# Patient Record
Sex: FEMALE | Race: WHITE | NOT HISPANIC OR LATINO | Employment: OTHER | URBAN - METROPOLITAN AREA
[De-identification: names, ages, dates, MRNs, and addresses within clinical notes are randomized per-mention and may not be internally consistent; named-entity substitution may affect disease eponyms.]

---

## 2017-01-13 ENCOUNTER — OFFICE VISIT (OUTPATIENT)
Dept: GASTROENTEROLOGY | Facility: CLINIC | Age: 77
End: 2017-01-13

## 2017-01-13 VITALS
WEIGHT: 207 LBS | DIASTOLIC BLOOD PRESSURE: 76 MMHG | SYSTOLIC BLOOD PRESSURE: 132 MMHG | HEIGHT: 64 IN | BODY MASS INDEX: 35.34 KG/M2

## 2017-01-13 DIAGNOSIS — D12.6: Primary | ICD-10-CM

## 2017-01-13 PROCEDURE — 99203 OFFICE O/P NEW LOW 30 MIN: CPT | Performed by: INTERNAL MEDICINE

## 2017-01-13 RX ORDER — SODIUM CHLORIDE 0.9 % (FLUSH) 0.9 %
1-10 SYRINGE (ML) INJECTION AS NEEDED
Status: CANCELLED | OUTPATIENT
Start: 2017-01-13

## 2017-01-13 RX ORDER — UBIDECARENONE 100 MG
100 CAPSULE ORAL DAILY
COMMUNITY
End: 2018-08-09

## 2017-01-13 RX ORDER — PANTOPRAZOLE SODIUM 40 MG/1
40 TABLET, DELAYED RELEASE ORAL DAILY
COMMUNITY
End: 2017-08-03 | Stop reason: SDUPTHER

## 2017-01-13 NOTE — PROGRESS NOTES
Chief Complaint   Patient presents with   • Consult for a rectal EUS     Subjective      HPI     Viktoria Villasenor is a 76 y.o. female who presents for evaluation for possible rectal EUS.  Pt underwent recent colonoscopy with Dr. Garcia.  She was was reported as a 2mm rectosigmoid polyp resected, with pathology suggestive of a Schwannoma.  Immunostaining was positive for S100 and parakeratin.  No Ki67 index was performed.  I have reviewed all of these records from Dr. Garcia's office.  She has no lower GI complaints.  She has no known hx of neurofibromatosis or unusual skin lesions.  She has no significant family hx from Gi perspective.      Past Medical History   Diagnosis Date   • ACE-inhibitor cough    • Asthma    • Asymptomatic postmenopausal status    • Conjunctivitis      right   • GERD (gastroesophageal reflux disease)    • HLD (hyperlipidemia)    • Hypertension    • Hypopigmentation    • Left shoulder tendonitis    • Menopausal syndrome    • Motion sickness    • Plantar fasciitis of left foot      nodule   • Pruritus      possibly due to Benicar   • SOB (shortness of breath)    • Thyroid cyst    • UTI (urinary tract infection)    • Viral syndrome        Current Outpatient Prescriptions:   •  coenzyme Q10 100 MG capsule, Take 100 mg by mouth Daily., Disp: , Rfl:   •  irbesartan (AVAPRO) 300 MG tablet, Take 1 tablet by mouth Every Night for 180 days., Disp: 90 tablet, Rfl: 1  •  montelukast (SINGULAIR) 10 MG tablet, Take 1 tablet by mouth Every Night for 180 days., Disp: 90 tablet, Rfl: 1  •  pantoprazole (PROTONIX) 40 MG EC tablet, Take 40 mg by mouth Daily., Disp: , Rfl:   •  simvastatin (ZOCOR) 20 MG tablet, Take 1 tablet by mouth Every Night for 180 days., Disp: 90 tablet, Rfl: 1     No Known Allergies  Social History     Social History   • Marital status:      Spouse name: N/A   • Number of children: N/A   • Years of education: N/A     Occupational History   • Not on file.     Social History Main  Topics   • Smoking status: Former Smoker     Packs/day: 1.00     Quit date: 1994   • Smokeless tobacco: Never Used      Comment: Smoked 1 ppd for 20-25 years.  Quit in 1995.    • Alcohol use No   • Drug use: No   • Sexual activity: Defer     Other Topics Concern   • Not on file     Social History Narrative     Family History   Problem Relation Age of Onset   • Anxiety disorder Mother    • Uterine cancer Mother    • Cancer Father      Prostate Cancer    • Other Father      AAA     Review of Systems   Constitutional: Negative.    HENT: Negative.    Respiratory: Negative.    Cardiovascular: Negative.    Gastrointestinal: Negative.    Neurological: Negative.    Psychiatric/Behavioral: Negative.         Objective   Vitals:    01/13/17 0922   BP: 132/76     Physical Exam   Constitutional: She is oriented to person, place, and time. She appears well-developed and well-nourished.   HENT:   Head: Normocephalic and atraumatic.   Eyes: EOM are normal. Pupils are equal, round, and reactive to light.   Cardiovascular: Normal rate, regular rhythm and normal heart sounds.    Pulmonary/Chest: Effort normal.   Abdominal: Soft. Bowel sounds are normal. She exhibits no distension and no mass. There is no tenderness. No hernia.   Musculoskeletal: Normal range of motion.   Neurological: She is alert and oriented to person, place, and time.   Skin: Skin is warm and dry.   Psychiatric: She has a normal mood and affect. Her behavior is normal. Judgment and thought content normal.        Assessment/Plan      Viktoria was seen today for consult for a rectal eus.    Diagnoses and all orders for this visit:    Schwannoma of colon  -     Case Request; Standing  -     sodium chloride 0.9 % flush 1-10 mL; Infuse 1-10 mL into a venous catheter As Needed for line care.  -     Case Request    Other orders  -     Implement Anesthesia orders day of procedure.; Standing  -     Obtain informed consent; Standing  -     Insert Peripheral IV; Standing  -      Saline Lock & Maintain IV Access; Standing    This lady is a somewhat unusual presentation of what pathology is describing as a rectal schwannoma.  This was discovered as a 2 mm polypoid lesion in the rectosigmoid on recent colonoscopy.  Lesion was not obviously submucosal in origin would be typical of a schwannoma.  We'll plan on endorectal ultrasound for further evaluation although the extremely small small size of this lesion is reassuring, as is the historically benign nature of schwannomas.

## 2017-01-13 NOTE — LETTER
January 16, 2017     Magen Olson MD  [id number] [facility] [address]  [place] [id number]    Patient: Viktoria Villasenor   YOB: 1940   Date of Visit: 1/13/2017       Dear Dr. Wesley MD:    Thank you for referring Viktoria Villasenor to me for evaluation. Below is a copy of my consult note.    If you have questions, please do not hesitate to call me. I look forward to following Viktoria along with you.         Sincerely,        Casper Rodríguez MD        CC: Alexy Garcia MD    Chief Complaint   Patient presents with   • Consult for a rectal EUS     Subjective      HPI     Viktoria Villasenor is a 76 y.o. female who presents for evaluation for possible rectal EUS.  Pt underwent recent colonoscopy with Dr. Garcia.  She was was reported as a 2mm rectosigmoid polyp resected, with pathology suggestive of a Schwannoma.  Immunostaining was positive for S100 and parakeratin.  No Ki67 index was performed.  I have reviewed all of these records from Dr. Garcia's office.  She has no lower GI complaints.  She has no known hx of neurofibromatosis or unusual skin lesions.  She has no significant family hx from Gi perspective.      Past Medical History   Diagnosis Date   • ACE-inhibitor cough    • Asthma    • Asymptomatic postmenopausal status    • Conjunctivitis      right   • GERD (gastroesophageal reflux disease)    • HLD (hyperlipidemia)    • Hypertension    • Hypopigmentation    • Left shoulder tendonitis    • Menopausal syndrome    • Motion sickness    • Plantar fasciitis of left foot      nodule   • Pruritus      possibly due to Benicar   • SOB (shortness of breath)    • Thyroid cyst    • UTI (urinary tract infection)    • Viral syndrome        Current Outpatient Prescriptions:   •  coenzyme Q10 100 MG capsule, Take 100 mg by mouth Daily., Disp: , Rfl:   •  irbesartan (AVAPRO) 300 MG tablet, Take 1 tablet by mouth Every Night for 180 days., Disp: 90 tablet, Rfl: 1  •  montelukast (SINGULAIR) 10 MG  tablet, Take 1 tablet by mouth Every Night for 180 days., Disp: 90 tablet, Rfl: 1  •  pantoprazole (PROTONIX) 40 MG EC tablet, Take 40 mg by mouth Daily., Disp: , Rfl:   •  simvastatin (ZOCOR) 20 MG tablet, Take 1 tablet by mouth Every Night for 180 days., Disp: 90 tablet, Rfl: 1     No Known Allergies  Social History     Social History   • Marital status:      Spouse name: N/A   • Number of children: N/A   • Years of education: N/A     Occupational History   • Not on file.     Social History Main Topics   • Smoking status: Former Smoker     Packs/day: 1.00     Quit date: 1994   • Smokeless tobacco: Never Used      Comment: Smoked 1 ppd for 20-25 years.  Quit in 1995.    • Alcohol use No   • Drug use: No   • Sexual activity: Defer     Other Topics Concern   • Not on file     Social History Narrative     Family History   Problem Relation Age of Onset   • Anxiety disorder Mother    • Uterine cancer Mother    • Cancer Father      Prostate Cancer    • Other Father      AAA     Review of Systems   Constitutional: Negative.    HENT: Negative.    Respiratory: Negative.    Cardiovascular: Negative.    Gastrointestinal: Negative.    Neurological: Negative.    Psychiatric/Behavioral: Negative.         Objective   Vitals:    01/13/17 0922   BP: 132/76     Physical Exam   Constitutional: She is oriented to person, place, and time. She appears well-developed and well-nourished.   HENT:   Head: Normocephalic and atraumatic.   Eyes: EOM are normal. Pupils are equal, round, and reactive to light.   Cardiovascular: Normal rate, regular rhythm and normal heart sounds.    Pulmonary/Chest: Effort normal.   Abdominal: Soft. Bowel sounds are normal. She exhibits no distension and no mass. There is no tenderness. No hernia.   Musculoskeletal: Normal range of motion.   Neurological: She is alert and oriented to person, place, and time.   Skin: Skin is warm and dry.   Psychiatric: She has a normal mood and affect. Her behavior is  normal. Judgment and thought content normal.        Assessment/Plan      Viktoria was seen today for consult for a rectal eus.    Diagnoses and all orders for this visit:    Schwannoma of colon  -     Case Request; Standing  -     sodium chloride 0.9 % flush 1-10 mL; Infuse 1-10 mL into a venous catheter As Needed for line care.  -     Case Request    Other orders  -     Implement Anesthesia orders day of procedure.; Standing  -     Obtain informed consent; Standing  -     Insert Peripheral IV; Standing  -     Saline Lock & Maintain IV Access; Standing    This lady is a somewhat unusual presentation of what pathology is describing as a rectal schwannoma.  This was discovered as a 2 mm polypoid lesion in the rectosigmoid on recent colonoscopy.  Lesion was not obviously submucosal in origin would be typical of a schwannoma.  We'll plan on endorectal ultrasound for further evaluation although the extremely small small size of this lesion is reassuring, as is the historically benign nature of schwannomas.

## 2017-01-13 NOTE — MR AVS SNAPSHOT
Viktoria Villasenor   1/13/2017 9:45 AM   Office Visit    Dept Phone:  [phone]   Encounter #:  [id number]    Provider:  Casper Rodríguez MD   Department:  [facility]                Your Full Care Plan              Today's Medication Changes          These changes are accurate as of: 1/13/17  9:56 AM.  If you have any questions, ask your nurse or doctor.               Stop taking medication(s)listed here:     omeprazole 40 MG capsule   Commonly known as:  priLOSEC   Stopped by:  Casper Rodríguez MD                      Your Updated Medication List          This list is accurate as of: 1/13/17  9:56 AM.  Always use your most recent med list.                coenzyme Q10 100 MG capsule       irbesartan 300 MG tablet   Commonly known as:  AVAPRO   Take 1 tablet by mouth Every Night for 180 days.       montelukast 10 MG tablet   Commonly known as:  SINGULAIR   Take 1 tablet by mouth Every Night for 180 days.       pantoprazole 40 MG EC tablet   Commonly known as:  PROTONIX       simvastatin 20 MG tablet   Commonly known as:  ZOCOR   Take 1 tablet by mouth Every Night for 180 days.               We Performed the Following     Case Request       You Were Diagnosed With        Codes Comments    Schwannoma of colon    -  Primary ICD-10-CM: D12.6  ICD-9-CM: 211.3       Instructions     None    Patient Instructions History      Upcoming Appointments     Visit Type Date Time Department    NEW PATIENT 1/13/2017  9:45 AM [facility]      cVidya Signup     [facility] [employer]Vidya allows you to send messages to your doctor, view your test results, renew your prescriptions, schedule appointments, and more. To sign up, go to [employer] and click on the Sign Up Now link in the New User? box. Enter your [employer]Vidya Activation Code exactly as it appears below along with the last four digits of your Social Security Number and your Date of Birth  "() to complete the sign-up process. If you do not sign up before the expiration date, you must request a new code.    [email or web address] Activation Code: [email or web address]  Expires: 2017  9:56 AM    If you have questions, you can email [email or web address] or call [phone] to talk to our [email or web address] staff. Remember, [email or web address] is NOT to be used for urgent needs. For medical emergencies, dial 911.               Other Info from Your Visit           Allergies     No Known Allergies      Reason for Visit     Consult for a rectal EUS           Vital Signs     Blood Pressure Height Weight Body Mass Index Smoking Status       132/76 64\" (162.6 cm) 207 lb (93.9 kg) 35.53 kg/m2 Former Smoker       Problems and Diagnoses Noted     Schwannoma of colon    -  Primary        "

## 2017-02-07 ENCOUNTER — ANESTHESIA (OUTPATIENT)
Dept: GASTROENTEROLOGY | Facility: HOSPITAL | Age: 77
End: 2017-02-07

## 2017-02-07 ENCOUNTER — HOSPITAL ENCOUNTER (OUTPATIENT)
Facility: HOSPITAL | Age: 77
Setting detail: HOSPITAL OUTPATIENT SURGERY
Discharge: HOME | End: 2017-02-07
Attending: INTERNAL MEDICINE | Admitting: INTERNAL MEDICINE

## 2017-02-07 ENCOUNTER — ANESTHESIA EVENT (OUTPATIENT)
Dept: GASTROENTEROLOGY | Facility: HOSPITAL | Age: 77
End: 2017-02-07

## 2017-02-07 VITALS
HEART RATE: 72 BPM | OXYGEN SATURATION: 97 % | BODY MASS INDEX: 34.67 KG/M2 | TEMPERATURE: 98.4 F | SYSTOLIC BLOOD PRESSURE: 114 MMHG | WEIGHT: 202 LBS | RESPIRATION RATE: 16 BRPM | DIASTOLIC BLOOD PRESSURE: 59 MMHG

## 2017-02-07 DIAGNOSIS — D12.6: ICD-10-CM

## 2017-02-07 PROCEDURE — 45341 SIGMOIDOSCOPY W/ULTRASOUND: CPT | Performed by: INTERNAL MEDICINE

## 2017-02-07 PROCEDURE — 25010000002 PROPOFOL 10 MG/ML EMULSION: Performed by: ANESTHESIOLOGY

## 2017-02-07 RX ORDER — SODIUM CHLORIDE, SODIUM LACTATE, POTASSIUM CHLORIDE, CALCIUM CHLORIDE 600; 310; 30; 20 MG/100ML; MG/100ML; MG/100ML; MG/100ML
1000 INJECTION, SOLUTION INTRAVENOUS CONTINUOUS PRN
Status: DISCONTINUED | OUTPATIENT
Start: 2017-02-07 | End: 2017-02-07 | Stop reason: HOSPADM

## 2017-02-07 RX ORDER — LIDOCAINE HYDROCHLORIDE 20 MG/ML
INJECTION, SOLUTION INFILTRATION; PERINEURAL AS NEEDED
Status: DISCONTINUED | OUTPATIENT
Start: 2017-02-07 | End: 2017-02-07 | Stop reason: SURG

## 2017-02-07 RX ORDER — SODIUM CHLORIDE 0.9 % (FLUSH) 0.9 %
1-10 SYRINGE (ML) INJECTION AS NEEDED
Status: DISCONTINUED | OUTPATIENT
Start: 2017-02-07 | End: 2017-02-07 | Stop reason: HOSPADM

## 2017-02-07 RX ORDER — PROPOFOL 10 MG/ML
VIAL (ML) INTRAVENOUS AS NEEDED
Status: DISCONTINUED | OUTPATIENT
Start: 2017-02-07 | End: 2017-02-07 | Stop reason: SURG

## 2017-02-07 RX ADMIN — PROPOFOL 200 MG: 10 INJECTION, EMULSION INTRAVENOUS at 11:00

## 2017-02-07 RX ADMIN — SODIUM CHLORIDE, POTASSIUM CHLORIDE, SODIUM LACTATE AND CALCIUM CHLORIDE: 600; 310; 30; 20 INJECTION, SOLUTION INTRAVENOUS at 10:20

## 2017-02-07 RX ADMIN — LIDOCAINE HYDROCHLORIDE 40 MG: 20 INJECTION, SOLUTION INFILTRATION; PERINEURAL at 10:20

## 2017-02-07 RX ADMIN — SODIUM CHLORIDE, POTASSIUM CHLORIDE, SODIUM LACTATE AND CALCIUM CHLORIDE 1000 ML: 600; 310; 30; 20 INJECTION, SOLUTION INTRAVENOUS at 10:15

## 2017-02-07 RX ADMIN — PROPOFOL 200 MG: 10 INJECTION, EMULSION INTRAVENOUS at 10:32

## 2017-02-07 NOTE — PLAN OF CARE
Problem: Patient Care Overview (Adult)  Goal: Plan of Care Review  Outcome: Ongoing (interventions implemented as appropriate)    02/07/17 0953   Coping/Psychosocial Response Interventions   Plan Of Care Reviewed With patient       Goal: Adult Individualization and Mutuality  Outcome: Ongoing (interventions implemented as appropriate)    02/07/17 0953   Individualization   Patient Specific Preferences none       Goal: Discharge Needs Assessment  Outcome: Ongoing (interventions implemented as appropriate)    02/07/17 0953   Discharge Needs Assessment   Concerns To Be Addressed no discharge needs identified   Living Environment   Transportation Available family or friend will provide         Problem: GI Endoscopy (Adult)  Goal: Signs and Symptoms of Listed Potential Problems Will be Absent or Manageable (GI Endoscopy)  Outcome: Ongoing (interventions implemented as appropriate)    02/07/17 0953   GI Endoscopy   Problems Assessed (GI Endoscopy) pain   Problems Present (GI Endoscopy) none

## 2017-02-07 NOTE — ANESTHESIA POSTPROCEDURE EVALUATION
Patient: Viktoria Villasenor    Procedure Summary     Date Anesthesia Start Anesthesia Stop Room / Location    02/07/17 1032 1109  [facility] 10 /  [facility]       Procedure Diagnosis Surgeon Provider    ENDOSCOPIC ULTRASOUND (LOWER) (N/A ) Schwannoma of colon  (Schwannoma of colon [D12.6]) MD Devon Brown MD          Anesthesia Type: MAC  Last vitals  BP 91/57 (02/07/17 1109)    Temp      Pulse 80 (02/07/17 1109)   Resp 16 (02/07/17 1109)    SpO2 96 % (02/07/17 1109)      Post Anesthesia Care and Evaluation    Patient location during evaluation: PACU  Patient participation: complete - patient participated  Level of consciousness: awake and alert  Pain management: adequate  Airway patency: patent  Anesthetic complications: No anesthetic complications    Cardiovascular status: acceptable  Respiratory status: acceptable  Hydration status: acceptable

## 2017-02-07 NOTE — DISCHARGE INSTRUCTIONS
For the next 24 hours patient needs to be with a responsible adult.    For 24 hours DO NOT drive, operate machinery, appliances, drink alcohol, make important decisions or sign legal documents.    Start with a light or bland diet and advance to regular diet as tolerated.    Follow recommendations on procedure report provided by your doctor.    Call Dr. MANZANO for problems     Problems may include but not limited to: large amounts of bleeding, trouble breathing, repeated vomiting, severe unrelieved pain, fever or chills.

## 2017-02-07 NOTE — ANESTHESIA PREPROCEDURE EVALUATION
Anesthesia Evaluation     Patient summary reviewed and Nursing notes reviewed      Airway   Mallampati: III  TM distance: <3 FB  Neck ROM: full  possible difficult intubation  Dental - normal exam     Pulmonary - normal exam   (+) a smoker,   Cardiovascular - negative cardio ROS and normal exam    ECG reviewed  Rhythm: regular  Rate: normal        Neuro/Psych- negative ROS  GI/Hepatic/Renal/Endo - negative ROS     Musculoskeletal (-) negative ROS    Abdominal  - normal exam    Bowel sounds: normal.   Substance History - negative use     OB/GYN negative ob/gyn ROS         Other                              Anesthesia Plan    ASA 2     MAC   (Small mouth  overbite  Short TM distance)  intravenous induction   Anesthetic plan and risks discussed with patient.

## 2017-02-15 ENCOUNTER — TELEPHONE (OUTPATIENT)
Dept: CARDIOLOGY | Facility: CLINIC | Age: 77
End: 2017-02-15

## 2017-02-15 NOTE — TELEPHONE ENCOUNTER
Called spoke with them noted RBBB on monitor no symptoms. Hey are letting her go to F/U with us as an op.MERLE

## 2017-02-17 NOTE — TELEPHONE ENCOUNTER
See EKG in inbox. Please advise how soon you would like to see the pt.     Pt can be reached at [phone] or [phone]

## 2017-02-18 NOTE — TELEPHONE ENCOUNTER
Sheree,    She has had a recent heart cath which was normal.  She can follow-up in 4-6 weeks.  Can you please arrange?  Thanks    AUSTIN

## 2017-04-14 ENCOUNTER — RESULTS ENCOUNTER (OUTPATIENT)
Dept: FAMILY MEDICINE CLINIC | Facility: CLINIC | Age: 77
End: 2017-04-14

## 2017-04-14 DIAGNOSIS — I10 ESSENTIAL HYPERTENSION: ICD-10-CM

## 2017-04-14 DIAGNOSIS — J45.909 UNCOMPLICATED ASTHMA, UNSPECIFIED ASTHMA SEVERITY: ICD-10-CM

## 2017-04-14 DIAGNOSIS — E78.49 OTHER HYPERLIPIDEMIA: ICD-10-CM

## 2017-05-02 ENCOUNTER — OFFICE VISIT (OUTPATIENT)
Dept: FAMILY MEDICINE CLINIC | Facility: CLINIC | Age: 77
End: 2017-05-02

## 2017-05-02 VITALS
HEIGHT: 64 IN | SYSTOLIC BLOOD PRESSURE: 128 MMHG | TEMPERATURE: 97.9 F | DIASTOLIC BLOOD PRESSURE: 70 MMHG | RESPIRATION RATE: 16 BRPM | BODY MASS INDEX: 35 KG/M2 | HEART RATE: 77 BPM | OXYGEN SATURATION: 97 % | WEIGHT: 205 LBS

## 2017-05-02 DIAGNOSIS — N39.0 ACUTE UTI: Primary | ICD-10-CM

## 2017-05-02 LAB
BILIRUB BLD-MCNC: NEGATIVE MG/DL
CLARITY, POC: CLEAR
COLOR UR: YELLOW
GLUCOSE UR STRIP-MCNC: NEGATIVE MG/DL
KETONES UR QL: NEGATIVE
LEUKOCYTE EST, POC: ABNORMAL
NITRITE UR-MCNC: NEGATIVE MG/ML
PH UR: 6.5 [PH] (ref 5–8)
PROT UR STRIP-MCNC: NEGATIVE MG/DL
RBC # UR STRIP: NEGATIVE /UL
SP GR UR: 1.02 (ref 1–1.03)
UROBILINOGEN UR QL: NORMAL

## 2017-05-02 PROCEDURE — 81003 URINALYSIS AUTO W/O SCOPE: CPT | Performed by: NURSE PRACTITIONER

## 2017-05-02 PROCEDURE — 99213 OFFICE O/P EST LOW 20 MIN: CPT | Performed by: NURSE PRACTITIONER

## 2017-05-02 RX ORDER — NITROFURANTOIN 25; 75 MG/1; MG/1
100 CAPSULE ORAL 2 TIMES DAILY
Qty: 14 CAPSULE | Refills: 0 | Status: SHIPPED | OUTPATIENT
Start: 2017-05-02 | End: 2017-08-03

## 2017-05-04 LAB
BACTERIA UR CULT: NORMAL
BACTERIA UR CULT: NORMAL

## 2017-05-16 LAB
ALBUMIN SERPL-MCNC: 4.2 G/DL (ref 3.5–5.2)
ALBUMIN/GLOB SERPL: 1.5 G/DL
ALP SERPL-CCNC: 92 U/L (ref 39–117)
ALT SERPL-CCNC: 39 U/L (ref 1–33)
AST SERPL-CCNC: 33 U/L (ref 1–32)
BASOPHILS # BLD AUTO: 0.04 10*3/MM3 (ref 0–0.2)
BASOPHILS NFR BLD AUTO: 0.4 % (ref 0–1.5)
BILIRUB SERPL-MCNC: 0.4 MG/DL (ref 0.1–1.2)
BUN SERPL-MCNC: 10 MG/DL (ref 8–23)
BUN/CREAT SERPL: 10.8 (ref 7–25)
CALCIUM SERPL-MCNC: 9.1 MG/DL (ref 8.6–10.5)
CHLORIDE SERPL-SCNC: 104 MMOL/L (ref 98–107)
CHOLEST SERPL-MCNC: 150 MG/DL (ref 0–200)
CO2 SERPL-SCNC: 28.1 MMOL/L (ref 22–29)
CREAT SERPL-MCNC: 0.93 MG/DL (ref 0.57–1)
EOSINOPHIL # BLD AUTO: 0.56 10*3/MM3 (ref 0–0.7)
EOSINOPHIL NFR BLD AUTO: 6.2 % (ref 0.3–6.2)
ERYTHROCYTE [DISTWIDTH] IN BLOOD BY AUTOMATED COUNT: 13.4 % (ref 11.7–13)
GLOBULIN SER CALC-MCNC: 2.8 GM/DL
GLUCOSE SERPL-MCNC: 110 MG/DL (ref 65–99)
HCT VFR BLD AUTO: 42.9 % (ref 35.6–45.5)
HDLC SERPL-MCNC: 62 MG/DL (ref 40–60)
HGB BLD-MCNC: 13.8 G/DL (ref 11.9–15.5)
IMM GRANULOCYTES # BLD: 0.02 10*3/MM3 (ref 0–0.03)
IMM GRANULOCYTES NFR BLD: 0.2 % (ref 0–0.5)
LDLC SERPL CALC-MCNC: 66 MG/DL (ref 0–100)
LDLC/HDLC SERPL: 1.07 {RATIO}
LYMPHOCYTES # BLD AUTO: 2.28 10*3/MM3 (ref 0.9–4.8)
LYMPHOCYTES NFR BLD AUTO: 25.1 % (ref 19.6–45.3)
MCH RBC QN AUTO: 28.2 PG (ref 26.9–32)
MCHC RBC AUTO-ENTMCNC: 32.2 G/DL (ref 32.4–36.3)
MCV RBC AUTO: 87.7 FL (ref 80.5–98.2)
MONOCYTES # BLD AUTO: 0.79 10*3/MM3 (ref 0.2–1.2)
MONOCYTES NFR BLD AUTO: 8.7 % (ref 5–12)
NEUTROPHILS # BLD AUTO: 5.41 10*3/MM3 (ref 1.9–8.1)
NEUTROPHILS NFR BLD AUTO: 59.4 % (ref 42.7–76)
PLATELET # BLD AUTO: 216 10*3/MM3 (ref 140–500)
POTASSIUM SERPL-SCNC: 4.4 MMOL/L (ref 3.5–5.2)
PROT SERPL-MCNC: 7 G/DL (ref 6–8.5)
RBC # BLD AUTO: 4.89 10*6/MM3 (ref 3.9–5.2)
SODIUM SERPL-SCNC: 146 MMOL/L (ref 136–145)
TRIGL SERPL-MCNC: 108 MG/DL (ref 0–150)
TSH SERPL DL<=0.005 MIU/L-ACNC: 1.89 MIU/ML (ref 0.27–4.2)
VLDLC SERPL CALC-MCNC: 21.6 MG/DL (ref 5–40)
WBC # BLD AUTO: 9.1 10*3/MM3 (ref 4.5–10.7)

## 2017-05-18 ENCOUNTER — TELEPHONE (OUTPATIENT)
Dept: FAMILY MEDICINE CLINIC | Facility: CLINIC | Age: 77
End: 2017-05-18

## 2017-05-18 DIAGNOSIS — E78.49 OTHER HYPERLIPIDEMIA: ICD-10-CM

## 2017-05-18 DIAGNOSIS — J45.909 UNCOMPLICATED ASTHMA, UNSPECIFIED ASTHMA SEVERITY: ICD-10-CM

## 2017-05-18 DIAGNOSIS — I10 ESSENTIAL HYPERTENSION: ICD-10-CM

## 2017-05-18 RX ORDER — IRBESARTAN 300 MG/1
300 TABLET ORAL NIGHTLY
Qty: 30 TABLET | Refills: 0 | Status: SHIPPED | OUTPATIENT
Start: 2017-05-18 | End: 2017-08-03 | Stop reason: SDUPTHER

## 2017-05-18 RX ORDER — SIMVASTATIN 20 MG
20 TABLET ORAL NIGHTLY
Qty: 30 TABLET | Refills: 0 | Status: SHIPPED | OUTPATIENT
Start: 2017-05-18 | End: 2017-08-03 | Stop reason: SDUPTHER

## 2017-05-18 RX ORDER — MONTELUKAST SODIUM 10 MG/1
10 TABLET ORAL NIGHTLY
Qty: 30 TABLET | Refills: 0 | Status: SHIPPED | OUTPATIENT
Start: 2017-05-18 | End: 2017-08-03 | Stop reason: SDUPTHER

## 2017-08-03 ENCOUNTER — OFFICE VISIT (OUTPATIENT)
Dept: FAMILY MEDICINE CLINIC | Facility: CLINIC | Age: 77
End: 2017-08-03

## 2017-08-03 VITALS
HEIGHT: 64 IN | HEART RATE: 72 BPM | RESPIRATION RATE: 16 BRPM | BODY MASS INDEX: 35.51 KG/M2 | DIASTOLIC BLOOD PRESSURE: 57 MMHG | SYSTOLIC BLOOD PRESSURE: 133 MMHG | WEIGHT: 208 LBS | TEMPERATURE: 97.8 F

## 2017-08-03 DIAGNOSIS — R79.89 ELEVATED LFTS: ICD-10-CM

## 2017-08-03 DIAGNOSIS — J45.909 UNCOMPLICATED ASTHMA, UNSPECIFIED ASTHMA SEVERITY: ICD-10-CM

## 2017-08-03 DIAGNOSIS — E78.49 OTHER HYPERLIPIDEMIA: ICD-10-CM

## 2017-08-03 DIAGNOSIS — K20.90 ESOPHAGITIS: ICD-10-CM

## 2017-08-03 DIAGNOSIS — R73.03 PREDIABETES: ICD-10-CM

## 2017-08-03 DIAGNOSIS — I10 ESSENTIAL HYPERTENSION: Primary | ICD-10-CM

## 2017-08-03 DIAGNOSIS — E66.09 NON MORBID OBESITY DUE TO EXCESS CALORIES: ICD-10-CM

## 2017-08-03 PROCEDURE — 99214 OFFICE O/P EST MOD 30 MIN: CPT | Performed by: FAMILY MEDICINE

## 2017-08-03 RX ORDER — PANTOPRAZOLE SODIUM 40 MG/1
40 TABLET, DELAYED RELEASE ORAL NIGHTLY PRN
Qty: 90 TABLET | Refills: 1 | Status: SHIPPED | OUTPATIENT
Start: 2017-08-03 | End: 2018-08-09

## 2017-08-03 RX ORDER — MONTELUKAST SODIUM 10 MG/1
10 TABLET ORAL NIGHTLY
Qty: 90 TABLET | Refills: 1 | Status: SHIPPED | OUTPATIENT
Start: 2017-08-03 | End: 2018-02-14 | Stop reason: SDUPTHER

## 2017-08-03 RX ORDER — IRBESARTAN 300 MG/1
300 TABLET ORAL NIGHTLY
Qty: 90 TABLET | Refills: 1 | Status: SHIPPED | OUTPATIENT
Start: 2017-08-03 | End: 2018-02-14 | Stop reason: ALTCHOICE

## 2017-08-03 RX ORDER — SIMVASTATIN 20 MG
20 TABLET ORAL NIGHTLY
Qty: 90 TABLET | Refills: 1 | Status: SHIPPED | OUTPATIENT
Start: 2017-08-03 | End: 2018-02-14 | Stop reason: SDUPTHER

## 2017-08-03 NOTE — PROGRESS NOTES
"Chief Complaint   Patient presents with   • Hyperlipidemia   • Hypertension       Subjective   This patient presents the office to review labs and refill medicines.  Blood pressure is controlled on current therapy.  Lipids are improved and the goal on current therapy.  Her asthma is controlled with montelukast.  She was diagnosed with mild esophagitis.  Her last EGD was done in February 2017.  She is using pantoprazole on an as-needed basis.  All pathology was negative.  She did have some stomach polyps removed a Schatzki's ring stretched.  Currently she is nearly asymptomatic.  Since the last visit her liver function tests have improved.  They're only mildly elevated.  Her blood sugar is improved but is still mildly elevated.  I have reviewed and updated her medications, medical history and problem list during today's office visit.        Social History   Substance Use Topics   • Smoking status: Former Smoker     Packs/day: 1.00     Quit date: 1994   • Smokeless tobacco: Never Used      Comment: Smoked 1 ppd for 20-25 years.  Quit in 1995.    • Alcohol use No       Review of Systems   Constitutional: Negative for fatigue.   Cardiovascular: Negative for chest pain.       Objective   /57  Pulse 72  Temp 97.8 °F (36.6 °C) (Oral)   Resp 16  Ht 64\" (162.6 cm)  Wt 208 lb (94.3 kg)  BMI 35.7 kg/m2  Body mass index is 35.7 kg/(m^2).  Physical Exam   Constitutional: She is cooperative. No distress.   Eyes: Conjunctivae and lids are normal.   Neck: Carotid bruit is not present. No tracheal deviation present.   Cardiovascular: Normal rate, regular rhythm and normal heart sounds.    No murmur heard.  Pulmonary/Chest: Effort normal and breath sounds normal.   Neurological: She is alert. She is not disoriented.   Skin: Skin is warm and dry.   Psychiatric: She has a normal mood and affect. Her speech is normal and behavior is normal.   Vitals reviewed.      Data Reviewed:    CMP:  Lab Results   Component Value Date    "  (H) 05/15/2017    BUN 10 05/15/2017    CREATININE 0.93 05/15/2017    EGFRIFNONA 59 (L) 05/15/2017    EGFRIFAFRI 71 05/15/2017     (H) 05/15/2017    K 4.4 05/15/2017     05/15/2017    CALCIUM 9.1 05/15/2017    PROTENTOTREF 7.0 05/15/2017    ALBUMIN 4.20 05/15/2017    LABGLOBREF 2.8 05/15/2017    BILITOT 0.4 05/15/2017    ALKPHOS 92 05/15/2017    AST 33 (H) 05/15/2017    ALT 39 (H) 05/15/2017     CBC w/ diff:   Lab Results   Component Value Date    WBC 9.10 05/15/2017    RBC 4.89 05/15/2017    HGB 13.8 05/15/2017    HCT 42.9 05/15/2017    MCV 87.7 05/15/2017    MCH 28.2 05/15/2017    MCHC 32.2 (L) 05/15/2017    RDW 13.4 (H) 05/15/2017     05/15/2017    NEUTRORELPCT 59.4 05/15/2017    AUTOIGPER 0.3 04/29/2016    LYMPHORELPCT 25.1 05/15/2017    MONORELPCT 8.7 05/15/2017    EOSRELPCT 6.2 05/15/2017    BASORELPCT 0.4 05/15/2017     LIPID PANEL:  Lab Results   Component Value Date    CHLPL 150 05/15/2017    TRIG 108 05/15/2017    HDL 62 (H) 05/15/2017    VLDL 21.6 05/15/2017    LDL 66 05/15/2017    LDLHDL 1.07 05/15/2017     TSH:  Lab Results   Component Value Date    TSH 1.890 05/15/2017       Assessment/Plan     Problem List Items Addressed This Visit        Cardiovascular and Mediastinum    HLD (hyperlipidemia)    Relevant Medications    simvastatin (ZOCOR) 20 MG tablet    Other Relevant Orders    Lipid Panel With LDL/HDL Ratio    Hypertension - Primary    Relevant Medications    irbesartan (AVAPRO) 300 MG tablet    Other Relevant Orders    Comprehensive metabolic panel    CBC and Differential    TSH       Respiratory    Asthma    Relevant Medications    montelukast (SINGULAIR) 10 MG tablet       Digestive    Esophagitis    Relevant Medications    pantoprazole (PROTONIX) 40 MG EC tablet    Non morbid obesity due to excess calories       Other    Elevated LFTs    Relevant Orders    Comprehensive metabolic panel    Prediabetes          Outpatient Encounter Prescriptions as of 8/3/2017    Medication Sig Dispense Refill   • coenzyme Q10 100 MG capsule Take 100 mg by mouth Daily.     • irbesartan (AVAPRO) 300 MG tablet Take 1 tablet by mouth Every Night for 180 days. 90 tablet 1   • montelukast (SINGULAIR) 10 MG tablet Take 1 tablet by mouth Every Night for 180 days. 90 tablet 1   • pantoprazole (PROTONIX) 40 MG EC tablet Take 1 tablet by mouth At Night As Needed (heartburn) for up to 180 days. 90 tablet 1   • simvastatin (ZOCOR) 20 MG tablet Take 1 tablet by mouth Every Night for 180 days. 90 tablet 1   • [DISCONTINUED] irbesartan (AVAPRO) 300 MG tablet Take 1 tablet by mouth Every Night for 180 days. 30 tablet 0   • [DISCONTINUED] montelukast (SINGULAIR) 10 MG tablet Take 1 tablet by mouth Every Night for 180 days. 30 tablet 0   • [DISCONTINUED] nitrofurantoin, macrocrystal-monohydrate, (MACROBID) 100 MG capsule Take 1 capsule by mouth 2 (Two) Times a Day. 14 capsule 0   • [DISCONTINUED] pantoprazole (PROTONIX) 40 MG EC tablet Take 40 mg by mouth Daily.     • [DISCONTINUED] simvastatin (ZOCOR) 20 MG tablet Take 1 tablet by mouth Every Night for 180 days. 30 tablet 0     No facility-administered encounter medications on file as of 8/3/2017.        Orders Placed This Encounter   Procedures   • Comprehensive metabolic panel     Standing Status:   Future     Standing Expiration Date:   4/30/2018   • Lipid Panel With LDL/HDL Ratio     Standing Status:   Future     Standing Expiration Date:   4/30/2018   • TSH     Standing Status:   Future     Standing Expiration Date:   4/30/2018   • CBC and Differential     Standing Status:   Future     Standing Expiration Date:   4/30/2018     Order Specific Question:   Manual Differential     Answer:   No       Continue with current treatment plan.  Diabetic Diet given to patient.         F/U in 6 months

## 2017-08-03 NOTE — PATIENT INSTRUCTIONS
"  Diabetes   Diabetes and Nutrition      Why does it matter what I eat?    What you eat is closely connected to the amount of sugar in your blood. The right food choices will help you control your blood sugar level.    Do I have to follow a special diet?    There isn't one specific \"diabetes diet.\" Your doctor will probably suggest that you work with a registered dietitian to design a meal plan. A meal plan is a guide that tells you what kinds of food to eat at meals and for snacks. The plan also tells you how much food to have. For most people who have diabetes (and those without, too), a healthy diet consists of 40% to 60% of calories from carbohydrates, 20% from protein and 30% or less from fat. It should be low in cholesterol, low in salt and low in added sugar.    Can I eat any sugar?    Yes. In recent years, doctors have learned that eating some sugar doesn't usually cause problems for most people who have diabetes--as long as it is part of a balanced diet. Just be careful about how much sugar you eat and try not to add sugar to foods.    What kinds of foods can I eat?    In general, at each meal you may have 2 to 5 choices (or up to 60 grams) of carbohydrates, 1 choice of protein and a certain amount of fat. Talk to your doctor or dietitian for specific advice.    Carbohydrates. Carbohydrates are found in fruits, vegetables, beans, dairy foods and starchy foods such as breads. Try to have fresh fruits rather than canned fruits, fruit juices or dried fruit. You may eat fresh vegetables and frozen or canned vegetables. Condiments such as nonfat mayonnaise, ketchup and mustard are also carbohydrates.    Protein. Protein is found in meat, poultry, fish, dairy products, beans and some vegetables. Try to eat poultry and fish more often than red meat. Don't eat poultry skin, and trim extra fat from all meat. Choose nonfat or reduced-fat options when you eat dairy, such as cheeses and yogurts.    Fat. Butter, " margarine, lard and oils add fat to food. Fat is also in many dairy and meat products. Try to avoid fried foods, mayonnaise-based dishes (unless they are made with fat-free vasquez), egg yolks, amaral and high-fat dairy products. Your doctor or dietitian will tell you how many grams of fat you may eat each day. When eating fat-free versions of foods (such as mayonnaise and butter), check the label to see how many grams of carbohydrates they contain. Keep in mind that these products often have added sugar.       This handout was developed by the American [employer] in cooperation with the American Diabetes Association.             Diabetes type 2 - meal planning      When you have type 2 diabetes, taking time to plan your meals goes a long way toward controlling your blood sugar and weight.    Your main focus is on keeping your blood sugar (glucose) level in your target range. To help manage your blood sugar, follow a meal plan that has:  •Food from all the food groups  •Fewer calories  •About the same amount of carbohydrates at each meal and snack  •Healthy fats    Along with healthy eating, you can keep your blood sugar in target range by maintaining a healthy weight. Persons with type 2 diabetes are often overweight. Losing just 10 pounds can help you manage your diabetes better. Eating healthy foods and staying active (for example, 30 minutes of walking per day) can help you meet and maintain your weight loss goal.     HOW CARBOHYDRATES AFFECT BLOOD SUGAR    Carbohydrates in food give your body energy. You need to eat carbohydrates to maintain your energy. But carbohydrates also raise your blood sugar higher and faster than other kinds of food.    The main kinds of carbohydrates are starches, sugars, and fiber. Learn which foods have carbohydrates. This will help with meal planning so that you can keep your blood sugar in your target range.    MEAL PLANNING FOR CHILDREN WITH TYPE 2 DIABETES    Meal  plans should consider the amount of calories children need to grow. In general, three small meals and three snacks a day can help meet calorie needs. Many children with type 2 diabetes are overweight. The goal should be a healthy weight by eating healthy foods and getting more activity (60 minutes each day).    Work with a registered dietitian to design a meal plan for your child. A registered dietitian is an expert in food and nutrition.    The following tips can help your child stay on track:  •No food is off-limits. Knowing how different foods affect your child’s blood sugar helps you and your child keep it in target range.  •Help your child learn how much food is a healthy amount. This is called portion control.  •Have your family gradually switch from drinking soda and other sugary drinks, such as sports drinks and juices, to plain water or low-fat milk.            PLANNING MEALS    Everyone has individual needs. Work with your doctor, registered dietitian, or diabetes educator to develop a meal plan that works for you.    When shopping, read food labels to make better food choices.    A good way to make sure you get all the nutrients you need during meals is to use the plate method. This is a visual food guide that helps you choose the best types and right amounts of food to eat. It encourages larger portions of non-starchy vegetables (half the plate) and moderate portions of protein (one quarter of the plate) and starch (one quarter of the plate). You can find more information about the plate method at the American Diabetes Association website: http://www.diabetes.org/food-and-fitness/food/planning-meals/create-your-plate.    EAT A VARIETY OF FOODS    Eating a wide variety of foods helps you stay healthy. Try to include foods from all the food groups at each meal.     VEGETABLES (2½ to 3 cups a day)    Choose fresh or frozen vegetables without added sauces, fats, or salt. Non-starchy vegetables include dark  green and deep yellow vegetables, such as spinach, broccoli, truong lettuce, cabbage, chard, and bell peppers. Starchy vegetables include corn, green peas, lima beans, potatoes, and taro.    FRUITS (1½ to 2 cups a day)    Choose fresh, frozen, canned (without added sugar), or dried fruits. Try apples, bananas, berries, cherries, fruit cocktail, grapes, melon, oranges, peaches, pears, papaya, pineapple, raisins. Drink juices that are 100% fruit with no added sweeteners or syrups.    GRAINS (3 to 4 ounces a day)    There are two types of grains:  •Whole grains are unprocessed and have the entire grain kernel. Examples are whole-wheat flour, oatmeal, whole cornmeal, amaranth, barley, brown and wild rice, buckwheat, and quinoa.  •Refined grains have been processed (milled) to remove the bran and germ. Examples are white flour, de-germed cornmeal, white bread, and white rice.    Grains have starch, a type of carbohydrate. Carbohydrates raise your blood sugar level. So, for healthy eating, make sure half of the grains you eat each day are whole grains. Whole grains have lots of fiber. Fiber in the diet keeps your blood sugar level from rising too fast.           PROTEIN FOODS (5 to 6½ ounces a day)    Protein foods include meat, poultry, seafood, eggs, beans and peas, nuts, seeds, and processed soy foods. Eat fish and poultry more often. Remove the skin from chicken and turkey. Select lean cuts of beef, veal, pork, or wild game. Trim all visible fat from meat. Bake, roast, broil, grill, or boil instead of frying.    DAIRY (3 cups a day)    Choose low-fat or nonfat dairy products. Be aware that milk, yogurt, and other dairy foods have natural sugar even when they do not contain added sugar. Take this into account when planning meals to stay in your blood sugar target range.        OILS/FATS (no more than 7 teaspoons a day)    Oils are not considered a food group. But they have nutrients that help your body stay healthy.  Oils are different from fats in that oils remain liquid at room temperature. Fats remain solid at room temperature.    Limit your intake of fatty foods, especially those high in saturated fat, such as hamburgers, deep-fried foods, amaral, and butter.     Instead, choose foods that are high in polyunsaturated or monounsaturated fats. These include fish, nuts, and vegetable oils.    Oils can raise your blood sugar, but not as fast as starch. Oils are also high in calories. Try to use no more than the recommended daily limit of 7 teaspoons.    WHAT ABOUT ALCOHOL AND SWEETS?    If you choose to drink alcohol, limit the amount and have it with a meal. Check with your health care provider about how alcohol will affect your blood sugar and to determine a safe amount for you.    Sweets are high in fat and sugar. Keep portion sizes small.     Here are tips to help avoid eating too many sweets:  •Ask for extra spoons and forks and split your dessert with others.  •Eat sweets that are sugar-free.  •Always ask for the smallest serving size or children’s size.      A registered dietitian can help you decide how to balance the carbohydrates, protein, and fat in your diet. Here are some general guidelines:    The amount of each type of food you eat depends on:  •Your diet  •Your weight  •How often you exercise  •Your other health risks    Everyone has individual needs. Work with your doctor, and possibly a dietitian, to develop a meal plan that works for you.    The Diabetes Food Pyramid, which resembles the old USDA food guide pyramid, splits foods into six groups in a range of serving sizes. In the Diabetes Food Pyramid, food groups are based on carbohydrate and protein content instead of their food type. A person with diabetes should eat more of the foods in the bottom of the pyramid (grains, beans, vegetables) than those on the top (fats and sweets). This diet will help keep your heart and body systems healthy.    Another  "method, similar to the new \"plate\" USDA food guide, encourages larger portions of vegetables (half the plate) and moderate portions of protein (one-quarter of the plate) and starch (one-quarter of the plate).    GRAINS, BEANS, AND STARCHY VEGETABLES    (6 or more servings a day)    Foods like bread, grains, beans, rice, pasta, and starchy vegetables are at the bottom of the pyramid because they should serve as the foundation of your diet. As a group, these foods are loaded with vitamins, minerals, fiber, and healthy carbohydrates.    It is important, however, to eat foods with plenty of fiber. Choose whole-grain foods such as whole-grain bread or crackers, tortillas, bran cereal, brown rice, or beans. Use whole-wheat or other whole-grain flours in cooking and baking. Choose low-fat breads, such as bagels, tortillas, English muffins, and elena bread.    VEGETABLES    (3 - 5 servings a day)    Choose fresh or frozen vegetables without added sauces, fats, or salt. Opt for more dark green and deep yellow vegetables, such as spinach, broccoli, truong lettuce, carrots, and peppers.    FRUITS    (2 - 4 servings a day)    Choose whole fruits more often than juices. Whole fruits have more fiber. Citrus fruits, such as oranges, grapefruits, and tangerines, are best. Drink fruit juices that do NOT have added sweeteners or syrups.    MILK    (2 - 3 servings a day)    Choose low-fat or nonfat milk or yogurt. Yogurt has natural sugar in it, but it can also contain added sugar or artificial sweeteners. Yogurt with artificial sweeteners has fewer calories than yogurt with added sugar.    MEAT AND FISH    (2 - 3 servings a day)    Eat fish and poultry more often. Remove the skin from chicken and turkey. Select lean cuts of beef, veal, pork, or wild game. Trim all visible fat from meat. Bake, roast, broil, grill, or boil instead of frying.    FATS, ALCOHOL, AND SWEETS    In general, you should limit your intake of fatty foods, " especially those high in saturated fat, such as hamburgers, cheese, amaral, and butter.    If you choose to drink alcohol, limit the amount and have it with a meal. Check with your health care provider about how alcohol will affect your blood sugar, and to determine a safe amount for you.    Sweets are high in fat and sugar, so keep portion sizes small. Here are some tips to help avoid eating too many sweets:  •Ask for extra spoons and forks and split your dessert with others.  •Eat sweets that are sugar-free.  •Always ask for the small serving size.    Learn how to read food labels, and consult them when making food decisions.      References      American Diabetes Association. Standards of medical care in diabetes -- 2013.American Diabetes Association. Standards of medical care in diabetes -- 2013. Diabetes Care. 2013;36 Suppl 1:S11-S66            Obesity, Adult  Obesity is the condition of having too much total body fat. Being overweight or obese means that your weight is greater than what is considered healthy for your body size. Obesity is determined by a measurement called BMI. BMI is an estimate of body fat and is calculated from height and weight. For adults, a BMI of 30 or higher is considered obese.  Obesity can eventually lead to other health concerns and major illnesses, including:  · Stroke.  · Coronary artery disease (CAD).  · Type 2 diabetes.  · Some types of cancer, including cancers of the colon, breast, uterus, and gallbladder.  · Osteoarthritis.  · High blood pressure (hypertension).  · High cholesterol.  · Sleep apnea.  · Gallbladder stones.  · Infertility problems.   CAUSES  The main cause of obesity is taking in (consuming) more calories than your body uses for energy. Other factors that contribute to this condition may include:  · Being born with genes that make you more likely to become obese.  · Having a medical condition that causes obesity. These conditions include:    Hypothyroidism.     Polycystic ovarian syndrome (PCOS).    Binge-eating disorder.    Cushing syndrome.  · Taking certain medicines, such as steroids, antidepressants, and seizure medicines.  · Not being physically active (sedentary lifestyle).  · Living where there are limited places to exercise safely or buy healthy foods.  · Not getting enough sleep.  RISK FACTORS  The following factors may increase your risk of this condition:  · Having a family history of obesity.  · Being a woman of -American descent.  · Being a man of  descent.  SYMPTOMS  Having excessive body fat is the main symptom of this condition.  DIAGNOSIS  This condition may be diagnosed based on:  · Your symptoms.  · Your medical history.  · A physical exam. Your health care provider may measure:    Your BMI. If you are an adult with a BMI between 25 and less than 30, you are considered overweight. If you are an adult with a BMI of 30 or higher, you are considered obese.    The distances around your hips and your waist (circumferences). These may be compared to each other to help diagnose your condition.    Your skinfold thickness. Your health care provider may gently pinch a fold of your skin and measure it.  TREATMENT  Treatment for this condition often includes changing your lifestyle. Treatment may include some or all of the following:  · Dietary changes. Work with your health care provider and a dietitian to set a weight-loss goal that is healthy and reasonable for you. Dietary changes may include eating:    Smaller portions. A portion size is the amount of a particular food that is healthy for you to eat at one time. This varies from person to person.    Low-calorie or low-fat options.    More whole grains, fruits, and vegetables.  · Regular physical activity. This may include aerobic activity (cardio) and strength training.  · Medicine to help you lose weight. Your health care provider may prescribe medicine if you are unable to lose 1 pound a week  after 6 weeks of eating more healthily and doing more physical activity.  · Surgery. Surgical options may include gastric banding and gastric bypass. Surgery may be done if:    Other treatments have not helped to improve your condition.    You have a BMI of 40 or higher.    You have life-threatening health problems related to obesity.  HOME CARE INSTRUCTIONS  Eating and Drinking  · Follow recommendations from your health care provider about what you eat and drink. Your health care provider may advise you to:    Limit fast foods, sweets, and processed snack foods.    Choose low-fat options, such as low-fat milk instead of whole milk.    Eat 5 or more servings of fruits or vegetables every day.    Eat at home more often. This gives you more control over what you eat.    Choose healthy foods when you eat out.    Learn what a healthy portion size is.    Keep low-fat snacks on hand.    Avoid sugary drinks, such as soda, fruit juice, iced tea sweetened with sugar, and flavored milk.    Eat a healthy breakfast.  · Drink enough water to keep your urine clear or pale yellow.  · Do not go without eating for long periods of time (do not fast) or follow a fad diet. Fasting and fad diets can be unhealthy and even dangerous.  Physical Activity  · Exercise regularly, as told by your health care provider. Ask your health care provider what types of exercise are safe for you and how often you should exercise.  · Warm up and stretch before being active.  · Cool down and stretch after being active.  · Rest between periods of activity.  Lifestyle  · Limit the time that you spend in front of your TV, computer, or video game system.  · Find ways to reward yourself that do not involve food.  · Limit alcohol intake to no more than 1 drink a day for nonpregnant women and 2 drinks a day for men. One drink equals 12 oz of beer, 5 oz of wine, or 1½ oz of hard liquor.  General Instructions  · Keep a weight loss journal to keep track of the food  you eat and how much you exercise you get.  · Take over-the-counter and prescription medicines only as told by your health care provider.  · Take vitamins and supplements only as told by your health care provider.  · Consider joining a support group. Your health care provider may be able to recommend a support group.  · Keep all follow-up visits as told by your health care provider. This is important.  SEEK MEDICAL CARE IF:  · You are unable to meet your weight loss goal after 6 weeks of dietary and lifestyle changes.     This information is not intended to replace advice given to you by your health care provider. Make sure you discuss any questions you have with your health care provider.     Document Released: 01/25/2006 Document Revised: 04/10/2017 Document Reviewed: 10/05/2016  [employer] Patient Education ©2017 [employer]      Exercising to Lose Weight  Exercising can help you to lose weight. In order to lose weight through exercise, you need to do vigorous-intensity exercise. You can tell that you are exercising with vigorous intensity if you are breathing very hard and fast and cannot hold a conversation while exercising.  Moderate-intensity exercise helps to maintain your current weight. You can tell that you are exercising at a moderate level if you have a higher heart rate and faster breathing, but you are still able to hold a conversation.  HOW OFTEN SHOULD I EXERCISE?  Choose an activity that you enjoy and set realistic goals. Your health care provider can help you to make an activity plan that works for you. Exercise regularly as directed by your health care provider. This may include:  · Doing resistance training twice each week, such as:    Push-ups.    Sit-ups.    Lifting weights.    Using resistance bands.  · Doing a given intensity of exercise for a given amount of time. Choose from these options:    150 minutes of moderate-intensity exercise every week.    75 minutes of  vigorous-intensity exercise every week.    A mix of moderate-intensity and vigorous-intensity exercise every week.  Children, pregnant women, people who are out of shape, people who are overweight, and older adults may need to consult a health care provider for individual recommendations. If you have any sort of medical condition, be sure to consult your health care provider before starting a new exercise program.  WHAT ARE SOME ACTIVITIES THAT CAN HELP ME TO LOSE WEIGHT?   · Walking at a rate of at least 4.5 miles an hour.  · Jogging or running at a rate of 5 miles per hour.  · Biking at a rate of at least 10 miles per hour.  · Lap swimming.  · Roller-skating or in-line skating.  · Cross-country skiing.  · Vigorous competitive sports, such as football, basketball, and soccer.  · Jumping rope.  · Aerobic dancing.  HOW CAN I BE MORE ACTIVE IN MY DAY-TO-DAY ACTIVITIES?  · Use the stairs instead of the elevator.  · Take a walk during your lunch break.  · If you drive, park your car farther away from work or school.  · If you take public transportation, get off one stop early and walk the rest of the way.  · Make all of your phone calls while standing up and walking around.  · Get up, stretch, and walk around every 30 minutes throughout the day.  WHAT GUIDELINES SHOULD I FOLLOW WHILE EXERCISING?  · Do not exercise so much that you hurt yourself, feel dizzy, or get very short of breath.  · Consult your health care provider prior to starting a new exercise program.  · Wear comfortable clothes and shoes with good support.  · Drink plenty of water while you exercise to prevent dehydration or heat stroke. Body water is lost during exercise and must be replaced.  · Work out until you breathe faster and your heart beats faster.     This information is not intended to replace advice given to you by your health care provider. Make sure you discuss any questions you have with your health care provider.     Document Released:  01/20/2012 Document Revised: 01/08/2016 Document Reviewed: 05/21/2015  [employer] Interactive Patient Education ©2017 [employer]      MyPlate from [employer]  The general, healthful diet is based on the 2010 Dietary Guidelines for Americans. The amount of food you need to eat from each food group depends on your age, sex, and level of physical activity and can be individualized by a dietitian. Go to ChooseMyPlate.gov for more information.  WHAT DO I NEED TO KNOW ABOUT THE MYPLATE PLAN?  · Enjoy your food, but eat less.    · Avoid oversized portions.      ½ of your plate should include fruits and vegetables.    ¼ of your plate should be grains.    ¼ of your plate should be protein.  Grains  · Make at least half of your grains whole grains.  · For a 2,000 calorie daily food plan, eat 6 oz every day.  · 1 oz is about 1 slice bread, 1 cup cereal, or ½ cup cooked rice, cereal, or pasta.  Vegetables  · Make half your plate fruits and vegetables.  · For a 2,000 calorie daily food plan, eat 2½ cups every day.  · 1 cup is about 1 cup raw or cooked vegetables or vegetable juice or 2 cups raw leafy greens.  Fruits  · Make half your plate fruits and vegetables.  · For a 2,000 calorie daily food plan, eat 2 cups every day.  · 1 cup is about 1 cup fruit or 100% fruit juice or ½ cup dried fruit.  Protein  · For a 2,000 calorie daily food plan, eat 5½ oz every day.  · 1 oz is about 1 oz meat, poultry, or fish, ¼ cup cooked beans, 1 egg, 1 Tbsp peanut butter, or ½ oz nuts or seeds.  Dairy  · Switch to fat-free or low-fat (1%) milk.  · For a 2,000 calorie daily food plan, eat 3 cups every day.  · 1 cup is about 1 cup milk or yogurt or soy milk (soy beverage), 1½ oz natural cheese, or 2 oz processed cheese.  Fats, Oils, and Empty Calories  · Only small amounts of oils are recommended.  · Empty calories are calories from solid fats or added sugars.  · Compare sodium in foods like soup, bread, and frozen meals. Choose the foods with lower  numbers.  · Drink water instead of sugary drinks.  WHAT FOODS CAN I EAT?  Grains  Whole grains such as whole wheat, quinoa, millet, and bulgur. Bread, rolls, and pasta made from whole grains. Brown or wild rice. Hot or cold cereals made from whole grains and without added sugar.  Vegetables  All fresh vegetables, especially fresh red, dark green, or orange vegetables. Peas and beans. Low-sodium frozen or canned vegetables prepared without added salt. Low-sodium vegetable juices.  Fruits  All fresh, frozen, and dried fruits. Canned fruit packed in water or fruit juice without added sugar. Fruit juices without added sugar.  Meats and Other Protein Sources  Boiled, baked, or grilled lean meat trimmed of fat. Skinless poultry. Fresh seafood and shellfish. Canned seafood packed in water. Unsalted nuts and unsalted nut butters. Tofu. Dried beans and pea. Eggs.  Dairy  Low-fat or fat-free milk, yogurt, and cheeses.   Sweets and Desserts  Frozen desserts made from low-fat milk.  Fats and Oils  Olive, peanut, and canola oils and margarine. Salad dressing and mayonnaise made from these oils.  Other  Soups and casseroles made from allowed ingredients and without added fat or salt.  The items listed above may not be a complete list of recommended foods or beverages. Contact your dietitian for more options.   WHAT FOODS ARE NOT RECOMMENDED?  Grains  Sweetened, low-fiber cereals. Packaged baked goods. Snack crackers and chips. Cheese crackers, butter crackers, and biscuits. Frozen waffles, sweet breads, doughnuts, pastries, packaged baking mixes, pancakes, cakes, and cookies.  Vegetables  Regular canned or frozen vegetables or vegetables prepared with salt. Canned tomatoes. Canned tomato sauce. Fried vegetables. Vegetables in cream sauce or cheese sauce.  Fruits  Fruits packed in syrup or made with added sugar.   Meats and Other Protein Sources  Marbled or fatty meats such as ribs. Poultry with skin. Fried meats, poultry, eggs,  or fish. Sausages, hot dogs, and deli meats such as pastrami, bologna, or salami.  Dairy  Whole milk, cream, cheeses made from whole milk, sour cream. Ice cream or yogurt made from whole milk or with added sugar.  Beverages  For adults, no more than one alcoholic drink per day. Regular soft drinks or other sugary beverages. Juice drinks.  Sweets and Desserts  Sugary or fatty desserts, candy, and other sweets.  Fats and Oils  Solid shortening or partially hydrogenated oils. Solid margarine. Margarine that contains trans fats. Butter.  The items listed above may not be a complete list of foods and beverages to avoid. Contact your dietitian for more information.     This information is not intended to replace advice given to you by your health care provider. Make sure you discuss any questions you have with your health care provider.     Document Released: 01/06/2009 Document Revised: 01/08/2016 Document Reviewed: 11/26/2014  [employer] Interactive Patient Education ©2017 [employer]

## 2017-09-18 ENCOUNTER — OFFICE VISIT (OUTPATIENT)
Dept: RETAIL CLINIC | Facility: CLINIC | Age: 77
End: 2017-09-18

## 2017-09-18 VITALS
SYSTOLIC BLOOD PRESSURE: 156 MMHG | DIASTOLIC BLOOD PRESSURE: 80 MMHG | RESPIRATION RATE: 16 BRPM | TEMPERATURE: 97.9 F | HEART RATE: 91 BPM | OXYGEN SATURATION: 96 %
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
## 2017-09-18 PROCEDURE — 99213 OFFICE O/P EST LOW 20 MIN: CPT | Performed by: NURSE PRACTITIONER

## 2017-09-18 RX ORDER — PREDNISONE 20 MG/1
20 TABLET ORAL 2 TIMES DAILY
Qty: 10 TABLET | Refills: 0 | Status: SHIPPED | OUTPATIENT
Start: 2017-09-18 | End: 2018-08-09

## 2017-09-18 RX ORDER — TRIAMCINOLONE ACETONIDE 5 MG/G
CREAM TOPICAL 3 TIMES DAILY
Qty: 30 G | Refills: 0 | Status: SHIPPED | OUTPATIENT
Start: 2017-09-18 | End: 2018-08-09

## 2017-09-18 NOTE — PATIENT INSTRUCTIONS
Insect Bite  Mosquitoes, flies, fleas, bedbugs, and many other insects can bite. Insect bites are different from insect stings. A sting is when poison (venom) is injected into the skin. Insect bites can cause pain or itching for a few days, but they are usually not serious. Some insects can spread diseases to people through a bite.  SYMPTOMS   Symptoms of an insect bite include:  · Itching or pain in the bite area.  · Redness and swelling in the bite area.  · An open wound (skin ulcer).  In many cases, symptoms last for 2-4 days.   DIAGNOSIS   This condition is usually diagnosed based on symptoms and a physical exam.  TREATMENT   Treatment is usually not needed for an insect bite. Symptoms often go away on their own. Your health care provider may recommend creams or lotions to help reduce itching. Antibiotic medicines may be prescribed if the bite becomes infected. A tetanus shot may be given in some cases. If you develop an allergic reaction to an insect bite, your health care provider will prescribe medicines to treat the reaction (antihistamines). This is rare.  HOME CARE INSTRUCTIONS  · Do not scratch the bite area.  · Keep the bite area clean and dry. Wash the bite area daily with soap and water as told by your health care provider.  · If directed, apply ice to the bite area.    Put ice in a plastic bag.    Place a towel between your skin and the bag.    Leave the ice on for 20 minutes, 2-3 times per day.  · To help reduce itching and swelling, try applying a baking soda paste, cortisone cream, or calamine lotion to the bite area as told by your health care provider.  · Apply or take over-the-counter and prescription medicines only as told by your health care provider.  · If you were prescribed an antibiotic medicine, use it as told by your health care provider. Do not stop using the antibiotic even if your condition improves.  · Keep all follow-up visits as told by your health care provider. This is  important.  PREVENTION   · Use insect repellent. The best insect repellents contain:    DEET, picaridin, oil of lemon eucalyptus (OLE), or [id number].    Higher amounts of an active ingredient.  · When you are outdoors, wear clothing that covers your arms and legs.  · Avoid opening windows that do not have window screens.  SEEK MEDICAL CARE IF:  · You have increased redness, swelling, or pain in the bite area.  · You have a fever.  SEEK IMMEDIATE MEDICAL CARE IF:   · You have joint pain.    · You have fluid, blood, or pus coming from the bite area.  · You have a headache or neck pain.  · You have unusual weakness.  · You have a rash.  · You have chest pain or shortness of breath.  · You have abdominal pain, nausea, or vomiting.  · You feel unusually tired or sleepy.     This information is not intended to replace advice given to you by your health care provider. Make sure you discuss any questions you have with your health care provider.     Document Released: 01/25/2006 Document Revised: 04/10/2017 Document Reviewed: 05/04/2016  [employer] Interactive Patient Education ©2017 [employer]

## 2017-09-18 NOTE — PROGRESS NOTES
Subjective:     Viktoria Villasenor is a 76 y.o.     Insect Bite   This is a new (left breast, felt it when she bit but did not see it , was about the size of a quarter) problem. The current episode started yesterday. The problem has been gradually worsening. Associated symptoms include nausea. Pertinent negatives include no arthralgias, chills, congestion, coughing, fever, headaches, myalgias, sore throat or vomiting. Associated symptoms comments: Started out quarter sized, itches, tender red, it has spread to her nipple. Treatments tried: antihistamine and hydrocortisone cream. The treatment provided no relief.         The following portions of the patient's history were reviewed and updated as appropriate: allergies, current medications, past family history, past medical history, past social history, past surgical history and problem list.      Review of Systems   Constitutional: Negative for chills and fever.   HENT: Negative for congestion and sore throat.    Respiratory: Negative for cough, shortness of breath and wheezing.    Cardiovascular:        Hx: hypertension   Gastrointestinal: Positive for nausea. Negative for vomiting.   Musculoskeletal: Negative for arthralgias and myalgias.   Skin: Negative for color change and pallor.        Quarter sized and now covers at least 50 percent of her breast, tender especially at nipple. Itchy, red and warm to touch   Neurological: Negative for dizziness, light-headedness and headaches.         Objective:      Physical Exam   Constitutional: She is oriented to person, place, and time. She appears well-developed and well-nourished. She is cooperative.   HENT:   Head: Normocephalic and atraumatic.   Nose: Nose normal.   Eyes: EOM are normal. Pupils are equal, round, and reactive to light.   Neck: Normal range of motion. Neck supple.   Cardiovascular: Normal rate, regular rhythm, S1 normal, S2 normal and normal heart sounds.    Pulmonary/Chest: Effort normal and breath  sounds normal.   Abdominal: Soft. Bowel sounds are normal. There is no tenderness.   Musculoskeletal: Normal range of motion.   Neurological: She is alert and oriented to person, place, and time.   Skin: Skin is warm, dry and intact.   Left breast swollen and erythematous covering castro 50% of breast and entire nipple area   Psychiatric: She has a normal mood and affect. Her speech is normal and behavior is normal. Cognition and memory are normal.   Vitals reviewed.          Diagnoses and all orders for this visit:    Insect bite, initial encounter    Other orders  -     predniSONE (DELTASONE) 20 MG tablet; Take 1 tablet by mouth 2 (Two) Times a Day.  -     triamcinolone (KENALOG) 0.5 % cream; Apply  topically 3 (Three) Times a Day. To affected area for contact dermatitis

## 2017-12-29 DIAGNOSIS — E78.49 OTHER HYPERLIPIDEMIA: ICD-10-CM

## 2017-12-29 DIAGNOSIS — J45.909 UNCOMPLICATED ASTHMA: ICD-10-CM

## 2017-12-31 ENCOUNTER — RESULTS ENCOUNTER (OUTPATIENT)
Dept: FAMILY MEDICINE CLINIC | Facility: CLINIC | Age: 77
End: 2017-12-31

## 2017-12-31 DIAGNOSIS — R79.89 ELEVATED LFTS: ICD-10-CM

## 2017-12-31 DIAGNOSIS — I10 ESSENTIAL HYPERTENSION: ICD-10-CM

## 2017-12-31 DIAGNOSIS — E78.49 OTHER HYPERLIPIDEMIA: ICD-10-CM

## 2018-01-02 DIAGNOSIS — I10 ESSENTIAL HYPERTENSION: ICD-10-CM

## 2018-01-02 RX ORDER — IRBESARTAN 300 MG/1
TABLET ORAL
Qty: 90 TABLET | Refills: 1 | OUTPATIENT
Start: 2018-01-02

## 2018-01-02 RX ORDER — SIMVASTATIN 20 MG
TABLET ORAL
Qty: 90 TABLET | Refills: 1 | OUTPATIENT
Start: 2018-01-02

## 2018-01-02 RX ORDER — PANTOPRAZOLE SODIUM 40 MG/1
TABLET, DELAYED RELEASE ORAL
Qty: 90 TABLET | Refills: 1 | OUTPATIENT
Start: 2018-01-02

## 2018-01-02 RX ORDER — MONTELUKAST SODIUM 10 MG/1
TABLET ORAL
Qty: 90 TABLET | Refills: 1 | OUTPATIENT
Start: 2018-01-02

## 2018-02-09 LAB
ALBUMIN SERPL-MCNC: 4.4 G/DL (ref 3.5–5.2)
ALBUMIN/GLOB SERPL: 1.8 G/DL
ALP SERPL-CCNC: 95 U/L (ref 39–117)
ALT SERPL-CCNC: 60 U/L (ref 1–33)
AST SERPL-CCNC: 53 U/L (ref 1–32)
BASOPHILS # BLD AUTO: 0.03 10*3/MM3 (ref 0–0.2)
BASOPHILS NFR BLD AUTO: 0.4 % (ref 0–1.5)
BILIRUB SERPL-MCNC: 0.6 MG/DL (ref 0.1–1.2)
BUN SERPL-MCNC: 12 MG/DL (ref 8–23)
BUN/CREAT SERPL: 13.8 (ref 7–25)
CALCIUM SERPL-MCNC: 9.4 MG/DL (ref 8.6–10.5)
CHLORIDE SERPL-SCNC: 101 MMOL/L (ref 98–107)
CHOLEST SERPL-MCNC: 159 MG/DL (ref 0–200)
CO2 SERPL-SCNC: 27.7 MMOL/L (ref 22–29)
CREAT SERPL-MCNC: 0.87 MG/DL (ref 0.57–1)
EOSINOPHIL # BLD AUTO: 0.69 10*3/MM3 (ref 0–0.7)
EOSINOPHIL NFR BLD AUTO: 8.2 % (ref 0.3–6.2)
ERYTHROCYTE [DISTWIDTH] IN BLOOD BY AUTOMATED COUNT: 13.1 % (ref 11.7–13)
GFR SERPLBLD CREATININE-BSD FMLA CKD-EPI: 63 ML/MIN/1.73
GFR SERPLBLD CREATININE-BSD FMLA CKD-EPI: 77 ML/MIN/1.73
GLOBULIN SER CALC-MCNC: 2.5 GM/DL
GLUCOSE SERPL-MCNC: 105 MG/DL (ref 65–99)
HCT VFR BLD AUTO: 45.4 % (ref 35.6–45.5)
HDLC SERPL-MCNC: 61 MG/DL (ref 40–60)
HGB BLD-MCNC: 14.5 G/DL (ref 11.9–15.5)
IMM GRANULOCYTES # BLD: 0.02 10*3/MM3 (ref 0–0.03)
IMM GRANULOCYTES NFR BLD: 0.2 % (ref 0–0.5)
LDLC SERPL CALC-MCNC: 77 MG/DL (ref 0–100)
LDLC/HDLC SERPL: 1.26 {RATIO}
LYMPHOCYTES # BLD AUTO: 2.52 10*3/MM3 (ref 0.9–4.8)
LYMPHOCYTES NFR BLD AUTO: 30 % (ref 19.6–45.3)
MCH RBC QN AUTO: 28.8 PG (ref 26.9–32)
MCHC RBC AUTO-ENTMCNC: 31.9 G/DL (ref 32.4–36.3)
MCV RBC AUTO: 90.1 FL (ref 80.5–98.2)
MONOCYTES # BLD AUTO: 0.76 10*3/MM3 (ref 0.2–1.2)
MONOCYTES NFR BLD AUTO: 9.1 % (ref 5–12)
NEUTROPHILS # BLD AUTO: 4.37 10*3/MM3 (ref 1.9–8.1)
NEUTROPHILS NFR BLD AUTO: 52.1 % (ref 42.7–76)
PLATELET # BLD AUTO: 207 10*3/MM3 (ref 140–500)
POTASSIUM SERPL-SCNC: 4.5 MMOL/L (ref 3.5–5.2)
PROT SERPL-MCNC: 6.9 G/DL (ref 6–8.5)
RBC # BLD AUTO: 5.04 10*6/MM3 (ref 3.9–5.2)
SODIUM SERPL-SCNC: 143 MMOL/L (ref 136–145)
TRIGL SERPL-MCNC: 105 MG/DL (ref 0–150)
TSH SERPL DL<=0.005 MIU/L-ACNC: 1.73 MIU/ML (ref 0.27–4.2)
VLDLC SERPL CALC-MCNC: 21 MG/DL (ref 5–40)
WBC # BLD AUTO: 8.39 10*3/MM3 (ref 4.5–10.7)

## 2018-02-14 ENCOUNTER — OFFICE VISIT (OUTPATIENT)
Dept: FAMILY MEDICINE CLINIC | Facility: CLINIC | Age: 78
End: 2018-02-14

## 2018-02-14 VITALS
BODY MASS INDEX: 35.85 KG/M2 | WEIGHT: 210 LBS | HEIGHT: 64 IN | SYSTOLIC BLOOD PRESSURE: 128 MMHG | DIASTOLIC BLOOD PRESSURE: 62 MMHG | TEMPERATURE: 97.1 F | RESPIRATION RATE: 16 BRPM

## 2018-02-14 DIAGNOSIS — R73.03 PREDIABETES: ICD-10-CM

## 2018-02-14 DIAGNOSIS — R79.89 ELEVATED LFTS: ICD-10-CM

## 2018-02-14 DIAGNOSIS — K20.90 ESOPHAGITIS: ICD-10-CM

## 2018-02-14 DIAGNOSIS — E66.09 NON MORBID OBESITY DUE TO EXCESS CALORIES: ICD-10-CM

## 2018-02-14 DIAGNOSIS — I10 ESSENTIAL HYPERTENSION: Primary | ICD-10-CM

## 2018-02-14 DIAGNOSIS — J45.20 MILD INTERMITTENT ASTHMA WITHOUT COMPLICATION: ICD-10-CM

## 2018-02-14 DIAGNOSIS — E78.49 OTHER HYPERLIPIDEMIA: ICD-10-CM

## 2018-02-14 PROCEDURE — 99214 OFFICE O/P EST MOD 30 MIN: CPT | Performed by: FAMILY MEDICINE

## 2018-02-14 RX ORDER — VALSARTAN 320 MG/1
320 TABLET ORAL DAILY
Qty: 90 TABLET | Refills: 1 | Status: SHIPPED | OUTPATIENT
Start: 2018-02-14 | End: 2018-07-25 | Stop reason: ALTCHOICE

## 2018-02-14 RX ORDER — MONTELUKAST SODIUM 10 MG/1
10 TABLET ORAL NIGHTLY
Qty: 90 TABLET | Refills: 1 | Status: SHIPPED | OUTPATIENT
Start: 2018-02-14 | End: 2018-08-09

## 2018-02-14 RX ORDER — SIMVASTATIN 20 MG
20 TABLET ORAL NIGHTLY
Qty: 90 TABLET | Refills: 1 | Status: SHIPPED | OUTPATIENT
Start: 2018-02-14 | End: 2018-08-09

## 2018-02-14 NOTE — PROGRESS NOTES
"Chief Complaint   Patient presents with   • Hyperlipidemia   • Hypertension   • Heartburn       Subjective   Viktoria Villasenor presents to the office today to refill her medications and review recent labs. No medication side effects are reported.  Her arterial blood pressure is controlled.  Lipids are controlled.  Asthma is controlled with current medication.  There has been interval improvement in her fasting blood sugar although it is still mildly elevated.  Mild progression of elevated liver function tests.  These elevations are in the nondangerous range.  She continues to take pantoprazole as needed for intermittent esophagitis symptoms.  Overall she feels well.    I have reviewed and updated her medications, medical history and problem list during today's office visit.     Assessment/Plan   Problem List Items Addressed This Visit        Cardiovascular and Mediastinum    HLD (hyperlipidemia)    Relevant Medications    simvastatin (ZOCOR) 20 MG tablet    Other Relevant Orders    Lipid Panel With LDL/HDL Ratio    Essential hypertension - Primary    Relevant Medications    valsartan (DIOVAN) 320 MG tablet    Other Relevant Orders    Comprehensive metabolic panel    CBC and Differential    TSH       Respiratory    Mild intermittent asthma without complication    Relevant Medications    montelukast (SINGULAIR) 10 MG tablet       Digestive    Esophagitis    Non morbid obesity due to excess calories       Other    Elevated LFTs    Prediabetes        F/U in 6 months for Medicare Wellness Exam and regular visit       Review of Systems   Constitutional: Negative for fatigue.   Cardiovascular: Negative for chest pain.     Objective   /62  Temp 97.1 °F (36.2 °C) (Oral)   Resp 16  Ht 162.6 cm (64\")  Wt 95.3 kg (210 lb)  BMI 36.05 kg/m2  Body mass index is 36.05 kg/(m^2).  Physical Exam   Constitutional: She is cooperative. No distress.   Eyes: Conjunctivae and lids are normal.   Neck: Carotid bruit is not " present. No tracheal deviation present.   Cardiovascular: Normal rate, regular rhythm and normal heart sounds.    No murmur heard.  Pulmonary/Chest: Effort normal and breath sounds normal.   Neurological: She is alert. She is not disoriented.   Skin: Skin is warm and dry.   Psychiatric: She has a normal mood and affect. Her speech is normal and behavior is normal.   Vitals reviewed.       Social History   Substance Use Topics   • Smoking status: Former Smoker     Packs/day: 1.00     Quit date: 1994   • Smokeless tobacco: Never Used      Comment: Smoked 1 ppd for 20-25 years.  Quit in 1995.    • Alcohol use No       Data Reviewed:    CMP:  Lab Results   Component Value Date     (H) 02/09/2018    BUN 12 02/09/2018    CREATININE 0.87 02/09/2018    EGFRIFNONA 63 02/09/2018    EGFRIFAFRI 77 02/09/2018     02/09/2018    K 4.5 02/09/2018     02/09/2018    CALCIUM 9.4 02/09/2018    PROTENTOTREF 6.9 02/09/2018    ALBUMIN 4.40 02/09/2018    LABGLOBREF 2.5 02/09/2018    BILITOT 0.6 02/09/2018    ALKPHOS 95 02/09/2018    AST 53 (H) 02/09/2018    ALT 60 (H) 02/09/2018     CBC w/ diff:   Lab Results   Component Value Date    WBC 8.39 02/09/2018    RBC 5.04 02/09/2018    HGB 14.5 02/09/2018    HCT 45.4 02/09/2018    MCV 90.1 02/09/2018    MCH 28.8 02/09/2018    MCHC 31.9 (L) 02/09/2018    RDW 13.1 (H) 02/09/2018     02/09/2018    NEUTRORELPCT 52.1 02/09/2018    AUTOIGPER 0.3 04/29/2016    LYMPHORELPCT 30.0 02/09/2018    MONORELPCT 9.1 02/09/2018    EOSRELPCT 8.2 (H) 02/09/2018    BASORELPCT 0.4 02/09/2018     LIPID PANEL:  Lab Results   Component Value Date    CHLPL 159 02/09/2018    TRIG 105 02/09/2018    HDL 61 (H) 02/09/2018    VLDL 21 02/09/2018    LDL 77 02/09/2018    LDLHDL 1.26 02/09/2018     TSH:  Lab Results   Component Value Date    TSH 1.730 02/09/2018       Orders Placed This Encounter   Procedures   • Comprehensive metabolic panel     Standing Status:   Future     Standing Expiration Date:    11/11/2018   • Lipid Panel With LDL/HDL Ratio     Standing Status:   Future     Standing Expiration Date:   11/11/2018   • TSH     Standing Status:   Future     Standing Expiration Date:   11/11/2018   • CBC and Differential     Standing Status:   Future     Standing Expiration Date:   11/11/2018     Order Specific Question:   Manual Differential     Answer:   No       Outpatient Encounter Prescriptions as of 2/14/2018   Medication Sig Dispense Refill   • coenzyme Q10 100 MG capsule Take 100 mg by mouth Daily.     • montelukast (SINGULAIR) 10 MG tablet Take 1 tablet by mouth Every Night for 180 days. 90 tablet 1   • pantoprazole (PROTONIX) 40 MG EC tablet Take 1 tablet by mouth At Night As Needed (heartburn) for up to 180 days. 90 tablet 1   • predniSONE (DELTASONE) 20 MG tablet Take 1 tablet by mouth 2 (Two) Times a Day. 10 tablet 0   • simvastatin (ZOCOR) 20 MG tablet Take 1 tablet by mouth Every Night for 180 days. 90 tablet 1   • triamcinolone (KENALOG) 0.5 % cream Apply  topically 3 (Three) Times a Day. To affected area for contact dermatitis 30 g 0   • valsartan (DIOVAN) 320 MG tablet Take 1 tablet by mouth Daily for 180 days. 90 tablet 1   • [DISCONTINUED] irbesartan (AVAPRO) 300 MG tablet Take 1 tablet by mouth Every Night for 180 days. 90 tablet 1   • [DISCONTINUED] montelukast (SINGULAIR) 10 MG tablet Take 1 tablet by mouth Every Night for 180 days. 90 tablet 1   • [DISCONTINUED] simvastatin (ZOCOR) 20 MG tablet Take 1 tablet by mouth Every Night for 180 days. 90 tablet 1     No facility-administered encounter medications on file as of 2/14/2018.

## 2018-06-22 ENCOUNTER — OFFICE VISIT (OUTPATIENT)
Dept: RETAIL CLINIC | Facility: CLINIC | Age: 78
End: 2018-06-22

## 2018-06-22 VITALS
RESPIRATION RATE: 18 BRPM | HEART RATE: 68 BPM | DIASTOLIC BLOOD PRESSURE: 62 MMHG | SYSTOLIC BLOOD PRESSURE: 130 MMHG | TEMPERATURE: 97.7 F | OXYGEN SATURATION: 98 %

## 2018-06-22 DIAGNOSIS — N30.00 ACUTE CYSTITIS WITHOUT HEMATURIA: Primary | ICD-10-CM

## 2018-06-22 LAB
BILIRUB BLD-MCNC: ABNORMAL MG/DL
CLARITY, POC: ABNORMAL
COLOR UR: YELLOW
GLUCOSE UR STRIP-MCNC: NEGATIVE MG/DL
KETONES UR QL: NEGATIVE
LEUKOCYTE EST, POC: ABNORMAL
NITRITE UR-MCNC: POSITIVE MG/ML
PH UR: 5.5 [PH] (ref 5–8)
PROT UR STRIP-MCNC: NEGATIVE MG/DL
RBC # UR STRIP: NEGATIVE /UL
SP GR UR: 1.02 (ref 1–1.03)
UROBILINOGEN UR QL: NORMAL

## 2018-06-22 PROCEDURE — 99213 OFFICE O/P EST LOW 20 MIN: CPT | Performed by: NURSE PRACTITIONER

## 2018-06-22 PROCEDURE — 81003 URINALYSIS AUTO W/O SCOPE: CPT | Performed by: NURSE PRACTITIONER

## 2018-06-22 RX ORDER — FLUCONAZOLE 150 MG/1
TABLET ORAL
Qty: 2 TABLET | Refills: 0 | Status: SHIPPED | OUTPATIENT
Start: 2018-06-22 | End: 2018-08-09

## 2018-06-22 RX ORDER — NITROFURANTOIN 25; 75 MG/1; MG/1
100 CAPSULE ORAL 2 TIMES DAILY
Qty: 14 CAPSULE | Refills: 0 | Status: SHIPPED | OUTPATIENT
Start: 2018-06-22 | End: 2018-06-29

## 2018-06-22 NOTE — PROGRESS NOTES
"Donavan Villasenor is a 77 y.o. female.     Urinary Tract Infection    This is a new problem. The current episode started in the past 7 days. The problem has been gradually worsening. The quality of the pain is described as burning (pressure). The pain is at a severity of 3/10. The pain is mild. There has been no fever. She is not sexually active. There is no history of pyelonephritis. Associated symptoms include flank pain (mild at times pain in the back). Pertinent negatives include no chills, hematuria or urgency. She has tried nothing for the symptoms. The treatment provided no relief.        The following portions of the patient's history were reviewed and updated as appropriate: allergies, past family history, past medical history, past social history, past surgical history and problem list.    Review of Systems   Constitutional: Negative for chills.   HENT: Negative.    Respiratory: Negative.    Cardiovascular: Negative.    Gastrointestinal: Negative.    Genitourinary: Positive for flank pain (mild at times pain in the back). Negative for hematuria and urgency.   Neurological: Negative.        Objective   Physical Exam   Constitutional: She is cooperative. No distress.   HENT:   Head: Normocephalic.   Right Ear: Hearing, tympanic membrane, external ear and ear canal normal.   Left Ear: Hearing, tympanic membrane, external ear and ear canal normal.   Nose: Nose normal.   Mouth/Throat: Oropharynx is clear and moist.   Eyes: Conjunctivae, EOM and lids are normal. Pupils are equal, round, and reactive to light.   Neck: Trachea normal and full passive range of motion without pain.   Cardiovascular: Normal rate, regular rhythm and normal pulses.    Pulmonary/Chest: Effort normal and breath sounds normal.   Abdominal: There is tenderness in the suprapubic area. There is no CVA tenderness (\"soreness\" when tapped on the back).   Neurological: She is alert.   Skin: Skin is warm. Capillary refill takes " less than 2 seconds.   Psychiatric: She has a normal mood and affect. Her speech is normal and behavior is normal.   Vitals reviewed.        Assessment/Plan   Viktoria was seen today for urinary tract infection.    Diagnoses and all orders for this visit:    Acute cystitis without hematuria  -     POC Urinalysis Dipstick, Automated  -     Urine Culture, Comprehen ([facility]) - Urine, Clean Catch    Other orders  -     nitrofurantoin, macrocrystal-monohydrate, (MACROBID) 100 MG capsule; Take 1 capsule by mouth 2 (Two) Times a Day for 7 days.  -     fluconazole (DIFLUCAN) 150 MG tablet; Take one tablet today then the second tablet on day 7 of antibiotic treatment

## 2018-06-22 NOTE — PATIENT INSTRUCTIONS
Urinary Tract Infection, Adult  A urinary tract infection (UTI) is an infection of any part of the urinary tract. The urinary tract includes the:  · Kidneys.  · Ureters.  · Bladder.  · Urethra.    These organs make, store, and get rid of pee (urine) in the body.  Follow these instructions at home:  · Take over-the-counter and prescription medicines only as told by your doctor.  · If you were prescribed an antibiotic medicine, take it as told by your doctor. Do not stop taking the antibiotic even if you start to feel better.  · Avoid the following drinks:  ? Alcohol.  ? Caffeine.  ? Tea.  ? Carbonated drinks.  · Drink enough fluid to keep your pee clear or pale yellow.  · Keep all follow-up visits as told by your doctor. This is important.  · Make sure to:  ? Empty your bladder often and completely. Do not to hold pee for long periods of time.  ? Empty your bladder before and after sex.  ? Wipe from front to back after a bowel movement if you are female. Use each tissue one time when you wipe.  Contact a doctor if:  · You have back pain.  · You have a fever.  · You feel sick to your stomach (nauseous).  · You throw up (vomit).  · Your symptoms do not get better after 3 days.  · Your symptoms go away and then come back.  Get help right away if:  · You have very bad back pain.  · You have very bad lower belly (abdominal) pain.  · You are throwing up and cannot keep down any medicines or water.  This information is not intended to replace advice given to you by your health care provider. Make sure you discuss any questions you have with your health care provider.  Document Released: 06/05/2009 Document Revised: 05/25/2017 Document Reviewed: 11/07/2016  [employer] Interactive Patient Education © 2018 [employer]

## 2018-06-25 LAB
BACTERIA UR CULT: NORMAL
BACTERIA UR CULT: NORMAL
Lab: NORMAL

## 2018-06-27 ENCOUNTER — TELEPHONE (OUTPATIENT)
Dept: RETAIL CLINIC | Facility: CLINIC | Age: 78
End: 2018-06-27

## 2018-06-27 NOTE — TELEPHONE ENCOUNTER
Attempted without success to contact pt.  Will need a follow up appt to discuss DCCV.  Left vm to return call.    Number not valid

## 2018-06-29 ENCOUNTER — TELEPHONE (OUTPATIENT)
Dept: RETAIL CLINIC | Facility: CLINIC | Age: 78
End: 2018-06-29

## 2018-06-29 NOTE — TELEPHONE ENCOUNTER
"Urine culture back and negative. Patient reached by phone. Reports still having burning with urination and \"kidney pain\". Advised to follow up with higher level of care. Verbalizes understanding.  "

## 2018-07-14 ENCOUNTER — RESULTS ENCOUNTER (OUTPATIENT)
Dept: FAMILY MEDICINE CLINIC | Facility: CLINIC | Age: 78
End: 2018-07-14

## 2018-07-14 DIAGNOSIS — I10 ESSENTIAL HYPERTENSION: ICD-10-CM

## 2018-07-14 DIAGNOSIS — E78.49 OTHER HYPERLIPIDEMIA: ICD-10-CM

## 2018-07-25 ENCOUNTER — TELEPHONE (OUTPATIENT)
Dept: FAMILY MEDICINE CLINIC | Facility: CLINIC | Age: 78
End: 2018-07-25

## 2018-07-25 RX ORDER — IRBESARTAN 300 MG/1
300 TABLET ORAL NIGHTLY
Qty: 90 TABLET | Refills: 0 | Status: SHIPPED | OUTPATIENT
Start: 2018-07-25 | End: 2018-08-09 | Stop reason: ALTCHOICE

## 2018-07-31 LAB
ALBUMIN SERPL-MCNC: 4.4 G/DL (ref 3.5–5.2)
ALBUMIN/GLOB SERPL: 1.8 G/DL
ALP SERPL-CCNC: 98 U/L (ref 39–117)
ALT SERPL-CCNC: 108 U/L (ref 1–33)
AST SERPL-CCNC: 127 U/L (ref 1–32)
BASOPHILS # BLD AUTO: 0.03 10*3/MM3 (ref 0–0.2)
BASOPHILS NFR BLD AUTO: 0.3 % (ref 0–1.5)
BILIRUB SERPL-MCNC: 0.7 MG/DL (ref 0.1–1.2)
BUN SERPL-MCNC: 11 MG/DL (ref 8–23)
BUN/CREAT SERPL: 11.2 (ref 7–25)
CALCIUM SERPL-MCNC: 9.8 MG/DL (ref 8.6–10.5)
CHLORIDE SERPL-SCNC: 103 MMOL/L (ref 98–107)
CHOLEST SERPL-MCNC: 158 MG/DL (ref 0–200)
CO2 SERPL-SCNC: 27.5 MMOL/L (ref 22–29)
CREAT SERPL-MCNC: 0.98 MG/DL (ref 0.57–1)
EOSINOPHIL # BLD AUTO: 0.38 10*3/MM3 (ref 0–0.7)
EOSINOPHIL NFR BLD AUTO: 3.9 % (ref 0.3–6.2)
ERYTHROCYTE [DISTWIDTH] IN BLOOD BY AUTOMATED COUNT: 13.2 % (ref 11.7–13)
GLOBULIN SER CALC-MCNC: 2.5 GM/DL
GLUCOSE SERPL-MCNC: 109 MG/DL (ref 65–99)
HCT VFR BLD AUTO: 47.1 % (ref 35.6–45.5)
HDLC SERPL-MCNC: 62 MG/DL (ref 40–60)
HGB BLD-MCNC: 15.4 G/DL (ref 11.9–15.5)
IMM GRANULOCYTES # BLD: 0.01 10*3/MM3 (ref 0–0.03)
IMM GRANULOCYTES NFR BLD: 0.1 % (ref 0–0.5)
LDLC SERPL CALC-MCNC: 78 MG/DL (ref 0–100)
LDLC/HDLC SERPL: 1.25 {RATIO}
LYMPHOCYTES # BLD AUTO: 2.85 10*3/MM3 (ref 0.9–4.8)
LYMPHOCYTES NFR BLD AUTO: 28.9 % (ref 19.6–45.3)
MCH RBC QN AUTO: 29.4 PG (ref 26.9–32)
MCHC RBC AUTO-ENTMCNC: 32.7 G/DL (ref 32.4–36.3)
MCV RBC AUTO: 89.9 FL (ref 80.5–98.2)
MONOCYTES # BLD AUTO: 0.73 10*3/MM3 (ref 0.2–1.2)
MONOCYTES NFR BLD AUTO: 7.4 % (ref 5–12)
NEUTROPHILS # BLD AUTO: 5.86 10*3/MM3 (ref 1.9–8.1)
NEUTROPHILS NFR BLD AUTO: 59.5 % (ref 42.7–76)
PLATELET # BLD AUTO: 197 10*3/MM3 (ref 140–500)
POTASSIUM SERPL-SCNC: 4.9 MMOL/L (ref 3.5–5.2)
PROT SERPL-MCNC: 6.9 G/DL (ref 6–8.5)
RBC # BLD AUTO: 5.24 10*6/MM3 (ref 3.9–5.2)
SODIUM SERPL-SCNC: 144 MMOL/L (ref 136–145)
TRIGL SERPL-MCNC: 92 MG/DL (ref 0–150)
TSH SERPL DL<=0.005 MIU/L-ACNC: 1.49 MIU/ML (ref 0.27–4.2)
VLDLC SERPL CALC-MCNC: 18.4 MG/DL (ref 5–40)
WBC # BLD AUTO: 9.85 10*3/MM3 (ref 4.5–10.7)

## 2018-08-09 ENCOUNTER — OFFICE VISIT (OUTPATIENT)
Dept: FAMILY MEDICINE CLINIC | Facility: CLINIC | Age: 78
End: 2018-08-09

## 2018-08-09 VITALS
HEIGHT: 64 IN | WEIGHT: 210 LBS | SYSTOLIC BLOOD PRESSURE: 150 MMHG | HEART RATE: 78 BPM | OXYGEN SATURATION: 98 % | RESPIRATION RATE: 16 BRPM | BODY MASS INDEX: 35.85 KG/M2 | DIASTOLIC BLOOD PRESSURE: 59 MMHG | TEMPERATURE: 98.4 F

## 2018-08-09 DIAGNOSIS — K76.0 STEATOSIS OF LIVER: ICD-10-CM

## 2018-08-09 DIAGNOSIS — K20.90 ESOPHAGITIS: ICD-10-CM

## 2018-08-09 DIAGNOSIS — Z00.00 MEDICARE ANNUAL WELLNESS VISIT, INITIAL: Primary | ICD-10-CM

## 2018-08-09 DIAGNOSIS — E78.49 OTHER HYPERLIPIDEMIA: ICD-10-CM

## 2018-08-09 DIAGNOSIS — R79.89 ELEVATED LFTS: ICD-10-CM

## 2018-08-09 DIAGNOSIS — R73.03 PREDIABETES: ICD-10-CM

## 2018-08-09 DIAGNOSIS — E04.1 THYROID CYST: ICD-10-CM

## 2018-08-09 DIAGNOSIS — Z11.59 ENCOUNTER FOR SCREENING FOR OTHER VIRAL DISEASES (CODE): ICD-10-CM

## 2018-08-09 DIAGNOSIS — I10 ESSENTIAL HYPERTENSION: ICD-10-CM

## 2018-08-09 PROCEDURE — 99214 OFFICE O/P EST MOD 30 MIN: CPT | Performed by: FAMILY MEDICINE

## 2018-08-09 PROCEDURE — G0438 PPPS, INITIAL VISIT: HCPCS | Performed by: FAMILY MEDICINE

## 2018-08-09 RX ORDER — OLMESARTAN MEDOXOMIL 40 MG/1
40 TABLET ORAL DAILY
Qty: 90 TABLET | Refills: 0 | Status: SHIPPED | OUTPATIENT
Start: 2018-08-09 | End: 2018-09-20 | Stop reason: SDUPTHER

## 2018-08-09 NOTE — ASSESSMENT & PLAN NOTE
Stop all medications except new BP medication. Check hepatitis panel, check for iron overload, referral to GI

## 2018-08-09 NOTE — PATIENT INSTRUCTIONS
Check on insurance coverage and cost for Shingrix (newest shingles vaccine) and get the immunization at your local pharmacy. It is more effective than the old Zostavax vaccine and is recommended even if you have had the Zostavax vaccine in the past. For more information, please look at the website below:    https://www.cdc.gov/vaccines/vpd/shingles/public/shingrix/index.html      Medicare Wellness  Personal Prevention Plan of Service     Date of Office Visit:  2018  Encounter Provider:  Magen Olson MD  Place of Service:  [facility]  Patient Name: Viktoria Villasenor  :  1940    As part of the Medicare Wellness portion of your visit today, we are providing you with this personalized preventive plan of services (PPPS). This plan is based upon recommendations of the [place] Preventive Services Task Force (USPSTF) and the Advisory Committee on Immunization Practices (ACIP).    This lists the preventive care services that should be considered, and provides dates of when you are due. Items listed as completed are up-to-date and do not require any further intervention.    Health Maintenance   Topic Date Due   • ZOSTER VACCINE (2 of 3) 2013   • MEDICARE ANNUAL WELLNESS  2016   • INFLUENZA VACCINE  10/15/2018 (Originally 2018)   • MAMMOGRAM  2019   • TDAP/TD VACCINES (2 - Td) 2019   • LIPID PANEL  2019   • COLONOSCOPY  2026   • PNEUMOCOCCAL VACCINES (65+ LOW/MEDIUM RISK)  Completed       Orders Placed This Encounter   Procedures   • US Thyroid     Standing Status:   Future     Standing Expiration Date:   2019     Order Specific Question:   Reason for Exam:     Answer:   followup thyroid cysts   • Hepatic Function Panel   • Hepatitis A Antibody, Total   • Hepatitis B Surface Antibody   • Hepatitis B Surface Antigen   • Hepatitis C Antibody   • Iron Profile   • Comprehensive metabolic panel     Standing Status:   Future      Standing Expiration Date:   5/6/2019   • Lipid Panel With LDL/HDL Ratio     Standing Status:   Future     Standing Expiration Date:   5/6/2019   • TSH     Standing Status:   Future     Standing Expiration Date:   5/6/2019   • Ambulatory Referral to Gastroenterology     Referral Priority:   Routine     Referral Type:   Consultation     Referral Reason:   Specialty Services Required     Referred to Provider:   Casper Hodge MD     Requested Specialty:   Gastroenterology     Number of Visits Requested:   1   • CBC and Differential     Standing Status:   Future     Standing Expiration Date:   5/6/2019     Order Specific Question:   Manual Differential     Answer:   No       Return in about 1 month (around 9/9/2018) for BP Check.        Fall Prevention in the Home  Falls can cause injuries and can affect people from all age groups. There are many simple things that you can do to make your home safe and to help prevent falls.  What can I do on the outside of my home?  · Regularly repair the edges of walkways and driveways and fix any cracks.  · Remove high doorway thresholds.  · Trim any shrubbery on the main path into your home.  · Use bright outdoor lighting.  · Clear walkways of debris and clutter, including tools and rocks.  · Regularly check that handrails are securely fastened and in good repair. Both sides of any steps should have handrails.  · Install guardrails along the edges of any raised decks or porches.  · Have leaves, snow, and ice cleared regularly.  · Use sand or salt on walkways during winter months.  · In the garage, clean up any spills right away, including grease or oil spills.  What can I do in the bathroom?  · Use night lights.  · Install grab bars by the toilet and in the tub and shower. Do not use towel bars as grab bars.  · Use non-skid mats or decals on the floor of the tub or shower.  · If you need to sit down while you are in the shower, use a plastic, non-slip stool.  · Keep the floor dry.  Immediately clean up any water that spills on the floor.  · Remove soap buildup in the tub or shower on a regular basis.  · Attach bath mats securely with double-sided non-slip rug tape.  · Remove throw rugs and other tripping hazards from the floor.  What can I do in the bedroom?  · Use night lights.  · Make sure that a bedside light is easy to reach.  · Do not use oversized bedding that drapes onto the floor.  · Have a firm chair that has side arms to use for getting dressed.  · Remove throw rugs and other tripping hazards from the floor.  What can I do in the kitchen?  · Clean up any spills right away.  · Avoid walking on wet floors.  · Place frequently used items in easy-to-reach places.  · If you need to reach for something above you, use a sturdy step stool that has a grab bar.  · Keep electrical cables out of the way.  · Do not use floor polish or wax that makes floors slippery. If you have to use wax, make sure that it is non-skid floor wax.  · Remove throw rugs and other tripping hazards from the floor.  What can I do in the stairways?  · Do not leave any items on the stairs.  · Make sure that there are handrails on both sides of the stairs. Fix handrails that are broken or loose. Make sure that handrails are as long as the stairways.  · Check any carpeting to make sure that it is firmly attached to the stairs. Fix any carpet that is loose or worn.  · Avoid having throw rugs at the top or bottom of stairways, or secure the rugs with carpet tape to prevent them from moving.  · Make sure that you have a light switch at the top of the stairs and the bottom of the stairs. If you do not have them, have them installed.  What are some other fall prevention tips?  · Wear closed-toe shoes that fit well and support your feet. Wear shoes that have rubber soles or low heels.  · When you use a stepladder, make sure that it is completely opened and that the sides are firmly locked. Have someone hold the ladder while you  are using it. Do not climb a closed stepladder.  · Add color or contrast paint or tape to grab bars and handrails in your home. Place contrasting color strips on the first and last steps.  · Use mobility aids as needed, such as canes, walkers, scooters, and crutches.  · Turn on lights if it is dark. Replace any light bulbs that burn out.  · Set up furniture so that there are clear paths. Keep the furniture in the same spot.  · Fix any uneven floor surfaces.  · Choose a carpet design that does not hide the edge of steps of a stairway.  · Be aware of any and all pets.  · Review your medicines with your healthcare provider. Some medicines can cause dizziness or changes in blood pressure, which increase your risk of falling.  Talk with your health care provider about other ways that you can decrease your risk of falls. This may include working with a physical therapist or  to improve your strength, balance, and endurance.  This information is not intended to replace advice given to you by your health care provider. Make sure you discuss any questions you have with your health care provider.  Document Released: 12/08/2003 Document Revised: 05/16/2017 Document Reviewed: 01/22/2016  Elsevier Interactive Patient Education © 2018 [employer]

## 2018-08-09 NOTE — ASSESSMENT & PLAN NOTE
Lipid abnormalities are improving with treatment.  lifestyle intervention only due to elevated LFT  Lipids will be reassessed at next visit.

## 2018-08-09 NOTE — ASSESSMENT & PLAN NOTE
Hypertension is worsening.  Medication changes per orders.  Blood pressure will be reassessed in 4 weeks.

## 2018-08-09 NOTE — PROGRESS NOTES
QUICK REFERENCE INFORMATION:  The ABCs of the Annual Wellness Visit    Initial Medicare Wellness Visit    HEALTH RISK ASSESSMENT    1940    Recent Hospitalizations:  No hospitalization(s) within the last year..        Current Medical Providers:  Patient Care Team:  Magen Olson MD as PCP - General  Magen Olson MD as PCP - Family Medicine  Magen Olson MD as PCP - Claims Attributed  Ralph Turner MD as Consulting Physician (Otolaryngology)  Casper Hodge MD as Consulting Physician (Gastroenterology)        Smoking Status:  History   Smoking Status   • Former Smoker   • Packs/day: 1.00   • Years: 30.00   • Quit date: 1994   Smokeless Tobacco   • Never Used       Alcohol Consumption:  History   Alcohol Use No       Depression Screen:   PHQ-2/PHQ-9 Depression Screening 8/9/2018   Little interest or pleasure in doing things 0   Feeling down, depressed, or hopeless 0   Total Score 0       Health Habits and Functional and Cognitive Screening:  Functional & Cognitive Status 8/9/2018   Do you have difficulty preparing food and eating? No   Do you have difficulty bathing yourself, getting dressed or grooming yourself? No   Do you have difficulty using the toilet? No   Do you have difficulty moving around from place to place? No   Do you have trouble with steps or getting out of a bed or a chair? No   In the past year have you fallen or experienced a near fall? No   Current Diet Unhealthy Diet   Dental Exam Unknown   Eye Exam Up to date   Exercise (times per week) 0 times per week   Current Exercise Activities Include Walking   Do you need help using the phone?  No   Are you deaf or do you have serious difficulty hearing?  No   Do you need help with transportation? No   Do you need help shopping? No   Do you need help preparing meals?  No   Do you need help with housework?  No   Do you need help with laundry? No   Do you need help taking your medications? No   Do you need help managing money? No   Do you  ever drive or ride in a car without wearing a seat belt? No   Have you felt unusual stress, anger or loneliness in the last month? No   Who do you live with? Spouse   If you need help, do you have trouble finding someone available to you? No   Have you been bothered in the last four weeks by sexual problems? No   Do you have difficulty concentrating, remembering or making decisions? No           Does the patient have evidence of cognitive impairment? No    Asiprin use counseling: Does not need ASA (and currently is not on it)      Recent Lab Results:    Visual Acuity:  No exam data present    Age-appropriate Screening Schedule:  Refer to the list below for future screening recommendations based on patient's age, sex and/or medical conditions. Orders for these recommended tests are listed in the plan section. The patient has been provided with a written plan.    Health Maintenance   Topic Date Due   • ZOSTER VACCINE (2 of 3) 05/18/2013   • INFLUENZA VACCINE  10/15/2018 (Originally 8/1/2018)   • MAMMOGRAM  03/07/2019   • TDAP/TD VACCINES (2 - Td) 03/24/2019   • LIPID PANEL  07/31/2019   • COLONOSCOPY  12/01/2026   • PNEUMOCOCCAL VACCINES (65+ LOW/MEDIUM RISK)  Completed        Subjective   History of Present Illness    Viktoria Villasenor is a 77 y.o. female who presents for an Annual Wellness Visit.she is also here to review labs and refill medications.  Blood pressures above goal.  She was changed from irbesartan to valsartan due to elevated liver function tests.  Recently valsartan has been recalled by the FDA and we will switch her to olmesartan with short-term follow-up to recheck blood pressure.  Lipids are stable.  Prediabetes is slightly improved.  Her liver function tests are markedly worse.  Repeat labs in referral to gastroenterology as indicated.  Esophagitis will also be evaluated by her gastroenterologist.  She has history of thyroid cysts and it is time for surveillance of that condition.  Immunization  needs have been discussed.    The following portions of the patient's history were reviewed and updated as appropriate: allergies, current medications, past family history, past medical history, past social history, past surgical history and problem list.    Outpatient Medications Prior to Visit   Medication Sig Dispense Refill   • montelukast (SINGULAIR) 10 MG tablet Take 1 tablet by mouth Every Night for 180 days. 90 tablet 1   • simvastatin (ZOCOR) 20 MG tablet Take 1 tablet by mouth Every Night for 180 days. 90 tablet 1   • coenzyme Q10 100 MG capsule Take 100 mg by mouth Daily.     • fluconazole (DIFLUCAN) 150 MG tablet Take one tablet today then the second tablet on day 7 of antibiotic treatment 2 tablet 0   • irbesartan (AVAPRO) 300 MG tablet Take 1 tablet by mouth Every Night. (Patient not taking: Reported on 8/9/2018) 90 tablet 0   • pantoprazole (PROTONIX) 40 MG EC tablet Take 1 tablet by mouth At Night As Needed (heartburn) for up to 180 days. 90 tablet 1   • predniSONE (DELTASONE) 20 MG tablet Take 1 tablet by mouth 2 (Two) Times a Day. 10 tablet 0   • triamcinolone (KENALOG) 0.5 % cream Apply  topically 3 (Three) Times a Day. To affected area for contact dermatitis 30 g 0     No facility-administered medications prior to visit.        Patient Active Problem List   Diagnosis   • Other hyperlipidemia   • Essential hypertension   • Thyroid cysts   • Mild intermittent asthma without complication   • Esophagitis   • Elevated LFTs   • Prediabetes   • Non morbid obesity due to excess calories   • Steatosis of liver       Advance Care Planning:  has an advance directive - a copy has been provided and is in file    Identification of Risk Factors:  Risk factors include: weight , unhealthy diet, inactivity and increased fall risk.    Review of Systems   Constitutional: Positive for fatigue. Negative for unexpected weight change.   Cardiovascular: Negative for chest pain.   Gastrointestinal: Negative for nausea and  "vomiting.   Skin: Negative for color change.       Compared to one year ago, the patient feels her physical health is worse. fatigue  Compared to one year ago, the patient feels her mental health is the same.    Objective     Physical Exam   Constitutional: She is cooperative. No distress.   Eyes: Conjunctivae and lids are normal.   Neck: Carotid bruit is not present. No tracheal deviation present.   Cardiovascular: Normal rate, regular rhythm and normal heart sounds.    No murmur heard.  Pulmonary/Chest: Effort normal and breath sounds normal.   Abdominal: Soft. Normal appearance and bowel sounds are normal. There is no hepatosplenomegaly. There is no tenderness.   Neurological: She is alert. She is not disoriented.   Skin: Skin is warm and dry.   Psychiatric: She has a normal mood and affect. Her speech is normal and behavior is normal.   Vitals reviewed.      Vitals:    08/09/18 0944   BP: 150/59   Pulse: 78   Resp: 16   Temp: 98.4 °F (36.9 °C)   SpO2: 98%   Weight: 95.3 kg (210 lb)   Height: 162.6 cm (64.02\")   PainSc: 0-No pain       Patient's Body mass index is 36.03 kg/m². BMI is above normal parameters. Recommendations include: educational material, exercise counseling and nutrition counseling.      Assessment/Plan   Patient Self-Management and Personalized Health Advice  The patient has been provided with information about: diet, exercise, weight management and fall prevention and preventive services including:   · Exercise counseling provided, Fall Risk assessment done, Nutrition counseling provided, shingrix.    Problem List Items Addressed This Visit     Other hyperlipidemia     Lipid abnormalities are improving with treatment.  lifestyle intervention only due to elevated LFT  Lipids will be reassessed at next visit.         Relevant Orders    Lipid Panel With LDL/HDL Ratio    Essential hypertension     Hypertension is worsening.  Medication changes per orders.  Blood pressure will be reassessed in 4 " weeks.         Relevant Medications    olmesartan (BENICAR) 40 MG tablet    Other Relevant Orders    Comprehensive metabolic panel    CBC and Differential    TSH    Thyroid cysts     Recheck US         Relevant Orders    US Thyroid    Prediabetes     The current medical regimen is effective;  continue present plan .           Esophagitis     Referral to GI         Relevant Orders    Ambulatory Referral to Gastroenterology    Elevated LFTs     Stop all medications except new BP medication. Check hepatitis panel, check for iron overload, referral to GI         Relevant Orders    Ambulatory Referral to Gastroenterology    Hepatic Function Panel    Hepatitis A Antibody, Total    Hepatitis B Surface Antibody    Hepatitis B Surface Antigen    Hepatitis C Antibody    Iron Profile    Steatosis of liver     Weight loss encouraged, referral to GI         Relevant Orders    Hepatic Function Panel    Hepatitis A Antibody, Total    Hepatitis B Surface Antibody    Hepatitis B Surface Antigen    Hepatitis C Antibody    Iron Profile      Other Visit Diagnoses     Medicare annual wellness visit, initial    -  Primary    fall prevention, diet, exercise, immunizations    Encounter for screening for other viral diseases (CODE)         Relevant Orders    Hepatitis B Surface Antibody    Hepatitis B Surface Antigen            Orders Placed This Encounter   Procedures   • US Thyroid     Standing Status:   Future     Standing Expiration Date:   2/5/2019     Order Specific Question:   Reason for Exam:     Answer:   followup thyroid cysts   • Hepatic Function Panel   • Hepatitis A Antibody, Total   • Hepatitis B Surface Antibody   • Hepatitis B Surface Antigen   • Hepatitis C Antibody   • Iron Profile   • Comprehensive metabolic panel     Standing Status:   Future     Standing Expiration Date:   5/6/2019   • Lipid Panel With LDL/HDL Ratio     Standing Status:   Future     Standing Expiration Date:   5/6/2019   • TSH     Standing Status:    Future     Standing Expiration Date:   5/6/2019   • Ambulatory Referral to Gastroenterology     Referral Priority:   Routine     Referral Type:   Consultation     Referral Reason:   Specialty Services Required     Referred to Provider:   Casper Hodge MD     Requested Specialty:   Gastroenterology     Number of Visits Requested:   1   • CBC and Differential     Standing Status:   Future     Standing Expiration Date:   5/6/2019     Order Specific Question:   Manual Differential     Answer:   No       Outpatient Encounter Prescriptions as of 8/9/2018   Medication Sig Dispense Refill   • [DISCONTINUED] montelukast (SINGULAIR) 10 MG tablet Take 1 tablet by mouth Every Night for 180 days. 90 tablet 1   • [DISCONTINUED] simvastatin (ZOCOR) 20 MG tablet Take 1 tablet by mouth Every Night for 180 days. 90 tablet 1   • olmesartan (BENICAR) 40 MG tablet Take 1 tablet by mouth Daily for 90 days. 90 tablet 0   • [DISCONTINUED] coenzyme Q10 100 MG capsule Take 100 mg by mouth Daily.     • [DISCONTINUED] fluconazole (DIFLUCAN) 150 MG tablet Take one tablet today then the second tablet on day 7 of antibiotic treatment 2 tablet 0   • [DISCONTINUED] irbesartan (AVAPRO) 300 MG tablet Take 1 tablet by mouth Every Night. (Patient not taking: Reported on 8/9/2018) 90 tablet 0   • [DISCONTINUED] pantoprazole (PROTONIX) 40 MG EC tablet Take 1 tablet by mouth At Night As Needed (heartburn) for up to 180 days. 90 tablet 1   • [DISCONTINUED] predniSONE (DELTASONE) 20 MG tablet Take 1 tablet by mouth 2 (Two) Times a Day. 10 tablet 0   • [DISCONTINUED] triamcinolone (KENALOG) 0.5 % cream Apply  topically 3 (Three) Times a Day. To affected area for contact dermatitis 30 g 0     No facility-administered encounter medications on file as of 8/9/2018.        Reviewed use of high risk medication in the elderly: no  Reviewed for potential of harmful drug interactions in the elderly: no    Follow Up:  Return in about 1 month (around 9/9/2018) for  BP Check.     An After Visit Summary and PPPS with all of these plans were given to the patient.

## 2018-08-10 LAB
ALBUMIN SERPL-MCNC: 4.3 G/DL (ref 3.5–5.2)
ALP SERPL-CCNC: 100 U/L (ref 39–117)
ALT SERPL-CCNC: 118 U/L (ref 1–33)
AST SERPL-CCNC: 108 U/L (ref 1–32)
BILIRUB DIRECT SERPL-MCNC: <0.2 MG/DL (ref 0–0.3)
BILIRUB SERPL-MCNC: 0.5 MG/DL (ref 0.1–1.2)
HAV AB SER QL IA: NEGATIVE
HBV SURFACE AB SER QL: NON REACTIVE
HBV SURFACE AG SERPL QL IA: NEGATIVE
HCV AB S/CO SERPL IA: 0.1 S/CO RATIO (ref 0–0.9)
IRON SATN MFR SERPL: 24 % (ref 20–50)
IRON SERPL-MCNC: 102 MCG/DL (ref 37–145)
PROT SERPL-MCNC: 7.2 G/DL (ref 6–8.5)
TIBC SERPL-MCNC: 420 MCG/DL
UIBC SERPL-MCNC: 318 MCG/DL

## 2018-08-13 NOTE — PROGRESS NOTES
Please call Viktoria regarding her abnormal result. Tell her to keep appointment with GI to assess her liver abnormality.

## 2018-08-17 ENCOUNTER — HOSPITAL ENCOUNTER (OUTPATIENT)
Dept: ULTRASOUND IMAGING | Facility: HOSPITAL | Age: 78
Discharge: HOME | End: 2018-08-17
Attending: FAMILY MEDICINE | Admitting: FAMILY MEDICINE

## 2018-08-17 DIAGNOSIS — E04.1 THYROID CYST: ICD-10-CM

## 2018-08-17 PROCEDURE — 76536 US EXAM OF HEAD AND NECK: CPT

## 2018-08-20 NOTE — PROGRESS NOTES
I have reviewed your recent test results and have found them to be within normal limits.  Please continue with current recommendations as discussed at our last visit. Please keep follow up schedule with me as set up.  Please call if you have any questions                                                             Dr. Olson

## 2018-09-09 ENCOUNTER — APPOINTMENT (OUTPATIENT)
Dept: GENERAL RADIOLOGY | Facility: HOSPITAL | Age: 78
End: 2018-09-09

## 2018-09-09 ENCOUNTER — APPOINTMENT (OUTPATIENT)
Dept: CARDIOLOGY | Facility: HOSPITAL | Age: 78
End: 2018-09-09
Attending: EMERGENCY MEDICINE

## 2018-09-09 ENCOUNTER — HOSPITAL ENCOUNTER (EMERGENCY)
Facility: HOSPITAL | Age: 78
Discharge: HOME | End: 2018-09-09
Attending: EMERGENCY MEDICINE | Admitting: EMERGENCY MEDICINE

## 2018-09-09 VITALS
SYSTOLIC BLOOD PRESSURE: 115 MMHG | OXYGEN SATURATION: 95 % | HEIGHT: 64 IN | HEART RATE: 88 BPM | DIASTOLIC BLOOD PRESSURE: 88 MMHG | BODY MASS INDEX: 35.51 KG/M2 | TEMPERATURE: 97.7 F | RESPIRATION RATE: 16 BRPM | WEIGHT: 208 LBS

## 2018-09-09 DIAGNOSIS — S80.11XA LEG HEMATOMA, RIGHT, INITIAL ENCOUNTER: Primary | ICD-10-CM

## 2018-09-09 PROCEDURE — 93971 EXTREMITY STUDY: CPT

## 2018-09-09 PROCEDURE — 99282 EMERGENCY DEPT VISIT SF MDM: CPT

## 2018-09-09 PROCEDURE — 73590 X-RAY EXAM OF LOWER LEG: CPT

## 2018-09-09 NOTE — PROGRESS NOTES
Right leg venous Doppler study preliminary report: negative for dvt.  Called report to Dr. Marrero.

## 2018-09-09 NOTE — ED PROVIDER NOTES
" EMERGENCY DEPARTMENT ENCOUNTER    Room Number:  33/33  Date seen:  9/9/2018  Time seen: 4:13 PM  PCP: Magen Olson MD  Historian: Patient, Family      HPI  Chief Complaint: Fall  Context: Viktoria Villasenor is a 77 y.o. female who presents to the ED c/o intermittent episodes of right lower leg pain s/p mechanical fall sustained about 6 days ago. Pt reports that pt slipped on a slippery surface while pt was at a pool in [place]. Pt reports that she returned via vehicle from [place] to [place] yesterday. Pt states that her leg pain has progressively worsened since initial onset. Pt states that her right lower leg pain worsens with movement of the RLE and improves with resting the RLE. Pt denies head injury, LOC, new/worsening abdominal pain, chest pain, dyspnea, nausea, vomiting, neck pain, and focal weakness/numbness of the RLE. Pt is not currently taking any antiplatelets or anticoagulants. There are no other complaints at this time.       Pain Location: Right lower leg  Radiation: None  Character: \"aching\"  Duration: Fall occurred about 6 days ago  Severity: Moderate  Progression: Worsening  Aggravating Factors: Movement of the RLE  Alleviating Factors: Resting the RLE            PAST MEDICAL HISTORY  Active Ambulatory Problems     Diagnosis Date Noted   • Other hyperlipidemia    • Essential hypertension    • Thyroid cysts    • Mild intermittent asthma without complication    • Esophagitis 04/06/2016   • Elevated LFTs 06/28/2016   • Prediabetes 08/03/2017   • Non morbid obesity due to excess calories 08/03/2017   • Steatosis of liver 08/09/2018     Resolved Ambulatory Problems     Diagnosis Date Noted   • Plantar fasciitis of left foot    • Left shoulder tendonitis    • ACE-inhibitor cough    • Conjunctivitis    • Hypopigmentation    • Menopausal syndrome    • Asymptomatic postmenopausal status    • Pruritus    • SOB (shortness of breath)    • UTI (urinary tract infection)    • Viral syndrome    • Motion " sickness    • 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   • Chest tightness 04/06/2016     Past Medical History:   Diagnosis Date   • ACE-inhibitor cough    • Asthma    • Asymptomatic postmenopausal status    • Conjunctivitis    • GERD (gastroesophageal reflux disease)    • History of colon polyps    • HLD (hyperlipidemia)    • Hypertension    • Hypopigmentation    • Left shoulder tendonitis    • Menopausal syndrome    • Motion sickness    • Plantar fasciitis of left foot    • Pruritus    • SOB (shortness of breath)    • Thyroid cyst    • UTI (urinary tract infection)    • Viral syndrome          PAST SURGICAL HISTORY  Past Surgical History:   Procedure Laterality Date   • CARDIAC CATHETERIZATION      in the 80's   • CARDIAC CATHETERIZATION N/A 5/4/2016    Procedure: Left Heart Cath;  Surgeon: Dale Vasquez MD;  Location: [facility] CATH INVASIVE LOCATION;  Service:    • CARDIAC CATHETERIZATION N/A 5/4/2016    Procedure: Coronary angiography;  Surgeon: Dale Vasquez MD;  Location: [facility] CATH INVASIVE LOCATION;  Service:    • CARDIAC CATHETERIZATION N/A 5/4/2016    Procedure: Left ventriculography;  Surgeon: Dale Vasquez MD;  Location: [facility] CATH INVASIVE LOCATION;  Service:    • CHOLECYSTECTOMY  1980   • COLONOSCOPY      >5 years   • COLONOSCOPY  12/01/2016    tics, IH, rectal biopsy showed changes suggestive of Schwannoma    • ENDOSCOPY  12/01/2016    LA grade B esophagitis, mild Schatzki ring, HH, gastric polyp, erythematous mucosa in stomach   • LASIK Bilateral    • RECTAL ULTRASOUND N/A 2/7/2017    Procedure: ENDOSCOPIC ULTRASOUND (LOWER);  Surgeon: Casper Rodríguez MD;  Location: [facility] ENDOSCOPY;  Service:    • SHOULDER SURGERY Left 06/17/2010    Dr. Green; repair L shoulder adhesive capsulitis   • SKIN BIOPSY     • TOTAL ABDOMINAL HYSTERECTOMY  1989    fibroid tumors         FAMILY HISTORY  Family History   Problem Relation Age of Onset   • Anxiety disorder Mother    • Uterine cancer Mother    • Dementia Mother    •  Cancer Father         Prostate Cancer    • Other Father         AAA, aneurysm rupture   • Leukemia Brother          SOCIAL HISTORY  Social History     Social History   • Marital status:      Spouse name: N/A   • Number of children: 2   • Years of education: N/A     Occupational History   • Not on file.     Social History Main Topics   • Smoking status: Former Smoker     Packs/day: 1.00     Years: 30.00     Quit date: 1994   • Smokeless tobacco: Never Used   • Alcohol use No   • Drug use: No   • Sexual activity: Defer     Other Topics Concern   • Not on file     Social History Narrative   • No narrative on file         ALLERGIES  Patient has no known allergies.        REVIEW OF SYSTEMS  Review of Systems   Constitutional: Negative.    Eyes: Negative for visual disturbance.   Respiratory: Negative for shortness of breath.    Cardiovascular: Negative for chest pain.   Gastrointestinal: Negative for abdominal pain, nausea and vomiting.   Musculoskeletal: Negative for neck pain.        Right lower leg pain   Skin: Negative for rash.   Neurological: Negative for syncope, weakness, numbness and headaches.   Psychiatric/Behavioral: Negative.    All other systems reviewed and are negative.           PHYSICAL EXAM  ED Triage Vitals   Temp Heart Rate Resp BP SpO2   09/09/18 1606 09/09/18 1606 09/09/18 1606 09/09/18 1617 09/09/18 1606   97.7 °F (36.5 °C) 88 16 115/88 95 %      Temp src Heart Rate Source Patient Position BP Location FiO2 (%)   09/09/18 1606 09/09/18 1606 09/09/18 1617 09/09/18 1617 --   Tympanic Monitor Lying Right arm        Physical Exam   Constitutional: She is oriented to person, place, and time. No distress.   Eyes: Pupils are equal, round, and reactive to light. EOM are normal.   Neck: Normal range of motion. Neck supple.   Cardiovascular: Normal rate and intact distal pulses.    Pulmonary/Chest: Effort normal. She exhibits no tenderness.   Abdominal: Soft. There is no tenderness.   Musculoskeletal:  She exhibits edema (of the right lower leg (from the knee down)) and tenderness (of the right lower leg).   Sensation is intact to light touch throughout the bilateral lower extremities. DP and PT pulses are 2+ bilaterally.      Neurological: She is alert and oriented to person, place, and time. She has normal sensation and normal strength.   Skin: Skin is warm. Bruising (of the right lower leg) noted.   Psychiatric: Mood and affect normal.   Nursing note and vitals reviewed.          LAB RESULTS  Recent Results (from the past 24 hour(s))   Duplex Venous Lower Extremity - Right    Collection Time: 09/09/18  5:30 PM   Result Value Ref Range    Right Common Femoral Spont Y     Right Common Femoral Phasic Y     Right Common Femoral Augment Y     Right Common Femoral Competent Y     Right Common Femoral Compress C     Right Saphenofemoral Junction Spont Y     Right Saphenofemoral Junction Phasic Y     Right Saphenofemoral Junction Augment Y     Right Saphenofemoral Junction Competent Y     Right Saphenofemoral Junction Compress C     Right Proximal Femoral Compress C     Right Mid Femoral Spont Y     Right Mid Femoral Phasic Y     Right Mid Femoral Augment Y     Right Mid Femoral Competent Y     Right Mid Femoral Compress C     Right Distal Femoral Compress C     Right Popliteal Spont Y     Right Popliteal Phasic Y     Right Popliteal Augment Y     Right Popliteal Competent Y     Right Popliteal Compress C     Right Posterior Tibial Compress C     Right Peroneal Compress C     Right GastronemiusSoleal Compress C     Right Greater Saph AK Compress C     Right Greater Saph BK Compress C     Left Common Femoral Spont Y     Left Common Femoral Phasic Y     Left Common Femoral Augment Y     Left Common Femoral Competent Y     Left Common Femoral Compress C        Ordered the above labs and reviewed the results.        RADIOLOGY  XR Tibia Fibula 2 View Right   Final Result    There appears to be mild soft tissue swelling but  no underlying fracture  is seen.         RLE Venous Doppler: Negative for acute DVT.    Ordered the above noted radiological studies. Reviewed by me in PACS.          PROCEDURES  Procedures        MEDICATIONS GIVEN IN ER  Medications - No data to display          PROGRESS AND CONSULTS  ED Course as of Sep 09 1857   Sun Sep 09, 2018   1731 5:31 PM  Patient with leg pain.  No fracture or DVT.  Has soft compartments.  I think she has hematoma and then drove back and has had some dependent edema.  Will discharge home.  Follow up with PMD.  Elevate.  [SL]      ED Course User Index  [SL] Imer Marrero MD         4:20 PM:  Right tib/fib X-Ray and RLE venous doppler ordered for further evaluation.    5:27 PM:  Rechecked pt. Informed pt and family that pt's right tib/fib X-Ray shows mild soft tissue swelling, but nothing acute otherwise. Pt's RLE venous doppler is negative for acute DVT. Pt was advised to ice/elevate area of pain. Pt was also advised to f/u with PMD. RTER warnings given. Pt and family agree with plan for discharge.          MEDICAL DECISION MAKING      MDM  Number of Diagnoses or Management Options     Amount and/or Complexity of Data Reviewed  Clinical lab tests: ordered and reviewed  Tests in the radiology section of CPT®: ordered and reviewed (Right tib/fib X-Ray:  There appears to be mild soft tissue swelling but no underlying fracture  is seen.)    Patient Progress  Patient progress: stable             DIAGNOSIS  Final diagnoses:   Leg hematoma, right, initial encounter             DISPOSITION  Pt discharged.      DISCHARGE    Patient discharged in stable condition.    Reviewed implications of results, diagnosis, meds, responsibility to follow up, warning signs and symptoms of possible worsening, potential complications and reasons to return to ER.    Patient/Family voiced understanding of above instructions.    Discussed plan for discharge, as there is no emergent indication for admission.  Pt/family is agreeable and understands need for follow up and repeat testing. Pt is aware that discharge does not mean that nothing is wrong but it indicates no emergency is present that requires admission and they must continue care with follow-up as given below or physician of their choice.     FOLLOW-UP  Magen Olson MD  [id number] [facility] [address]  [facility] [address]  [phone]    Schedule an appointment as soon as possible for a visit               Latest Documented Vital Signs:  As of 6:34 PM  BP- 115/88 HR- 88 Temp- 97.7 °F (36.5 °C) (Tympanic) O2 sat- 95%        --  Documentation assistance provided by bindu Stanton for Dr. Janes MD.  Information recorded by the scribe was done at my direction and has been verified and validated by me.               Deniz Stanton  09/09/18 2774       Imer Marrero MD  09/09/18 9272

## 2018-09-09 NOTE — ED TRIAGE NOTES
Pt states that she fell last Monday and landed on her right leg.  Denies hitting head or LOC.  Pt states the pain has gotten worse.   States she has some discoloration, but no numbness or tingling.

## 2018-09-10 LAB
BH CV LOWER VASCULAR LEFT COMMON FEMORAL AUGMENT: NORMAL
BH CV LOWER VASCULAR LEFT COMMON FEMORAL COMPETENT: NORMAL
BH CV LOWER VASCULAR LEFT COMMON FEMORAL COMPRESS: NORMAL
BH CV LOWER VASCULAR LEFT COMMON FEMORAL PHASIC: NORMAL
BH CV LOWER VASCULAR LEFT COMMON FEMORAL SPONT: NORMAL
BH CV LOWER VASCULAR RIGHT COMMON FEMORAL AUGMENT: NORMAL
BH CV LOWER VASCULAR RIGHT COMMON FEMORAL COMPETENT: NORMAL
BH CV LOWER VASCULAR RIGHT COMMON FEMORAL COMPRESS: NORMAL
BH CV LOWER VASCULAR RIGHT COMMON FEMORAL PHASIC: NORMAL
BH CV LOWER VASCULAR RIGHT COMMON FEMORAL SPONT: NORMAL
BH CV LOWER VASCULAR RIGHT DISTAL FEMORAL COMPRESS: NORMAL
BH CV LOWER VASCULAR RIGHT GASTRONEMIUS COMPRESS: NORMAL
BH CV LOWER VASCULAR RIGHT GREATER SAPH AK COMPRESS: NORMAL
BH CV LOWER VASCULAR RIGHT GREATER SAPH BK COMPRESS: NORMAL
BH CV LOWER VASCULAR RIGHT MID FEMORAL AUGMENT: NORMAL
BH CV LOWER VASCULAR RIGHT MID FEMORAL COMPETENT: NORMAL
BH CV LOWER VASCULAR RIGHT MID FEMORAL COMPRESS: NORMAL
BH CV LOWER VASCULAR RIGHT MID FEMORAL PHASIC: NORMAL
BH CV LOWER VASCULAR RIGHT MID FEMORAL SPONT: NORMAL
BH CV LOWER VASCULAR RIGHT PERONEAL COMPRESS: NORMAL
BH CV LOWER VASCULAR RIGHT POPLITEAL AUGMENT: NORMAL
BH CV LOWER VASCULAR RIGHT POPLITEAL COMPETENT: NORMAL
BH CV LOWER VASCULAR RIGHT POPLITEAL COMPRESS: NORMAL
BH CV LOWER VASCULAR RIGHT POPLITEAL PHASIC: NORMAL
BH CV LOWER VASCULAR RIGHT POPLITEAL SPONT: NORMAL
BH CV LOWER VASCULAR RIGHT POSTERIOR TIBIAL COMPRESS: NORMAL
BH CV LOWER VASCULAR RIGHT PROXIMAL FEMORAL COMPRESS: NORMAL
BH CV LOWER VASCULAR RIGHT SAPHENOFEMORAL JUNCTION AUGMENT: NORMAL
BH CV LOWER VASCULAR RIGHT SAPHENOFEMORAL JUNCTION COMPETENT: NORMAL
BH CV LOWER VASCULAR RIGHT SAPHENOFEMORAL JUNCTION COMPRESS: NORMAL
BH CV LOWER VASCULAR RIGHT SAPHENOFEMORAL JUNCTION PHASIC: NORMAL
BH CV LOWER VASCULAR RIGHT SAPHENOFEMORAL JUNCTION SPONT: NORMAL

## 2018-09-13 ENCOUNTER — EPISODE CHANGES (OUTPATIENT)
Dept: CASE MANAGEMENT | Facility: OTHER | Age: 78
End: 2018-09-13

## 2018-09-14 ENCOUNTER — EPISODE CHANGES (OUTPATIENT)
Dept: CASE MANAGEMENT | Facility: OTHER | Age: 78
End: 2018-09-14

## 2018-09-20 ENCOUNTER — OFFICE VISIT (OUTPATIENT)
Dept: FAMILY MEDICINE CLINIC | Facility: CLINIC | Age: 78
End: 2018-09-20

## 2018-09-20 VITALS
RESPIRATION RATE: 16 BRPM | HEIGHT: 64 IN | DIASTOLIC BLOOD PRESSURE: 70 MMHG | HEART RATE: 89 BPM | BODY MASS INDEX: 35.68 KG/M2 | SYSTOLIC BLOOD PRESSURE: 135 MMHG | WEIGHT: 209 LBS | TEMPERATURE: 97 F

## 2018-09-20 DIAGNOSIS — L03.115 CELLULITIS OF RIGHT LOWER EXTREMITY: ICD-10-CM

## 2018-09-20 DIAGNOSIS — I10 ESSENTIAL HYPERTENSION: Primary | ICD-10-CM

## 2018-09-20 DIAGNOSIS — R79.89 ELEVATED LFTS: ICD-10-CM

## 2018-09-20 DIAGNOSIS — E78.49 OTHER HYPERLIPIDEMIA: ICD-10-CM

## 2018-09-20 DIAGNOSIS — R73.03 PREDIABETES: ICD-10-CM

## 2018-09-20 PROBLEM — IMO0001 CLASS 2 OBESITY DUE TO EXCESS CALORIES WITH SERIOUS COMORBIDITY AND BODY MASS INDEX (BMI) OF 35.0 TO 35.9 IN ADULT: Status: ACTIVE | Noted: 2017-08-03

## 2018-09-20 PROCEDURE — 99214 OFFICE O/P EST MOD 30 MIN: CPT | Performed by: FAMILY MEDICINE

## 2018-09-20 RX ORDER — OLMESARTAN MEDOXOMIL 40 MG/1
40 TABLET ORAL DAILY
Qty: 90 TABLET | Refills: 0 | Status: SHIPPED | OUTPATIENT
Start: 2018-09-20 | End: 2018-09-20 | Stop reason: SDUPTHER

## 2018-09-20 RX ORDER — OLMESARTAN MEDOXOMIL 40 MG/1
40 TABLET ORAL DAILY
Qty: 90 TABLET | Refills: 0 | Status: SHIPPED | OUTPATIENT
Start: 2018-09-20 | End: 2018-12-17 | Stop reason: SDUPTHER

## 2018-09-20 RX ORDER — CEPHALEXIN 500 MG/1
500 TABLET ORAL 4 TIMES DAILY
Qty: 40 TABLET | Refills: 0 | Status: SHIPPED | OUTPATIENT
Start: 2018-09-20 | End: 2018-09-30

## 2018-09-20 NOTE — PROGRESS NOTES
"Chief Complaint   Patient presents with   • Hypertension       Subjective     Viktoria Villasenor presents to the office today to refill her medications. No medication side effects are reported.  Her blood pressure is controlled on current therapy.  Lipids are stable.  Prediabetes is stable.  She has appointment with gastroenterology in the near future.  3 weeks ago while on vacation the patient fell and injured her right leg.  She has been seen in the emergency room and fracture and blood clots have been ruled out.  Today her legs continue to have redness and warmth and tenderness.  She would like that rechecked.    I have reviewed and updated her medications, medical history and problem list during today's office visit.     Patient Care Team:  Magen Olson MD as PCP - General  Magen Olson MD as PCP - Family Medicine  Magen Olson MD as PCP - [facility]  Ralph Turner MD as Consulting Physician (Otolaryngology)  Casper Hodge MD as Consulting Physician (Gastroenterology)  Rebeca Sierra RN as Care Coordinator (Population Health)    Social History   Substance Use Topics   • Smoking status: Former Smoker     Packs/day: 1.00     Years: 30.00     Quit date: 1994   • Smokeless tobacco: Never Used   • Alcohol use No       Review of Systems   Constitutional: Negative for fatigue.   Cardiovascular: Negative for chest pain.   Skin:        See HPI       Objective     /70   Pulse 89   Temp 97 °F (36.1 °C) (Oral)   Resp 16   Ht 162.6 cm (64\")   Wt 94.8 kg (209 lb)   BMI 35.87 kg/m²     Body mass index is 35.87 kg/m².    Physical Exam   Constitutional: She is oriented to person, place, and time. She appears well-developed. No distress.   Eyes: Conjunctivae and lids are normal.   Neck: Carotid bruit is not present.   Cardiovascular: Normal rate, regular rhythm and normal heart sounds.    Pulmonary/Chest: Effort normal and breath sounds normal.   Neurological: She is alert and oriented to " person, place, and time.   Skin: Skin is warm and dry.   See photo below   Psychiatric: She has a normal mood and affect. Her behavior is normal.   Vitals reviewed.          Data Reviewed:             Assessment/Plan     Problem List Items Addressed This Visit     Other hyperlipidemia    Relevant Orders    Lipid Panel With LDL/HDL Ratio    Essential hypertension - Primary    Relevant Medications    olmesartan (BENICAR) 40 MG tablet    Other Relevant Orders    Comprehensive metabolic panel    CBC and Differential    Prediabetes    Elevated LFTs    Relevant Orders    Comprehensive metabolic panel    Cellulitis of right lower extremity    Relevant Medications    Cephalexin 500 MG tablet          Orders Placed This Encounter   Procedures   • Comprehensive metabolic panel     Standing Status:   Future     Standing Expiration Date:   3/19/2019   • Lipid Panel With LDL/HDL Ratio     Standing Status:   Future     Standing Expiration Date:   3/19/2019   • CBC and Differential     Standing Status:   Future     Standing Expiration Date:   3/19/2019     Order Specific Question:   Manual Differential     Answer:   No         Current Outpatient Prescriptions:   •  olmesartan (BENICAR) 40 MG tablet, Take 1 tablet by mouth Daily for 90 days., Disp: 90 tablet, Rfl: 0  •  Cephalexin 500 MG tablet, Take 500 mg by mouth 4 (Four) Times a Day for 10 days., Disp: 40 tablet, Rfl: 0    Return in about 2 weeks (around 10/4/2018) for Recheck cellulitis.

## 2018-09-28 ENCOUNTER — OFFICE VISIT (OUTPATIENT)
Dept: GASTROENTEROLOGY | Facility: CLINIC | Age: 78
End: 2018-09-28

## 2018-09-28 VITALS
BODY MASS INDEX: 35.71 KG/M2 | HEIGHT: 64 IN | WEIGHT: 209.2 LBS | DIASTOLIC BLOOD PRESSURE: 78 MMHG | SYSTOLIC BLOOD PRESSURE: 118 MMHG | TEMPERATURE: 98.1 F

## 2018-09-28 DIAGNOSIS — IMO0001 CLASS 2 OBESITY DUE TO EXCESS CALORIES WITH SERIOUS COMORBIDITY AND BODY MASS INDEX (BMI) OF 35.0 TO 35.9 IN ADULT: ICD-10-CM

## 2018-09-28 DIAGNOSIS — R79.89 ELEVATED LFTS: Primary | ICD-10-CM

## 2018-09-28 DIAGNOSIS — K76.0 STEATOSIS OF LIVER: ICD-10-CM

## 2018-09-28 PROCEDURE — 99214 OFFICE O/P EST MOD 30 MIN: CPT | Performed by: INTERNAL MEDICINE

## 2018-09-28 NOTE — PROGRESS NOTES
Chief Complaint   Patient presents with   • Elevated Hepatic Enzymes     Subjective     HPI     Viktoria Villasenor is a 77 y.o. female who presents today for f/u.  She has been followed by Dr. Wilder sargent for a long time but is trying to switch all her providers over to the [facility].  I saw Viktoria last year where she underwent a endorectal ultrasound for a schwannoma found incidentally on colonoscopy done by Dr. Garcia.  I saw no significant endosonographic abnormalities in the rectal area.    She has been referred by her PCP for evaluation of elevated liver enzymes.  /.  Normal platelets and TB.   Her body mass index today in the office is 36.  Weight relatively stable over last 4 years.  No EtOH intake.  Hx of thyroid nodules, no hypothyroidism.  RUQ u/s 2016 with hepatic steatosis.        Past Medical History:   Diagnosis Date   • ACE-inhibitor cough    • Asthma    • Asymptomatic postmenopausal status    • Conjunctivitis     right   • GERD (gastroesophageal reflux disease)    • History of colon polyps    • HLD (hyperlipidemia)    • Hypertension    • Hypopigmentation    • Left shoulder tendonitis    • Menopausal syndrome    • Motion sickness    • Plantar fasciitis of left foot     nodule   • Pruritus     possibly due to Benicar   • SOB (shortness of breath)    • Thyroid cyst    • UTI (urinary tract infection)    • Viral syndrome        Social History     Social History   • Marital status:    • Number of children: 2     Social History Main Topics   • Smoking status: Former Smoker     Packs/day: 1.00     Years: 30.00     Quit date: 1994   • Smokeless tobacco: Never Used   • Alcohol use No   • Drug use: No   • Sexual activity: Defer     Other Topics Concern   • Not on file       Current Outpatient Prescriptions:   •  Cephalexin 500 MG tablet, Take 500 mg by mouth 4 (Four) Times a Day for 10 days., Disp: 40 tablet, Rfl: 0  •  olmesartan (BENICAR) 40 MG tablet, Take 1 tablet by  mouth Daily for 90 days., Disp: 90 tablet, Rfl: 0    Review of Systems   Constitutional: Negative.    Cardiovascular: Negative.    Gastrointestinal: Negative.        Objective   Vitals:    09/28/18 0835   BP: 118/78   Temp: 98.1 °F (36.7 °C)       Physical Exam   Constitutional: She is oriented to person, place, and time. She appears well-developed and well-nourished.   HENT:   Head: Normocephalic and atraumatic.   Abdominal: Soft. Bowel sounds are normal. She exhibits no distension and no mass. There is no tenderness. No hernia.   Neurological: She is alert and oriented to person, place, and time.   Skin: Skin is warm and dry.   Psychiatric: She has a normal mood and affect. Her behavior is normal. Judgment and thought content normal.   Vitals reviewed.      Assessment/Plan   Assessment:     1. Elevated LFTs    2. Steatosis of liver    3. Class 2 obesity due to excess calories with serious comorbidity and body mass index (BMI) of 35.0 to 35.9 in adult      Plan:   Elevated LFTs most likely related to progressive NAFLD.  May have element of MARSHALL.  Will update RUQ u/s  We'll perform serologic workup today to rule out other secondary causes of her transaminitis.  Discussed in detail with the patient the importance of lifestyle modification to include dietary modification and exercise with the goal of weight reduction.  This is the mainstay of therapy for fatty liver disease.  Follow up 3 mos - will consider MARHSALL fibrosure at that time.  Request all records from Dr Garcia's office        Casper Rodríguez M.D.  [facility]  [address] [place] [address]  Office: [phone]

## 2018-09-29 LAB
ACTIN IGG SERPL-ACNC: 12 UNITS (ref 0–19)
IGG SERPL-MCNC: 1159 MG/DL (ref 700–1600)
MITOCHONDRIA M2 IGG SER-ACNC: <20 UNITS (ref 0–20)

## 2018-10-01 ENCOUNTER — TELEPHONE (OUTPATIENT)
Dept: GASTROENTEROLOGY | Facility: CLINIC | Age: 78
End: 2018-10-01

## 2018-10-01 LAB
ANA TITR SER IF: NEGATIVE {TITER}
ENDOMYSIUM IGA SER QL: NEGATIVE
GLIADIN PEPTIDE IGA SER-ACNC: 8 UNITS (ref 0–19)
GLIADIN PEPTIDE IGG SER-ACNC: 2 UNITS (ref 0–19)
IGA SERPL-MCNC: 376 MG/DL (ref 64–422)
TTG IGA SER-ACNC: <2 U/ML (ref 0–3)
TTG IGG SER-ACNC: <2 U/ML (ref 0–5)

## 2018-10-01 NOTE — TELEPHONE ENCOUNTER
----- Message from Casper Rodríguez MD sent at 9/28/2018 11:29 AM EDT -----  Please request office notes and last endoscopy reports from Dr Garcia's office

## 2018-10-04 ENCOUNTER — HOSPITAL ENCOUNTER (OUTPATIENT)
Dept: ULTRASOUND IMAGING | Facility: HOSPITAL | Age: 78
Discharge: HOME | End: 2018-10-04
Attending: INTERNAL MEDICINE | Admitting: INTERNAL MEDICINE

## 2018-10-04 DIAGNOSIS — R79.89 ELEVATED LFTS: ICD-10-CM

## 2018-10-04 PROCEDURE — 76705 ECHO EXAM OF ABDOMEN: CPT

## 2018-10-10 ENCOUNTER — OFFICE VISIT (OUTPATIENT)
Dept: FAMILY MEDICINE CLINIC | Facility: CLINIC | Age: 78
End: 2018-10-10

## 2018-10-10 VITALS
TEMPERATURE: 97.6 F | BODY MASS INDEX: 35.51 KG/M2 | RESPIRATION RATE: 16 BRPM | SYSTOLIC BLOOD PRESSURE: 134 MMHG | HEART RATE: 83 BPM | DIASTOLIC BLOOD PRESSURE: 76 MMHG | WEIGHT: 208 LBS | HEIGHT: 64 IN

## 2018-10-10 DIAGNOSIS — L03.115 CELLULITIS OF RIGHT LOWER EXTREMITY: Primary | ICD-10-CM

## 2018-10-10 PROCEDURE — 99213 OFFICE O/P EST LOW 20 MIN: CPT | Performed by: FAMILY MEDICINE

## 2018-10-10 RX ORDER — CEPHALEXIN 500 MG/1
500 TABLET ORAL 4 TIMES DAILY
Qty: 40 TABLET | Refills: 0 | Status: SHIPPED | OUTPATIENT
Start: 2018-10-10 | End: 2018-10-11

## 2018-10-10 NOTE — PROGRESS NOTES
"Chief Complaint   Patient presents with   • Cellulitis       Subjective   This patient returns the office to recheck cellulitis of right lower extremity.  She has finished her antibiotics.  The swelling is much improved.  There is still some residual redness and tenderness over the main spot.  Please see photo below.  Comparison with previous photo does document decreased swelling in the right lower extremity.  I have reviewed and updated her medications, medical history and problem list during today's office visit.     Patient Care Team:  Magen Olson MD as PCP - General  Magen Olson MD as PCP - Family Medicine  Magen Olson MD as PCP - [facility] Attributed  ForRalph calzada MD as Consulting Physician (Otolaryngology)  Casper Hodge MD as Consulting Physician (Gastroenterology)  Rebeca Sierra RN as Care Coordinator (Population Health)    Social History   Substance Use Topics   • Smoking status: Former Smoker     Packs/day: 1.00     Years: 30.00     Quit date: 1994   • Smokeless tobacco: Never Used   • Alcohol use No       Review of Systems   Cardiovascular: Negative for leg swelling.   Skin:        Decreased swelling       Objective     /76   Pulse 83   Temp 97.6 °F (36.4 °C) (Oral)   Resp 16   Ht 162.6 cm (64\")   Wt 94.3 kg (208 lb)   BMI 35.70 kg/m²     Body mass index is 35.7 kg/m².    Physical Exam   Constitutional: She appears well-developed. No distress.   Musculoskeletal:   See photo below            Data Reviewed:             Assessment/Plan     Problem List Items Addressed This Visit     Cellulitis of right lower extremity - Primary    Relevant Medications    Cephalexin 500 MG tablet          No orders of the defined types were placed in this encounter.        Current Outpatient Prescriptions:   •  olmesartan (BENICAR) 40 MG tablet, Take 1 tablet by mouth Daily for 90 days., Disp: 90 tablet, Rfl: 0  •  Cephalexin 500 MG tablet, Take 500 mg by mouth 4 (Four) Times a Day for 10 days., " Disp: 40 tablet, Rfl: 0    Return in about 2 weeks (around 10/24/2018) for Recheck.                  Rae Ale Rae

## 2018-10-11 ENCOUNTER — TELEPHONE (OUTPATIENT)
Dept: FAMILY MEDICINE CLINIC | Facility: CLINIC | Age: 78
End: 2018-10-11

## 2018-10-11 DIAGNOSIS — L03.115 CELLULITIS OF RIGHT LOWER EXTREMITY: Primary | ICD-10-CM

## 2018-10-11 RX ORDER — CEPHALEXIN 500 MG/1
500 CAPSULE ORAL 4 TIMES DAILY
Qty: 40 CAPSULE | Refills: 0 | Status: SHIPPED | OUTPATIENT
Start: 2018-10-11 | End: 2018-10-21

## 2018-10-23 ENCOUNTER — TELEPHONE (OUTPATIENT)
Dept: GASTROENTEROLOGY | Facility: CLINIC | Age: 78
End: 2018-10-23

## 2018-10-23 DIAGNOSIS — R79.9 ABNORMAL FINDING OF BLOOD CHEMISTRY: ICD-10-CM

## 2018-10-23 DIAGNOSIS — R79.89 ELEVATED LIVER FUNCTION TESTS: Primary | ICD-10-CM

## 2018-10-23 DIAGNOSIS — I10 ESSENTIAL (PRIMARY) HYPERTENSION: ICD-10-CM

## 2018-10-23 DIAGNOSIS — R74.8 ABNORMAL LEVELS OF OTHER SERUM ENZYMES: ICD-10-CM

## 2018-10-23 NOTE — TELEPHONE ENCOUNTER
Called pt and advised of the note from Dr Rodríguez. Pt verb understanding. Lab appt made for tomorrow at 0900. She will call to schedule 3 month follow a little closer to time.   Lab order placed.

## 2018-10-23 NOTE — TELEPHONE ENCOUNTER
----- Message from Casper Rodríguez MD sent at 10/8/2018 10:47 AM EDT -----  Autoimmune panel negative  U/S liver showed no concerning features  Please have pt come in for MARSHALL fibrosure at her convenience  O/V in 3 mos

## 2018-10-23 NOTE — TELEPHONE ENCOUNTER
----- Message from Zoë Maldonado sent at 10/23/2018  2:26 PM EDT -----  Regarding: LAB & U/S RESULTS  Contact: [phone]  PT HAD TEST ABOUT 2 WKS AGO. PLEASE CHECK WITH DR STRONG & CALL PT.

## 2018-10-26 LAB
A2 MACROGLOB SERPL-MCNC: 287 MG/DL (ref 110–276)
ALT SERPL W P-5'-P-CCNC: 110 IU/L (ref 0–40)
APO A-I SERPL-MCNC: 168 MG/DL (ref 116–209)
AST SERPL W P-5'-P-CCNC: 107 IU/L (ref 0–40)
BILIRUB SERPL-MCNC: 0.3 MG/DL (ref 0–1.2)
CHOLEST SERPL-MCNC: 197 MG/DL (ref 100–199)
FIBROSIS SCORING:: ABNORMAL
FIBROSIS STAGE SERPL QL: ABNORMAL
GGT SERPL-CCNC: 74 IU/L (ref 0–60)
GLUCOSE SERPL-MCNC: 71 MG/DL (ref 65–99)
HAPTOGLOB SERPL-MCNC: 151 MG/DL (ref 34–200)
INTERPRETATIONS: (REFERENCE): ABNORMAL
LABORATORY COMMENT REPORT: ABNORMAL
LIVER FIBR SCORE SERPL CALC.FIBROSURE: 0.39 (ref 0–0.21)
NASH SCORING (REFERENCE): ABNORMAL
NECROINFLAMMATORY ACT GRADE SERPL QL: ABNORMAL
NECROINFLAMMATORY ACT SCORE SERPL: 0.5
SERVICE CMNT-IMP: ABNORMAL
STEATOSIS GRADE (REFERENCE): ABNORMAL
STEATOSIS GRADING (REFERENCE): ABNORMAL
STEATOSIS SCORE (REFERENCE): 0.71 (ref 0–0.3)
TRIGL SERPL-MCNC: 105 MG/DL (ref 0–149)

## 2018-11-01 NOTE — ASSESSMENT & PLAN NOTE
Recheck US   VRE uti on 10/6  completed course of abx  reports dysuria on 10/29  repeat UA + Ecoli, pending s/s;

## 2018-11-07 ENCOUNTER — TELEPHONE (OUTPATIENT)
Dept: GASTROENTEROLOGY | Facility: CLINIC | Age: 78
End: 2018-11-07

## 2018-11-07 NOTE — TELEPHONE ENCOUNTER
----- Message from Casper Rodríguez MD sent at 11/2/2018  1:57 PM EDT -----  F1-F2 fibrosis, relatively early  F/U as scheduled

## 2018-11-08 NOTE — TELEPHONE ENCOUNTER
Patient called back advised as per Dr. Rodríguez's note. She verb understanding and will call back later to schedule an appointment for January.

## 2018-11-15 ENCOUNTER — OFFICE VISIT (OUTPATIENT)
Dept: FAMILY MEDICINE CLINIC | Facility: CLINIC | Age: 78
End: 2018-11-15

## 2018-11-15 VITALS
HEIGHT: 64 IN | TEMPERATURE: 97 F | WEIGHT: 210 LBS | RESPIRATION RATE: 16 BRPM | SYSTOLIC BLOOD PRESSURE: 158 MMHG | BODY MASS INDEX: 35.85 KG/M2 | DIASTOLIC BLOOD PRESSURE: 64 MMHG | HEART RATE: 77 BPM

## 2018-11-15 DIAGNOSIS — L03.115 CELLULITIS OF RIGHT LOWER EXTREMITY: Primary | ICD-10-CM

## 2018-11-15 PROCEDURE — 99213 OFFICE O/P EST LOW 20 MIN: CPT | Performed by: FAMILY MEDICINE

## 2018-11-15 RX ORDER — AMOXICILLIN AND CLAVULANATE POTASSIUM 875; 125 MG/1; MG/1
1 TABLET, FILM COATED ORAL 2 TIMES DAILY
Qty: 20 TABLET | Refills: 0 | Status: SHIPPED | OUTPATIENT
Start: 2018-11-15 | End: 2018-11-25

## 2018-11-15 NOTE — PROGRESS NOTES
"Chief Complaint   Patient presents with   • Cellulitis       Subjective   He returns the office to reevaluate moderate cellulitis of right lower leg.  It has moved distally and is associated with significant itching.  The previous antibiotics did not fully control the cellulitis.  Overall there is less redness. Vasoline helps some. I have reviewed and updated her medications, medical history and problem list during today's office visit.     Patient Care Team:  Magen Olson MD as PCP - General  Magen Olson MD as PCP - Family Medicine  Magen Olson MD as PCP - Claims Attributed  Ralph Turner MD as Consulting Physician (Otolaryngology)  Casper Hodge MD as Consulting Physician (Gastroenterology)    Social History     Tobacco Use   • Smoking status: Former Smoker     Packs/day: 1.00     Years: 30.00     Pack years: 30.00     Last attempt to quit:      Years since quittin.8   • Smokeless tobacco: Never Used   Substance Use Topics   • Alcohol use: No       Review of Systems   Skin:        See Hpi   Allergic/Immunologic: Negative for environmental allergies.       Objective     /64   Pulse 77   Temp 97 °F (36.1 °C) (Oral)   Resp 16   Ht 162.6 cm (64\")   Wt 95.3 kg (210 lb)   BMI 36.05 kg/m²     Body mass index is 36.05 kg/m².    Physical Exam         Data Reviewed:             Assessment/Plan     Problem List Items Addressed This Visit     Cellulitis of right lower extremity - Primary     antibiotics         Relevant Medications    amoxicillin-clavulanate (AUGMENTIN) 875-125 MG per tablet          No orders of the defined types were placed in this encounter.        Current Outpatient Medications:   •  olmesartan (BENICAR) 40 MG tablet, Take 1 tablet by mouth Daily for 90 days., Disp: 90 tablet, Rfl: 0  •  amoxicillin-clavulanate (AUGMENTIN) 875-125 MG per tablet, Take 1 tablet by mouth 2 (Two) Times a Day for 10 days., Disp: 20 tablet, Rfl: 0    Return in about 3 weeks (around 2018) " for Recheck.

## 2018-11-19 ENCOUNTER — RESULTS ENCOUNTER (OUTPATIENT)
Dept: FAMILY MEDICINE CLINIC | Facility: CLINIC | Age: 78
End: 2018-11-19

## 2018-11-19 DIAGNOSIS — I10 ESSENTIAL HYPERTENSION: ICD-10-CM

## 2018-11-19 DIAGNOSIS — R79.89 ELEVATED LFTS: ICD-10-CM

## 2018-11-19 DIAGNOSIS — E78.49 OTHER HYPERLIPIDEMIA: ICD-10-CM

## 2018-12-17 DIAGNOSIS — I10 ESSENTIAL HYPERTENSION: ICD-10-CM

## 2018-12-18 RX ORDER — OLMESARTAN MEDOXOMIL 40 MG/1
TABLET ORAL
Qty: 90 TABLET | Refills: 0 | Status: SHIPPED | OUTPATIENT
Start: 2018-12-18 | End: 2019-04-24 | Stop reason: ALTCHOICE

## 2019-01-06 ENCOUNTER — RESULTS ENCOUNTER (OUTPATIENT)
Dept: FAMILY MEDICINE CLINIC | Facility: CLINIC | Age: 79
End: 2019-01-06

## 2019-01-06 DIAGNOSIS — E78.49 OTHER HYPERLIPIDEMIA: ICD-10-CM

## 2019-01-06 DIAGNOSIS — I10 ESSENTIAL HYPERTENSION: ICD-10-CM

## 2019-04-02 LAB
ALBUMIN SERPL-MCNC: 4.3 G/DL (ref 3.5–5.2)
ALBUMIN/GLOB SERPL: 1.5 G/DL
ALP SERPL-CCNC: 100 U/L (ref 39–117)
ALT SERPL-CCNC: 78 U/L (ref 1–33)
AST SERPL-CCNC: 100 U/L (ref 1–32)
BASOPHILS # BLD AUTO: 0.08 10*3/MM3 (ref 0–0.2)
BASOPHILS NFR BLD AUTO: 1 % (ref 0–1.5)
BILIRUB SERPL-MCNC: 0.8 MG/DL (ref 0.2–1.2)
BUN SERPL-MCNC: 13 MG/DL (ref 8–23)
BUN/CREAT SERPL: 14.6 (ref 7–25)
CALCIUM SERPL-MCNC: 9.7 MG/DL (ref 8.6–10.5)
CHLORIDE SERPL-SCNC: 102 MMOL/L (ref 98–107)
CHOLEST SERPL-MCNC: 213 MG/DL (ref 0–200)
CO2 SERPL-SCNC: 25.7 MMOL/L (ref 22–29)
CREAT SERPL-MCNC: 0.89 MG/DL (ref 0.57–1)
EOSINOPHIL # BLD AUTO: 0.39 10*3/MM3 (ref 0–0.4)
EOSINOPHIL NFR BLD AUTO: 5.1 % (ref 0.3–6.2)
ERYTHROCYTE [DISTWIDTH] IN BLOOD BY AUTOMATED COUNT: 13.3 % (ref 12.3–15.4)
GLOBULIN SER CALC-MCNC: 2.9 GM/DL
GLUCOSE SERPL-MCNC: 104 MG/DL (ref 65–99)
HCT VFR BLD AUTO: 46.2 % (ref 34–46.6)
HDLC SERPL-MCNC: 59 MG/DL (ref 40–60)
HGB BLD-MCNC: 14.7 G/DL (ref 12–15.9)
IMM GRANULOCYTES # BLD AUTO: 0.01 10*3/MM3 (ref 0–0.05)
IMM GRANULOCYTES NFR BLD AUTO: 0.1 % (ref 0–0.5)
LDLC SERPL CALC-MCNC: 135 MG/DL (ref 0–100)
LDLC/HDLC SERPL: 2.28 {RATIO}
LYMPHOCYTES # BLD AUTO: 2.71 10*3/MM3 (ref 0.7–3.1)
LYMPHOCYTES NFR BLD AUTO: 35.5 % (ref 19.6–45.3)
MCH RBC QN AUTO: 29.6 PG (ref 26.6–33)
MCHC RBC AUTO-ENTMCNC: 31.8 G/DL (ref 31.5–35.7)
MCV RBC AUTO: 93.1 FL (ref 79–97)
MONOCYTES # BLD AUTO: 0.74 10*3/MM3 (ref 0.1–0.9)
MONOCYTES NFR BLD AUTO: 9.7 % (ref 5–12)
NEUTROPHILS # BLD AUTO: 3.7 10*3/MM3 (ref 1.4–7)
NEUTROPHILS NFR BLD AUTO: 48.6 % (ref 42.7–76)
NRBC BLD AUTO-RTO: 0 /100 WBC (ref 0–0)
PLATELET # BLD AUTO: 161 10*3/MM3 (ref 140–450)
POTASSIUM SERPL-SCNC: 5 MMOL/L (ref 3.5–5.2)
PROT SERPL-MCNC: 7.2 G/DL (ref 6–8.5)
RBC # BLD AUTO: 4.96 10*6/MM3 (ref 3.77–5.28)
SODIUM SERPL-SCNC: 140 MMOL/L (ref 136–145)
TRIGL SERPL-MCNC: 97 MG/DL (ref 0–150)
TSH SERPL DL<=0.005 MIU/L-ACNC: 2.01 MIU/ML (ref 0.27–4.2)
VLDLC SERPL CALC-MCNC: 19.4 MG/DL
WBC # BLD AUTO: 7.63 10*3/MM3 (ref 3.4–10.8)

## 2019-04-24 ENCOUNTER — OFFICE VISIT (OUTPATIENT)
Dept: FAMILY MEDICINE CLINIC | Facility: CLINIC | Age: 79
End: 2019-04-24

## 2019-04-24 VITALS
DIASTOLIC BLOOD PRESSURE: 61 MMHG | RESPIRATION RATE: 18 BRPM | BODY MASS INDEX: 34.16 KG/M2 | TEMPERATURE: 96.8 F | HEIGHT: 65 IN | SYSTOLIC BLOOD PRESSURE: 140 MMHG | HEART RATE: 80 BPM | OXYGEN SATURATION: 95 % | WEIGHT: 205 LBS

## 2019-04-24 DIAGNOSIS — I10 ESSENTIAL HYPERTENSION: Primary | ICD-10-CM

## 2019-04-24 DIAGNOSIS — R73.03 PREDIABETES: ICD-10-CM

## 2019-04-24 DIAGNOSIS — J45.20 MILD INTERMITTENT ASTHMA WITHOUT COMPLICATION: ICD-10-CM

## 2019-04-24 DIAGNOSIS — E78.49 OTHER HYPERLIPIDEMIA: ICD-10-CM

## 2019-04-24 PROCEDURE — 99214 OFFICE O/P EST MOD 30 MIN: CPT | Performed by: FAMILY MEDICINE

## 2019-04-24 RX ORDER — LISINOPRIL 20 MG/1
20 TABLET ORAL DAILY
Qty: 90 TABLET | Refills: 1 | Status: SHIPPED | OUTPATIENT
Start: 2019-04-24 | End: 2019-10-02 | Stop reason: SINTOL

## 2019-04-24 RX ORDER — CHOLESTYRAMINE LIGHT 4 G/5.7G
4 POWDER, FOR SUSPENSION ORAL DAILY
Qty: 90 PACKET | Refills: 1 | Status: SHIPPED | OUTPATIENT
Start: 2019-04-24 | End: 2019-10-02

## 2019-04-24 NOTE — ASSESSMENT & PLAN NOTE
Asthma is worsening.  The patient is experiencing frequent daytime asthma symptoms. She is experiencing frequent nighttime asthma symptoms.  Medications: resume montelukast.

## 2019-04-24 NOTE — ASSESSMENT & PLAN NOTE
Hypertension is improving with treatment.  Medication changes per orders. due to backorder of olmesartan  Blood pressure will be reassessed at the next regular appointment.

## 2019-04-24 NOTE — PROGRESS NOTES
"Chief Complaint   Patient presents with   • Hypertension       Subjective     Viktoria Villasenor presents to the office today to refill her medications and review recent labs. No medication side effects are reported.  Blood pressure shows improvement.  Olmesartan has been on back order.  We will switch to lisinopril which is also metabolized through the kidney.  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  Prediabetic condition is present and has shown interval improvement.  She has had a dry cough since January.  Previously she was on montelukast and this will be restarted.  Labs have been reviewed and are seen below.    I have reviewed and updated her medications, medical history and problem list during today's office visit.      Patient Care Team:  Magen Olson MD as PCP - General  Magen Olson MD as PCP - Family Medicine  [facility] Olson MD as PCP - [facility] as Consulting Physician (Otolaryngology)  Casper Hodge MD as Consulting Physician (Gastroenterology)    Social History     Tobacco Use   • Smoking status: Former Smoker     Packs/day: 1.00     Years: 30.00     Pack years: 30.00     Last attempt to quit:      Years since quittin.3   • Smokeless tobacco: Never Used   Substance Use Topics   • Alcohol use: No       Review of Systems   Constitutional: Negative for fatigue.   Respiratory: Positive for cough (dry).        Objective     /61   Pulse 80   Temp 96.8 °F (36 °C) (Oral)   Resp 18   Ht 163.8 cm (64.5\")   Wt 93 kg (205 lb)   SpO2 95%   BMI 34.64 kg/m²     Body mass index is 34.64 kg/m².    Physical Exam   Constitutional: She is oriented to person, place, and time. She appears well-developed. No distress.   Eyes: Conjunctivae and lids are normal.   Neck: Carotid bruit is not present.   Cardiovascular: Normal rate, regular rhythm and normal heart sounds.   Pulmonary/Chest: Effort normal and breath sounds normal. "   Neurological: She is alert and oriented to person, place, and time.   Skin: Skin is warm and dry.   Psychiatric: She has a normal mood and affect. Her behavior is normal.   Vitals reviewed.      Data Reviewed:             Lab Results   Component Value Date     (H) 04/01/2019    BUN 13 04/01/2019    BUN 13 04/29/2016    CREATININE 0.89 04/01/2019    CREATININE 0.93 04/29/2016    EGFRIFNONA 62 04/01/2019    EGFRIFNONA 59 (L) 04/29/2016    EGFRIFAFRI 75 04/01/2019     04/01/2019     04/29/2016    K 5.0 04/01/2019    K 4.7 04/29/2016     04/01/2019     04/29/2016    CO2 25.7 04/01/2019    CO2 25.8 04/29/2016    CALCIUM 9.7 04/01/2019    CALCIUM 9.6 04/29/2016    ALBUMIN 4.30 04/01/2019    LABGLOBREF 2.9 04/01/2019    BILITOT 0.8 04/01/2019    ALKPHOS 100 04/01/2019     (H) 04/01/2019    ALT 78 (H) 04/01/2019    CHLPL 213 (H) 04/01/2019    TRIG 97 04/01/2019    HDL 59 04/01/2019    VLDL 19.4 04/01/2019     (H) 04/01/2019    LDLHDL 2.28 04/01/2019    WBC 7.63 04/01/2019    RBC 4.96 04/01/2019    HCT 46.2 04/01/2019    HCT 45.4 04/29/2016    MCV 93.1 04/01/2019    MCV 86.3 04/29/2016    MCH 29.6 04/01/2019    MCH 28.5 04/29/2016    MCHC 31.8 04/01/2019    MCHC 33.0 04/29/2016    RDW 13.3 04/01/2019    RDW 13.0 04/29/2016    TSH 2.010 04/01/2019          Assessment/Plan     Diagnoses and all orders for this visit:    1. Essential hypertension (Primary)  Assessment & Plan:  Hypertension is improving with treatment.  Medication changes per orders. due to backorder of olmesartan  Blood pressure will be reassessed at the next regular appointment.    Orders:  -     lisinopril (PRINIVIL,ZESTRIL) 20 MG tablet; Take 1 tablet by mouth Daily for 180 days.  Dispense: 90 tablet; Refill: 1  -     Comprehensive Metabolic Panel; Future  -     CBC & Differential; Future  -     TSH; Future    2. Other hyperlipidemia  Assessment & Plan:  Lipid abnormalities are worsening.  Pharmacotherapy as  ordered. add cholestyramine powder packet daily with water.   Lipids will be reassessed in 6 months.    Orders:  -     cholestyramine light 4 g packet; Take 1 packet by mouth Daily for 180 days.  Dispense: 90 packet; Refill: 1  -     Lipid Panel With / Chol / HDL Ratio; Future  -     CK; Future    3. Prediabetes  Assessment & Plan:  Improving, continue diet and exercise    Orders:  -     Comprehensive Metabolic Panel; Future    4. Mild intermittent asthma without complication  Assessment & Plan:  Asthma is worsening.  The patient is experiencing frequent daytime asthma symptoms. She is experiencing frequent nighttime asthma symptoms.  Medications: resume montelukast.            Return in about 6 months (around 10/24/2019) for Medicare Wellness and regular visit, 30 minutes.

## 2019-04-24 NOTE — ASSESSMENT & PLAN NOTE
Lipid abnormalities are worsening.  Pharmacotherapy as ordered. add cholestyramine powder packet daily with water.   Lipids will be reassessed in 6 months.

## 2019-09-11 DIAGNOSIS — I10 ESSENTIAL HYPERTENSION: ICD-10-CM

## 2019-09-12 RX ORDER — LISINOPRIL 20 MG/1
TABLET ORAL
Qty: 90 TABLET | Refills: 1 | OUTPATIENT
Start: 2019-09-12

## 2019-09-21 ENCOUNTER — RESULTS ENCOUNTER (OUTPATIENT)
Dept: FAMILY MEDICINE CLINIC | Facility: CLINIC | Age: 79
End: 2019-09-21

## 2019-09-21 DIAGNOSIS — R73.03 PREDIABETES: ICD-10-CM

## 2019-09-21 DIAGNOSIS — I10 ESSENTIAL HYPERTENSION: ICD-10-CM

## 2019-09-21 DIAGNOSIS — E78.49 OTHER HYPERLIPIDEMIA: ICD-10-CM

## 2019-09-26 LAB
ALBUMIN SERPL-MCNC: 4.3 G/DL (ref 3.5–5.2)
ALBUMIN/GLOB SERPL: 1.3 G/DL
ALP SERPL-CCNC: 91 U/L (ref 39–117)
ALT SERPL-CCNC: 56 U/L (ref 1–33)
AST SERPL-CCNC: 73 U/L (ref 1–32)
BASOPHILS # BLD AUTO: 0.11 10*3/MM3 (ref 0–0.2)
BASOPHILS NFR BLD AUTO: 1.5 % (ref 0–1.5)
BILIRUB SERPL-MCNC: 0.9 MG/DL (ref 0.2–1.2)
BUN SERPL-MCNC: 12 MG/DL (ref 8–23)
BUN/CREAT SERPL: 13.8 (ref 7–25)
CALCIUM SERPL-MCNC: 9.5 MG/DL (ref 8.6–10.5)
CHLORIDE SERPL-SCNC: 103 MMOL/L (ref 98–107)
CHOLEST SERPL-MCNC: 214 MG/DL (ref 0–200)
CHOLEST/HDLC SERPL: 3.45 {RATIO}
CK SERPL-CCNC: 56 U/L (ref 20–180)
CO2 SERPL-SCNC: 27.9 MMOL/L (ref 22–29)
CREAT SERPL-MCNC: 0.87 MG/DL (ref 0.57–1)
EOSINOPHIL # BLD AUTO: 0.27 10*3/MM3 (ref 0–0.4)
EOSINOPHIL NFR BLD AUTO: 3.8 % (ref 0.3–6.2)
ERYTHROCYTE [DISTWIDTH] IN BLOOD BY AUTOMATED COUNT: 12.8 % (ref 12.3–15.4)
GLOBULIN SER CALC-MCNC: 3.2 GM/DL
GLUCOSE SERPL-MCNC: 99 MG/DL (ref 65–99)
HCT VFR BLD AUTO: 47.1 % (ref 34–46.6)
HDLC SERPL-MCNC: 62 MG/DL (ref 40–60)
HGB BLD-MCNC: 15.4 G/DL (ref 12–15.9)
IMM GRANULOCYTES # BLD AUTO: 0.01 10*3/MM3 (ref 0–0.05)
IMM GRANULOCYTES NFR BLD AUTO: 0.1 % (ref 0–0.5)
LDLC SERPL CALC-MCNC: 130 MG/DL (ref 0–100)
LYMPHOCYTES # BLD AUTO: 2.59 10*3/MM3 (ref 0.7–3.1)
LYMPHOCYTES NFR BLD AUTO: 36.1 % (ref 19.6–45.3)
MCH RBC QN AUTO: 28.5 PG (ref 26.6–33)
MCHC RBC AUTO-ENTMCNC: 32.7 G/DL (ref 31.5–35.7)
MCV RBC AUTO: 87.1 FL (ref 79–97)
MONOCYTES # BLD AUTO: 0.76 10*3/MM3 (ref 0.1–0.9)
MONOCYTES NFR BLD AUTO: 10.6 % (ref 5–12)
NEUTROPHILS # BLD AUTO: 3.43 10*3/MM3 (ref 1.7–7)
NEUTROPHILS NFR BLD AUTO: 47.9 % (ref 42.7–76)
NRBC BLD AUTO-RTO: 0 /100 WBC (ref 0–0.2)
PLATELET # BLD AUTO: 172 10*3/MM3 (ref 140–450)
POTASSIUM SERPL-SCNC: 4.8 MMOL/L (ref 3.5–5.2)
PROT SERPL-MCNC: 7.5 G/DL (ref 6–8.5)
RBC # BLD AUTO: 5.41 10*6/MM3 (ref 3.77–5.28)
SODIUM SERPL-SCNC: 142 MMOL/L (ref 136–145)
TRIGL SERPL-MCNC: 111 MG/DL (ref 0–150)
TSH SERPL DL<=0.005 MIU/L-ACNC: 1.1 UIU/ML (ref 0.27–4.2)
VLDLC SERPL CALC-MCNC: 22.2 MG/DL
WBC # BLD AUTO: 7.17 10*3/MM3 (ref 3.4–10.8)

## 2019-10-02 ENCOUNTER — OFFICE VISIT (OUTPATIENT)
Dept: FAMILY MEDICINE CLINIC | Facility: CLINIC | Age: 79
End: 2019-10-02

## 2019-10-02 VITALS
WEIGHT: 204 LBS | DIASTOLIC BLOOD PRESSURE: 68 MMHG | HEART RATE: 67 BPM | OXYGEN SATURATION: 97 % | BODY MASS INDEX: 34.83 KG/M2 | SYSTOLIC BLOOD PRESSURE: 138 MMHG | HEIGHT: 64 IN | RESPIRATION RATE: 16 BRPM

## 2019-10-02 DIAGNOSIS — Z23 NEED FOR IMMUNIZATION AGAINST INFLUENZA: ICD-10-CM

## 2019-10-02 DIAGNOSIS — K76.0 STEATOSIS OF LIVER: ICD-10-CM

## 2019-10-02 DIAGNOSIS — E78.49 OTHER HYPERLIPIDEMIA: ICD-10-CM

## 2019-10-02 DIAGNOSIS — E66.09 CLASS 1 OBESITY DUE TO EXCESS CALORIES WITH SERIOUS COMORBIDITY AND BODY MASS INDEX (BMI) OF 34.0 TO 34.9 IN ADULT: ICD-10-CM

## 2019-10-02 DIAGNOSIS — I10 ESSENTIAL HYPERTENSION: Primary | ICD-10-CM

## 2019-10-02 PROBLEM — E66.811 CLASS 1 OBESITY DUE TO EXCESS CALORIES WITH SERIOUS COMORBIDITY AND BODY MASS INDEX (BMI) OF 34.0 TO 34.9 IN ADULT: Status: ACTIVE | Noted: 2017-08-03

## 2019-10-02 PROCEDURE — G0008 ADMIN INFLUENZA VIRUS VAC: HCPCS | Performed by: FAMILY MEDICINE

## 2019-10-02 PROCEDURE — 99214 OFFICE O/P EST MOD 30 MIN: CPT | Performed by: FAMILY MEDICINE

## 2019-10-02 PROCEDURE — 90653 IIV ADJUVANT VACCINE IM: CPT | Performed by: FAMILY MEDICINE

## 2019-10-02 RX ORDER — ALBUTEROL SULFATE 90 UG/1
2 AEROSOL, METERED RESPIRATORY (INHALATION)
COMMUNITY
Start: 2019-06-27

## 2019-10-02 RX ORDER — VALSARTAN 160 MG/1
160 TABLET ORAL DAILY
Qty: 90 TABLET | Refills: 0 | Status: SHIPPED | OUTPATIENT
Start: 2019-10-02 | End: 2019-12-10 | Stop reason: SDUPTHER

## 2019-10-02 NOTE — ASSESSMENT & PLAN NOTE
Hypertension is unchanged.  Medication changes per orders.  Change from lisinopril to valsartan due to ACE cough  Blood pressure will be reassessed in 3 months.

## 2019-10-02 NOTE — ASSESSMENT & PLAN NOTE
Steatosis condition of liver is improving.  Patient will work with exercise and diet to try to lose 10 pounds and we will recheck.

## 2019-10-02 NOTE — PROGRESS NOTES
"Chief Complaint:   Subjective    Rooming Tab(CC,VS,Pt Hx,Fall Screen)   Chief Complaint   Patient presents with   • Hypertension     med refill    • Hyperlipidemia   • Earache     right - 2-3 weeks          History of Present Illness   Viktoria Villasenor is a 78 y.o. female who presents to the office today to review labs and refill medicines.  Blood pressure control.  Side effect of cough with lisinopril.  Lipids are unchanged.  She is on no medicine for that condition.  No recent issues with asthma.  Liver function testing is improving.  One liver enzyme still elevated at 56 with normal below 33.    I have reviewed and updated her medications, medical history and problem list during today's office visit.     Problem List Tab  Medications Tab  Synopsis Tab  Chart Review Tab  Care Everywhere Tab  Immunizations Tab  Patient History Tab  Social History     Tobacco Use   • Smoking status: Former Smoker     Packs/day: 1.00     Years: 30.00     Pack years: 30.00     Last attempt to quit:      Years since quittin.7   • Smokeless tobacco: Never Used   Substance Use Topics   • Alcohol use: No       Review of Systems   Constitutional: Negative for fatigue.   HENT:        Problem with right ear in last month, feels full   Cardiovascular: Negative for chest pain.         Physical Examination:   Objective   Rooming Tab(CC,VS,Pt Hx,Fall Screen)  /68   Pulse 67   Resp 16   Ht 163.8 cm (64.49\")   Wt 92.5 kg (204 lb)   SpO2 97%   BMI 34.49 kg/m²     Body mass index is 34.49 kg/m².    Physical Exam   Constitutional: She is oriented to person, place, and time. She appears well-developed. No distress.   HENT:   Left Ear: Tympanic membrane normal.   Serous otitis right ear   Eyes: Conjunctivae and lids are normal.   Neck: Carotid bruit is not present.   Cardiovascular: Normal rate, regular rhythm and normal heart sounds.   Pulmonary/Chest: Effort normal and breath sounds normal.   Neurological: She is alert and " oriented to person, place, and time.   Skin: Skin is warm and dry.   Psychiatric: She has a normal mood and affect. Her behavior is normal.   Vitals reviewed.       Data Reviewed:              Lab Results   Component Value Date    GLU 99 09/25/2019    BUN 12 09/25/2019    CREATININE 0.87 09/25/2019    EGFRIFNONA 63 09/25/2019    EGFRIFAFRI 77 09/25/2019     09/25/2019    K 4.8 09/25/2019     09/25/2019    CALCIUM 9.5 09/25/2019    ALBUMIN 4.30 09/25/2019    BILITOT 0.9 09/25/2019    ALKPHOS 91 09/25/2019    AST 73 (H) 09/25/2019    ALT 56 (H) 09/25/2019    CHLPL 214 (H) 09/25/2019    TRIG 111 09/25/2019    HDL 62 (H) 09/25/2019    VLDL 22.2 09/25/2019     (H) 09/25/2019    CKTOTAL 56 09/25/2019    WBC 7.17 09/25/2019    RBC 5.41 (H) 09/25/2019    HCT 47.1 (H) 09/25/2019    MCV 87.1 09/25/2019    MCH 28.5 09/25/2019    TSH 1.100 09/25/2019          Assessment/Plan:   Assessment/Plan   Order Review Tab  Health Maintenance Tab  Patient Plan/Order Tab  Diagnoses and all orders for this visit:    1. Essential hypertension (Primary)  Assessment & Plan:  Hypertension is unchanged.  Medication changes per orders.  Change from lisinopril to valsartan due to ACE cough  Blood pressure will be reassessed in 3 months.    Orders:  -     valsartan (DIOVAN) 160 MG tablet; Take 1 tablet by mouth Daily for 90 days.  Dispense: 90 tablet; Refill: 0    2. Need for immunization against influenza  -     Fluad Tri 65yr+    3. Other hyperlipidemia  Assessment & Plan:  Lipid abnormalities are unchanged.  Nutritional counseling was provided.  Lipids will be reassessed in 3 months.      4. Steatosis of liver  Assessment & Plan:  Steatosis condition of liver is improving.  Patient will work with exercise and diet to try to lose 10 pounds and we will recheck.      5. Class 1 obesity due to excess calories with serious comorbidity and body mass index (BMI) of 34.0 to 34.9 in adult  Assessment & Plan:  Obesity is improving with  lifestyle modifications.  continue same         Follow up:   Wrapup Tab  Return in about 3 months (around 1/2/2020) for Medicare Wellness and regular visit, 30 minutes.

## 2019-12-10 DIAGNOSIS — I10 ESSENTIAL HYPERTENSION: ICD-10-CM

## 2019-12-11 RX ORDER — VALSARTAN 160 MG/1
TABLET ORAL
Qty: 90 TABLET | Refills: 0 | Status: SHIPPED | OUTPATIENT
Start: 2019-12-11 | End: 2020-04-01 | Stop reason: SDUPTHER

## 2020-04-01 ENCOUNTER — OFFICE VISIT (OUTPATIENT)
Dept: FAMILY MEDICINE CLINIC | Facility: CLINIC | Age: 80
End: 2020-04-01

## 2020-04-01 DIAGNOSIS — E78.49 OTHER HYPERLIPIDEMIA: ICD-10-CM

## 2020-04-01 DIAGNOSIS — I10 ESSENTIAL HYPERTENSION: Primary | ICD-10-CM

## 2020-04-01 DIAGNOSIS — R73.03 PREDIABETES: ICD-10-CM

## 2020-04-01 PROCEDURE — 99442 PR PHYS/QHP TELEPHONE EVALUATION 11-20 MIN: CPT | Performed by: FAMILY MEDICINE

## 2020-04-01 RX ORDER — VALSARTAN 160 MG/1
160 TABLET ORAL DAILY
Qty: 90 TABLET | Refills: 1 | Status: SHIPPED | OUTPATIENT
Start: 2020-04-01 | End: 2020-08-10

## 2020-04-01 NOTE — PROGRESS NOTES
Mode of Visit: Telephone  You have chosen to receive care through a telephone visit today. Do you consent to use a telephone visit for your medical care today? Yes   Viktoria Villasenor confirmed that she was in [place] at the time of this telephonic visit.   The visit included telephone interaction. No technical issues occurred during this visit.   more than 10 minutes up to 20 minutes.   Subjective       Chief Complaint   Patient presents with   • Hypertension   • Hyperlipidemia   • Med Management         HPI:       Viktoria Villasenor is a 79 y.o. female who presents to  [address] today to refill medications.  Her blood pressure is controlled at home.  She has no side effects with valsartan.  She has been watching her diet and carbs because of her prediabetes.  Previously she was diagnosed with hyperlipidemia but that condition is unchanged.  Overall she feels well.    I have reviewed and updated her medications, medical history and problem list during today's office visit.     Social History     Tobacco Use   • Smoking status: Former Smoker     Packs/day: 1.00     Years: 30.00     Pack years: 30.00     Last attempt to quit:      Years since quittin.2   • Smokeless tobacco: Never Used   Substance Use Topics   • Alcohol use: No       Review of Systems   All other systems reviewed and are negative.        PE:   Objective   There were no vitals taken for this visit.    There is no height or weight on file to calculate BMI.    Physical Exam   Constitutional:   Voice quality good.  Cognition intact.  Appropriate interaction on the phone        Data Reviewed:                      A/P:     Assessment/Plan   Diagnoses and all orders for this visit:    1. Essential hypertension (Primary)  Assessment & Plan:  Hypertension is unchanged.  Continue current treatment regimen.  Blood pressure will be reassessed at the next regular  appointment.    Orders:  -     valsartan (DIOVAN) 160 MG tablet; Take 1 tablet by mouth Daily for 180 days.  Dispense: 90 tablet; Refill: 1  -     Comprehensive Metabolic Panel; Future  -     CBC & Differential; Future  -     TSH; Future    2. Other hyperlipidemia  Assessment & Plan:  Lipid abnormalities are unchanged.  Nutritional counseling was provided.  Lipids will be reassessed in 6 months.    Orders:  -     Lipid Panel With / Chol / HDL Ratio; Future  -     CK; Future    3. Prediabetes  Assessment & Plan:  Condition stable.  Low-carb diet shared.    Orders:  -     Comprehensive Metabolic Panel; Future        Follow up:    Return in about 6 months (around 10/1/2020) for Medicare Wellness and regular visit, 30 minutes.

## 2020-04-01 NOTE — PATIENT INSTRUCTIONS
"Carbohydrate Counting for Diabetes Mellitus, Adult    Carbohydrate counting is a method of keeping track of how many carbohydrates you eat. Eating carbohydrates naturally increases the amount of sugar (glucose) in the blood. Counting how many carbohydrates you eat helps keep your blood glucose within normal limits, which helps you manage your diabetes (diabetes mellitus).  It is important to know how many carbohydrates you can safely have in each meal. This is different for every person. A diet and nutrition specialist (registered dietitian) can help you make a meal plan and calculate how many carbohydrates you should have at each meal and snack.  Carbohydrates are found in the following foods:  · Grains, such as breads and cereals.  · Dried beans and soy products.  · Starchy vegetables, such as potatoes, peas, and corn.  · Fruit and fruit juices.  · Milk and yogurt.  · Sweets and snack foods, such as cake, cookies, candy, chips, and soft drinks.  How do I count carbohydrates?  There are two ways to count carbohydrates in food. You can use either of the methods or a combination of both.  Reading \"Nutrition Facts\" on packaged food  The \"Nutrition Facts\" list is included on the labels of almost all packaged foods and beverages in the [place] It includes:  · The serving size.  · Information about nutrients in each serving, including the grams (g) of carbohydrate per serving.  To use the “Nutrition Facts\":  · Decide how many servings you will have.  · Multiply the number of servings by the number of carbohydrates per serving.  · The resulting number is the total amount of carbohydrates that you will be having.  Learning standard serving sizes of other foods  When you eat carbohydrate foods that are not packaged or do not include \"Nutrition Facts\" on the label, you need to measure the servings in order to count the amount of carbohydrates:  · Measure the foods that you will eat with a food scale or measuring cup, if " needed.  · Decide how many standard-size servings you will eat.  · Multiply the number of servings by 15. Most carbohydrate-rich foods have about 15 g of carbohydrates per serving.  ? For example, if you eat 8 oz (170 g) of strawberries, you will have eaten 2 servings and 30 g of carbohydrates (2 servings x 15 g = 30 g).  · For foods that have more than one food mixed, such as soups and casseroles, you must count the carbohydrates in each food that is included.  The following list contains standard serving sizes of common carbohydrate-rich foods. Each of these servings has about 15 g of carbohydrates:  · ½ hamburger bun or ½ English muffin.  · ½ oz (15 mL) syrup.  · ½ oz (14 g) jelly.  · 1 slice of bread.  · 1 six-inch tortilla.  · 3 oz (85 g) cooked rice or pasta.  · 4 oz (113 g) cooked dried beans.  · 4 oz (113 g) starchy vegetable, such as peas, corn, or potatoes.  · 4 oz (113 g) hot cereal.  · 4 oz (113 g) mashed potatoes or ¼ of a large baked potato.  · 4 oz (113 g) canned or frozen fruit.  · 4 oz (120 mL) fruit juice.  · 4-6 crackers.  · 6 chicken nuggets.  · 6 oz (170 g) unsweetened dry cereal.  · 6 oz (170 g) plain fat-free yogurt or yogurt sweetened with artificial sweeteners.  · 8 oz (240 mL) milk.  · 8 oz (170 g) fresh fruit or one small piece of fruit.  · 24 oz (680 g) popped popcorn.  Example of carbohydrate counting  Sample meal  · 3 oz (85 g) chicken breast.  · 6 oz (170 g) brown rice.  · 4 oz (113 g) corn.  · 8 oz (240 mL) milk.  · 8 oz (170 g) strawberries with sugar-free whipped topping.  Carbohydrate calculation  1. Identify the foods that contain carbohydrates:  ? Rice.  ? Corn.  ? Milk.  ? Strawberries.  2. Calculate how many servings you have of each food:  ? 2 servings rice.  ? 1 serving corn.  ? 1 serving milk.  ? 1 serving strawberries.  3. Multiply each number of servings by 15 g:  ? 2 servings rice x 15 g = 30 g.  ? 1 serving corn x 15 g = 15 g.  ? 1 serving milk x 15 g = 15 g.  ? 1  serving strawberries x 15 g = 15 g.  4. Add together all of the amounts to find the total grams of carbohydrates eaten:  ? 30 g + 15 g + 15 g + 15 g = 75 g of carbohydrates total.  Summary  · Carbohydrate counting is a method of keeping track of how many carbohydrates you eat.  · Eating carbohydrates naturally increases the amount of sugar (glucose) in the blood.  · Counting how many carbohydrates you eat helps keep your blood glucose within normal limits, which helps you manage your diabetes.  · A diet and nutrition specialist (registered dietitian) can help you make a meal plan and calculate how many carbohydrates you should have at each meal and snack.  This information is not intended to replace advice given to you by your health care provider. Make sure you discuss any questions you have with your health care provider.  Document Released: 12/18/2006 Document Revised: 01/14/2020 Document Reviewed: 05/31/2017  [employer] Patient Education © 2020 [employer]    Fat and Cholesterol Restricted Diet  Getting too much fat and cholesterol in your diet may cause health problems. Following this diet helps keep your fat and cholesterol at normal levels. This can keep you from getting sick.  WHAT TYPES OF FAT SHOULD I CHOOSE?  · Choose monosaturated and polyunsaturated fats. These are found in foods such as olive oil, canola oil, flaxseeds, walnuts, almonds, and seeds.  · Eat more omega-3 fats. Good choices include salmon, mackerel, sardines, tuna, flaxseed oil, and ground flaxseeds.  · Limit saturated fats. These are in animal products such as meats, butter, and cream. They can also be in plant products such as palm oil, palm kernel oil, and coconut oil.  ·   ·   · Avoid foods with partially hydrogenated oils in them. These contain trans fats. Examples of foods that have trans fats are stick margarine, some tub margarines, cookies, crackers, and other baked goods.  WHAT GENERAL GUIDELINES DO I NEED TO FOLLOW?  "   · Check food labels. Look for the words \"trans fat\" and \"saturated fat.\"  · When preparing a meal:  ¨ Fill half of your plate with vegetables and green salads.  ¨ Fill one fourth of your plate with whole grains. Look for the word \"whole\" as the first word in the ingredient list.  ¨ Fill one fourth of your plate with lean protein foods.  · Limit fruit to two servings a day. Choose fruit instead of juice.  · Eat more foods with soluble fiber. Examples of foods with this type of fiber are apples, broccoli, carrots, beans, peas, and barley. Try to get 20-30 g (grams) of fiber per day.  · Eat more home-cooked foods. Eat less at restaurants and buffets.  · Limit or avoid alcohol.  · Limit foods high in starch and sugar.  · Limit fried foods.  · Cook foods without frying them. Baking, boiling, grilling, and broiling are all great options.  · Lose weight if you are overweight. Losing even a small amount of weight can help your overall health. It can also help prevent diseases such as diabetes and heart disease.  WHAT FOODS CAN I EAT?  Grains  Whole grains, such as whole wheat or whole grain breads, crackers, cereals, and pasta. Unsweetened oatmeal, bulgur, barley, quinoa, or brown rice. Corn or whole wheat flour tortillas.  Vegetables  Fresh or frozen vegetables (raw, steamed, roasted, or grilled). Green salads.  Fruits  All fresh, canned (in natural juice), or frozen fruits.  Meat and Other Protein Products  Ground beef (85% or leaner), grass-fed beef, or beef trimmed of fat. Skinless chicken or turkey. Ground chicken or turkey. Pork trimmed of fat. All fish and seafood. Eggs. Dried beans, peas, or lentils. Unsalted nuts or seeds. Unsalted canned or dry beans.  Dairy  Low-fat dairy products, such as skim or 1% milk, 2% or reduced-fat cheeses, low-fat ricotta or cottage cheese, or plain low-fat yogurt.  Fats and Oils  Tub margarines without trans fats. Light or reduced-fat mayonnaise and salad dressings. Avocado. Olive, " canola, sesame, or safflower oils. Natural peanut or almond butter (choose ones without added sugar and oil).  The items listed above may not be a complete list of recommended foods or beverages. Contact your dietitian for more options.  WHAT FOODS ARE NOT RECOMMENDED?  Grains  White bread. White pasta. White rice. Cornbread. Bagels, pastries, and croissants. Crackers that contain trans fat.  Vegetables  White potatoes. Corn. Creamed or fried vegetables. Vegetables in a cheese sauce.  Fruits  Dried fruits. Canned fruit in light or heavy syrup. Fruit juice.  Meat and Other Protein Products  Fatty cuts of meat. Ribs, chicken wings, amaral, sausage, bologna, salami, chitterlings, fatback, hot dogs, bratwurst, and packaged luncheon meats. Liver and organ meats.  Dairy  Whole or 2% milk, cream, half-and-half, and cream cheese. Whole milk cheeses. Whole-fat or sweetened yogurt. Full-fat cheeses. Nondairy creamers and whipped toppings. Processed cheese, cheese spreads, or cheese curds.  Sweets and Desserts  Corn syrup, sugars, honey, and molasses. Candy. Jam and jelly. Syrup. Sweetened cereals. Cookies, pies, cakes, donuts, muffins, and ice cream.  Fats and Oils  Butter, stick margarine, lard, shortening, ghee, or amaral fat. Coconut, palm kernel, or palm oils.  Beverages  Alcohol. Sweetened drinks (such as sodas, lemonade, and fruit drinks or punches).  The items listed above may not be a complete list of foods and beverages to avoid. Contact your dietitian for more information.  Document Released: 06/18/2013 Document Revised: 08/21/2015 Document Reviewed: 03/19/2015  [employer]® Patient Information ©2015 [employer], [facility]. This information is not intended to replace advice given to you by your health care provider. Make sure you discuss any questions you have with your health care provider.

## 2020-08-07 DIAGNOSIS — I10 ESSENTIAL HYPERTENSION: ICD-10-CM

## 2020-08-10 RX ORDER — VALSARTAN 160 MG/1
160 TABLET ORAL DAILY
Qty: 90 TABLET | Refills: 0 | Status: SHIPPED | OUTPATIENT
Start: 2020-08-10 | End: 2021-01-21 | Stop reason: SDUPTHER

## 2020-08-29 ENCOUNTER — RESULTS ENCOUNTER (OUTPATIENT)
Dept: FAMILY MEDICINE CLINIC | Facility: CLINIC | Age: 80
End: 2020-08-29

## 2020-08-29 DIAGNOSIS — R73.03 PREDIABETES: ICD-10-CM

## 2020-08-29 DIAGNOSIS — I10 ESSENTIAL HYPERTENSION: ICD-10-CM

## 2020-08-29 DIAGNOSIS — E78.49 OTHER HYPERLIPIDEMIA: ICD-10-CM

## 2020-10-22 DIAGNOSIS — I10 ESSENTIAL HYPERTENSION: ICD-10-CM

## 2020-10-23 RX ORDER — VALSARTAN 160 MG/1
TABLET ORAL
Qty: 90 TABLET | Refills: 0 | OUTPATIENT
Start: 2020-10-23

## 2021-01-13 DIAGNOSIS — I10 ESSENTIAL HYPERTENSION: ICD-10-CM

## 2021-01-14 RX ORDER — VALSARTAN 160 MG/1
160 TABLET ORAL DAILY
Qty: 90 TABLET | Refills: 0 | OUTPATIENT
Start: 2021-01-14 | End: 2021-04-14

## 2021-01-21 ENCOUNTER — TELEMEDICINE (OUTPATIENT)
Dept: FAMILY MEDICINE CLINIC | Facility: CLINIC | Age: 81
End: 2021-01-21

## 2021-01-21 DIAGNOSIS — E78.49 OTHER HYPERLIPIDEMIA: ICD-10-CM

## 2021-01-21 DIAGNOSIS — I10 ESSENTIAL HYPERTENSION: Primary | ICD-10-CM

## 2021-01-21 DIAGNOSIS — R73.03 PREDIABETES: ICD-10-CM

## 2021-01-21 PROCEDURE — 99442 PR PHYS/QHP TELEPHONE EVALUATION 11-20 MIN: CPT | Performed by: FAMILY MEDICINE

## 2021-01-21 RX ORDER — VALSARTAN 320 MG/1
320 TABLET ORAL NIGHTLY
Qty: 90 TABLET | Refills: 0 | Status: SHIPPED | OUTPATIENT
Start: 2021-01-21 | End: 2021-07-08 | Stop reason: SDUPTHER

## 2021-01-21 NOTE — PROGRESS NOTES
You have chosen to receive care through a telephone visit. Do you consent to use a telephone visit for your medical care today? Yes   Mode of Visit: Telephone  The visit included telephone interaction. No technical issues occurred during this visit.   more than 10 minutes up to 20 minutes.     Subjective       Chief Complaint   Patient presents with   • Hypertension     med refill    • Hyperlipidemia         History of Present Illness       Viktoria Villasenor is a 80 y.o. female who presents to  [address] today to refill medicines.  Home blood pressures are waxing and waning.  Some systolic blood pressures are in the 150s and some are in the 160s.  She is tolerating current valsartan with no reported side effects.  Labs are due to check on prediabetic condition and lipid condition.  She would like to postpone in office visit for least 3 months due to COVID-19 pandemic.          Objective                     Assessment/Plan     Assessment and Plan      Diagnoses and all orders for this visit:    1. Essential hypertension (Primary)  Assessment & Plan:  Hypertension is unchanged.  Continue current treatment regimen.  Blood pressure will be reassessed in 3 months.    Orders:  -     valsartan (DIOVAN) 320 MG tablet; Take 1 tablet by mouth Every Night for 90 days.  Dispense: 90 tablet; Refill: 0  -     Comprehensive Metabolic Panel  -     CBC & Differential  -     TSH    2. Other hyperlipidemia  Assessment & Plan:  Lipid abnormalities are unchanged.  Nutritional counseling was provided.  Lipids will be reassessed in 3 months.    Orders:  -     CK  -     Lipid Panel With / Chol / HDL Ratio    3. Prediabetes  Assessment & Plan:  Check labs    Orders:  -     Comprehensive Metabolic Panel        Follow Up     Return in about 3 months (around 4/21/2021) for Recheck/Medication.

## 2021-01-21 NOTE — PATIENT INSTRUCTIONS
"Fat and Cholesterol Restricted Diet  Getting too much fat and cholesterol in your diet may cause health problems. Following this diet helps keep your fat and cholesterol at normal levels. This can keep you from getting sick.  WHAT TYPES OF FAT SHOULD I CHOOSE?  · Choose monosaturated and polyunsaturated fats. These are found in foods such as olive oil, canola oil, flaxseeds, walnuts, almonds, and seeds.  · Eat more omega-3 fats. Good choices include salmon, mackerel, sardines, tuna, flaxseed oil, and ground flaxseeds.  · Limit saturated fats. These are in animal products such as meats, butter, and cream. They can also be in plant products such as palm oil, palm kernel oil, and coconut oil.  ·   ·   · Avoid foods with partially hydrogenated oils in them. These contain trans fats. Examples of foods that have trans fats are stick margarine, some tub margarines, cookies, crackers, and other baked goods.  WHAT GENERAL GUIDELINES DO I NEED TO FOLLOW?    · Check food labels. Look for the words \"trans fat\" and \"saturated fat.\"  · When preparing a meal:  ¨ Fill half of your plate with vegetables and green salads.  ¨ Fill one fourth of your plate with whole grains. Look for the word \"whole\" as the first word in the ingredient list.  ¨ Fill one fourth of your plate with lean protein foods.  · Limit fruit to two servings a day. Choose fruit instead of juice.  · Eat more foods with soluble fiber. Examples of foods with this type of fiber are apples, broccoli, carrots, beans, peas, and barley. Try to get 20-30 g (grams) of fiber per day.  · Eat more home-cooked foods. Eat less at restaurants and buffets.  · Limit or avoid alcohol.  · Limit foods high in starch and sugar.  · Limit fried foods.  · Cook foods without frying them. Baking, boiling, grilling, and broiling are all great options.  · Lose weight if you are overweight. Losing even a small amount of weight can help your overall health. It can also help prevent diseases such " as diabetes and heart disease.  WHAT FOODS CAN I EAT?  Grains  Whole grains, such as whole wheat or whole grain breads, crackers, cereals, and pasta. Unsweetened oatmeal, bulgur, barley, quinoa, or brown rice. Corn or whole wheat flour tortillas.  Vegetables  Fresh or frozen vegetables (raw, steamed, roasted, or grilled). Green salads.  Fruits  All fresh, canned (in natural juice), or frozen fruits.  Meat and Other Protein Products  Ground beef (85% or leaner), grass-fed beef, or beef trimmed of fat. Skinless chicken or turkey. Ground chicken or turkey. Pork trimmed of fat. All fish and seafood. Eggs. Dried beans, peas, or lentils. Unsalted nuts or seeds. Unsalted canned or dry beans.  Dairy  Low-fat dairy products, such as skim or 1% milk, 2% or reduced-fat cheeses, low-fat ricotta or cottage cheese, or plain low-fat yogurt.  Fats and Oils  Tub margarines without trans fats. Light or reduced-fat mayonnaise and salad dressings. Avocado. Olive, canola, sesame, or safflower oils. Natural peanut or almond butter (choose ones without added sugar and oil).  The items listed above may not be a complete list of recommended foods or beverages. Contact your dietitian for more options.  WHAT FOODS ARE NOT RECOMMENDED?  Grains  White bread. White pasta. White rice. Cornbread. Bagels, pastries, and croissants. Crackers that contain trans fat.  Vegetables  White potatoes. Corn. Creamed or fried vegetables. Vegetables in a cheese sauce.  Fruits  Dried fruits. Canned fruit in light or heavy syrup. Fruit juice.  Meat and Other Protein Products  Fatty cuts of meat. Ribs, chicken wings, amaral, sausage, bologna, salami, chitterlings, fatback, hot dogs, bratwurst, and packaged luncheon meats. Liver and organ meats.  Dairy  Whole or 2% milk, cream, half-and-half, and cream cheese. Whole milk cheeses. Whole-fat or sweetened yogurt. Full-fat cheeses. Nondairy creamers and whipped toppings. Processed cheese, cheese spreads, or cheese  "curds.  Sweets and Desserts  Corn syrup, sugars, honey, and molasses. Candy. Jam and jelly. Syrup. Sweetened cereals. Cookies, pies, cakes, donuts, muffins, and ice cream.  Fats and Oils  Butter, stick margarine, lard, shortening, ghee, or amaral fat. Coconut, palm kernel, or palm oils.  Beverages  Alcohol. Sweetened drinks (such as sodas, lemonade, and fruit drinks or punches).  The items listed above may not be a complete list of foods and beverages to avoid. Contact your dietitian for more information.  Document Released: 06/18/2013 Document Revised: 08/21/2015 Document Reviewed: 03/19/2015  [employer]® Patient Information ©2015 [employer]. This information is not intended to replace advice given to you by your health care provider. Make sure you discuss any questions you have with your health care provider.      DASH Eating Plan  DASH stands for \"Dietary Approaches to Stop Hypertension.\" The DASH eating plan is a healthy eating plan that has been shown to reduce high blood pressure (hypertension). It may also reduce your risk for type 2 diabetes, heart disease, and stroke. The DASH eating plan may also help with weight loss.  What are tips for following this plan?    General guidelines  · Avoid eating more than 2,300 mg (milligrams) of salt (sodium) a day. If you have hypertension, you may need to reduce your sodium intake to 1,500 mg a day.  · Limit alcohol intake to no more than 1 drink a day for nonpregnant women and 2 drinks a day for men. One drink equals 12 oz of beer, 5 oz of wine, or 1½ oz of hard liquor.  · Work with your health care provider to maintain a healthy body weight or to lose weight. Ask what an ideal weight is for you.  · Get at least 30 minutes of exercise that causes your heart to beat faster (aerobic exercise) most days of the week. Activities may include walking, swimming, or biking.  · Work with your health care provider or diet and nutrition specialist (dietitian) to adjust your eating " "plan to your individual calorie needs.  Reading food labels    · Check food labels for the amount of sodium per serving. Choose foods with less than 5 percent of the Daily Value of sodium. Generally, foods with less than 300 mg of sodium per serving fit into this eating plan.  · To find whole grains, look for the word \"whole\" as the first word in the ingredient list.  Shopping  · Buy products labeled as \"low-sodium\" or \"no salt added.\"  · Buy fresh foods. Avoid canned foods and premade or frozen meals.  Cooking  · Avoid adding salt when cooking. Use salt-free seasonings or herbs instead of table salt or sea salt. Check with your health care provider or pharmacist before using salt substitutes.  · Do not amaro foods. Cook foods using healthy methods such as baking, boiling, grilling, and broiling instead.  · Cook with heart-healthy oils, such as olive, canola, soybean, or sunflower oil.  Meal planning  · Eat a balanced diet that includes:  ? 5 or more servings of fruits and vegetables each day. At each meal, try to fill half of your plate with fruits and vegetables.  ? Up to 6-8 servings of whole grains each day.  ? Less than 6 oz of lean meat, poultry, or fish each day. A 3-oz serving of meat is about the same size as a deck of cards. One egg equals 1 oz.  ? 2 servings of low-fat dairy each day.  ? A serving of nuts, seeds, or beans 5 times each week.  ? Heart-healthy fats. Healthy fats called Omega-3 fatty acids are found in foods such as flaxseeds and coldwater fish, like sardines, salmon, and mackerel.  · Limit how much you eat of the following:  ? Canned or prepackaged foods.  ? Food that is high in trans fat, such as fried foods.  ? Food that is high in saturated fat, such as fatty meat.  ? Sweets, desserts, sugary drinks, and other foods with added sugar.  ? Full-fat dairy products.  · Do not salt foods before eating.  · Try to eat at least 2 vegetarian meals each week.  · Eat more home-cooked food and less " restaurant, buffet, and fast food.  · When eating at a restaurant, ask that your food be prepared with less salt or no salt, if possible.  What foods are recommended?  The items listed may not be a complete list. Talk with your dietitian about what dietary choices are best for you.  Grains  Whole-grain or whole-wheat bread. Whole-grain or whole-wheat pasta. Brown rice. Oatmeal. Quinoa. Bulgur. Whole-grain and low-sodium cereals. Melani bread. Low-fat, low-sodium crackers. Whole-wheat flour tortillas.  Vegetables  Fresh or frozen vegetables (raw, steamed, roasted, or grilled). Low-sodium or reduced-sodium tomato and vegetable juice. Low-sodium or reduced-sodium tomato sauce and tomato paste. Low-sodium or reduced-sodium canned vegetables.  Fruits  All fresh, dried, or frozen fruit. Canned fruit in natural juice (without added sugar).  Meat and other protein foods  Skinless chicken or turkey. Ground chicken or turkey. Pork with fat trimmed off. Fish and seafood. Egg whites. Dried beans, peas, or lentils. Unsalted nuts, nut butters, and seeds. Unsalted canned beans. Lean cuts of beef with fat trimmed off. Low-sodium, lean deli meat.  Dairy  Low-fat (1%) or fat-free (skim) milk. Fat-free, low-fat, or reduced-fat cheeses. Nonfat, low-sodium ricotta or cottage cheese. Low-fat or nonfat yogurt. Low-fat, low-sodium cheese.  Fats and oils  Soft margarine without trans fats. Vegetable oil. Low-fat, reduced-fat, or light mayonnaise and salad dressings (reduced-sodium). Canola, safflower, olive, soybean, and sunflower oils. Avocado.  Seasoning and other foods  Herbs. Spices. Seasoning mixes without salt. Unsalted popcorn and pretzels. Fat-free sweets.  What foods are not recommended?  The items listed may not be a complete list. Talk with your dietitian about what dietary choices are best for you.  Grains  Baked goods made with fat, such as croissants, muffins, or some breads. Dry pasta or rice meal packs.  Vegetables  Creamed or  fried vegetables. Vegetables in a cheese sauce. Regular canned vegetables (not low-sodium or reduced-sodium). Regular canned tomato sauce and paste (not low-sodium or reduced-sodium). Regular tomato and vegetable juice (not low-sodium or reduced-sodium). Pickles. Olives.  Fruits  Canned fruit in a light or heavy syrup. Fried fruit. Fruit in cream or butter sauce.  Meat and other protein foods  Fatty cuts of meat. Ribs. Fried meat. Anglin. Sausage. Bologna and other processed lunch meats. Salami. Fatback. Hotdogs. Bratwurst. Salted nuts and seeds. Canned beans with added salt. Canned or smoked fish. Whole eggs or egg yolks. Chicken or turkey with skin.  Dairy  Whole or 2% milk, cream, and half-and-half. Whole or full-fat cream cheese. Whole-fat or sweetened yogurt. Full-fat cheese. Nondairy creamers. Whipped toppings. Processed cheese and cheese spreads.  Fats and oils  Butter. Stick margarine. Lard. Shortening. Ghee. [place]in fat. Tropical oils, such as coconut, palm kernel, or palm oil.  Seasoning and other foods  Salted popcorn and pretzels. Onion salt, garlic salt, seasoned salt, table salt, and sea salt. Worcestershire sauce. Tartar sauce. Barbecue sauce. Teriyaki sauce. Soy sauce, including reduced-sodium. Steak sauce. Canned and packaged gravies. Fish sauce. Oyster sauce. Cocktail sauce. Horseradish that you find on the shelf. Ketchup. Mustard. Meat flavorings and tenderizers. Bouillon cubes. Hot sauce and Tabasco sauce. Premade or packaged marinades. Premade or packaged taco seasonings. Relishes. Regular salad dressings.  Where to find more information:  · National Heart, Lung, and Blood [place]: www.nhlbi.nih.gov  · American Heart Association: www.heart.org  Summary  · The DASH eating plan is a healthy eating plan that has been shown to reduce high blood pressure (hypertension). It may also reduce your risk for type 2 diabetes, heart disease, and stroke.  · With the DASH eating plan, you should limit salt  (sodium) intake to 2,300 mg a day. If you have hypertension, you may need to reduce your sodium intake to 1,500 mg a day.  · When on the DASH eating plan, aim to eat more fresh fruits and vegetables, whole grains, lean proteins, low-fat dairy, and heart-healthy fats.  · Work with your health care provider or diet and nutrition specialist (dietitian) to adjust your eating plan to your individual calorie needs.  This information is not intended to replace advice given to you by your health care provider. Make sure you discuss any questions you have with your health care provider.  Document Revised: 11/30/2018 Document Reviewed: 12/11/2017  [email or web address] Patient Education © 2020 [email or web address]      Exercising to Lose Weight  Exercise is structured, repetitive physical activity to improve fitness and health. Getting regular exercise is important for everyone. It is especially important if you are overweight. Being overweight increases your risk of heart disease, stroke, diabetes, high blood pressure, and several types of cancer. Reducing your calorie intake and exercising can help you lose weight.  Exercise is usually categorized as moderate or vigorous intensity. To lose weight, most people need to do a certain amount of moderate-intensity or vigorous-intensity exercise each week.  Moderate-intensity exercise    Moderate-intensity exercise is any activity that gets you moving enough to burn at least three times more energy (calories) than if you were sitting.  Examples of moderate exercise include:  · Walking a mile in 15 minutes.  · Doing light yard work.  · Biking at an easy pace.  Most people should get at least 150 minutes (2 hours and 30 minutes) a week of moderate-intensity exercise to maintain their body weight.  Vigorous-intensity exercise  Vigorous-intensity exercise is any activity that gets you moving enough to burn at least six times more calories than if you were sitting. When you exercise at this intensity, you  should be working hard enough that you are not able to carry on a conversation.  Examples of vigorous exercise include:  · Running.  · Playing a team sport, such as football, basketball, and soccer.  · Jumping rope.  Most people should get at least 75 minutes (1 hour and 15 minutes) a week of vigorous-intensity exercise to maintain their body weight.  How can exercise affect me?  When you exercise enough to burn more calories than you eat, you lose weight. Exercise also reduces body fat and builds muscle. The more muscle you have, the more calories you burn. Exercise also:  · Improves mood.  · Reduces stress and tension.  · Improves your overall fitness, flexibility, and endurance.  · Increases bone strength.  The amount of exercise you need to lose weight depends on:  · Your age.  · The type of exercise.  · Any health conditions you have.  · Your overall physical ability.  Talk to your health care provider about how much exercise you need and what types of activities are safe for you.  What actions can I take to lose weight?  Nutrition    · Make changes to your diet as told by your health care provider or diet and nutrition specialist (dietitian). This may include:  ? Eating fewer calories.  ? Eating more protein.  ? Eating less unhealthy fats.  ? Eating a diet that includes fresh fruits and vegetables, whole grains, low-fat dairy products, and lean protein.  ? Avoiding foods with added fat, salt, and sugar.  · Drink plenty of water while you exercise to prevent dehydration or heat stroke.  Activity  · Choose an activity that you enjoy and set realistic goals. Your health care provider can help you make an exercise plan that works for you.  · Exercise at a moderate or vigorous intensity most days of the week.  ? The intensity of exercise may vary from person to person. You can tell how intense a workout is for you by paying attention to your breathing and heartbeat. Most people will notice their breathing and  heartbeat get faster with more intense exercise.  · Do resistance training twice each week, such as:  ? Push-ups.  ? Sit-ups.  ? Lifting weights.  ? Using resistance bands.  · Getting short amounts of exercise can be just as helpful as long structured periods of exercise. If you have trouble finding time to exercise, try to include exercise in your daily routine.  ? Get up, stretch, and walk around every 30 minutes throughout the day.  ? Go for a walk during your lunch break.  ? Park your car farther away from your destination.  ? If you take public transportation, get off one stop early and walk the rest of the way.  ? Make phone calls while standing up and walking around.  ? Take the stairs instead of elevators or escalators.  · Wear comfortable clothes and shoes with good support.  · Do not exercise so much that you hurt yourself, feel dizzy, or get very short of breath.  Where to find more information  · [place] Department of Health and Human Services: www.hhs.gov  · Centers for Disease Control and Prevention (CDC): www.cdc.gov  Contact a health care provider:  · Before starting a new exercise program.  · If you have questions or concerns about your weight.  · If you have a medical problem that keeps you from exercising.  Get help right away if you have any of the following while exercising:  · Injury.  · Dizziness.  · Difficulty breathing or shortness of breath that does not go away when you stop exercising.  · Chest pain.  · Rapid heartbeat.  Summary  · Being overweight increases your risk of heart disease, stroke, diabetes, high blood pressure, and several types of cancer.  · Losing weight happens when you burn more calories than you eat.  · Reducing the amount of calories you eat in addition to getting regular moderate or vigorous exercise each week helps you lose weight.  This information is not intended to replace advice given to you by your health care provider. Make sure you discuss any questions you have  with your health care provider.  Document Revised: 12/31/2018 Document Reviewed: 12/31/2018  [employer] Patient Education © 2020 [employer]

## 2021-03-19 DIAGNOSIS — I10 ESSENTIAL HYPERTENSION: ICD-10-CM

## 2021-03-19 RX ORDER — VALSARTAN 320 MG/1
TABLET ORAL
Qty: 90 TABLET | Refills: 0 | OUTPATIENT
Start: 2021-03-19

## 2021-04-07 LAB
ALBUMIN SERPL-MCNC: 3.9 G/DL (ref 3.5–5.2)
ALBUMIN/GLOB SERPL: 1.1 G/DL
ALP SERPL-CCNC: 95 U/L (ref 39–117)
ALT SERPL-CCNC: 19 U/L (ref 1–33)
AST SERPL-CCNC: 28 U/L (ref 1–32)
BASOPHILS # BLD AUTO: 0.07 10*3/MM3 (ref 0–0.2)
BASOPHILS NFR BLD AUTO: 0.8 % (ref 0–1.5)
BILIRUB SERPL-MCNC: 0.8 MG/DL (ref 0–1.2)
BUN SERPL-MCNC: 8 MG/DL (ref 8–23)
BUN/CREAT SERPL: 9.6 (ref 7–25)
CALCIUM SERPL-MCNC: 9.2 MG/DL (ref 8.6–10.5)
CHLORIDE SERPL-SCNC: 102 MMOL/L (ref 98–107)
CHOLEST SERPL-MCNC: 159 MG/DL (ref 0–200)
CHOLEST/HDLC SERPL: 3.31 {RATIO}
CK SERPL-CCNC: 29 U/L (ref 20–180)
CO2 SERPL-SCNC: 25.8 MMOL/L (ref 22–29)
CREAT SERPL-MCNC: 0.83 MG/DL (ref 0.57–1)
EOSINOPHIL # BLD AUTO: 0.21 10*3/MM3 (ref 0–0.4)
EOSINOPHIL NFR BLD AUTO: 2.4 % (ref 0.3–6.2)
ERYTHROCYTE [DISTWIDTH] IN BLOOD BY AUTOMATED COUNT: 12.8 % (ref 12.3–15.4)
GLOBULIN SER CALC-MCNC: 3.5 GM/DL
GLUCOSE SERPL-MCNC: 102 MG/DL (ref 65–99)
HCT VFR BLD AUTO: 43.5 % (ref 34–46.6)
HDLC SERPL-MCNC: 48 MG/DL (ref 40–60)
HGB BLD-MCNC: 14.9 G/DL (ref 12–15.9)
IMM GRANULOCYTES # BLD AUTO: 0.02 10*3/MM3 (ref 0–0.05)
IMM GRANULOCYTES NFR BLD AUTO: 0.2 % (ref 0–0.5)
LDLC SERPL CALC-MCNC: 94 MG/DL (ref 0–100)
LYMPHOCYTES # BLD AUTO: 2.17 10*3/MM3 (ref 0.7–3.1)
LYMPHOCYTES NFR BLD AUTO: 24.5 % (ref 19.6–45.3)
MCH RBC QN AUTO: 29.6 PG (ref 26.6–33)
MCHC RBC AUTO-ENTMCNC: 34.3 G/DL (ref 31.5–35.7)
MCV RBC AUTO: 86.5 FL (ref 79–97)
MONOCYTES # BLD AUTO: 0.78 10*3/MM3 (ref 0.1–0.9)
MONOCYTES NFR BLD AUTO: 8.8 % (ref 5–12)
NEUTROPHILS # BLD AUTO: 5.61 10*3/MM3 (ref 1.7–7)
NEUTROPHILS NFR BLD AUTO: 63.3 % (ref 42.7–76)
NRBC BLD AUTO-RTO: 0 /100 WBC (ref 0–0.2)
PLATELET # BLD AUTO: 265 10*3/MM3 (ref 140–450)
POTASSIUM SERPL-SCNC: 4.8 MMOL/L (ref 3.5–5.2)
PROT SERPL-MCNC: 7.4 G/DL (ref 6–8.5)
RBC # BLD AUTO: 5.03 10*6/MM3 (ref 3.77–5.28)
SODIUM SERPL-SCNC: 140 MMOL/L (ref 136–145)
TRIGL SERPL-MCNC: 92 MG/DL (ref 0–150)
TSH SERPL DL<=0.005 MIU/L-ACNC: 1.87 UIU/ML (ref 0.27–4.2)
VLDLC SERPL CALC-MCNC: 17 MG/DL (ref 5–40)
WBC # BLD AUTO: 8.86 10*3/MM3 (ref 3.4–10.8)

## 2021-07-08 ENCOUNTER — OFFICE VISIT (OUTPATIENT)
Dept: FAMILY MEDICINE CLINIC | Facility: CLINIC | Age: 81
End: 2021-07-08

## 2021-07-08 VITALS
SYSTOLIC BLOOD PRESSURE: 122 MMHG | OXYGEN SATURATION: 97 % | TEMPERATURE: 98 F | DIASTOLIC BLOOD PRESSURE: 68 MMHG | HEART RATE: 82 BPM | WEIGHT: 194 LBS | RESPIRATION RATE: 16 BRPM | HEIGHT: 65 IN | BODY MASS INDEX: 32.32 KG/M2

## 2021-07-08 DIAGNOSIS — I10 ESSENTIAL HYPERTENSION: Primary | ICD-10-CM

## 2021-07-08 DIAGNOSIS — R01.1 CARDIAC MURMUR: ICD-10-CM

## 2021-07-08 DIAGNOSIS — R06.02 SOBOE (SHORTNESS OF BREATH ON EXERTION): ICD-10-CM

## 2021-07-08 PROCEDURE — 99214 OFFICE O/P EST MOD 30 MIN: CPT | Performed by: FAMILY MEDICINE

## 2021-07-08 RX ORDER — VALSARTAN 320 MG/1
320 TABLET ORAL NIGHTLY
Qty: 90 TABLET | Refills: 0 | Status: SHIPPED | OUTPATIENT
Start: 2021-07-08 | End: 2021-10-18 | Stop reason: SDUPTHER

## 2021-07-08 NOTE — PROGRESS NOTES
"Chief Complaint  Shortness of Breath    Subjective          Viktoria presents to [facility] to refill medicines. Blood pressure is very well controlled. No reported side effects to this medicine.    Patient has had weight loss of about 10 pounds over the past 3 months that is unintentional. She has been having some nausea and decreased appetite. She also has noted increased shortness of breath with minimal activity. She is unable to go up and down the stairs because of this issue as well as fear of falling due to knee issues.          Objective   Vital Signs:   Vitals:    07/08/21 1028   BP: 122/68   Pulse: 82   Resp: 16   Temp: 98 °F (36.7 °C)   TempSrc: Oral   SpO2: 97%   Weight: 88 kg (194 lb)   Height: 163.8 cm (64.5\")        Physical Exam  Vitals reviewed.   Constitutional:       General: She is not in acute distress.  Eyes:      General: Lids are normal.      Conjunctiva/sclera: Conjunctivae normal.   Neck:      Vascular: No carotid bruit.      Trachea: No tracheal deviation.   Cardiovascular:      Rate and Rhythm: Normal rate and regular rhythm.      Heart sounds: Murmur heard.     Pulmonary:      Effort: Pulmonary effort is normal.      Breath sounds: Normal breath sounds.   Skin:     General: Skin is warm and dry.   Neurological:      Mental Status: She is alert. She is not disoriented.   Psychiatric:         Speech: Speech normal.         Behavior: Behavior normal. Behavior is cooperative.          Result Review :                Assessment and Plan    Diagnoses and all orders for this visit:    1. Essential hypertension (Primary)  -     valsartan (DIOVAN) 320 MG tablet; Take 1 tablet by mouth Every Night for 90 days.  Dispense: 90 tablet; Refill: 0    2. Cardiac murmur  Assessment & Plan:  Cardiac murmur newly identified. Referral to cardiology.    Orders:  -     Ambulatory Referral to Cardiology    3. SOBOE (shortness of breath on exertion)  Assessment & Plan:  New problem. " Referral to cardiology.    Orders:  -     Ambulatory Referral to Cardiology  -     XR Chest PA & Lateral; Future      Follow Up   Return in about 3 months (around 10/8/2021) for Medicare Wellness & regular visit, [id number] min.  Patient was given instructions and counseling regarding her condition or for health maintenance advice. Please see specific information pulled into the AVS if appropriate.

## 2021-07-13 ENCOUNTER — HOSPITAL ENCOUNTER (OUTPATIENT)
Dept: GENERAL RADIOLOGY | Facility: HOSPITAL | Age: 81
Discharge: HOME | End: 2021-07-13
Admitting: FAMILY MEDICINE

## 2021-07-13 DIAGNOSIS — R06.02 SOBOE (SHORTNESS OF BREATH ON EXERTION): ICD-10-CM

## 2021-07-13 PROCEDURE — 71046 X-RAY EXAM CHEST 2 VIEWS: CPT

## 2021-08-10 ENCOUNTER — OFFICE VISIT (OUTPATIENT)
Dept: CARDIOLOGY | Facility: CLINIC | Age: 81
End: 2021-08-10

## 2021-08-10 ENCOUNTER — LAB (OUTPATIENT)
Dept: LAB | Facility: HOSPITAL | Age: 81
End: 2021-08-10

## 2021-08-10 VITALS
BODY MASS INDEX: 33.39 KG/M2 | HEIGHT: 64 IN | WEIGHT: 195.6 LBS | HEART RATE: 70 BPM | DIASTOLIC BLOOD PRESSURE: 72 MMHG | SYSTOLIC BLOOD PRESSURE: 132 MMHG

## 2021-08-10 DIAGNOSIS — R01.1 SYSTOLIC MURMUR: ICD-10-CM

## 2021-08-10 DIAGNOSIS — R60.0 BILATERAL LEG EDEMA: ICD-10-CM

## 2021-08-10 DIAGNOSIS — R06.02 SHORTNESS OF BREATH: ICD-10-CM

## 2021-08-10 DIAGNOSIS — R79.89 POSITIVE D DIMER: ICD-10-CM

## 2021-08-10 DIAGNOSIS — R07.2 PRECORDIAL PAIN: Primary | ICD-10-CM

## 2021-08-10 DIAGNOSIS — R07.2 PRECORDIAL PAIN: ICD-10-CM

## 2021-08-10 LAB — D DIMER PPP FEU-MCNC: 0.67 MCGFEU/ML (ref 0–0.49)

## 2021-08-10 PROCEDURE — 93000 ELECTROCARDIOGRAM COMPLETE: CPT | Performed by: INTERNAL MEDICINE

## 2021-08-10 PROCEDURE — 36415 COLL VENOUS BLD VENIPUNCTURE: CPT

## 2021-08-10 PROCEDURE — 99204 OFFICE O/P NEW MOD 45 MIN: CPT | Performed by: INTERNAL MEDICINE

## 2021-08-10 PROCEDURE — 85379 FIBRIN DEGRADATION QUANT: CPT

## 2021-08-10 NOTE — PROGRESS NOTES
I called and spoke with her.  I ordered a CT angiogram and lower extremity venous Doppler ultrasound.  AUSTIN

## 2021-08-23 ENCOUNTER — HOSPITAL ENCOUNTER (OUTPATIENT)
Dept: CARDIOLOGY | Facility: HOSPITAL | Age: 81
Discharge: HOME | End: 2021-08-23
Admitting: INTERNAL MEDICINE

## 2021-08-23 VITALS
WEIGHT: 195 LBS | HEIGHT: 64 IN | HEART RATE: 75 BPM | BODY MASS INDEX: 33.29 KG/M2 | DIASTOLIC BLOOD PRESSURE: 88 MMHG | SYSTOLIC BLOOD PRESSURE: 160 MMHG

## 2021-08-23 DIAGNOSIS — R07.2 PRECORDIAL PAIN: ICD-10-CM

## 2021-08-23 DIAGNOSIS — R60.0 BILATERAL LEG EDEMA: ICD-10-CM

## 2021-08-23 DIAGNOSIS — R01.1 SYSTOLIC MURMUR: ICD-10-CM

## 2021-08-23 DIAGNOSIS — R06.02 SHORTNESS OF BREATH: ICD-10-CM

## 2021-08-23 PROCEDURE — 93306 TTE W/DOPPLER COMPLETE: CPT

## 2021-08-23 PROCEDURE — 93306 TTE W/DOPPLER COMPLETE: CPT | Performed by: INTERNAL MEDICINE

## 2021-08-23 NOTE — PROGRESS NOTES
Called and spoke with the patient.  Echo looks good.  Await CTA and lower extremity Doppler.      AUSTIN

## 2021-08-24 LAB
AORTIC ARCH: 3 CM
ASCENDING AORTA: 2.9 CM
BH CV ECHO MEAS - ACS: 1.6 CM
BH CV ECHO MEAS - AO ARCH DIAM (PROXIMAL TRANS.): 3 CM
BH CV ECHO MEAS - AO MAX PG (FULL): 5.5 MMHG
BH CV ECHO MEAS - AO MAX PG: 11.2 MMHG
BH CV ECHO MEAS - AO MEAN PG (FULL): 2.4 MMHG
BH CV ECHO MEAS - AO MEAN PG: 6 MMHG
BH CV ECHO MEAS - AO ROOT AREA (BSA CORRECTED): 1.4
BH CV ECHO MEAS - AO ROOT AREA: 5.6 CM^2
BH CV ECHO MEAS - AO ROOT DIAM: 2.7 CM
BH CV ECHO MEAS - AO V2 MAX: 167.5 CM/SEC
BH CV ECHO MEAS - AO V2 MEAN: 116.2 CM/SEC
BH CV ECHO MEAS - AO V2 VTI: 42.7 CM
BH CV ECHO MEAS - ASC AORTA: 2.9 CM
BH CV ECHO MEAS - AVA(I,A): 1.7 CM^2
BH CV ECHO MEAS - AVA(I,D): 1.7 CM^2
BH CV ECHO MEAS - AVA(V,A): 1.8 CM^2
BH CV ECHO MEAS - AVA(V,D): 1.8 CM^2
BH CV ECHO MEAS - BSA(HAYCOCK): 2 M^2
BH CV ECHO MEAS - BSA: 1.9 M^2
BH CV ECHO MEAS - BZI_BMI: 33.5 KILOGRAMS/M^2
BH CV ECHO MEAS - BZI_METRIC_HEIGHT: 162.6 CM
BH CV ECHO MEAS - BZI_METRIC_WEIGHT: 88.5 KG
BH CV ECHO MEAS - EDV(MOD-SP2): 71 ML
BH CV ECHO MEAS - EDV(MOD-SP4): 58 ML
BH CV ECHO MEAS - EDV(TEICH): 109.8 ML
BH CV ECHO MEAS - EF(CUBED): 66.3 %
BH CV ECHO MEAS - EF(MOD-BP): 65 %
BH CV ECHO MEAS - EF(MOD-SP2): 64.8 %
BH CV ECHO MEAS - EF(MOD-SP4): 62.1 %
BH CV ECHO MEAS - EF(TEICH): 57.7 %
BH CV ECHO MEAS - ESV(MOD-SP2): 25 ML
BH CV ECHO MEAS - ESV(MOD-SP4): 22 ML
BH CV ECHO MEAS - ESV(TEICH): 46.4 ML
BH CV ECHO MEAS - FS: 30.4 %
BH CV ECHO MEAS - IVS/LVPW: 1.2
BH CV ECHO MEAS - IVSD: 1 CM
BH CV ECHO MEAS - LAT PEAK E' VEL: 9.1 CM/SEC
BH CV ECHO MEAS - LV DIASTOLIC VOL/BSA (35-75): 30 ML/M^2
BH CV ECHO MEAS - LV MASS(C)D: 153.1 GRAMS
BH CV ECHO MEAS - LV MASS(C)DI: 79.1 GRAMS/M^2
BH CV ECHO MEAS - LV MAX PG: 5.8 MMHG
BH CV ECHO MEAS - LV MEAN PG: 3.6 MMHG
BH CV ECHO MEAS - LV SYSTOLIC VOL/BSA (12-30): 11.4 ML/M^2
BH CV ECHO MEAS - LV V1 MAX: 120 CM/SEC
BH CV ECHO MEAS - LV V1 MEAN: 90.3 CM/SEC
BH CV ECHO MEAS - LV V1 VTI: 28.4 CM
BH CV ECHO MEAS - LVIDD: 4.8 CM
BH CV ECHO MEAS - LVIDS: 3.4 CM
BH CV ECHO MEAS - LVLD AP2: 6.1 CM
BH CV ECHO MEAS - LVLD AP4: 6.3 CM
BH CV ECHO MEAS - LVLS AP2: 5.4 CM
BH CV ECHO MEAS - LVLS AP4: 5.5 CM
BH CV ECHO MEAS - LVOT AREA (M): 2.5 CM^2
BH CV ECHO MEAS - LVOT AREA: 2.5 CM^2
BH CV ECHO MEAS - LVOT DIAM: 1.8 CM
BH CV ECHO MEAS - LVPWD: 0.83 CM
BH CV ECHO MEAS - MED PEAK E' VEL: 7.2 CM/SEC
BH CV ECHO MEAS - MR MAX PG: 122.9 MMHG
BH CV ECHO MEAS - MR MAX VEL: 554.4 CM/SEC
BH CV ECHO MEAS - MV A DUR: 0.09 SEC
BH CV ECHO MEAS - MV A MAX VEL: 74.6 CM/SEC
BH CV ECHO MEAS - MV DEC SLOPE: 465.1 CM/SEC^2
BH CV ECHO MEAS - MV DEC TIME: 0.15 SEC
BH CV ECHO MEAS - MV E MAX VEL: 90.8 CM/SEC
BH CV ECHO MEAS - MV E/A: 1.2
BH CV ECHO MEAS - MV MAX PG: 6.1 MMHG
BH CV ECHO MEAS - MV MEAN PG: 2 MMHG
BH CV ECHO MEAS - MV P1/2T MAX VEL: 122.6 CM/SEC
BH CV ECHO MEAS - MV P1/2T: 77.2 MSEC
BH CV ECHO MEAS - MV V2 MAX: 123.3 CM/SEC
BH CV ECHO MEAS - MV V2 MEAN: 65.4 CM/SEC
BH CV ECHO MEAS - MV V2 VTI: 30.2 CM
BH CV ECHO MEAS - MVA P1/2T LCG: 1.8 CM^2
BH CV ECHO MEAS - MVA(P1/2T): 2.9 CM^2
BH CV ECHO MEAS - MVA(VTI): 2.4 CM^2
BH CV ECHO MEAS - PA ACC TIME: 0.12 SEC
BH CV ECHO MEAS - PA MAX PG (FULL): 2.8 MMHG
BH CV ECHO MEAS - PA MAX PG: 4.7 MMHG
BH CV ECHO MEAS - PA PR(ACCEL): 23.1 MMHG
BH CV ECHO MEAS - PA V2 MAX: 108.6 CM/SEC
BH CV ECHO MEAS - PULM A REVS DUR: 0.1 SEC
BH CV ECHO MEAS - PULM A REVS VEL: 23.6 CM/SEC
BH CV ECHO MEAS - PULM DIAS VEL: 72.8 CM/SEC
BH CV ECHO MEAS - PULM S/D: 0.86
BH CV ECHO MEAS - PULM SYS VEL: 63 CM/SEC
BH CV ECHO MEAS - PVA(V,A): 1.5 CM^2
BH CV ECHO MEAS - PVA(V,D): 1.5 CM^2
BH CV ECHO MEAS - QP/QS: 0.49
BH CV ECHO MEAS - RAP SYSTOLE: 3 MMHG
BH CV ECHO MEAS - RV MAX PG: 1.9 MMHG
BH CV ECHO MEAS - RV MEAN PG: 1.3 MMHG
BH CV ECHO MEAS - RV V1 MAX: 68.6 CM/SEC
BH CV ECHO MEAS - RV V1 MEAN: 54.7 CM/SEC
BH CV ECHO MEAS - RV V1 VTI: 15.2 CM
BH CV ECHO MEAS - RVOT AREA: 2.3 CM^2
BH CV ECHO MEAS - RVOT DIAM: 1.7 CM
BH CV ECHO MEAS - RVSP: 38 MMHG
BH CV ECHO MEAS - SI(AO): 123.6 ML/M^2
BH CV ECHO MEAS - SI(CUBED): 39 ML/M^2
BH CV ECHO MEAS - SI(LVOT): 37.4 ML/M^2
BH CV ECHO MEAS - SI(MOD-SP2): 23.8 ML/M^2
BH CV ECHO MEAS - SI(MOD-SP4): 18.6 ML/M^2
BH CV ECHO MEAS - SI(TEICH): 32.8 ML/M^2
BH CV ECHO MEAS - SUP REN AO DIAM: 1.6 CM
BH CV ECHO MEAS - SV(AO): 239.2 ML
BH CV ECHO MEAS - SV(CUBED): 75.4 ML
BH CV ECHO MEAS - SV(LVOT): 72.3 ML
BH CV ECHO MEAS - SV(MOD-SP2): 46 ML
BH CV ECHO MEAS - SV(MOD-SP4): 36 ML
BH CV ECHO MEAS - SV(RVOT): 35.6 ML
BH CV ECHO MEAS - SV(TEICH): 63.4 ML
BH CV ECHO MEAS - TAPSE (>1.6): 2.1 CM
BH CV ECHO MEAS - TR MAX VEL: 295.3 CM/SEC
BH CV ECHO MEASUREMENTS AVERAGE E/E' RATIO: 11.14
BH CV XLRA - RV BASE: 3.4 CM
BH CV XLRA - RV LENGTH: 6.3 CM
BH CV XLRA - RV MID: 2.5 CM
BH CV XLRA - TDI S': 11.1 CM/SEC
LEFT ATRIUM VOLUME INDEX: 28 ML/M2
MAXIMAL PREDICTED HEART RATE: 142 BPM
SINUS: 3 CM
STJ: 2.3 CM
STRESS TARGET HR: 121 BPM

## 2021-08-29 NOTE — PROGRESS NOTES
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    History of Present Illness:    I again had the pleasure of seeing the patient in cardiology office on 8/10/2021.  She is a very pleasant 80 year-old female with a history of diabetes, hypertension, and hyperlipidemia who presents for follow-up and evaluation of new complaints.  I have not seen the patient since 2016, and she is considered a new patient today.  I initially saw her in the office on 2016 with chest discomfort.  She underwent left heart catheterization on 2016 which showed angiographically normal coronary arteries at that time.    The patient presents with several complaints.  She states that since 2021, she has felt more short of breath with exertion, and has also had pain in the center of her chest and into her neck at times.  She describes this as a pressure sensation.  She states that stairs have been very difficult for her, and activity such as cleaning the bathroom caused her to have to stop and rest frequently.  This is a change from her baseline.  She has also had some mild swelling in her legs.  She was also noted to have a murmur on exam recently, and she does have a systolic murmur at her left lower sternal border today.  Her EKG today does show a new right bundle branch block and left posterior fascicular block compared to her EKG from 2016.      Past Medical History:   Diagnosis Date   • ACE-inhibitor cough    • Asthma    • Asymptomatic postmenopausal status    • Cardiac murmur    • Conjunctivitis     right   • Elevated LFTs 2016   • GERD (gastroesophageal reflux disease)    • History of colon polyps    • HLD (hyperlipidemia)    •  Hypertension    • Hypopigmentation    • Left shoulder tendonitis    • Menopausal syndrome    • Motion sickness    • Plantar fasciitis of left foot     nodule   • Pruritus     possibly due to Benicar   • SOB (shortness of breath)    • SOBOE (shortness of breath on exertion)    • Thyroid cyst    • UTI (urinary tract infection)    • Viral syndrome        Past Surgical History:   Procedure Laterality Date   • CARDIAC CATHETERIZATION      in the 80's   • CARDIAC CATHETERIZATION N/A 5/4/2016    Procedure: Left Heart Cath;  Surgeon: Dale Vasquez MD;  Location: [facility] CATH INVASIVE LOCATION;  Service:    • CARDIAC CATHETERIZATION N/A 5/4/2016    Procedure: Coronary angiography;  Surgeon: Dale Vasquez MD;  Location: [facility] CATH INVASIVE LOCATION;  Service:    • CARDIAC CATHETERIZATION N/A 5/4/2016    Procedure: Left ventriculography;  Surgeon: Dale Vasquez MD;  Location: [facility] CATH INVASIVE LOCATION;  Service:    • CHOLECYSTECTOMY  1980   • COLONOSCOPY      >5 years   • COLONOSCOPY  12/01/2016    tics, IH, rectal biopsy showed changes suggestive of Schwannoma    • ENDOSCOPY  12/01/2016    LA grade B esophagitis, mild Schatzki ring, HH, gastric polyp, erythematous mucosa in stomach   • LASIK Bilateral    • RECTAL ULTRASOUND N/A 2/7/2017    Procedure: ENDOSCOPIC ULTRASOUND (LOWER);  Surgeon: Casper Rodríguez MD;  Location: [facility] ENDOSCOPY;  Service:    • SHOULDER SURGERY Left 06/17/2010    Dr. Green; repair L shoulder adhesive capsulitis   • SKIN BIOPSY     • TOTAL ABDOMINAL HYSTERECTOMY  1989    fibroid tumors       Current Outpatient Medications on File Prior to Visit   Medication Sig Dispense Refill   • albuterol sulfate  (90 Base) MCG/ACT inhaler Inhale 2 puffs.     • valsartan (DIOVAN) 320 MG tablet Take 1 tablet by mouth Every Night for 90 days. 90 tablet 0     No current facility-administered medications on file prior to visit.     Allergies as of 08/10/2021 - Reviewed 07/08/2021   Allergen  "Reaction Noted   • Lisinopril Cough 10/02/2019     Social History     Socioeconomic History   • Marital status:      Spouse name: Not on file   • Number of children: 2   • Years of education: Not on file   • Highest education level: Not on file   Tobacco Use   • Smoking status: Former Smoker     Packs/day: 1.00     Years: 30.00     Pack years: 30.00     Quit date:      Years since quittin.6   • Smokeless tobacco: Never Used   Substance and Sexual Activity   • Alcohol use: No   • Drug use: No   • Sexual activity: Defer     Family History   Problem Relation Age of Onset   • Anxiety disorder Mother    • Uterine cancer Mother    • Dementia Mother    • Cancer Father         Prostate Cancer    • Other Father         AAA, aneurysm rupture   • Leukemia Brother        Review of Systems   Constitutional: Positive for malaise/fatigue.   Cardiovascular: Positive for chest pain and dyspnea on exertion.   Respiratory: Positive for shortness of breath.    All other systems reviewed and are negative.     Objective:     Vitals:    08/10/21 1206   BP: 132/72   Pulse: 70   Weight: 88.7 kg (195 lb 9.6 oz)   Height: 163.8 cm (64.49\")     Body mass index is 33.07 kg/m².    Constitutional:       Appearance: Healthy appearance. Well-developed.   Eyes:      Conjunctiva/sclera: Conjunctivae normal.   HENT:      Head: Normocephalic and atraumatic.   Pulmonary:      Effort: Pulmonary effort is normal.      Breath sounds: Normal breath sounds.   Cardiovascular:      Normal rate. Regular rhythm.      Murmurs: There is a grade 2/6 systolic murmur at the LLSB.      No gallop.   Edema:     Peripheral edema absent.   Abdominal:      Palpations: Abdomen is soft.      Tenderness: There is no abdominal tenderness.   Musculoskeletal:      Cervical back: Neck supple. Skin:     General: Skin is warm.   Neurological:      Mental Status: Alert and oriented to person, place, and time.   Psychiatric:         Behavior: Behavior normal. "       Lab Review:     ECG 12 Lead    Date/Time: 8/10/2021 11:59 AM  Performed by: Irving Fajardo MD  Authorized by: Irving Fajardo MD   Comparison: compared with previous ECG from 4/6/2016  Comparison to previous ECG: Compared to the prior EKG, right bundle branch block and left posterior fascicular block are now present.  Rhythm: sinus rhythm  Rate: normal  BPM: 70  Conduction: right bundle branch block and left posterior fascicular block  Other findings: left atrial abnormality    Clinical impression: abnormal EKG            Lipid Panel    Lipid Panel 4/7/21   Total Cholesterol 159   Triglycerides 92   HDL Cholesterol 48   VLDL Cholesterol 17   LDL Cholesterol  94      Comments are available for some flowsheets but are not being displayed.              Cardiac Procedures:  1.  Left heart catheterization on 5/4/2016: Angiographically normal coronary arteries.    Assessment:       Diagnosis Plan   1. Precordial pain  D-dimer, Quantitative    Adult Transthoracic Echo Complete w/ Color, Spectral and Contrast if Necessary Per Protocol   2. Shortness of breath  D-dimer, Quantitative    Adult Transthoracic Echo Complete w/ Color, Spectral and Contrast if Necessary Per Protocol   3. Bilateral leg edema  D-dimer, Quantitative    Adult Transthoracic Echo Complete w/ Color, Spectral and Contrast if Necessary Per Protocol   4. Systolic murmur  Adult Transthoracic Echo Complete w/ Color, Spectral and Contrast if Necessary Per Protocol     Plan:       Again, her coronary arteries were normal in 2016, although she does have multiple risk factors, and this certainly could have changed.  She does have diabetes and hypertension.  She also has a new right posterior fascicular block and right bundle branch block on her EKG compared to 2016.  I am going to check an echocardiogram to mainly evaluate her left ventricular function, as well as her right ventricular size and function.  I am also going to check a D-dimer at this  point.  If positive, I will need to evaluate for pulmonary thromboembolic disease.  She is not having any unstable symptoms currently.  Depending on the results of the other test, she may need an ischemic evaluation in the future as well.  Further plans will be made pending the results of the above testing.

## 2021-09-05 DIAGNOSIS — I10 ESSENTIAL HYPERTENSION: ICD-10-CM

## 2021-09-07 RX ORDER — VALSARTAN 320 MG/1
TABLET ORAL
Qty: 90 TABLET | Refills: 0 | OUTPATIENT
Start: 2021-09-07

## 2021-09-09 ENCOUNTER — HOSPITAL ENCOUNTER (OUTPATIENT)
Dept: CT IMAGING | Facility: HOSPITAL | Age: 81
Discharge: HOME | End: 2021-09-09

## 2021-09-09 ENCOUNTER — HOSPITAL ENCOUNTER (OUTPATIENT)
Dept: CARDIOLOGY | Facility: HOSPITAL | Age: 81
Discharge: HOME | End: 2021-09-09

## 2021-09-09 DIAGNOSIS — R79.89 POSITIVE D DIMER: ICD-10-CM

## 2021-09-09 DIAGNOSIS — R60.0 BILATERAL LEG EDEMA: ICD-10-CM

## 2021-09-09 LAB

## 2021-09-09 PROCEDURE — 93970 EXTREMITY STUDY: CPT

## 2021-09-09 PROCEDURE — 71275 CT ANGIOGRAPHY CHEST: CPT

## 2021-09-09 PROCEDURE — 0 IOPAMIDOL PER 1 ML: Performed by: INTERNAL MEDICINE

## 2021-09-09 PROCEDURE — 82565 ASSAY OF CREATININE: CPT

## 2021-09-09 RX ADMIN — IOPAMIDOL 95 ML: 755 INJECTION, SOLUTION INTRAVENOUS at 14:03

## 2021-09-09 NOTE — PROGRESS NOTES
Gave her the results.  No PE on the chest CT and no DVT on the ultrasound.  She is actually feeling better, and feels that the chest discomfort is better in general.  Her shortness of breath is also better.  For now, I am going to hold on further testing.  If she has any recurrence of the symptoms, she will call me, and I will likely get a stress test at that time.  GM

## 2021-09-09 NOTE — NURSING NOTE
Pt a look and let go for CTA Chest PE Protocol. Dr. Gannon called and stated pt may go home.  IV removed and pt ambulated out to car per self.

## 2021-10-18 ENCOUNTER — OFFICE VISIT (OUTPATIENT)
Dept: FAMILY MEDICINE CLINIC | Facility: CLINIC | Age: 81
End: 2021-10-18

## 2021-10-18 VITALS
RESPIRATION RATE: 16 BRPM | WEIGHT: 195 LBS | SYSTOLIC BLOOD PRESSURE: 138 MMHG | OXYGEN SATURATION: 96 % | TEMPERATURE: 96.8 F | HEART RATE: 72 BPM | BODY MASS INDEX: 33.29 KG/M2 | HEIGHT: 64 IN | DIASTOLIC BLOOD PRESSURE: 60 MMHG

## 2021-10-18 DIAGNOSIS — J45.20 MILD INTERMITTENT ASTHMA WITHOUT COMPLICATION: ICD-10-CM

## 2021-10-18 DIAGNOSIS — Z00.00 MEDICARE ANNUAL WELLNESS VISIT, SUBSEQUENT: Primary | ICD-10-CM

## 2021-10-18 DIAGNOSIS — R73.03 PREDIABETES: ICD-10-CM

## 2021-10-18 DIAGNOSIS — N95.1 MENOPAUSAL STATE: ICD-10-CM

## 2021-10-18 DIAGNOSIS — E78.49 OTHER HYPERLIPIDEMIA: ICD-10-CM

## 2021-10-18 DIAGNOSIS — I10 ESSENTIAL HYPERTENSION: ICD-10-CM

## 2021-10-18 DIAGNOSIS — Z12.31 ENCOUNTER FOR SCREENING MAMMOGRAM FOR BREAST CANCER: ICD-10-CM

## 2021-10-18 DIAGNOSIS — E66.09 CLASS 1 OBESITY DUE TO EXCESS CALORIES WITH SERIOUS COMORBIDITY AND BODY MASS INDEX (BMI) OF 33.0 TO 33.9 IN ADULT: ICD-10-CM

## 2021-10-18 DIAGNOSIS — Z13.820 ENCOUNTER FOR SCREENING FOR OSTEOPOROSIS: ICD-10-CM

## 2021-10-18 DIAGNOSIS — R06.02 SOBOE (SHORTNESS OF BREATH ON EXERTION): ICD-10-CM

## 2021-10-18 PROCEDURE — 1160F RVW MEDS BY RX/DR IN RCRD: CPT | Performed by: FAMILY MEDICINE

## 2021-10-18 PROCEDURE — 1170F FXNL STATUS ASSESSED: CPT | Performed by: FAMILY MEDICINE

## 2021-10-18 PROCEDURE — 99214 OFFICE O/P EST MOD 30 MIN: CPT | Performed by: FAMILY MEDICINE

## 2021-10-18 PROCEDURE — G0439 PPPS, SUBSEQ VISIT: HCPCS | Performed by: FAMILY MEDICINE

## 2021-10-18 PROCEDURE — 1126F AMNT PAIN NOTED NONE PRSNT: CPT | Performed by: FAMILY MEDICINE

## 2021-10-18 RX ORDER — MONTELUKAST SODIUM 10 MG/1
10 TABLET ORAL NIGHTLY
Qty: 90 TABLET | Refills: 1 | Status: SHIPPED | OUTPATIENT
Start: 2021-10-18 | End: 2023-03-29

## 2021-10-18 RX ORDER — VALSARTAN 320 MG/1
320 TABLET ORAL NIGHTLY
Qty: 90 TABLET | Refills: 1 | Status: SHIPPED | OUTPATIENT
Start: 2021-10-18 | End: 2022-02-28

## 2021-10-18 NOTE — PROGRESS NOTES
The ABCs of the Annual Wellness Visit  Subsequent Medicare Wellness Visit    Chief Complaint   Patient presents with   • Medicare Wellness-subsequent      Subjective    History of Present Illness:  Viktoria Villasenor is a 80 y.o. female who presents for a Subsequent Medicare Wellness Visit.  He is also here to refill medicines.  Blood pressure is well controlled.  Lipids were well controlled with diet and exercise only on April labs.  Asthma symptoms might be not fully controlled.  She previously did better with montelukast.  Continues to have some shortness of breath with exertion.  Cardiac work-up recently negative.  Prediabetic condition stable.    Preventative measures discussed.  She is currently in the middle of Covid vaccination series and will consider other vaccines after this is complete.  Mammogram and DEXA scan due.    The following portions of the patient's history were reviewed and   updated as appropriate: allergies, current medications, past family history, past medical history, past social history, past surgical history and problem list.    Compared to one year ago, the patient feels her physical   health is the same.    Compared to one year ago, the patient feels her mental   health is the same.    Recent Hospitalizations:  She was not admitted to the hospital during the last year.       Current Medical Providers:  Patient Care Team:  Magen Olson MD as PCP - General  Magen Olson MD as PCP - Family Medicine  [facility] as Consulting Physician (Otolaryngology)  Casper Hodge MD as Consulting Physician (Gastroenterology)    Outpatient Medications Prior to Visit   Medication Sig Dispense Refill   • albuterol sulfate  (90 Base) MCG/ACT inhaler Inhale 2 puffs.     • valsartan (DIOVAN) 320 MG tablet Take 1 tablet by mouth Every Night for 90 days. 90 tablet 0     No facility-administered medications prior to visit.       No opioid medication identified on active medication list.  "I have reviewed chart for other potential  high risk medication/s and harmful drug interactions in the elderly.          Aspirin is not on active medication list.  Aspirin use is not indicated based on review of current medical condition/s. Risk of harm outweighs potential benefits.  .    Patient Active Problem List   Diagnosis   • Other hyperlipidemia   • Essential hypertension   • SOBOE (shortness of breath on exertion)   • Thyroid cysts   • Mild intermittent asthma without complication   • Esophagitis   • Prediabetes   • Class 1 obesity due to excess calories with serious comorbidity and body mass index (BMI) of 33.0 to 33.9 in adult   • Steatosis of liver   • Cardiac murmur     Advance Care Planning  Advance Directive is on file.  ACP discussion was held with the patient during this visit. Patient has an advance directive in EMR which is still valid.           Objective    Vitals:    10/18/21 1028 10/18/21 1131   BP: 145/58 138/60   BP Location:  Right arm   Patient Position:  Sitting   Cuff Size:  Large Adult   Pulse: 72    Resp: 16    Temp: 96.8 °F (36 °C)    TempSrc: Skin    SpO2: 96%    Weight: 88.5 kg (195 lb)    Height: 162.6 cm (64\")    PainSc: 0-No pain      BMI Readings from Last 1 Encounters:   10/18/21 33.47 kg/m²   BMI is above normal parameters. Recommendations include: educational material, exercise counseling and nutrition counseling    Does the patient have evidence of cognitive impairment? No    Physical Exam  Vitals reviewed.   Constitutional:       General: She is not in acute distress.  Eyes:      General: Lids are normal.      Conjunctiva/sclera: Conjunctivae normal.   Neck:      Vascular: No carotid bruit.      Trachea: No tracheal deviation.   Cardiovascular:      Rate and Rhythm: Normal rate and regular rhythm.      Heart sounds: Normal heart sounds. No murmur heard.      Pulmonary:      Effort: Pulmonary effort is normal.      Breath sounds: Normal breath sounds.   Skin:     General: " Skin is warm and dry.   Neurological:      Mental Status: She is alert. She is not disoriented.   Psychiatric:         Speech: Speech normal.         Behavior: Behavior normal. Behavior is cooperative.                 HEALTH RISK ASSESSMENT    Smoking Status:  Social History     Tobacco Use   Smoking Status Former Smoker   • Packs/day: 1.00   • Years: 30.00   • Pack years: 30.00   • Quit date:    • Years since quittin.8   Smokeless Tobacco Never Used     Alcohol Consumption:  Social History     Substance and Sexual Activity   Alcohol Use No     Fall Risk Screen:    PATRICIA Fall Risk Assessment was completed, and patient is at LOW risk for falls.Assessment completed on:10/18/2021    Depression Screening:  PHQ-2/PHQ-9 Depression Screening 10/18/2021   Little interest or pleasure in doing things 0   Feeling down, depressed, or hopeless 0   Trouble falling or staying asleep, or sleeping too much 0   Feeling tired or having little energy 3   Poor appetite or overeating 0   Feeling bad about yourself - or that you are a failure or have let yourself or your family down 0   Trouble concentrating on things, such as reading the newspaper or watching television 0   Moving or speaking so slowly that other people could have noticed. Or the opposite - being so fidgety or restless that you have been moving around a lot more than usual 0   Thoughts that you would be better off dead, or of hurting yourself in some way 0   Total Score 3   If you checked off any problems, how difficult have these problems made it for you to do your work, take care of things at home, or get along with other people? Somewhat difficult       Health Habits and Functional and Cognitive Screening:  Functional & Cognitive Status 10/18/2021   Do you have difficulty preparing food and eating? No   Do you have difficulty bathing yourself, getting dressed or grooming yourself? No   Do you have difficulty using the toilet? No   Do you have difficulty  moving around from place to place? No   Do you have trouble with steps or getting out of a bed or a chair? No   Current Diet Well Balanced Diet   Dental Exam Up to date   Eye Exam Up to date   Exercise (times per week) 0 times per week   Current Exercises Include No Regular Exercise   Current Exercise Activities Include -   Do you need help using the phone?  No   Are you deaf or do you have serious difficulty hearing?  No   Do you need help with transportation? No   Do you need help shopping? No   Do you need help preparing meals?  No   Do you need help with housework?  No   Do you need help with laundry? No   Do you need help taking your medications? No   Do you need help managing money? No   Do you ever drive or ride in a car without wearing a seat belt? No   Have you felt unusual stress, anger or loneliness in the last month? Yes   Who do you live with? Spouse   If you need help, do you have trouble finding someone available to you? No   Have you been bothered in the last four weeks by sexual problems? -   Do you have difficulty concentrating, remembering or making decisions? No       Age-appropriate Screening Schedule:  Refer to the list below for future screening recommendations based on patient's age, sex and/or medical conditions. Orders for these recommended tests are listed in the plan section. The patient has been provided with a written plan.    Health Maintenance   Topic Date Due   • ZOSTER VACCINE (2 of 3) 05/18/2013   • DXA SCAN  08/06/2016   • MAMMOGRAM  03/07/2019   • TDAP/TD VACCINES (2 - Td or Tdap) 03/24/2019   • INFLUENZA VACCINE  08/01/2021   • LIPID PANEL  04/07/2022              Assessment/Plan   CMS Preventative Services Quick Reference  Risk Factors Identified During Encounter  Glaucoma or Family History of Glaucoma  Immunizations Discussed/Encouraged (specific Immunizations; Tdap, Influenza and Shingrix  Inactivity/Sedentary  Obesity/Overweight   The above risks/problems have been discussed  with the patient.  Follow up actions/plans if indicated are seen below in the Assessment/Plan Section.  Pertinent information has been shared with the patient in the After Visit Summary.    Diagnoses and all orders for this visit:    1. Medicare annual wellness visit, subsequent (Primary)  Comments:  Information on diet and exercise and immunization shared in AVS.  Mammogram and DEXA scan ordered.    2. Essential hypertension  Assessment & Plan:  Condition is stable. The current medical regimen is effective;  continue present plan and medications.    Orders:  -     valsartan (DIOVAN) 320 MG tablet; Take 1 tablet by mouth Every Night for 180 days.  Dispense: 90 tablet; Refill: 1  -     Comprehensive Metabolic Panel; Future  -     CBC & Differential; Future  -     TSH; Future  -     MicroAlbumin, Urine, Random - Urine, Clean Catch; Future    3. Other hyperlipidemia  Assessment & Plan:  Condition is stable. The current medical regimen is effective;  continue present plan and medications.    Orders:  -     Lipid Panel With / Chol / HDL Ratio; Future  -     CK; Future    4. Prediabetes  Assessment & Plan:  Continue to monitor with labs.    Orders:  -     Comprehensive Metabolic Panel; Future  -     Hemoglobin A1c; Future  -     MicroAlbumin, Urine, Random - Urine, Clean Catch; Future    5. Mild intermittent asthma without complication  Assessment & Plan:  Asthma is unchanged.  The patient is experiencing frequent daytime asthma symptoms. She is experiencing frequent nighttime asthma symptoms.  Medications: resume Montelukast.  We will set up referral to pulmonologist for lung evaluation..        Orders:  -     Ambulatory Referral to Pulmonology  -     montelukast (Singulair) 10 MG tablet; Take 1 tablet by mouth Every Night for 180 days.  Dispense: 90 tablet; Refill: 1    6. SOBOE (shortness of breath on exertion)  -     Ambulatory Referral to Pulmonology  -     montelukast (Singulair) 10 MG tablet; Take 1 tablet by mouth  Every Night for 180 days.  Dispense: 90 tablet; Refill: 1    7. Class 1 obesity due to excess calories with serious comorbidity and body mass index (BMI) of 33.0 to 33.9 in adult  Assessment & Plan:  Patient's (Body mass index is 33.47 kg/m².) indicates that they are obese (BMI >30) with health conditions that include hypertension . Weight is worsening. BMI is is above average; BMI management plan is completed. We discussed portion control and increasing exercise.       8. Encounter for screening for osteoporosis  -     DEXA Bone Density, Axial (Hospital); Future    9. Menopausal state  -     DEXA Bone Density, Axial (Hospital); Future    10. Encounter for screening mammogram for breast cancer  -     Mammo Screening, Bilateral with CAD (Annually); Future      Follow Up:   Return in about 6 months (around 4/18/2022) for recheck/refill medication.     An After Visit Summary and PPPS were made available to the patient.

## 2021-10-18 NOTE — ASSESSMENT & PLAN NOTE
Patient's (Body mass index is 33.47 kg/m².) indicates that they are obese (BMI >30) with health conditions that include hypertension . Weight is worsening. BMI is is above average; BMI management plan is completed. We discussed portion control and increasing exercise.

## 2021-10-18 NOTE — ASSESSMENT & PLAN NOTE
Asthma is unchanged.  The patient is experiencing frequent daytime asthma symptoms. She is experiencing frequent nighttime asthma symptoms.  Medications: resume Montelukast.  We will set up referral to pulmonologist for lung evaluation..

## 2021-10-18 NOTE — PATIENT INSTRUCTIONS
Annual flu shot has been recommended to patient. Optimal timing of this vaccination is in mid October of each year.   Check on insurance coverage and cost for Shingrix (newest shingles vaccine) and get the immunization at your local pharmacy. It is more effective than the old Zostavax vaccine and is recommended even if you have had the Zostavax vaccine in the past. For more information, please look at the website below:  https://www.cdc.gov/vaccines/vpd/shingles/public/shingrix/index.html    Check on insurance coverage and cost for adacel Tdap(tetanus plus whooping cough vaccine) and get the immunization at your local pharmacy. (Once every 10 Years)        Bone Density Test  A bone density test uses a type of X-ray to measure the amount of calcium and other minerals in a person's bones. It can measure bone density in the hip and the spine. The test is similar to having a regular X-ray. This test may also be called:  · Bone densitometry.  · Bone mineral density test.  · Dual-energy X-ray absorptiometry (DEXA).  You may have this test to:  · Diagnose a condition that causes weak or thin bones (osteoporosis).  · Screen you for osteoporosis.  · Predict your risk for a broken bone (fracture).  · Determine how well your osteoporosis treatment is working.  Tell a health care provider about:  · Any allergies you have.  · All medicines you are taking, including vitamins, herbs, eye drops, creams, and over-the-counter medicines.  · Any problems you or family members have had with anesthetic medicines.  · Any blood disorders you have.  · Any surgeries you have had.  · Any medical conditions you have.  · Whether you are pregnant or may be pregnant.  · Any medical tests you have had within the past 14 days that used contrast material.  What are the risks?  Generally, this is a safe test. However, it does expose you to a small amount of radiation, which can slightly increase your cancer risk.  What happens before the test?  · Do  not take any calcium supplements within the 24 hours before your test.  · You will need to remove all metal jewelry, eyeglasses, removable dental appliances, and any other metal objects on your body.  What happens during the test?    · You will lie down on an exam table. There will be an X-ray generator below you and an imaging device above you.  · Other devices, such as boxes or braces, may be used to position your body properly for the scan.  · The machine will slowly scan your body. You will need to keep very still while the machine does the scan.  · The images will show up on a screen in the room. Images will be examined by a specialist after your test is finished.  The procedure may vary among health care providers and hospitals.  What can I expect after the test?  It is up to you to get the results of your test. Ask your health care provider, or the department that is doing the test, when your results will be ready.  Summary  · A bone density test is an imaging test that uses a type of X-ray to measure the amount of calcium and other minerals in your bones.  · The test may be used to diagnose or screen you for a condition that causes weak or thin bones (osteoporosis), predict your risk for a broken bone (fracture), or determine how well your osteoporosis treatment is working.  · Do not take any calcium supplements within 24 hours before your test.  · Ask your health care provider, or the department that is doing the test, when your results will be ready.  This information is not intended to replace advice given to you by your health care provider. Make sure you discuss any questions you have with your health care provider.  Document Revised: 06/03/2021 Document Reviewed: 06/03/2021  Elsevier Patient Education © 2021 Elsevier Inc.      Preventing Osteoporosis, Adult  Osteoporosis is a condition that causes the bones to lose density. This means that the bones become thinner, and the normal spaces in bone tissue  become larger. Low bone density can make the bones weak and cause them to break more easily.  Osteoporosis cannot always be prevented, but you can take steps to lower your risk of developing this condition.  How can this condition affect me?  If you develop osteoporosis, you will be more likely to break bones in your wrist, spine, or hip. Even a minor accident or injury can be enough to break weak bones. The bones will also be slower to heal. Osteoporosis can cause other problems as well, such as a stooped posture or trouble with movement.  Osteoporosis can occur with aging. As you get older, you may lose bone tissue more quickly, or it may be replaced more slowly. Osteoporosis is more likely to develop if you have poor nutrition or do not get enough calcium or vitamin D. Other lifestyle factors can also play a role. By eating a well-balanced diet and making lifestyle changes, you can help keep your bones strong and healthy, lowering your chances of developing osteoporosis.  What can increase my risk?  The following factors may make you more likely to develop osteoporosis:  · Having a family history of the condition.  · Having poor nutrition or not getting enough calcium or vitamin D.  · Using certain medicines, such as steroid medicines or anti-seizure medicines.  · Being any of the following:  ? 50 years of age or older.  ? Female.  ? A woman who has gone through menopause (is postmenopausal).  ? A person who is of  or  descent.  · Using products that contain nicotine or tobacco, such as cigarettes, e-cigarettes, and chewing tobacco.  · Not being physically active (being sedentary).  · Having a small body frame.  What actions can I take to prevent this?  Get enough calcium    · Make sure you get enough calcium every day. Calcium is the most important mineral for bone health. Most people can get enough calcium from their diet, but supplements may be recommended for people who are at risk for  osteoporosis. Follow these guidelines:  ? If you are age 50 or younger, aim to get 1,000 milligrams (mg) of calcium every day.  ? If you are older than age 50, aim to get 1,200 mg of calcium every day.  · Good sources of calcium include:  ? Dairy products, such as low-fat or nonfat milk, cheese, and yogurt.  ? Dark green leafy vegetables, such as bok hernesto and broccoli.  ? Foods that have had calcium added to them (calcium-fortified foods), such as orange juice, cereal, bread, soy beverages, and tofu products.  ? Nuts, such as almonds.  · Check nutrition labels to see how much calcium is in a food or drink.    Get enough vitamin D  · Try to get enough vitamin D every day. Vitamin D is the most essential vitamin for bone health. It helps the body absorb calcium. Follow these guidelines for how much vitamin D to get from food:  ? If you are age 70 or younger, aim to get at least 600 international units (IU) every day. Your health care provider may suggest more.  ? If you are older than age 70, aim to get at least 800 international units every day. Your health care provider may suggest more.  · Good sources of vitamin D in your diet include:  ? Egg yolks.  ? Oily fish, such as salmon, sardines, and tuna.  ? Milk and cereal fortified with vitamin D.  · Your body also makes vitamin D when you are out in the sun. Exposing the bare skin on your face, arms, legs, or back to the sun for no more than 30 minutes a day, 2 times a week is more than enough. Beyond that, make sure you use sunblock to protect your skin from sunburn, which increases your risk for skin cancer.  Exercise    · Stay active and get exercise every day.  · Ask your health care provider what types of exercise are best for you. Weight-bearing and strength-building activities are important for building and maintaining healthy bones. Some examples of these types of activities include:  ? Walking and hiking.  ? Jogging and running.  ? Dancing.  ? Gym exercises  and lifting weights.  ? Tennis and racquetball.  ? Climbing stairs.  ? Isidro chi.    Make other lifestyle changes  · Do not use any products that contain nicotine or tobacco, such as cigarettes, e-cigarettes, and chewing tobacco. If you need help quitting, ask your health care provider.  · Lose weight if you are overweight.  · If you drink alcohol:  ? Limit how much you use to:  § 0-1 drink a day for women who are not pregnant.  § 0-2 drinks a day for men.  ? Be aware of how much alcohol is in your drink. In the [place] one drink equals one 12 oz bottle of beer (355 mL), one 5 oz glass of wine (148 mL), or one 1½ oz glass of hard liquor (44 mL).  Where to find support  If you need help making changes to prevent osteoporosis, talk with your health care provider. You can ask for a referral to a dietitian and a physical therapist.  Where to find more information  Learn more about osteoporosis from:  · NIH Osteoporosis and Related Bone Diseases National Resource Center: www.bones.nih.gov  · [place] Office on Women's Health: www.womenshealth.gov  · National Osteoporosis Foundation: www.nof.org  Summary  · Osteoporosis is a condition that causes weak bones that are more likely to break.  · Eat a healthy diet, making sure you get enough calcium and vitamin D, and stay active by getting regular exercise to help prevent osteoporosis.  · Other ways to reduce your risk of osteoporosis include maintaining a healthy weight and avoiding alcohol and products that contain nicotine or tobacco.  This information is not intended to replace advice given to you by your health care provider. Make sure you discuss any questions you have with your health care provider.  Document Revised: 06/03/2021 Document Reviewed: 06/03/2021  Elsevier Patient Education © 2021 Elsevier Inc.      Mammogram  A mammogram is an X-ray of the breasts that is done to check for changes that are not normal. This test can screen for and find any changes that may suggest  breast cancer. Mammograms are regularly done on women. A man may have a mammogram if he has a lump or swelling in his breast. This test can also help to find other changes and variations in the breast.  Tell a doctor:  · About any allergies you have.  · If you have breast implants.  · If you have had previous breast disease, biopsy, or surgery.  · If you are breastfeeding.  · If you are younger than age 25.  · If you have a family history of breast cancer.  · Whether you are pregnant or may be pregnant.  What are the risks?  Generally, this is a safe procedure. However, problems may occur, including:  · Exposure to radiation. Radiation levels are very low with this test.  · The results being misinterpreted.  · The need for further tests.  · The inability of the mammogram to detect certain cancers.  What happens before the procedure?  · Have this test done about 1-2 weeks after your period. This is usually when your breasts are the least tender.  · If you are visiting a new doctor or clinic, send any past mammogram images to your new doctor's office.  · Wash your breasts and under your arms the day of the test.  · Do not use deodorants, perfumes, lotions, or powders on the day of the test.  · Take off any jewelry from your neck.  · Wear clothes that you can change into and out of easily.  What happens during the procedure?    · You will undress from the waist up. You will put on a gown.  · You will  front of the X-ray machine.  · Each breast will be placed between two plastic or glass plates. The plates will press down on your breast for a few seconds. Try to stay as relaxed as possible. This does not cause any harm to your breasts. Any discomfort you feel will be very brief.  · X-rays will be taken from different angles of each breast.  The procedure may vary among doctors and hospitals.  What happens after the procedure?  · The mammogram will be read by a specialist (radiologist).  · You may need to do  certain parts of the test again. This depends on the quality of the images.  · Ask when your test results will be ready. Make sure you get your test results.  · You may go back to your normal activities.  Summary  · A mammogram is a low energy X-ray of the breasts that is done to check for abnormal changes. A man may have this test if he has a lump or swelling in his breast.  · Before the procedure, tell your doctor about any breast problems that you have had in the past.  · Have this test done about 1-2 weeks after your period.  · For the test, each breast will be placed between two plastic or glass plates. The plates will press down on your breast for a few seconds.  · The mammogram will be read by a specialist (radiologist). Ask when your test results will be ready. Make sure you get your test results.  This information is not intended to replace advice given to you by your health care provider. Make sure you discuss any questions you have with your health care provider.  Document Revised: 2019 Document Reviewed: 2019  [employer] Patient Education ©  Elsevier Inc.      Medicare Wellness  Personal Prevention Plan of Service     Date of Office Visit:    Encounter Provider:  Magen Olson MD  Place of Service:  [facility]  Patient Name: Viktoria Villasenor  :  1940    As part of the Medicare Wellness portion of your visit today, we are providing you with this personalized preventive plan of services (PPPS). This plan is based upon recommendations of the [place] Preventive Services Task Force (USPSTF) and the Advisory Committee on Immunization Practices (ACIP).    This lists the preventive care services that should be considered, and provides dates of when you are due. Items listed as completed are up-to-date and do not require any further intervention.    Health Maintenance   Topic Date Due   • ZOSTER VACCINE (2 of 3) 2013   • DXA SCAN  2016   •  MAMMOGRAM  03/07/2019   • TDAP/TD VACCINES (2 - Td or Tdap) 03/24/2019   • INFLUENZA VACCINE  08/01/2021   • COVID-19 Vaccine (2 - Moderna 2-dose series) 10/25/2021   • LIPID PANEL  04/07/2022   • ANNUAL WELLNESS VISIT  10/18/2022   • COLORECTAL CANCER SCREENING  12/01/2026   • HEPATITIS C SCREENING  Completed   • Pneumococcal Vaccine 65+  Completed       Orders Placed This Encounter   Procedures   • DEXA Bone Density, Axial (Hospital)     Standing Status:   Future     Standing Expiration Date:   10/18/2022     Order Specific Question:   Is patient taking or have taken long term Glucocorticoid (steroids)?     Answer:   No     Order Specific Question:   Does the patient have rheumatoid arthritis?     Answer:   No     Order Specific Question:   Does the patient have secondary osteoporosis?     Answer:   No     Order Specific Question:   Reason for Exam:     Answer:   screening for osteoporosis     Order Specific Question:   Release to patient     Answer:   Immediate   • Mammo Screening, Bilateral with CAD (Annually)     Use HCPCS/CPT Code 51414     Standing Status:   Future     Standing Expiration Date:   10/18/2022     Order Specific Question:   Reason for Exam:     Answer:   screen for breast cancer     Order Specific Question:   Release to patient     Answer:   Immediate   • Ambulatory Referral to Pulmonology     Referral Priority:   Routine     Referral Type:   Consultation     Referral Reason:   Specialty Services Required     Requested Specialty:   Pulmonary Disease     Number of Visits Requested:   1       No follow-ups on file.

## 2021-10-21 ENCOUNTER — TRANSCRIBE ORDERS (OUTPATIENT)
Dept: FAMILY MEDICINE CLINIC | Facility: CLINIC | Age: 81
End: 2021-10-21

## 2021-10-21 DIAGNOSIS — Z12.31 SCREENING MAMMOGRAM, ENCOUNTER FOR: Primary | ICD-10-CM

## 2021-12-06 ENCOUNTER — APPOINTMENT (OUTPATIENT)
Dept: MAMMOGRAPHY | Facility: HOSPITAL | Age: 81
End: 2021-12-06

## 2022-02-28 DIAGNOSIS — I10 ESSENTIAL HYPERTENSION: ICD-10-CM

## 2022-02-28 RX ORDER — VALSARTAN 320 MG/1
TABLET ORAL
Qty: 30 TABLET | Refills: 0 | Status: SHIPPED | OUTPATIENT
Start: 2022-02-28 | End: 2022-05-23 | Stop reason: SDUPTHER

## 2022-04-13 ENCOUNTER — APPOINTMENT (OUTPATIENT)
Dept: BONE DENSITY | Facility: HOSPITAL | Age: 82
End: 2022-04-13

## 2022-05-23 DIAGNOSIS — I10 ESSENTIAL HYPERTENSION: ICD-10-CM

## 2022-05-24 RX ORDER — VALSARTAN 320 MG/1
320 TABLET ORAL NIGHTLY
Qty: 15 TABLET | Refills: 0 | Status: SHIPPED | OUTPATIENT
Start: 2022-05-24 | End: 2022-06-30 | Stop reason: SDUPTHER

## 2022-06-30 ENCOUNTER — OFFICE VISIT (OUTPATIENT)
Dept: FAMILY MEDICINE CLINIC | Facility: CLINIC | Age: 82
End: 2022-06-30

## 2022-06-30 VITALS
BODY MASS INDEX: 32.95 KG/M2 | DIASTOLIC BLOOD PRESSURE: 57 MMHG | OXYGEN SATURATION: 97 % | SYSTOLIC BLOOD PRESSURE: 135 MMHG | HEART RATE: 68 BPM | WEIGHT: 193 LBS | HEIGHT: 64 IN | TEMPERATURE: 97 F | RESPIRATION RATE: 16 BRPM

## 2022-06-30 DIAGNOSIS — I10 ESSENTIAL HYPERTENSION: Primary | ICD-10-CM

## 2022-06-30 DIAGNOSIS — E78.49 OTHER HYPERLIPIDEMIA: ICD-10-CM

## 2022-06-30 DIAGNOSIS — H91.93 HEARING PROBLEM OF BOTH EARS: ICD-10-CM

## 2022-06-30 DIAGNOSIS — R73.03 PREDIABETES: ICD-10-CM

## 2022-06-30 PROCEDURE — 99214 OFFICE O/P EST MOD 30 MIN: CPT | Performed by: FAMILY MEDICINE

## 2022-06-30 RX ORDER — ATORVASTATIN CALCIUM 10 MG/1
10 TABLET, FILM COATED ORAL NIGHTLY
Qty: 90 TABLET | Refills: 2 | Status: SHIPPED | OUTPATIENT
Start: 2022-06-30 | End: 2023-03-29 | Stop reason: SDUPTHER

## 2022-06-30 RX ORDER — VALSARTAN 320 MG/1
320 TABLET ORAL NIGHTLY
Qty: 90 TABLET | Refills: 2 | Status: SHIPPED | OUTPATIENT
Start: 2022-06-30 | End: 2023-03-15 | Stop reason: SDUPTHER

## 2022-06-30 RX ORDER — MONTELUKAST SODIUM 10 MG/1
10 TABLET ORAL NIGHTLY
Qty: 90 TABLET | Refills: 1 | Status: CANCELLED | OUTPATIENT
Start: 2022-06-30 | End: 2022-12-27

## 2022-06-30 NOTE — ASSESSMENT & PLAN NOTE
Mild progression of LDL cholesterol noted.  Add atorvastatin to current regimen.  Recheck early next year.

## 2022-06-30 NOTE — PROGRESS NOTES
"Chief Complaint  Hypertension (6 month follow up ) and Ear Fullness    Subjective          Viktoria presents to [facility]   to refill medications and review labs.  Overall she feels well. No medication side effects are reported.     Patient would like ears checked to rule out excess earwax.        Objective   Vital Signs:   Vitals:    06/30/22 0933   BP: 135/57   Pulse: 68   Resp: 16   Temp: 97 °F (36.1 °C)   TempSrc: Skin   SpO2: 97%   Weight: 87.5 kg (193 lb)   Height: 162.6 cm (64\")               Physical Exam  Vitals reviewed.   Constitutional:       General: She is not in acute distress.  HENT:      Right Ear: A middle ear effusion is present. There is no impacted cerumen.      Left Ear: A middle ear effusion is present. There is no impacted cerumen.   Eyes:      General: Lids are normal.      Conjunctiva/sclera: Conjunctivae normal.   Neck:      Vascular: No carotid bruit.      Trachea: No tracheal deviation.   Cardiovascular:      Rate and Rhythm: Normal rate and regular rhythm.      Heart sounds: Normal heart sounds. No murmur heard.  Pulmonary:      Effort: Pulmonary effort is normal.      Breath sounds: Normal breath sounds.   Skin:     General: Skin is warm and dry.   Neurological:      Mental Status: She is alert. She is not disoriented.   Psychiatric:         Speech: Speech normal.         Behavior: Behavior normal. Behavior is cooperative.          Result Review :     The following data was reviewed by: Magen Olson MD on 06/30/2022:  MicroAlbumin, Urine, Random - Urine, Clean Catch (03/15/2022 10:05)  Hemoglobin A1c (03/15/2022 10:05)-5.5  TSH (03/15/2022 10:05)  CK (03/15/2022 10:05)  Lipid Panel With / Chol / HDL Ratio (03/15/2022 10:05)-ldl 115  CBC & Differential (03/15/2022 10:05)  Comprehensive Metabolic Panel (03/15/2022 10:05)           Assessment and Plan    Diagnoses and all orders for this visit:    1. Essential hypertension (Primary)  Assessment & " Plan:  Condition is stable. The current medical regimen is effective;  continue present plan and medications.    Orders:  -     valsartan (DIOVAN) 320 MG tablet; Take 1 tablet by mouth Every Night for 270 days.  Dispense: 90 tablet; Refill: 2  -     Comprehensive Metabolic Panel; Future  -     CBC & Differential; Future  -     TSH; Future    2. Prediabetes  Assessment & Plan:  Condition is stable. The current medical regimen is effective;  continue present plan.    Orders:  -     Comprehensive Metabolic Panel; Future  -     Hemoglobin A1c; Future  -     MicroAlbumin, Urine, Random - Urine, Clean Catch; Future    3. Other hyperlipidemia  Assessment & Plan:  Mild progression of LDL cholesterol noted.  Add atorvastatin to current regimen.  Recheck early next year.    Orders:  -     atorvastatin (LIPITOR) 10 MG tablet; Take 1 tablet by mouth Every Night for 270 days.  Dispense: 90 tablet; Refill: 2  -     Lipid Panel With / Chol / HDL Ratio; Future  -     CK; Future    4. Hearing problem of both ears  Assessment & Plan:  Patient will try Flonase 2 sprays each nostril daily for the next 2 weeks.  If her hearing does not improve then she will get hearing tested.        Follow Up   Return in about 7 months (around 1/30/2023) for Medicare Wellness & regular visit, [id number] min.  Patient was given instructions and counseling regarding her condition or for health maintenance advice. Please see specific information pulled into the AVS if appropriate.

## 2022-06-30 NOTE — ASSESSMENT & PLAN NOTE
Patient will try Flonase 2 sprays each nostril daily for the next 2 weeks.  If her hearing does not improve then she will get hearing tested.

## 2023-03-15 DIAGNOSIS — I10 ESSENTIAL HYPERTENSION: ICD-10-CM

## 2023-03-16 RX ORDER — VALSARTAN 320 MG/1
320 TABLET ORAL NIGHTLY
Qty: 30 TABLET | Refills: 0 | Status: SHIPPED | OUTPATIENT
Start: 2023-03-16 | End: 2023-03-29 | Stop reason: SDUPTHER

## 2023-03-16 NOTE — TELEPHONE ENCOUNTER
Sent a 30 day RX. Pt is needing an appointment.     Okay for the HUB to relay message       Rx Refill Note  Requested Prescriptions     Pending Prescriptions Disp Refills   • valsartan (DIOVAN) 320 MG tablet 90 tablet 2     Sig: Take 1 tablet by mouth Every Night for 270 days.      Last office visit with prescribing clinician: 6/30/2022   Last telemedicine visit with prescribing clinician: Visit date not found   Next office visit with prescribing clinician: Visit date not found                         Would you like a call back once the refill request has been completed: [] Yes [] No    If the office needs to give you a call back, can they leave a voicemail: [] Yes [] No    Tamy Herrera MA  03/16/23, 08:17 EDT

## 2023-03-29 ENCOUNTER — OFFICE VISIT (OUTPATIENT)
Dept: FAMILY MEDICINE CLINIC | Facility: CLINIC | Age: 83
End: 2023-03-29
Payer: MEDICARE

## 2023-03-29 VITALS
RESPIRATION RATE: 18 BRPM | DIASTOLIC BLOOD PRESSURE: 52 MMHG | BODY MASS INDEX: 33.97 KG/M2 | OXYGEN SATURATION: 97 % | WEIGHT: 199 LBS | SYSTOLIC BLOOD PRESSURE: 128 MMHG | HEART RATE: 78 BPM | HEIGHT: 64 IN | TEMPERATURE: 98.1 F

## 2023-03-29 DIAGNOSIS — R73.03 PREDIABETES: ICD-10-CM

## 2023-03-29 DIAGNOSIS — J45.20 MILD INTERMITTENT ASTHMA WITHOUT COMPLICATION: ICD-10-CM

## 2023-03-29 DIAGNOSIS — R06.02 SOBOE (SHORTNESS OF BREATH ON EXERTION): ICD-10-CM

## 2023-03-29 DIAGNOSIS — I10 ESSENTIAL HYPERTENSION: Primary | ICD-10-CM

## 2023-03-29 DIAGNOSIS — E78.49 OTHER HYPERLIPIDEMIA: ICD-10-CM

## 2023-03-29 PROCEDURE — 3074F SYST BP LT 130 MM HG: CPT | Performed by: FAMILY MEDICINE

## 2023-03-29 PROCEDURE — 99214 OFFICE O/P EST MOD 30 MIN: CPT | Performed by: FAMILY MEDICINE

## 2023-03-29 PROCEDURE — 3078F DIAST BP <80 MM HG: CPT | Performed by: FAMILY MEDICINE

## 2023-03-29 RX ORDER — ATORVASTATIN CALCIUM 10 MG/1
10 TABLET, FILM COATED ORAL NIGHTLY
Qty: 90 TABLET | Refills: 2 | Status: SHIPPED | OUTPATIENT
Start: 2023-03-29 | End: 2023-12-24

## 2023-03-29 RX ORDER — MONTELUKAST SODIUM 10 MG/1
10 TABLET ORAL NIGHTLY
Qty: 90 TABLET | Refills: 2 | Status: SHIPPED | OUTPATIENT
Start: 2023-03-29 | End: 2023-12-24

## 2023-03-29 RX ORDER — BUDESONIDE AND FORMOTEROL FUMARATE DIHYDRATE 80; 4.5 UG/1; UG/1
2 AEROSOL RESPIRATORY (INHALATION)
Qty: 10.2 G | Refills: 0 | Status: SHIPPED | OUTPATIENT
Start: 2023-03-29 | End: 2023-04-28

## 2023-03-29 RX ORDER — VALSARTAN 320 MG/1
320 TABLET ORAL NIGHTLY
Qty: 90 TABLET | Refills: 2 | Status: SHIPPED | OUTPATIENT
Start: 2023-03-29 | End: 2023-12-24

## 2023-03-29 NOTE — ASSESSMENT & PLAN NOTE
Patient is having nonproductive cough that has worsened since COVID infection.  Will treat with 1 month of Symbicort.  Patient will restart montelukast.

## 2023-03-29 NOTE — ASSESSMENT & PLAN NOTE
LDL cholesterol not to goal.  Patient to restart a atorvastatin.  She had stopped that after her COVID infection recently.

## 2023-03-29 NOTE — PROGRESS NOTES
"Chief Complaint  Hypertension (Med refills)    Subjective          Viktoria presents to [facility] for  lab review and to refill current medications. Overall she feels well. No medication side effects are reported.  LDL above goal.          Objective   Vital Signs:   Vitals:    03/29/23 1121   BP: 128/52   Pulse: 78   Resp: 18   Temp: 98.1 °F (36.7 °C)   SpO2: 97%   Weight: 90.3 kg (199 lb)   Height: 162.6 cm (64\")                Physical Exam  Vitals reviewed.   Constitutional:       General: She is not in acute distress.  Eyes:      General: Lids are normal.      Conjunctiva/sclera: Conjunctivae normal.   Neck:      Vascular: No carotid bruit.      Trachea: No tracheal deviation.   Cardiovascular:      Rate and Rhythm: Normal rate and regular rhythm.      Heart sounds: Normal heart sounds. No murmur heard.  Pulmonary:      Effort: Pulmonary effort is normal.      Breath sounds: Normal breath sounds.   Skin:     General: Skin is warm and dry.   Neurological:      Mental Status: She is alert. She is not disoriented.   Psychiatric:         Speech: Speech normal.         Behavior: Behavior normal. Behavior is cooperative.          Result Review :     The following data was reviewed by: Magen Olson MD on 03/29/2023:  MicroAlbumin, Urine, Random - Urine, Clean Catch (03/08/2023 10:12)  Hemoglobin A1c (03/08/2023 10:12)-103  TSH (03/08/2023 10:12)  CK (03/08/2023 10:12)  Lipid Panel With / Chol / HDL Ratio (03/08/2023 10:12)  CBC & Differential (03/08/2023 10:12)  Comprehensive Metabolic Panel (03/08/2023 10:12)           Assessment and Plan    Diagnoses and all orders for this visit:    1. Essential hypertension (Primary)  Assessment & Plan:  Condition is stable. The current medical regimen is effective;  continue present plan and medications.    Orders:  -     valsartan (DIOVAN) 320 MG tablet; Take 1 tablet by mouth Every Night for 270 days.  Dispense: 90 tablet; Refill: 2  -     " Comprehensive Metabolic Panel; Future  -     CBC & Differential; Future  -     TSH; Future    2. Mild intermittent asthma without complication  Assessment & Plan:  Patient is having nonproductive cough that has worsened since COVID infection.  Will treat with 1 month of Symbicort.  Patient will restart montelukast.      Orders:  -     budesonide-formoterol (Symbicort) 80-4.5 MCG/ACT inhaler; Inhale 2 puffs 2 (Two) Times a Day for 30 days.  Dispense: 10.2 g; Refill: 0  -     montelukast (Singulair) 10 MG tablet; Take 1 tablet by mouth Every Night for 270 days.  Dispense: 90 tablet; Refill: 2    3. Other hyperlipidemia  Assessment & Plan:  LDL cholesterol not to goal.  Patient to restart a atorvastatin.  She had stopped that after her COVID infection recently.    Orders:  -     atorvastatin (LIPITOR) 10 MG tablet; Take 1 tablet by mouth Every Night for 270 days.  Dispense: 90 tablet; Refill: 2  -     Lipid Panel; Future  -     CK; Future    4. Prediabetes  Assessment & Plan:  Condition stable.  Continue to monitor with serial labs.    Orders:  -     Comprehensive Metabolic Panel; Future  -     Cancel: Hemoglobin A1c; Future  -     Cancel: MicroAlbumin, Urine, Random - Urine, Clean Catch; Future    5. SOBOE (shortness of breath on exertion)  -     montelukast (Singulair) 10 MG tablet; Take 1 tablet by mouth Every Night for 270 days.  Dispense: 90 tablet; Refill: 2      Follow Up   Return in about 6 months (around 9/29/2023) for Medicare Wellness & regular visit, [id number] min.  Patient was given instructions and counseling regarding her condition or for health maintenance advice. Please see specific information pulled into the AVS if appropriate.

## 2023-08-11 ENCOUNTER — APPOINTMENT (OUTPATIENT)
Dept: CT IMAGING | Facility: HOSPITAL | Age: 83
End: 2023-08-11
Payer: MEDICARE

## 2023-08-11 ENCOUNTER — HOSPITAL ENCOUNTER (EMERGENCY)
Facility: HOSPITAL | Age: 83
Discharge: HOME | End: 2023-08-11
Attending: EMERGENCY MEDICINE
Payer: MEDICARE

## 2023-08-11 VITALS
OXYGEN SATURATION: 96 % | TEMPERATURE: 97.7 F | BODY MASS INDEX: 33.46 KG/M2 | DIASTOLIC BLOOD PRESSURE: 58 MMHG | HEIGHT: 64 IN | SYSTOLIC BLOOD PRESSURE: 152 MMHG | RESPIRATION RATE: 16 BRPM | HEART RATE: 87 BPM | WEIGHT: 196 LBS

## 2023-08-11 DIAGNOSIS — N39.0 ACUTE UTI: Primary | ICD-10-CM

## 2023-08-11 DIAGNOSIS — K57.92 DIVERTICULITIS: ICD-10-CM

## 2023-08-11 LAB
ALBUMIN SERPL-MCNC: 4 G/DL (ref 3.5–5.2)
ALBUMIN/GLOB SERPL: 1.3 G/DL
ALP SERPL-CCNC: 73 U/L (ref 39–117)
ALT SERPL W P-5'-P-CCNC: 21 U/L (ref 1–33)
ANION GAP SERPL CALCULATED.3IONS-SCNC: 10.5 MMOL/L (ref 5–15)
AST SERPL-CCNC: 28 U/L (ref 1–32)
BACTERIA UR QL AUTO: ABNORMAL /HPF
BASOPHILS # BLD AUTO: 0.03 10*3/MM3 (ref 0–0.2)
BASOPHILS NFR BLD AUTO: 0.4 % (ref 0–1.5)
BILIRUB SERPL-MCNC: 0.6 MG/DL (ref 0–1.2)
BILIRUB UR QL STRIP: NEGATIVE
BUN SERPL-MCNC: 14 MG/DL (ref 8–23)
BUN/CREAT SERPL: 16.3 (ref 7–25)
CALCIUM SPEC-SCNC: 9.4 MG/DL (ref 8.6–10.5)
CHLORIDE SERPL-SCNC: 106 MMOL/L (ref 98–107)
CLARITY UR: ABNORMAL
CO2 SERPL-SCNC: 25.5 MMOL/L (ref 22–29)
COLOR UR: YELLOW
CREAT SERPL-MCNC: 0.86 MG/DL (ref 0.57–1)
DEPRECATED RDW RBC AUTO: 46 FL (ref 37–54)
EGFRCR SERPLBLD CKD-EPI 2021: 68.4 ML/MIN/1.73
EOSINOPHIL # BLD AUTO: 0.39 10*3/MM3 (ref 0–0.4)
EOSINOPHIL NFR BLD AUTO: 5.7 % (ref 0.3–6.2)
ERYTHROCYTE [DISTWIDTH] IN BLOOD BY AUTOMATED COUNT: 14.4 % (ref 12.3–15.4)
GLOBULIN UR ELPH-MCNC: 3 GM/DL
GLUCOSE SERPL-MCNC: 100 MG/DL (ref 65–99)
GLUCOSE UR STRIP-MCNC: NEGATIVE MG/DL
HCT VFR BLD AUTO: 41.1 % (ref 34–46.6)
HGB BLD-MCNC: 13 G/DL (ref 12–15.9)
HGB UR QL STRIP.AUTO: ABNORMAL
HYALINE CASTS UR QL AUTO: ABNORMAL /LPF
IMM GRANULOCYTES # BLD AUTO: 0.01 10*3/MM3 (ref 0–0.05)
IMM GRANULOCYTES NFR BLD AUTO: 0.1 % (ref 0–0.5)
KETONES UR QL STRIP: NEGATIVE
LEUKOCYTE ESTERASE UR QL STRIP.AUTO: NEGATIVE
LIPASE SERPL-CCNC: 24 U/L (ref 13–60)
LYMPHOCYTES # BLD AUTO: 1.81 10*3/MM3 (ref 0.7–3.1)
LYMPHOCYTES NFR BLD AUTO: 26.3 % (ref 19.6–45.3)
MCH RBC QN AUTO: 27.1 PG (ref 26.6–33)
MCHC RBC AUTO-ENTMCNC: 31.6 G/DL (ref 31.5–35.7)
MCV RBC AUTO: 85.8 FL (ref 79–97)
MONOCYTES # BLD AUTO: 0.9 10*3/MM3 (ref 0.1–0.9)
MONOCYTES NFR BLD AUTO: 13.1 % (ref 5–12)
NEUTROPHILS NFR BLD AUTO: 3.74 10*3/MM3 (ref 1.7–7)
NEUTROPHILS NFR BLD AUTO: 54.4 % (ref 42.7–76)
NITRITE UR QL STRIP: POSITIVE
PH UR STRIP.AUTO: 6.5 [PH] (ref 5–8)
PLATELET # BLD AUTO: 144 10*3/MM3 (ref 140–450)
PMV BLD AUTO: 11.2 FL (ref 6–12)
POTASSIUM SERPL-SCNC: 4.1 MMOL/L (ref 3.5–5.2)
PROT SERPL-MCNC: 7 G/DL (ref 6–8.5)
PROT UR QL STRIP: NEGATIVE
RBC # BLD AUTO: 4.79 10*6/MM3 (ref 3.77–5.28)
RBC # UR STRIP: ABNORMAL /HPF
REF LAB TEST METHOD: ABNORMAL
SODIUM SERPL-SCNC: 142 MMOL/L (ref 136–145)
SP GR UR STRIP: 1.01 (ref 1–1.03)
SQUAMOUS #/AREA URNS HPF: ABNORMAL /HPF
UROBILINOGEN UR QL STRIP: ABNORMAL
WBC # UR STRIP: ABNORMAL /HPF
WBC NRBC COR # BLD: 6.88 10*3/MM3 (ref 3.4–10.8)

## 2023-08-11 PROCEDURE — 96374 THER/PROPH/DIAG INJ IV PUSH: CPT

## 2023-08-11 PROCEDURE — 25010000002 ONDANSETRON PER 1 MG: Performed by: NURSE PRACTITIONER

## 2023-08-11 PROCEDURE — 87186 SC STD MICRODIL/AGAR DIL: CPT | Performed by: NURSE PRACTITIONER

## 2023-08-11 PROCEDURE — 99285 EMERGENCY DEPT VISIT HI MDM: CPT

## 2023-08-11 PROCEDURE — 36415 COLL VENOUS BLD VENIPUNCTURE: CPT

## 2023-08-11 PROCEDURE — 96375 TX/PRO/DX INJ NEW DRUG ADDON: CPT

## 2023-08-11 PROCEDURE — 87077 CULTURE AEROBIC IDENTIFY: CPT | Performed by: NURSE PRACTITIONER

## 2023-08-11 PROCEDURE — 25010000002 MORPHINE PER 10 MG: Performed by: NURSE PRACTITIONER

## 2023-08-11 PROCEDURE — 85025 COMPLETE CBC W/AUTO DIFF WBC: CPT | Performed by: EMERGENCY MEDICINE

## 2023-08-11 PROCEDURE — 81001 URINALYSIS AUTO W/SCOPE: CPT | Performed by: EMERGENCY MEDICINE

## 2023-08-11 PROCEDURE — 25510000001 IOPAMIDOL PER 1 ML: Performed by: EMERGENCY MEDICINE

## 2023-08-11 PROCEDURE — 87086 URINE CULTURE/COLONY COUNT: CPT | Performed by: NURSE PRACTITIONER

## 2023-08-11 PROCEDURE — 74177 CT ABD & PELVIS W/CONTRAST: CPT

## 2023-08-11 PROCEDURE — 83690 ASSAY OF LIPASE: CPT | Performed by: EMERGENCY MEDICINE

## 2023-08-11 PROCEDURE — 80053 COMPREHEN METABOLIC PANEL: CPT | Performed by: EMERGENCY MEDICINE

## 2023-08-11 PROCEDURE — 99284 EMERGENCY DEPT VISIT MOD MDM: CPT | Performed by: NURSE PRACTITIONER

## 2023-08-11 PROCEDURE — 96361 HYDRATE IV INFUSION ADD-ON: CPT

## 2023-08-11 RX ORDER — SODIUM CHLORIDE 0.9 % (FLUSH) 0.9 %
10 SYRINGE (ML) INJECTION AS NEEDED
Status: DISCONTINUED | OUTPATIENT
Start: 2023-08-11 | End: 2023-08-11 | Stop reason: HOSPADM

## 2023-08-11 RX ORDER — MORPHINE SULFATE 4 MG/ML
4 INJECTION, SOLUTION INTRAMUSCULAR; INTRAVENOUS ONCE
Status: COMPLETED | OUTPATIENT
Start: 2023-08-11 | End: 2023-08-11

## 2023-08-11 RX ORDER — AMOXICILLIN AND CLAVULANATE POTASSIUM 875; 125 MG/1; MG/1
1 TABLET, FILM COATED ORAL 2 TIMES DAILY
Qty: 14 TABLET | Refills: 0 | Status: SHIPPED | OUTPATIENT
Start: 2023-08-11

## 2023-08-11 RX ORDER — ONDANSETRON 2 MG/ML
4 INJECTION INTRAMUSCULAR; INTRAVENOUS ONCE
Status: COMPLETED | OUTPATIENT
Start: 2023-08-11 | End: 2023-08-11

## 2023-08-11 RX ADMIN — ONDANSETRON 4 MG: 2 INJECTION INTRAMUSCULAR; INTRAVENOUS at 10:35

## 2023-08-11 RX ADMIN — SODIUM CHLORIDE 1000 ML: 9 INJECTION, SOLUTION INTRAVENOUS at 10:34

## 2023-08-11 RX ADMIN — MORPHINE SULFATE 4 MG: 4 INJECTION, SOLUTION INTRAMUSCULAR; INTRAVENOUS at 10:35

## 2023-08-11 RX ADMIN — IOPAMIDOL 85 ML: 755 INJECTION, SOLUTION INTRAVENOUS at 10:49

## 2023-08-11 NOTE — FSED PROVIDER NOTE
Subjective   History of Present Illness  Patient is an 82-year-old female who presents complaining of lower abdominal cramping, nausea, vomiting, diarrhea x 4 to 5 days.  States that she has seen bright red blood in her stool as well but that seems to have resolved.  She denies any anticoagulants.  She denies any fever or chills.  Reports prior abdominal surgeries of a cholecystectomy and a complete hysterectomy.  Denies any urinary complaints.    Review of Systems   Gastrointestinal:  Positive for abdominal pain, blood in stool, diarrhea, nausea and vomiting.   All other systems reviewed and are negative.    Past Medical History:   Diagnosis Date    ACE-inhibitor cough     Asthma     Asymptomatic postmenopausal status     Cardiac murmur     Conjunctivitis     right    Elevated LFTs 6/28/2016    GERD (gastroesophageal reflux disease)     History of colon polyps     HLD (hyperlipidemia)     Hypertension     Hypopigmentation     Left shoulder tendonitis     Menopausal syndrome     Motion sickness     Plantar fasciitis of left foot     nodule    Pruritus     possibly due to Benicar    SOB (shortness of breath)     SOBOE (shortness of breath on exertion)     Thyroid cyst     UTI (urinary tract infection)     Viral syndrome        Allergies   Allergen Reactions    Lisinopril Cough       Past Surgical History:   Procedure Laterality Date    CARDIAC CATHETERIZATION      in the 80's    CARDIAC CATHETERIZATION N/A 5/4/2016    Procedure: Left Heart Cath;  Surgeon: Dale Vasquez MD;  Location: [facility] CATH INVASIVE LOCATION;  Service:     CARDIAC CATHETERIZATION N/A 5/4/2016    Procedure: Coronary angiography;  Surgeon: Dale Vasquez MD;  Location: [facility] CATH INVASIVE LOCATION;  Service:     CARDIAC CATHETERIZATION N/A 5/4/2016    Procedure: Left ventriculography;  Surgeon: Dale Vasquez MD;  Location: [facility] INVASIVE LOCATION;  Service:     CHOLECYSTECTOMY  1980    COLONOSCOPY      >5 years    COLONOSCOPY   2016    tics, IH, rectal biopsy showed changes suggestive of Schwannoma     ENDOSCOPY  2016    LA grade B esophagitis, mild Schatzki ring, HH, gastric polyp, erythematous mucosa in stomach    LASIK Bilateral     RECTAL ULTRASOUND N/A 2017    Procedure: ENDOSCOPIC ULTRASOUND (LOWER);  Surgeon: Casper Rodríguez MD;  Location: [facility] ENDOSCOPY;  Service:     SHOULDER SURGERY Left 2010    Dr. Green; repair L shoulder adhesive capsulitis    SKIN BIOPSY      TOTAL ABDOMINAL HYSTERECTOMY      fibroid tumors       Family History   Problem Relation Age of Onset    Anxiety disorder Mother     Uterine cancer Mother     Dementia Mother     Cancer Father         Prostate Cancer     Other Father         AAA, aneurysm rupture    Leukemia Brother        Social History     Socioeconomic History    Marital status:      Spouse name: Abram    Number of children: 2   Tobacco Use    Smoking status: Former     Packs/day: 1.00     Years: 30.00     Pack years: 30.00     Types: Cigarettes     Quit date:      Years since quittin.6    Smokeless tobacco: Never   Substance and Sexual Activity    Alcohol use: No    Drug use: No    Sexual activity: Defer           Objective   Physical Exam  Vitals and nursing note reviewed.   Constitutional:       Appearance: Normal appearance.   HENT:      Head: Normocephalic and atraumatic.   Cardiovascular:      Rate and Rhythm: Normal rate and regular rhythm.      Pulses: Normal pulses.   Pulmonary:      Effort: Pulmonary effort is normal.      Breath sounds: Normal breath sounds.   Abdominal:      General: Abdomen is flat.      Palpations: Abdomen is soft.      Tenderness: There is abdominal tenderness (Mild, lower abdomen).   Musculoskeletal:      Cervical back: Normal range of motion and neck supple.   Skin:     General: Skin is warm and dry.      Capillary Refill: Capillary refill takes less than 2 seconds.   Neurological:      General: No focal deficit  present.      Mental Status: She is alert and oriented to person, place, and time.       Procedures           ED Course                                           Medical Decision Making  CT findings as below:    IMPRESSION:  1. Hepatic cirrhosis with sequela of portal hypertension to include  portosystemic collaterals. No ascites.  2. Colonic diverticulosis. Mild haziness within the sigmoid mesocolon,  nonspecific however could represent acute mild diverticulitis or  resolving diverticulitis.  3. Diffuse haziness at the origin of the celiac, superior inferior  mesenteric artery from the aorta. This is nonspecific and appears  unchanged within the upper retroperitoneum when compared to prior  imaging from 2021. The differential would include sequela from prior  inflammation, infection/vasculitis.    Laboratory findings reassuring, no leukocytosis, patient is afebrile.  She is nontoxic-appearing.  Her urine is positive.  Will cover with Augmentin.  UA was sent for culture.  She is to follow-up with GI as an outpatient, given strict return precautions.    Amount and/or Complexity of Data Reviewed  Labs: ordered.  Radiology: ordered.    Risk  Prescription drug management.        Final diagnoses:   Acute UTI   Diverticulitis       ED Disposition  ED Disposition       ED Disposition   Discharge    Condition   Stable    Comment   --               Magen Olson MD  [address] [address]  [phone]    Call   If symptoms worsen         Medication List        New Prescriptions      amoxicillin-clavulanate 875-125 MG per tablet  Commonly known as: AUGMENTIN  Take 1 tablet by mouth 2 (Two) Times a Day.               Where to Get Your Medications        These medications were sent to [facility] [id number] - [place], [place] - [address] AT [facility] - [phone]  - [phone]   [address], [place] [id number]      Phone: [phone]    amoxicillin-clavulanate 875-125 MG per tablet

## 2023-08-14 LAB — BACTERIA SPEC AEROBE CULT: ABNORMAL

## 2023-09-14 ENCOUNTER — HOSPITAL ENCOUNTER (OUTPATIENT)
Facility: HOSPITAL | Age: 83
Discharge: HOME | End: 2023-09-14
Attending: EMERGENCY MEDICINE
Payer: MEDICARE

## 2023-09-14 VITALS
RESPIRATION RATE: 18 BRPM | BODY MASS INDEX: 33.8 KG/M2 | DIASTOLIC BLOOD PRESSURE: 45 MMHG | SYSTOLIC BLOOD PRESSURE: 176 MMHG | HEIGHT: 64 IN | HEART RATE: 89 BPM | OXYGEN SATURATION: 96 % | TEMPERATURE: 98.4 F | WEIGHT: 198 LBS

## 2023-09-14 DIAGNOSIS — B02.9 HERPES ZOSTER WITHOUT COMPLICATION: Primary | ICD-10-CM

## 2023-09-14 DIAGNOSIS — N39.0 ACUTE UTI: ICD-10-CM

## 2023-09-14 LAB
BILIRUB UR QL STRIP: NEGATIVE
CLARITY UR: CLEAR
COLOR UR: YELLOW
GLUCOSE UR STRIP-MCNC: NEGATIVE MG/DL
HGB UR QL STRIP.AUTO: ABNORMAL
KETONES UR QL STRIP: NEGATIVE
LEUKOCYTE ESTERASE UR QL STRIP.AUTO: ABNORMAL
NITRITE UR QL STRIP: POSITIVE
PH UR STRIP.AUTO: 6.5 [PH] (ref 5–8)
PROT UR QL STRIP: NEGATIVE
SP GR UR STRIP: 1.02 (ref 1–1.03)
UROBILINOGEN UR QL STRIP: ABNORMAL

## 2023-09-14 PROCEDURE — 81003 URINALYSIS AUTO W/O SCOPE: CPT | Performed by: EMERGENCY MEDICINE

## 2023-09-14 PROCEDURE — G0463 HOSPITAL OUTPT CLINIC VISIT: HCPCS | Performed by: NURSE PRACTITIONER

## 2023-09-14 RX ORDER — ONDANSETRON 4 MG/1
4 TABLET, ORALLY DISINTEGRATING ORAL EVERY 6 HOURS PRN
Qty: 15 TABLET | Refills: 0 | Status: SHIPPED | OUTPATIENT
Start: 2023-09-14

## 2023-09-14 RX ORDER — CEFDINIR 300 MG/1
300 CAPSULE ORAL 2 TIMES DAILY
Qty: 14 CAPSULE | Refills: 0 | Status: SHIPPED | OUTPATIENT
Start: 2023-09-14

## 2023-09-14 RX ORDER — VALACYCLOVIR HYDROCHLORIDE 1 G/1
1000 TABLET, FILM COATED ORAL 3 TIMES DAILY
Qty: 21 TABLET | Refills: 0 | Status: SHIPPED | OUTPATIENT
Start: 2023-09-14

## 2023-09-14 NOTE — FSED PROVIDER NOTE
Subjective   History of Present Illness  Patient is an 82-year-old female who presents complaining of burning rash on her right back and lower abdomen that started on Thursday or Friday.  She also states she is having some burning with urination.  Denies any fever or chills.  Complains of nausea but denies any vomiting.    Review of Systems   Genitourinary:  Positive for dysuria.   Skin:  Positive for rash.   All other systems reviewed and are negative.    Past Medical History:   Diagnosis Date    ACE-inhibitor cough     Asthma     Asymptomatic postmenopausal status     Cardiac murmur     Conjunctivitis     right    Elevated LFTs 6/28/2016    GERD (gastroesophageal reflux disease)     History of colon polyps     HLD (hyperlipidemia)     Hypertension     Hypopigmentation     Left shoulder tendonitis     Menopausal syndrome     Motion sickness     Plantar fasciitis of left foot     nodule    Pruritus     possibly due to Benicar    SOB (shortness of breath)     SOBOE (shortness of breath on exertion)     Thyroid cyst     UTI (urinary tract infection)     Viral syndrome        Allergies   Allergen Reactions    Lisinopril Cough       Past Surgical History:   Procedure Laterality Date    CARDIAC CATHETERIZATION      in the 80's    CARDIAC CATHETERIZATION N/A 5/4/2016    Procedure: Left Heart Cath;  Surgeon: Dale Vasquez MD;  Location: [facility] CATH INVASIVE LOCATION;  Service:     CARDIAC CATHETERIZATION N/A 5/4/2016    Procedure: Coronary angiography;  Surgeon: Dale Vasquez MD;  [facility];  Service:     CARDIAC CATHETERIZATION N/A 5/4/2016    Procedure: Left ventriculography;  Surgeon: Dale Vasquez MD;  Location: [facility] CATH INVASIVE LOCATION;  Service:     CHOLECYSTECTOMY  1980    COLONOSCOPY      >5 years    COLONOSCOPY  12/01/2016    tics, IH, rectal biopsy showed changes suggestive of Schwannoma     ENDOSCOPY  12/01/2016    LA grade B esophagitis, mild Schatzki ring, HH, gastric  polyp, erythematous mucosa in stomach    LASIK Bilateral     RECTAL ULTRASOUND N/A 2017    Procedure: ENDOSCOPIC ULTRASOUND (LOWER);  Surgeon: Casper Rodríguez MD;  Location: [facility] ENDOSCOPY;  Service:     SHOULDER SURGERY Left 2010    Dr. Green; repair L shoulder adhesive capsulitis    SKIN BIOPSY      TOTAL ABDOMINAL HYSTERECTOMY      fibroid tumors       Family History   Problem Relation Age of Onset    Anxiety disorder Mother     Uterine cancer Mother     Dementia Mother     Cancer Father         Prostate Cancer     Other Father         AAA, aneurysm rupture    Leukemia Brother        Social History     Socioeconomic History    Marital status:      Spouse name: Abram    Number of children: 2   Tobacco Use    Smoking status: Former     Packs/day: 1.00     Years: 30.00     Pack years: 30.00     Types: Cigarettes     Quit date:      Years since quittin.7    Smokeless tobacco: Never   Substance and Sexual Activity    Alcohol use: No    Drug use: No    Sexual activity: Defer           Objective   Physical Exam  Vitals and nursing note reviewed.   Constitutional:       Appearance: Normal appearance.   HENT:      Head: Normocephalic and atraumatic.   Pulmonary:      Effort: Pulmonary effort is normal.   Musculoskeletal:      Cervical back: Normal range of motion and neck supple.   Skin:     General: Skin is warm and dry.      Capillary Refill: Capillary refill takes less than 2 seconds.      Findings: Rash (Shingles rash noted to right abdomen) present.   Neurological:      General: No focal deficit present.      Mental Status: She is alert and oriented to person, place, and time.       Procedures           ED Course                                           Medical Decision Making  UA is positive.  Will treat with cefdinir for UTI and valacyclovir for shingles.  We will also prescribe Zofran for nausea.  Patient is nontoxic-appearing.  She is to follow-up with her primary care as  needed, given strict return precautions.    Problems Addressed:  Acute UTI: complicated acute illness or injury  Herpes zoster without complication: complicated acute illness or injury    Risk  Prescription drug management.        Final diagnoses:   Herpes zoster without complication   Acute UTI       ED Disposition  ED Disposition       ED Disposition   Discharge    Condition   Stable    Comment   --               Magen Olson MD  [address]  [place] [address]  [phone]    Call   If symptoms worsen         Medication List        New Prescriptions      cefdinir 300 MG capsule  Commonly known as: OMNICEF  Take 1 capsule by mouth 2 (Two) Times a Day.     ondansetron ODT 4 MG disintegrating tablet  Commonly known as: ZOFRAN-ODT  Place 1 tablet on the tongue Every 6 (Six) Hours As Needed for Nausea.     valACYclovir 1000 MG tablet  Commonly known as: VALTREX  Take 1 tablet by mouth 3 (Three) Times a Day.               Where to Get Your Medications        These medications were sent to [facility] [id number] - [place], [place] - [address] AT [facility] - [phone]  - [phone]   [address], [place] [id number]      Phone: [phone]   cefdinir 300 MG capsule  ondansetron ODT 4 MG disintegrating tablet  valACYclovir 1000 MG tablet

## 2023-12-20 ENCOUNTER — OFFICE VISIT (OUTPATIENT)
Dept: GASTROENTEROLOGY | Facility: CLINIC | Age: 83
End: 2023-12-20
Payer: MEDICARE

## 2023-12-20 ENCOUNTER — TELEPHONE (OUTPATIENT)
Dept: GASTROENTEROLOGY | Facility: CLINIC | Age: 83
End: 2023-12-20

## 2023-12-20 VITALS
BODY MASS INDEX: 34.23 KG/M2 | HEIGHT: 64 IN | WEIGHT: 200.5 LBS | OXYGEN SATURATION: 97 % | TEMPERATURE: 96.8 F | HEART RATE: 78 BPM

## 2023-12-20 DIAGNOSIS — K57.92 DIVERTICULITIS: ICD-10-CM

## 2023-12-20 DIAGNOSIS — K76.0 STEATOSIS OF LIVER: Primary | ICD-10-CM

## 2023-12-20 LAB
INR PPP: 1.06 (ref 0.9–1.1)
PROTHROMBIN TIME: 13.9 SECONDS (ref 11.7–14.2)

## 2023-12-20 PROCEDURE — 99204 OFFICE O/P NEW MOD 45 MIN: CPT | Performed by: INTERNAL MEDICINE

## 2023-12-20 PROCEDURE — 1160F RVW MEDS BY RX/DR IN RCRD: CPT | Performed by: INTERNAL MEDICINE

## 2023-12-20 PROCEDURE — 1159F MED LIST DOCD IN RCRD: CPT | Performed by: INTERNAL MEDICINE

## 2023-12-20 RX ORDER — SODIUM CHLORIDE, SODIUM LACTATE, POTASSIUM CHLORIDE, CALCIUM CHLORIDE 600; 310; 30; 20 MG/100ML; MG/100ML; MG/100ML; MG/100ML
30 INJECTION, SOLUTION INTRAVENOUS CONTINUOUS
OUTPATIENT
Start: 2024-04-25

## 2023-12-20 NOTE — TELEPHONE ENCOUNTER
COLONOSCOPY & EGD on 4/25/2024  arrive at 8:30  . Gave prep instructions to pt in office....miralax

## 2023-12-20 NOTE — PROGRESS NOTES
Chief Complaint   Patient presents with    Diverticulitis    Nausea     Subjective   HPI  Viktoria Villasenor is a 83 y.o. female who presents today for new patient evaluation.    Seen in ED in August for lower abdominal cramping, nausea.  CT A/P performed (reviewed and noted below) - mild sigmoid diverticulitis.  Incidental note of nodular liver with portalsystemic collaterals suggestive pHTN.    She was treated with oral abx with resolution of sx.  Reports prior remote episodes of diverticulitis.  Last colonoscopy 2016 with benign rectal schwannoma.    Seen in 2018 for workup of fatty liver, lost to follow up.      BMI today is 34.  Normal LFTs.  Normal platelet count.       CT Abdomen Pelvis With Contrast (08/11/2023 10:49) -  IMPRESSION:  1. Hepatic cirrhosis with sequela of portal hypertension to include  portosystemic collaterals. No ascites.  2. Colonic diverticulosis. Mild haziness within the sigmoid mesocolon,  nonspecific however could represent acute mild diverticulitis or  resolving diverticulitis.  3. Diffuse haziness at the origin of the celiac, superior inferior  mesenteric artery from the aorta. This is nonspecific and appears  unchanged within the upper retroperitoneum when compared to prior  imaging from 2021. The differential would include sequela from prior  inflammation, infection/vasculitis.    Objective   Vitals:    12/20/23 1037   Pulse: 78   Temp: 96.8 °F (36 °C)   SpO2: 97%     Physical Exam  The following data was reviewed by: Casper Rodríguez MD on 12/20/2023:  Common labs          3/8/2023    10:12 8/11/2023    10:18   Common Labs   Glucose 103  100    BUN 11  14    Creatinine 0.98  0.86    Sodium 143  142    Potassium 4.5  4.1    Chloride 107  106    Calcium 9.3  9.4    Total Protein 7.1     Albumin 4.0  4.0    Total Bilirubin 0.7  0.6    Alkaline Phosphatase 72  73    AST (SGOT) 34  28    ALT (SGPT) 24  21    WBC 6.86  6.88    Hemoglobin 12.8  13.0    Hematocrit 40.6  41.1     Platelets 168  144    Total Cholesterol 190     Triglycerides 102     HDL Cholesterol 61     LDL Cholesterol  111     Hemoglobin A1C 5.50     Microalbumin, Urine 3.4       Data reviewed : Radiologic studies CT A/P per HPI      Assessment & Plan   Assessment:     1. Steatosis of liver    2. Diverticulitis      Plan:   Schedule EGD to evalute for varices/pHTN  [place] colonocopy for recent e/o diverticulits  Check MARSHALL fibrosure          Casper Rodríguez M.D.  [facility]  [address] [place] [address]  Office: [phone]

## 2023-12-22 LAB
A2 MACROGLOB SERPL-MCNC: 302 MG/DL (ref 110–276)
ALT SERPL W P-5'-P-CCNC: 30 IU/L (ref 0–40)
APO A-I SERPL-MCNC: 151 MG/DL (ref 114–214)
AST SERPL W P-5'-P-CCNC: 37 IU/L (ref 0–40)
BILIRUB SERPL-MCNC: 0.3 MG/DL (ref 0–1.2)
CHOLEST SERPL-MCNC: 132 MG/DL (ref 100–199)
FIBROSIS SCORING:: ABNORMAL
FIBROSIS STAGE SERPL QL: ABNORMAL
GGT SERPL-CCNC: 26 IU/L (ref 0–60)
GLUCOSE SERPL-MCNC: 86 MG/DL (ref 70–99)
HAPTOGLOB SERPL-MCNC: 97 MG/DL (ref 41–333)
LABORATORY COMMENT REPORT: ABNORMAL
LIVER FIBR SCORE SERPL CALC.FIBROSURE: 0.45 (ref 0–0.21)
LIVER STEATOSIS GRADE SERPL QL: ABNORMAL
LIVER STEATOSIS SCORE SERPL: 0.39 (ref 0–0.4)
NASH GRADE SERPL QL: ABNORMAL
NASH INTERPRETATION SERPL-IMP: ABNORMAL
NASH SCORE SERPL: 0 (ref 0–0.25)
NASH SCORING: ABNORMAL
STEATOSIS SCORING: ABNORMAL
TEST PERFORMANCE INFO SPEC: ABNORMAL
TEST PERFORMANCE INFO SPEC: ABNORMAL
TRIGL SERPL-MCNC: 99 MG/DL (ref 0–149)

## 2023-12-26 DIAGNOSIS — E78.49 OTHER HYPERLIPIDEMIA: ICD-10-CM

## 2023-12-26 DIAGNOSIS — I10 ESSENTIAL HYPERTENSION: ICD-10-CM

## 2023-12-26 RX ORDER — ATORVASTATIN CALCIUM 10 MG/1
10 TABLET, FILM COATED ORAL NIGHTLY
Qty: 30 TABLET | Refills: 0 | Status: SHIPPED | OUTPATIENT
Start: 2023-12-26 | End: 2024-09-21

## 2023-12-26 RX ORDER — VALSARTAN 320 MG/1
320 TABLET ORAL NIGHTLY
Qty: 30 TABLET | Refills: 0 | Status: SHIPPED | OUTPATIENT
Start: 2023-12-26 | End: 2024-09-21

## 2023-12-26 NOTE — TELEPHONE ENCOUNTER
Rx Refill Note  Requested Prescriptions      No prescriptions requested or ordered in this encounter      Last office visit with prescribing clinician: 3/29/2023   Last telemedicine visit with prescribing clinician: Visit date not found   Next office visit with prescribing clinician: Visit date not found                         Would you like a call back once the refill request has been completed: [] Yes [] No    If the office needs to give you a call back, can they leave a voicemail: [] Yes [] No    Yue Womack MA  12/26/23, 14:58 EST

## 2023-12-27 NOTE — TELEPHONE ENCOUNTER
Yue,  The patient is due for an appointment.  I sent the prescription for 30 days and sent it to the pharmacy on record. Please call the patient and set up an appointment to get additional refills.  Please use the correct refill protocol workflow next time.  Thanks, Dr. Olson

## 2023-12-27 NOTE — TELEPHONE ENCOUNTER
Yue,  The patient is due for an appointment.  I sent the prescription to  the pharmacy on record. Please call the patient and set up an appointment to get additional refills.  Also please send prescription refills to me using the refill workflow in the future.  Thanks, Dr. Olson

## 2024-01-04 ENCOUNTER — OFFICE VISIT (OUTPATIENT)
Dept: FAMILY MEDICINE CLINIC | Facility: CLINIC | Age: 84
End: 2024-01-04
Payer: MEDICARE

## 2024-01-04 VITALS
DIASTOLIC BLOOD PRESSURE: 50 MMHG | HEIGHT: 65 IN | WEIGHT: 200.2 LBS | SYSTOLIC BLOOD PRESSURE: 135 MMHG | BODY MASS INDEX: 33.36 KG/M2 | RESPIRATION RATE: 16 BRPM | TEMPERATURE: 97.1 F | HEART RATE: 73 BPM | OXYGEN SATURATION: 97 %

## 2024-01-04 DIAGNOSIS — N39.0 URINARY TRACT INFECTION WITHOUT HEMATURIA, SITE UNSPECIFIED: Primary | ICD-10-CM

## 2024-01-04 DIAGNOSIS — I10 ESSENTIAL HYPERTENSION: ICD-10-CM

## 2024-01-04 DIAGNOSIS — E78.49 OTHER HYPERLIPIDEMIA: ICD-10-CM

## 2024-01-04 LAB
BILIRUB BLD-MCNC: NEGATIVE MG/DL
CLARITY, POC: CLEAR
COLOR UR: YELLOW
EXPIRATION DATE: ABNORMAL
GLUCOSE UR STRIP-MCNC: NEGATIVE MG/DL
KETONES UR QL: NEGATIVE
LEUKOCYTE EST, POC: ABNORMAL
Lab: ABNORMAL
NITRITE UR-MCNC: NEGATIVE MG/ML
PH UR: 6.5 [PH] (ref 5–8)
PROT UR STRIP-MCNC: NEGATIVE MG/DL
RBC # UR STRIP: ABNORMAL /UL
SP GR UR: 1.03 (ref 1–1.03)
UROBILINOGEN UR QL: ABNORMAL

## 2024-01-04 RX ORDER — SULFAMETHOXAZOLE AND TRIMETHOPRIM 800; 160 MG/1; MG/1
1 TABLET ORAL 2 TIMES DAILY
Qty: 6 TABLET | Refills: 0 | Status: SHIPPED | OUTPATIENT
Start: 2024-01-04 | End: 2024-01-04 | Stop reason: SDUPTHER

## 2024-01-04 RX ORDER — SULFAMETHOXAZOLE AND TRIMETHOPRIM 800; 160 MG/1; MG/1
1 TABLET ORAL 2 TIMES DAILY
Qty: 14 TABLET | Refills: 0 | Status: SHIPPED | OUTPATIENT
Start: 2024-01-04 | End: 2024-01-11

## 2024-01-04 RX ORDER — ATORVASTATIN CALCIUM 10 MG/1
10 TABLET, FILM COATED ORAL NIGHTLY
Qty: 90 TABLET | Refills: 2 | Status: SHIPPED | OUTPATIENT
Start: 2024-01-04 | End: 2024-09-30

## 2024-01-04 RX ORDER — VALSARTAN 320 MG/1
320 TABLET ORAL NIGHTLY
Qty: 90 TABLET | Refills: 2 | Status: SHIPPED | OUTPATIENT
Start: 2024-01-04 | End: 2024-09-30

## 2024-01-04 NOTE — PROGRESS NOTES
"Chief Complaint  Urinary Tract Infection and Med Refill    Subjective        HPI   Viktoria presents to [facility] for dysuria x 2 weeks.  No fever sweats or chills.  Recently treated for diverticulitis and urine symptoms in August with course of Augmentin.  Currently patient not having diverticulitis symptoms.    Patient also needs medications refilled.  Blood pressure controlled.  Lipids stable pending lab results.  Labs are due.  She recently saw gastroenterologist for liver evaluation.          Objective   Vital Signs:   Vitals:    01/04/24 1157   BP: 135/50   BP Location: Left arm   Patient Position: Sitting   Pulse: 73   Resp: 16   Temp: 97.1 °F (36.2 °C)   TempSrc: Oral   SpO2: 97%   Weight: 90.8 kg (200 lb 3.2 oz)   Height: 163.8 cm (64.5\")            1/4/2024    11:58 AM   PHQ-2/PHQ-9 Depression Screening   Little Interest or Pleasure in Doing Things 0-->not at all   Feeling Down, Depressed or Hopeless 0-->not at all   PHQ-9: Brief Depression Severity Measure Score 0       BMI is >= 30 and <35. (Class 1 Obesity). The following options were offered after discussion;: weight loss educational material (shared in after visit summary), exercise counseling/recommendations, and nutrition counseling/recommendations        Physical Exam  Vitals reviewed.   Constitutional:       General: She is not in acute distress.     Appearance: She is obese.   Eyes:      General: Lids are normal.      Conjunctiva/sclera: Conjunctivae normal.   Neck:      Vascular: No carotid bruit.      Trachea: No tracheal deviation.   Cardiovascular:      Rate and Rhythm: Normal rate and regular rhythm.      Heart sounds: Normal heart sounds. No murmur heard.  Pulmonary:      Effort: Pulmonary effort is normal.      Breath sounds: Normal breath sounds.   Skin:     General: Skin is warm and dry.   Neurological:      Mental Status: She is alert. She is not disoriented.   Psychiatric:         Speech: Speech normal.   "       Behavior: Behavior normal. Behavior is cooperative.          Result Review :     The following data was reviewed by: Magen Olson MD on 01/04/2024:  POCT urinalysis dipstick, automated (01/04/2024 12:02)          Assessment and Plan    Assessment & Plan  Urinary tract infection without hematuria, site unspecified  Manage treat with Bactrim DS twice daily x 7days.  Urine sent for culture  Other hyperlipidemia  The current medical regimen is effective;  continue present plan and medications.  Labs are due  Essential hypertension  Condition is stable. The current medical regimen is effective;  continue present plan and medications.    Orders Placed This Encounter   Procedures    Urine Culture - Urine, Urine, Clean Catch    POCT urinalysis dipstick, automated     New Medications Ordered This Visit   Medications    atorvastatin (LIPITOR) 10 MG tablet     Sig: Take 1 tablet by mouth Every Night for 270 days.     Dispense:  90 tablet     Refill:  2    valsartan (DIOVAN) 320 MG tablet     Sig: Take 1 tablet by mouth Every Night for 270 days.     Dispense:  90 tablet     Refill:  2    sulfamethoxazole-trimethoprim (BACTRIM DS,SEPTRA DS) 800-160 MG per tablet     Sig: Take 1 tablet by mouth 2 (Two) Times a Day for 7 days.     Dispense:  14 tablet     Refill:  0          Follow Up   Return in about 6 months (around 7/4/2024) for Medicare Wellness & regular visit, [id number] min.  Patient was given instructions and counseling regarding her condition or for health maintenance advice. Please see specific information pulled into the AVS if appropriate.

## 2024-01-07 LAB
BACTERIA UR CULT: ABNORMAL
BACTERIA UR CULT: ABNORMAL
OTHER ANTIBIOTIC SUSC ISLT: ABNORMAL

## 2024-01-08 NOTE — PROGRESS NOTES
Please give the patient the following message:  Viktoria, your urine culture showed an infection in your bladder. The antibiotic should be effective in treating this infection. Please make a nurse appointment in 1 week for a test of cure after the antibiotics are completed. Dr. Olson

## 2024-01-18 ENCOUNTER — TELEPHONE (OUTPATIENT)
Dept: FAMILY MEDICINE CLINIC | Facility: CLINIC | Age: 84
End: 2024-01-18

## 2024-01-25 ENCOUNTER — TELEPHONE (OUTPATIENT)
Dept: FAMILY MEDICINE CLINIC | Facility: CLINIC | Age: 84
End: 2024-01-25
Payer: MEDICARE

## 2024-01-25 DIAGNOSIS — N39.0 URINARY TRACT INFECTION WITHOUT HEMATURIA, SITE UNSPECIFIED: Primary | ICD-10-CM

## 2024-01-25 NOTE — TELEPHONE ENCOUNTER
Pt had labs done today and told  she was to leave a urine for frequent UTI.  There is not an order in for that but urine is being held in the lab if you want it done.

## 2024-01-25 NOTE — TELEPHONE ENCOUNTER
I went ahead and ordered a urine culture lab collect.  Please call the lab and let them know.  They were holding the urine for testing

## 2024-01-28 ENCOUNTER — PATIENT MESSAGE (OUTPATIENT)
Dept: FAMILY MEDICINE CLINIC | Facility: CLINIC | Age: 84
End: 2024-01-28
Payer: MEDICARE

## 2024-01-28 DIAGNOSIS — N39.0 URINARY TRACT INFECTION WITHOUT HEMATURIA, SITE UNSPECIFIED: Primary | ICD-10-CM

## 2024-01-29 LAB
BACTERIA UR CULT: ABNORMAL
OTHER ANTIBIOTIC SUSC ISLT: ABNORMAL

## 2024-01-29 RX ORDER — LEVOFLOXACIN 500 MG/1
500 TABLET, FILM COATED ORAL DAILY
Qty: 7 TABLET | Refills: 0 | Status: SHIPPED | OUTPATIENT
Start: 2024-01-29 | End: 2024-02-05

## 2024-01-29 NOTE — TELEPHONE ENCOUNTER
Yue Womack MA 1/29/2024 9:58 AM EST      ----- Message -----  From: Viktoria Villasenor  Sent: 1/28/2024 9:06 PM EST  To: [facility] 2 Clinical Pool  Subject: urine recheck     I had urine rechecked 1/25/24 when I had labs. I still am having problems with burning and frequent urinating.

## 2024-02-06 DIAGNOSIS — I10 ESSENTIAL HYPERTENSION: ICD-10-CM

## 2024-02-06 DIAGNOSIS — E78.49 OTHER HYPERLIPIDEMIA: ICD-10-CM

## 2024-02-07 RX ORDER — ATORVASTATIN CALCIUM 10 MG/1
10 TABLET, FILM COATED ORAL NIGHTLY
Qty: 90 TABLET | Refills: 2 | Status: SHIPPED | OUTPATIENT
Start: 2024-02-07 | End: 2024-11-03

## 2024-02-07 RX ORDER — VALSARTAN 320 MG/1
320 TABLET ORAL NIGHTLY
Qty: 90 TABLET | Refills: 2 | Status: SHIPPED | OUTPATIENT
Start: 2024-02-07 | End: 2024-11-03

## 2024-02-12 ENCOUNTER — OFFICE VISIT (OUTPATIENT)
Dept: FAMILY MEDICINE CLINIC | Facility: CLINIC | Age: 84
End: 2024-02-12
Payer: MEDICARE

## 2024-02-12 VITALS
BODY MASS INDEX: 33.72 KG/M2 | SYSTOLIC BLOOD PRESSURE: 140 MMHG | TEMPERATURE: 97.5 F | OXYGEN SATURATION: 97 % | HEIGHT: 65 IN | WEIGHT: 202.4 LBS | DIASTOLIC BLOOD PRESSURE: 62 MMHG | HEART RATE: 81 BPM

## 2024-02-12 DIAGNOSIS — R35.0 FREQUENCY OF URINATION: Primary | ICD-10-CM

## 2024-02-12 DIAGNOSIS — R39.15 URGENCY OF URINATION: ICD-10-CM

## 2024-02-12 LAB
BILIRUB BLD-MCNC: NEGATIVE MG/DL
CLARITY, POC: ABNORMAL
COLOR UR: ABNORMAL
EXPIRATION DATE: ABNORMAL
GLUCOSE UR STRIP-MCNC: NEGATIVE MG/DL
KETONES UR QL: NEGATIVE
LEUKOCYTE EST, POC: NEGATIVE
Lab: ABNORMAL
NITRITE UR-MCNC: NEGATIVE MG/ML
PH UR: 6.5 [PH] (ref 5–8)
PROT UR STRIP-MCNC: NEGATIVE MG/DL
RBC # UR STRIP: NEGATIVE /UL
SP GR UR: 10.25 (ref 1–1.03)
UROBILINOGEN UR QL: NORMAL

## 2024-02-12 PROCEDURE — 99214 OFFICE O/P EST MOD 30 MIN: CPT | Performed by: FAMILY MEDICINE

## 2024-02-12 PROCEDURE — 3078F DIAST BP <80 MM HG: CPT | Performed by: FAMILY MEDICINE

## 2024-02-12 PROCEDURE — 81003 URINALYSIS AUTO W/O SCOPE: CPT | Performed by: FAMILY MEDICINE

## 2024-02-12 PROCEDURE — 3077F SYST BP >= 140 MM HG: CPT | Performed by: FAMILY MEDICINE

## 2024-02-12 PROCEDURE — G2211 COMPLEX E/M VISIT ADD ON: HCPCS | Performed by: FAMILY MEDICINE

## 2024-02-12 RX ORDER — VIBEGRON 75 MG/1
1 TABLET, FILM COATED ORAL DAILY
Qty: 30 TABLET | Refills: 11 | Status: SHIPPED | OUTPATIENT
Start: 2024-02-12 | End: 2025-02-06

## 2024-04-25 ENCOUNTER — ANESTHESIA (OUTPATIENT)
Dept: TELEMETRY | Facility: HOSPITAL | Age: 84
End: 2024-04-25
Payer: MEDICARE

## 2024-04-25 ENCOUNTER — APPOINTMENT (OUTPATIENT)
Dept: CARDIOLOGY | Facility: HOSPITAL | Age: 84
DRG: 287 | End: 2024-04-25
Payer: MEDICARE

## 2024-04-25 ENCOUNTER — ANESTHESIA EVENT (OUTPATIENT)
Dept: TELEMETRY | Facility: HOSPITAL | Age: 84
End: 2024-04-25
Payer: MEDICARE

## 2024-04-25 ENCOUNTER — HOSPITAL ENCOUNTER (INPATIENT)
Facility: HOSPITAL | Age: 84
LOS: 2 days | Discharge: HOME | DRG: 287 | End: 2024-04-27
Attending: INTERNAL MEDICINE | Admitting: INTERNAL MEDICINE
Payer: MEDICARE

## 2024-04-25 DIAGNOSIS — K76.0 STEATOSIS OF LIVER: ICD-10-CM

## 2024-04-25 DIAGNOSIS — I21.4 NSTEMI, INITIAL EPISODE OF CARE: Primary | ICD-10-CM

## 2024-04-25 DIAGNOSIS — K57.92 DIVERTICULITIS: ICD-10-CM

## 2024-04-25 PROBLEM — R07.9 CHEST PAIN: Status: ACTIVE | Noted: 2024-04-25

## 2024-04-25 LAB
ALBUMIN SERPL-MCNC: 4.3 G/DL (ref 3.5–5.2)
ALBUMIN/GLOB SERPL: 1.4 G/DL
ALP SERPL-CCNC: 85 U/L (ref 39–117)
ALT SERPL W P-5'-P-CCNC: 30 U/L (ref 1–33)
ANION GAP SERPL CALCULATED.3IONS-SCNC: 13 MMOL/L (ref 5–15)
AORTIC DIMENSIONLESS INDEX: 0.8 (DI)
APTT PPP: 38.2 SECONDS (ref 22.7–35.4)
APTT PPP: 66.3 SECONDS (ref 22.7–35.4)
ASCENDING AORTA: 2.7 CM
AST SERPL-CCNC: 36 U/L (ref 1–32)
BASOPHILS # BLD AUTO: 0.09 10*3/MM3 (ref 0–0.2)
BASOPHILS NFR BLD AUTO: 1 % (ref 0–1.5)
BH CV ECHO MEAS - ACS: 1.29 CM
BH CV ECHO MEAS - AO MAX PG: 14.8 MMHG
BH CV ECHO MEAS - AO MEAN PG: 7.6 MMHG
BH CV ECHO MEAS - AO ROOT DIAM: 2.8 CM
BH CV ECHO MEAS - AO V2 MAX: 192.5 CM/SEC
BH CV ECHO MEAS - AO V2 VTI: 36.3 CM
BH CV ECHO MEAS - AVA(I,D): 2.26 CM2
BH CV ECHO MEAS - EDV(CUBED): 150.7 ML
BH CV ECHO MEAS - EDV(MOD-SP2): 35 ML
BH CV ECHO MEAS - EDV(MOD-SP4): 59 ML
BH CV ECHO MEAS - EF(MOD-BP): 67.5 %
BH CV ECHO MEAS - EF(MOD-SP2): 62.9 %
BH CV ECHO MEAS - EF(MOD-SP4): 71.2 %
BH CV ECHO MEAS - ESV(CUBED): 25.7 ML
BH CV ECHO MEAS - ESV(MOD-SP2): 13 ML
BH CV ECHO MEAS - ESV(MOD-SP4): 17 ML
BH CV ECHO MEAS - FS: 44.6 %
BH CV ECHO MEAS - IVS/LVPW: 0.87 CM
BH CV ECHO MEAS - IVSD: 0.89 CM
BH CV ECHO MEAS - LAT PEAK E' VEL: 10.3 CM/SEC
BH CV ECHO MEAS - LV MASS(C)D: 190.3 GRAMS
BH CV ECHO MEAS - LV MAX PG: 7.3 MMHG
BH CV ECHO MEAS - LV MEAN PG: 3.9 MMHG
BH CV ECHO MEAS - LV V1 MAX: 135.5 CM/SEC
BH CV ECHO MEAS - LV V1 VTI: 27.8 CM
BH CV ECHO MEAS - LVIDD: 5.3 CM
BH CV ECHO MEAS - LVIDS: 2.9 CM
BH CV ECHO MEAS - LVOT AREA: 3 CM2
BH CV ECHO MEAS - LVOT DIAM: 1.94 CM
BH CV ECHO MEAS - LVPWD: 1.03 CM
BH CV ECHO MEAS - MED PEAK E' VEL: 7.8 CM/SEC
BH CV ECHO MEAS - MR MAX PG: 67.9 MMHG
BH CV ECHO MEAS - MR MAX VEL: 412 CM/SEC
BH CV ECHO MEAS - MV A DUR: 0.1 SEC
BH CV ECHO MEAS - MV A MAX VEL: 108 CM/SEC
BH CV ECHO MEAS - MV DEC SLOPE: 408.6 CM/SEC2
BH CV ECHO MEAS - MV DEC TIME: 0.28 SEC
BH CV ECHO MEAS - MV E MAX VEL: 102 CM/SEC
BH CV ECHO MEAS - MV E/A: 0.94
BH CV ECHO MEAS - MV MAX PG: 5.3 MMHG
BH CV ECHO MEAS - MV MEAN PG: 2.8 MMHG
BH CV ECHO MEAS - MV P1/2T: 92 MSEC
BH CV ECHO MEAS - MV V2 VTI: 26.2 CM
BH CV ECHO MEAS - MVA(P1/2T): 2.39 CM2
BH CV ECHO MEAS - MVA(VTI): 3.1 CM2
BH CV ECHO MEAS - PA ACC TIME: 0.11 SEC
BH CV ECHO MEAS - PULM A REVS DUR: 0.1 SEC
BH CV ECHO MEAS - PULM A REVS VEL: 34 CM/SEC
BH CV ECHO MEAS - PULM DIAS VEL: 57.1 CM/SEC
BH CV ECHO MEAS - PULM S/D: 1.21
BH CV ECHO MEAS - PULM SYS VEL: 69 CM/SEC
BH CV ECHO MEAS - RAP SYSTOLE: 3 MMHG
BH CV ECHO MEAS - RV MAX PG: 2.37 MMHG
BH CV ECHO MEAS - RV V1 MAX: 77 CM/SEC
BH CV ECHO MEAS - RV V1 VTI: 14.9 CM
BH CV ECHO MEAS - RVSP: 31 MMHG
BH CV ECHO MEAS - SV(LVOT): 82.1 ML
BH CV ECHO MEAS - SV(MOD-SP2): 22 ML
BH CV ECHO MEAS - SV(MOD-SP4): 42 ML
BH CV ECHO MEAS - TAPSE (>1.6): 2.3 CM
BH CV ECHO MEAS - TR MAX PG: 28 MMHG
BH CV ECHO MEAS - TR MAX VEL: 264.7 CM/SEC
BH CV ECHO MEASUREMENTS AVERAGE E/E' RATIO: 11.27
BH CV XLRA - RV BASE: 3.2 CM
BH CV XLRA - RV LENGTH: 6.6 CM
BH CV XLRA - RV MID: 2.36 CM
BH CV XLRA - TDI S': 12.3 CM/SEC
BILIRUB SERPL-MCNC: 1 MG/DL (ref 0–1.2)
BUN SERPL-MCNC: 14 MG/DL (ref 8–23)
BUN/CREAT SERPL: 14.9 (ref 7–25)
CALCIUM SPEC-SCNC: 9.2 MG/DL (ref 8.6–10.5)
CHLORIDE SERPL-SCNC: 103 MMOL/L (ref 98–107)
CO2 SERPL-SCNC: 23 MMOL/L (ref 22–29)
CREAT SERPL-MCNC: 0.94 MG/DL (ref 0.57–1)
DEPRECATED RDW RBC AUTO: 43.4 FL (ref 37–54)
EGFRCR SERPLBLD CKD-EPI 2021: 61.1 ML/MIN/1.73
EOSINOPHIL # BLD AUTO: 0.31 10*3/MM3 (ref 0–0.4)
EOSINOPHIL NFR BLD AUTO: 3.4 % (ref 0.3–6.2)
ERYTHROCYTE [DISTWIDTH] IN BLOOD BY AUTOMATED COUNT: 13.4 % (ref 12.3–15.4)
GEN 5 2HR TROPONIN T REFLEX: 132 NG/L
GLOBULIN UR ELPH-MCNC: 3 GM/DL
GLUCOSE SERPL-MCNC: 104 MG/DL (ref 65–99)
HCT VFR BLD AUTO: 44.3 % (ref 34–46.6)
HGB BLD-MCNC: 14.3 G/DL (ref 12–15.9)
IMM GRANULOCYTES # BLD AUTO: 0.02 10*3/MM3 (ref 0–0.05)
IMM GRANULOCYTES NFR BLD AUTO: 0.2 % (ref 0–0.5)
INR PPP: 1.19 (ref 0.9–1.1)
LEFT ATRIUM VOLUME INDEX: 18.9 ML/M2
LYMPHOCYTES # BLD AUTO: 2.67 10*3/MM3 (ref 0.7–3.1)
LYMPHOCYTES NFR BLD AUTO: 29 % (ref 19.6–45.3)
MCH RBC QN AUTO: 28.3 PG (ref 26.6–33)
MCHC RBC AUTO-ENTMCNC: 32.3 G/DL (ref 31.5–35.7)
MCV RBC AUTO: 87.7 FL (ref 79–97)
MONOCYTES # BLD AUTO: 0.97 10*3/MM3 (ref 0.1–0.9)
MONOCYTES NFR BLD AUTO: 10.5 % (ref 5–12)
NEUTROPHILS NFR BLD AUTO: 5.15 10*3/MM3 (ref 1.7–7)
NEUTROPHILS NFR BLD AUTO: 55.9 % (ref 42.7–76)
NRBC BLD AUTO-RTO: 0 /100 WBC (ref 0–0.2)
PLATELET # BLD AUTO: 163 10*3/MM3 (ref 140–450)
PMV BLD AUTO: 11.8 FL (ref 6–12)
POTASSIUM SERPL-SCNC: 4.4 MMOL/L (ref 3.5–5.2)
PROT SERPL-MCNC: 7.3 G/DL (ref 6–8.5)
PROTHROMBIN TIME: 15.3 SECONDS (ref 11.7–14.2)
QT INTERVAL: 381 MS
QTC INTERVAL: 451 MS
RBC # BLD AUTO: 5.05 10*6/MM3 (ref 3.77–5.28)
SINUS: 2.6 CM
SODIUM SERPL-SCNC: 139 MMOL/L (ref 136–145)
STJ: 2.16 CM
TROPONIN T DELTA: 109 NG/L
TROPONIN T SERPL HS-MCNC: 23 NG/L
WBC NRBC COR # BLD AUTO: 9.21 10*3/MM3 (ref 3.4–10.8)

## 2024-04-25 PROCEDURE — 93306 TTE W/DOPPLER COMPLETE: CPT

## 2024-04-25 PROCEDURE — 93010 ELECTROCARDIOGRAM REPORT: CPT | Performed by: INTERNAL MEDICINE

## 2024-04-25 PROCEDURE — 93306 TTE W/DOPPLER COMPLETE: CPT | Performed by: INTERNAL MEDICINE

## 2024-04-25 PROCEDURE — 85730 THROMBOPLASTIN TIME PARTIAL: CPT | Performed by: INTERNAL MEDICINE

## 2024-04-25 PROCEDURE — 84484 ASSAY OF TROPONIN QUANT: CPT | Performed by: INTERNAL MEDICINE

## 2024-04-25 PROCEDURE — 25010000002 GLYCOPYRROLATE 0.2 MG/ML SOLUTION: Performed by: NURSE ANESTHETIST, CERTIFIED REGISTERED

## 2024-04-25 PROCEDURE — 25010000002 ENOXAPARIN PER 10 MG: Performed by: INTERNAL MEDICINE

## 2024-04-25 PROCEDURE — 85025 COMPLETE CBC W/AUTO DIFF WBC: CPT | Performed by: INTERNAL MEDICINE

## 2024-04-25 PROCEDURE — 85610 PROTHROMBIN TIME: CPT | Performed by: INTERNAL MEDICINE

## 2024-04-25 PROCEDURE — 25010000002 HEPARIN (PORCINE) 25000-0.45 UT/250ML-% SOLUTION: Performed by: INTERNAL MEDICINE

## 2024-04-25 PROCEDURE — 99223 1ST HOSP IP/OBS HIGH 75: CPT | Performed by: INTERNAL MEDICINE

## 2024-04-25 PROCEDURE — 80053 COMPREHEN METABOLIC PANEL: CPT | Performed by: INTERNAL MEDICINE

## 2024-04-25 PROCEDURE — 25810000003 LACTATED RINGERS PER 1000 ML: Performed by: INTERNAL MEDICINE

## 2024-04-25 PROCEDURE — 25810000003 LACTATED RINGERS PER 1000 ML: Performed by: NURSE ANESTHETIST, CERTIFIED REGISTERED

## 2024-04-25 PROCEDURE — 25810000003 SODIUM CHLORIDE 0.9 % SOLUTION: Performed by: INTERNAL MEDICINE

## 2024-04-25 PROCEDURE — 93005 ELECTROCARDIOGRAM TRACING: CPT | Performed by: INTERNAL MEDICINE

## 2024-04-25 RX ORDER — SODIUM CHLORIDE, SODIUM LACTATE, POTASSIUM CHLORIDE, CALCIUM CHLORIDE 600; 310; 30; 20 MG/100ML; MG/100ML; MG/100ML; MG/100ML
30 INJECTION, SOLUTION INTRAVENOUS CONTINUOUS
Status: DISCONTINUED | OUTPATIENT
Start: 2024-04-25 | End: 2024-04-25

## 2024-04-25 RX ORDER — ATORVASTATIN CALCIUM 20 MG/1
10 TABLET, FILM COATED ORAL NIGHTLY
Status: DISCONTINUED | OUTPATIENT
Start: 2024-04-25 | End: 2024-04-25

## 2024-04-25 RX ORDER — ONDANSETRON 2 MG/ML
4 INJECTION INTRAMUSCULAR; INTRAVENOUS ONCE AS NEEDED
Status: DISCONTINUED | OUTPATIENT
Start: 2024-04-25 | End: 2024-04-25 | Stop reason: HOSPADM

## 2024-04-25 RX ORDER — NITROGLYCERIN 0.4 MG/1
0.4 TABLET SUBLINGUAL
Status: DISCONTINUED | OUTPATIENT
Start: 2024-04-25 | End: 2024-04-27 | Stop reason: HOSPADM

## 2024-04-25 RX ORDER — METOPROLOL SUCCINATE 25 MG/1
12.5 TABLET, EXTENDED RELEASE ORAL
Status: DISCONTINUED | OUTPATIENT
Start: 2024-04-25 | End: 2024-04-25

## 2024-04-25 RX ORDER — SODIUM CHLORIDE 9 MG/ML
75 INJECTION, SOLUTION INTRAVENOUS CONTINUOUS
Status: DISCONTINUED | OUTPATIENT
Start: 2024-04-25 | End: 2024-04-27 | Stop reason: HOSPADM

## 2024-04-25 RX ORDER — ACETAMINOPHEN 325 MG/1
650 TABLET ORAL EVERY 6 HOURS PRN
Status: DISCONTINUED | OUTPATIENT
Start: 2024-04-25 | End: 2024-04-27 | Stop reason: HOSPADM

## 2024-04-25 RX ORDER — METOPROLOL SUCCINATE 25 MG/1
12.5 TABLET, EXTENDED RELEASE ORAL EVERY 12 HOURS SCHEDULED
Status: DISCONTINUED | OUTPATIENT
Start: 2024-04-25 | End: 2024-04-27 | Stop reason: HOSPADM

## 2024-04-25 RX ORDER — HEPARIN SODIUM 10000 [USP'U]/100ML
11 INJECTION, SOLUTION INTRAVENOUS
Status: DISCONTINUED | OUTPATIENT
Start: 2024-04-25 | End: 2024-04-26

## 2024-04-25 RX ORDER — SODIUM CHLORIDE 0.9 % (FLUSH) 0.9 %
10 SYRINGE (ML) INJECTION EVERY 12 HOURS SCHEDULED
Status: DISCONTINUED | OUTPATIENT
Start: 2024-04-25 | End: 2024-04-27 | Stop reason: HOSPADM

## 2024-04-25 RX ORDER — ENOXAPARIN SODIUM 100 MG/ML
40 INJECTION SUBCUTANEOUS DAILY
Status: DISCONTINUED | OUTPATIENT
Start: 2024-04-25 | End: 2024-04-25

## 2024-04-25 RX ORDER — GLYCOPYRROLATE 0.2 MG/ML
INJECTION INTRAMUSCULAR; INTRAVENOUS AS NEEDED
Status: DISCONTINUED | OUTPATIENT
Start: 2024-04-25 | End: 2024-04-25 | Stop reason: SURG

## 2024-04-25 RX ORDER — ASPIRIN 81 MG/1
81 TABLET ORAL DAILY
Status: DISCONTINUED | OUTPATIENT
Start: 2024-04-25 | End: 2024-04-27 | Stop reason: HOSPADM

## 2024-04-25 RX ORDER — ONDANSETRON 2 MG/ML
4 INJECTION INTRAMUSCULAR; INTRAVENOUS EVERY 6 HOURS PRN
Status: DISCONTINUED | OUTPATIENT
Start: 2024-04-25 | End: 2024-04-27 | Stop reason: HOSPADM

## 2024-04-25 RX ORDER — ATORVASTATIN CALCIUM 20 MG/1
40 TABLET, FILM COATED ORAL NIGHTLY
Status: DISCONTINUED | OUTPATIENT
Start: 2024-04-25 | End: 2024-04-27 | Stop reason: HOSPADM

## 2024-04-25 RX ORDER — VALSARTAN 320 MG/1
320 TABLET ORAL NIGHTLY
Status: DISCONTINUED | OUTPATIENT
Start: 2024-04-25 | End: 2024-04-27 | Stop reason: HOSPADM

## 2024-04-25 RX ORDER — HEPARIN SODIUM 5000 [USP'U]/ML
30-44 INJECTION, SOLUTION INTRAVENOUS; SUBCUTANEOUS EVERY 6 HOURS PRN
Status: DISCONTINUED | OUTPATIENT
Start: 2024-04-25 | End: 2024-04-26

## 2024-04-25 RX ORDER — SODIUM CHLORIDE 0.9 % (FLUSH) 0.9 %
10 SYRINGE (ML) INJECTION AS NEEDED
Status: DISCONTINUED | OUTPATIENT
Start: 2024-04-25 | End: 2024-04-27 | Stop reason: HOSPADM

## 2024-04-25 RX ORDER — SODIUM CHLORIDE 9 MG/ML
40 INJECTION, SOLUTION INTRAVENOUS AS NEEDED
Status: DISCONTINUED | OUTPATIENT
Start: 2024-04-25 | End: 2024-04-27 | Stop reason: HOSPADM

## 2024-04-25 RX ORDER — OXYBUTYNIN CHLORIDE 10 MG/1
10 TABLET, EXTENDED RELEASE ORAL DAILY
Status: DISCONTINUED | OUTPATIENT
Start: 2024-04-25 | End: 2024-04-27 | Stop reason: HOSPADM

## 2024-04-25 RX ORDER — SODIUM CHLORIDE, SODIUM LACTATE, POTASSIUM CHLORIDE, CALCIUM CHLORIDE 600; 310; 30; 20 MG/100ML; MG/100ML; MG/100ML; MG/100ML
INJECTION, SOLUTION INTRAVENOUS CONTINUOUS PRN
Status: DISCONTINUED | OUTPATIENT
Start: 2024-04-25 | End: 2024-04-25 | Stop reason: SURG

## 2024-04-25 RX ORDER — HYDROMORPHONE HYDROCHLORIDE 2 MG/ML
0.25 INJECTION, SOLUTION INTRAMUSCULAR; INTRAVENOUS; SUBCUTANEOUS
Status: DISCONTINUED | OUTPATIENT
Start: 2024-04-25 | End: 2024-04-27 | Stop reason: HOSPADM

## 2024-04-25 RX ADMIN — Medication 10 ML: at 20:18

## 2024-04-25 RX ADMIN — SODIUM CHLORIDE, POTASSIUM CHLORIDE, SODIUM LACTATE AND CALCIUM CHLORIDE 30 ML/HR: 600; 310; 30; 20 INJECTION, SOLUTION INTRAVENOUS at 09:22

## 2024-04-25 RX ADMIN — METOPROLOL SUCCINATE 12.5 MG: 25 TABLET, EXTENDED RELEASE ORAL at 20:16

## 2024-04-25 RX ADMIN — NITROGLYCERIN 1 INCH: 20 OINTMENT TOPICAL at 23:34

## 2024-04-25 RX ADMIN — METOPROLOL SUCCINATE 12.5 MG: 25 TABLET, EXTENDED RELEASE ORAL at 15:03

## 2024-04-25 RX ADMIN — ENOXAPARIN SODIUM 40 MG: 100 INJECTION SUBCUTANEOUS at 15:03

## 2024-04-25 RX ADMIN — ASPIRIN 81 MG: 81 TABLET, COATED ORAL at 15:03

## 2024-04-25 RX ADMIN — HEPARIN SODIUM 11 UNITS/KG/HR: 10000 INJECTION, SOLUTION INTRAVENOUS at 17:05

## 2024-04-25 RX ADMIN — ATORVASTATIN CALCIUM 40 MG: 20 TABLET, FILM COATED ORAL at 20:16

## 2024-04-25 RX ADMIN — NITROGLYCERIN 1 INCH: 20 OINTMENT TOPICAL at 18:10

## 2024-04-25 RX ADMIN — GLYCOPYRROLATE 0.1 MG: 0.2 INJECTION INTRAMUSCULAR; INTRAVENOUS at 09:37

## 2024-04-25 RX ADMIN — SODIUM CHLORIDE, SODIUM LACTATE, POTASSIUM CHLORIDE, AND CALCIUM CHLORIDE: 600; 310; 30; 20 INJECTION, SOLUTION INTRAVENOUS at 09:35

## 2024-04-25 RX ADMIN — VALSARTAN 320 MG: 320 TABLET, FILM COATED ORAL at 20:16

## 2024-04-25 RX ADMIN — SODIUM CHLORIDE 75 ML/HR: 9 INJECTION, SOLUTION INTRAVENOUS at 23:31

## 2024-04-25 NOTE — PROGRESS NOTES
Prior to sedation patient began to complain of chest pain and headache.  Tele monitor showed PVCs and some PACs  Bp was noted to be elevated and patient was mildly tachyardic.    We elected not to proceed with procedure at this time, pt returned to pre-op hold and STAT cardiology consult has been requested  CBC, CMP, 12 lead EKG and troponin have been ordered    Family at beside and updated on current events          Casper Rodríguez M.D.  [facility]  [address] [place] [address]  Office: [phone]

## 2024-04-25 NOTE — NURSING NOTE
Pt back to preop, resting in bed. Appears comfortable, does c/o mild substernal CP 5/10, denies SOB.  /80, HR 93, sats 95% on 2L O2 NC. NSR on monitor. Awaiting cardiology to see.  Labs and EKG done.  Pt/family made aware.

## 2024-04-25 NOTE — CASE MANAGEMENT/SOCIAL WORK
Discharge Planning Assessment  [facility]     Patient Name: Viktoria Villasenor  MRN: [id number]  Today's Date: 4/25/2024    Admit Date: 4/25/2024    Plan: Home with    Discharge Needs Assessment       Row Name 04/25/24 1617       Living Environment    People in Home spouse    Name(s) of People in Home , she is caregiver for him    Current Living Arrangements home    Potentially Unsafe Housing Conditions none    Primary Care Provided by self    Family Caregiver if Needed child(josey), adult    Quality of Family Relationships involved;helpful    Able to Return to Prior Arrangements yes       Resource/Environmental Concerns    Resource/Environmental Concerns none       Transition Planning    Patient/Family Anticipates Transition to home with family    Patient/Family Anticipated Services at Transition none    Transportation Anticipated family or friend will provide       Discharge Needs Assessment    Readmission Within the Last 30 Days no previous admission in last 30 days    Equipment Currently Used at Home none    Concerns to be Addressed no discharge needs identified    Anticipated Changes Related to Illness none    Equipment Needed After Discharge none                   Discharge Plan       Row Name 04/25/24 1618       Plan    Plan Home with     Patient/Family in Agreement with Plan yes    Plan Comments Spoke to pt at bedside, introduced self and explained CCP role, face sheet and pharmacy information verified. Pt lives with  Abram for whom she is main caregiver, he has dementia. Pt is IADL's, uses no medical equipment, no HH or SNF history. She plans home with son to transport, no anticipated needs. CCP will follow -Kristin MCDANIEL                  Continued Care and Services - Admitted Since 4/25/2024    No active coordination exists for this encounter.       Expected Discharge Date and Time       Expected Discharge Date Expected Discharge Time    Apr 26, 2024            Demographic Summary        Row Name 04/25/24 1616       General Information    Admission Type inpatient                   Functional Status       Row Name 04/25/24 1616       Functional Status    Usual Activity Tolerance excellent    Current Activity Tolerance excellent       Functional Status, IADL    Medications independent    Meal Preparation independent    Housekeeping independent    Laundry independent    Shopping independent       Mental Status    General Appearance WDL WDL       Mental Status Summary    Recent Changes in Mental Status/Cognitive Functioning no changes                   Psychosocial    No documentation.                  Abuse/Neglect    No documentation.                  Legal       Row Name 04/25/24 1617       Financial/Legal    Who Manages Finances if Patient Unable Son Scot                   Substance Abuse    No documentation.                  Patient Forms    No documentation.                     Kristin Begum RN

## 2024-04-25 NOTE — ANESTHESIA PREPROCEDURE EVALUATION
Anesthesia Evaluation     Patient summary reviewed and Nursing notes reviewed                Airway   Mallampati: III  TM distance: <3 FB  Neck ROM: limited  Small opening and Possible difficult intubation  Dental      Pulmonary    (+) a smoker Former, COPD, asthma,shortness of breath  Cardiovascular     ECG reviewed  Rhythm: regular  Rate: normal    (+) hypertension, valvular problems/murmurs murmur, hyperlipidemia      Neuro/Psych- negative ROS  GI/Hepatic/Renal/Endo    (+) morbid obesity, GERD, GI bleeding , liver disease fatty liver disease, thyroid problem     Musculoskeletal (-) negative ROS    Abdominal    Substance History - negative use     OB/GYN negative ob/gyn ROS         Other                      Anesthesia Plan    ASA 3     MAC     (RBBB and LPFB  COPD)  intravenous induction     Anesthetic plan, risks, benefits, and alternatives have been provided, discussed and informed consent has been obtained with: patient.    CODE STATUS:

## 2024-04-25 NOTE — NURSING NOTE
"Pt to be admitted, pt/family is aware.  VSS, pt states feeling \"a lot better.\"  No SOB, very mild 2/10 CP.  NSR on monitor.  Awaiting bed assignment.  "

## 2024-04-25 NOTE — PLAN OF CARE
Goal Outcome Evaluation:              Outcome Evaluation: Pt having cath tomorrow. VSS, NSR on tele, on RA, no c/o pain. Ambulating independently in room. Heparin currently infusing 11 units. Will continue POC and update as needed.

## 2024-04-25 NOTE — H&P
Date of Admission: 24    Encounter Provider: Irving Fajardo MD    Group of Service: [facility]     Patient Name: Viktoria Villasenor    :1940    Chief complaint: Chest pain.    History of Present Illness:      This is a very pleasant 82-year-old female with a history of borderline diabetes, hypertension, and hyperlipidemia.  The patient was recently diagnosed with diverticulitis and cirrhosis.  She presented for an elective colonoscopy and EGD.  While she was preparing for her procedures in the endoscopy department, she developed severe substernal chest pressure.  This radiated through into her back and was fairly intense.  It lasted for several minutes, and she became short of breath.  She also has had gradually worsening dyspnea on exertion for the last several months.  She was having multiple PVCs on the monitor, but was fairly pain-free when I saw her.  She does have a history of a normal heart catheterization in 2016.  Her EKG showed a chronic right bundle branch block without ischemic changes.  Her initial troponin was 23, although the repeat was 132.      ECHO 21  Estimated right ventricular systolic pressure from tricuspid regurgitation is mildly elevated (35-45 mmHg). Calculated right ventricular systolic pressure from tricuspid regurgitation is 38 mmHg.  Calculated left ventricular EF = 65% Estimated left ventricular EF was in agreement with the calculated left ventricular EF. Left ventricular systolic function is normal.  Left ventricular diastolic function was normal      Past Medical History:   Diagnosis Date    ACE-inhibitor cough     Asthma     Asymptomatic postmenopausal status     Cardiac murmur     Cholelithiasis     Colon polyp 10/2016    Conjunctivitis     right    Diverticulitis of colon     Elevated LFTs 2016    Fatty liver     GERD (gastroesophageal reflux disease)     GI (gastrointestinal bleed)     History of colon polyps     HLD  (hyperlipidemia)     Hypertension     Hypopigmentation     Left shoulder tendonitis     Menopausal syndrome     Motion sickness     Plantar fasciitis of left foot     nodule    Pruritus     possibly due to Benicar    SOB (shortness of breath)     SOBOE (shortness of breath on exertion)     Thyroid cyst     UTI (urinary tract infection)     Viral syndrome          Past Surgical History:   Procedure Laterality Date    CARDIAC CATHETERIZATION      in the 80's    CARDIAC CATHETERIZATION N/A 05/04/2016    Procedure: Left Heart Cath;  Surgeon: Dale Vasquez MD;  Location: [facility] CATH INVASIVE LOCATION;  Service:     CARDIAC CATHETERIZATION N/A 05/04/2016    Procedure: Coronary angiography;  Surgeon: Dale Vasquez MD;  Location: [facility] CATH INVASIVE LOCATION;  Service:     CARDIAC CATHETERIZATION N/A 05/04/2016    Procedure: Left ventriculography;  Surgeon: Dale Vasquez MD;  Location: [facility] CATH INVASIVE LOCATION;  Service:     CHOLECYSTECTOMY  1980    COLONOSCOPY      >5 years    COLONOSCOPY  12/01/2016    tics, IH, rectal biopsy showed changes suggestive of Schwannoma     ENDOSCOPY  12/01/2016    LA grade B esophagitis, mild Schatzki ring, HH, gastric polyp, erythematous mucosa in stomach    LASIK Bilateral     RECTAL ULTRASOUND N/A 02/07/2017    Procedure: ENDOSCOPIC ULTRASOUND (LOWER);  Surgeon: Casper Rodríguez MD;  Location: [facility] ENDOSCOPY;  Service:     SHOULDER SURGERY Left 06/17/2010    Dr. Green; repair L shoulder adhesive capsulitis    SKIN BIOPSY      TOTAL ABDOMINAL HYSTERECTOMY  1989    fibroid tumors    TUBAL ABDOMINAL LIGATION           Allergies   Allergen Reactions    Lisinopril Cough         No current facility-administered medications on file prior to encounter.     No current outpatient medications on file prior to encounter.         Social History     Socioeconomic History    Marital status:      Spouse name: Abram    Number of children: 2   Tobacco Use    Smoking status: Former  "    Current packs/day: 0.00     Average packs/day: 1 pack/day for 30.0 years (30.0 ttl pk-yrs)     Types: Cigarettes     Start date: 1964     Quit date: 1994     Years since quittin.3    Smokeless tobacco: Never   Vaping Use    Vaping status: Never Used   Substance and Sexual Activity    Alcohol use: No    Drug use: No    Sexual activity: Not Currently     Partners: Male         Family History   Problem Relation Age of Onset    Anxiety disorder Mother     Uterine cancer Mother     Dementia Mother     Cancer Father         Prostate Cancer     Other Father         AAA, aneurysm rupture    Leukemia Brother     Malig Hyperthermia Neg Hx        REVIEW OF SYSTEMS:   Pertinent positives are noted in the HPI above.  Otherwise, all other systems were reviewed, and are negative.     Objective:     Vitals:    24 0900 24 1208 24 1549   BP: 143/61 138/50 125/59   BP Location: Left arm  Left arm   Patient Position: Lying  Lying   Pulse: 76  80   Resp: 12  16   Temp: 98 °F (36.7 °C)  98 °F (36.7 °C)   TempSrc: Oral  Oral   SpO2: 95%  93%   Weight: 91 kg (200 lb 9.6 oz)     Height: 163.8 cm (64.5\")       Body mass index is 33.9 kg/m².  Flowsheet Rows      Flowsheet Row First Filed Value   Admission Height 163.8 cm (64.5\") Documented at 2024 0900   Admission Weight 91 kg (200 lb 9.6 oz) Documented at 2024 0900             General:    No acute distress, alert and oriented x4, pleasant                   Head:    Normocephalic, atraumatic.   Eyes:          Conjunctivae and sclerae normal, no icterus.   Throat:   No oral lesions, no thrush, oral mucosa moist.    Neck:   Supple, trachea midline.   Lungs:     Clear to auscultation bilaterally     Heart:    Regular rhythm and normal rate. II/VI SM RUSB and LUSB.   Abdomen:     Soft, non-tender, non-distended, positive bowel sounds.    Extremities:   No clubbing, cyanosis, or edema.     Pulses:   Pulses palpable and equal bilaterally.    Skin:   No " bleeding or rash.   Neuro:   Non-focal.  Moves all extremities well.    Psychiatric:   Normal mood and affect.       Lab Review:                Results from last 7 days   Lab Units 04/25/24  0957   SODIUM mmol/L 139   POTASSIUM mmol/L 4.4   CHLORIDE mmol/L 103   CO2 mmol/L 23.0   BUN mg/dL 14   CREATININE mg/dL 0.94   GLUCOSE mg/dL 104*   CALCIUM mg/dL 9.2     Results from last 7 days   Lab Units 04/25/24  1447 04/25/24  0957   HSTROP T ng/L 132* 23*     Results from last 7 days   Lab Units 04/25/24  0957   WBC 10*3/mm3 9.21   HEMOGLOBIN g/dL 14.3   HEMATOCRIT % 44.3   PLATELETS 10*3/mm3 163                       4-25-24      8-10-21        EKG (reviewed by me personally):      Assessment:   1.  Chest pressure and elevated troponin, likely type I NSTEMI  2.  Chronic right bundle branch block  3.  Moderate mitral regurgitation on echo  4.  Asymptomatic PVCs  5.  Recently diagnosed cirrhosis  6.  History of diverticulitis  7.  Hypertension  8.  Borderline diabetes    Plan:       Again, her troponin went up to 132.  I am going to treat her for ACS until proven otherwise.  I am going to start her on a cardiac protocol heparin drip.  I will place nitroglycerin paste at this time.  I increased her Lipitor to 40 mg/day for now, and a lipid panel will be ordered for the morning.  I started Toprol-XL 12.5 mg twice daily.  He is already on Diovan, and I ordered aspirin 81 mg/day.    Her echocardiogram did not show any significant wall motion abnormalities, and she had moderate mitral regurgitation.  She will ultimately need a left heart catheterization tomorrow.  She had a normal heart cath in 2016, although this certainly could have changed since that time.    She will be hydrated in preparation for the heart catheterization tomorrow.    Tahir Fajardo MD

## 2024-04-26 ENCOUNTER — APPOINTMENT (OUTPATIENT)
Dept: CT IMAGING | Facility: HOSPITAL | Age: 84
DRG: 287 | End: 2024-04-26
Payer: MEDICARE

## 2024-04-26 PROBLEM — I21.4 NSTEMI, INITIAL EPISODE OF CARE: Status: ACTIVE | Noted: 2023-12-20

## 2024-04-26 LAB
ANION GAP SERPL CALCULATED.3IONS-SCNC: 11 MMOL/L (ref 5–15)
APTT PPP: 72 SECONDS (ref 22.7–35.4)
BASOPHILS # BLD AUTO: 0.06 10*3/MM3 (ref 0–0.2)
BASOPHILS NFR BLD AUTO: 0.8 % (ref 0–1.5)
BUN SERPL-MCNC: 13 MG/DL (ref 8–23)
BUN/CREAT SERPL: 14.6 (ref 7–25)
CALCIUM SPEC-SCNC: 8.4 MG/DL (ref 8.6–10.5)
CHLORIDE SERPL-SCNC: 107 MMOL/L (ref 98–107)
CHOLEST SERPL-MCNC: 108 MG/DL (ref 0–200)
CO2 SERPL-SCNC: 22 MMOL/L (ref 22–29)
CREAT SERPL-MCNC: 0.89 MG/DL (ref 0.57–1)
DEPRECATED RDW RBC AUTO: 42 FL (ref 37–54)
EGFRCR SERPLBLD CKD-EPI 2021: 65.2 ML/MIN/1.73
EOSINOPHIL # BLD AUTO: 0.31 10*3/MM3 (ref 0–0.4)
EOSINOPHIL NFR BLD AUTO: 4 % (ref 0.3–6.2)
ERYTHROCYTE [DISTWIDTH] IN BLOOD BY AUTOMATED COUNT: 13.2 % (ref 12.3–15.4)
GLUCOSE SERPL-MCNC: 102 MG/DL (ref 65–99)
HCT VFR BLD AUTO: 41.3 % (ref 34–46.6)
HDLC SERPL-MCNC: 59 MG/DL (ref 40–60)
HGB BLD-MCNC: 13.4 G/DL (ref 12–15.9)
LDLC SERPL CALC-MCNC: 36 MG/DL (ref 0–100)
LDLC/HDLC SERPL: 0.64 {RATIO}
LYMPHOCYTES # BLD AUTO: 1.83 10*3/MM3 (ref 0.7–3.1)
LYMPHOCYTES NFR BLD AUTO: 23.6 % (ref 19.6–45.3)
MCH RBC QN AUTO: 28 PG (ref 26.6–33)
MCHC RBC AUTO-ENTMCNC: 32.4 G/DL (ref 31.5–35.7)
MCV RBC AUTO: 86.2 FL (ref 79–97)
MONOCYTES # BLD AUTO: 0.79 10*3/MM3 (ref 0.1–0.9)
MONOCYTES NFR BLD AUTO: 10.2 % (ref 5–12)
NEUTROPHILS NFR BLD AUTO: 4.75 10*3/MM3 (ref 1.7–7)
NEUTROPHILS NFR BLD AUTO: 61.1 % (ref 42.7–76)
PLATELET # BLD AUTO: 132 10*3/MM3 (ref 140–450)
PMV BLD AUTO: 11.8 FL (ref 6–12)
POTASSIUM SERPL-SCNC: 4.1 MMOL/L (ref 3.5–5.2)
RBC # BLD AUTO: 4.79 10*6/MM3 (ref 3.77–5.28)
SODIUM SERPL-SCNC: 140 MMOL/L (ref 136–145)
TRIGL SERPL-MCNC: 57 MG/DL (ref 0–150)
TROPONIN T SERPL HS-MCNC: 54 NG/L
VLDLC SERPL-MCNC: 13 MG/DL (ref 5–40)
WBC NRBC COR # BLD AUTO: 7.76 10*3/MM3 (ref 3.4–10.8)

## 2024-04-26 PROCEDURE — 25010000002 MIDAZOLAM PER 1 MG: Performed by: INTERNAL MEDICINE

## 2024-04-26 PROCEDURE — 25510000001 IOPAMIDOL PER 1 ML: Performed by: INTERNAL MEDICINE

## 2024-04-26 PROCEDURE — 99232 SBSQ HOSP IP/OBS MODERATE 35: CPT | Performed by: INTERNAL MEDICINE

## 2024-04-26 PROCEDURE — C1769 GUIDE WIRE: HCPCS | Performed by: INTERNAL MEDICINE

## 2024-04-26 PROCEDURE — 25010000002 FENTANYL CITRATE (PF) 50 MCG/ML SOLUTION: Performed by: INTERNAL MEDICINE

## 2024-04-26 PROCEDURE — 80061 LIPID PANEL: CPT | Performed by: INTERNAL MEDICINE

## 2024-04-26 PROCEDURE — C1894 INTRO/SHEATH, NON-LASER: HCPCS | Performed by: INTERNAL MEDICINE

## 2024-04-26 PROCEDURE — 85025 COMPLETE CBC W/AUTO DIFF WBC: CPT | Performed by: INTERNAL MEDICINE

## 2024-04-26 PROCEDURE — B2151ZZ FLUOROSCOPY OF LEFT HEART USING LOW OSMOLAR CONTRAST: ICD-10-PCS | Performed by: INTERNAL MEDICINE

## 2024-04-26 PROCEDURE — 80048 BASIC METABOLIC PNL TOTAL CA: CPT | Performed by: INTERNAL MEDICINE

## 2024-04-26 PROCEDURE — 93458 L HRT ARTERY/VENTRICLE ANGIO: CPT | Performed by: INTERNAL MEDICINE

## 2024-04-26 PROCEDURE — 85730 THROMBOPLASTIN TIME PARTIAL: CPT | Performed by: INTERNAL MEDICINE

## 2024-04-26 PROCEDURE — 71275 CT ANGIOGRAPHY CHEST: CPT

## 2024-04-26 PROCEDURE — 93005 ELECTROCARDIOGRAM TRACING: CPT | Performed by: INTERNAL MEDICINE

## 2024-04-26 PROCEDURE — 4A023N7 MEASUREMENT OF CARDIAC SAMPLING AND PRESSURE, LEFT HEART, PERCUTANEOUS APPROACH: ICD-10-PCS | Performed by: INTERNAL MEDICINE

## 2024-04-26 PROCEDURE — 84484 ASSAY OF TROPONIN QUANT: CPT | Performed by: INTERNAL MEDICINE

## 2024-04-26 PROCEDURE — 93010 ELECTROCARDIOGRAM REPORT: CPT | Performed by: INTERNAL MEDICINE

## 2024-04-26 PROCEDURE — 25010000002 HEPARIN (PORCINE) PER 1000 UNITS: Performed by: INTERNAL MEDICINE

## 2024-04-26 PROCEDURE — B2111ZZ FLUOROSCOPY OF MULTIPLE CORONARY ARTERIES USING LOW OSMOLAR CONTRAST: ICD-10-PCS | Performed by: INTERNAL MEDICINE

## 2024-04-26 RX ORDER — MIDAZOLAM HYDROCHLORIDE 1 MG/ML
INJECTION INTRAMUSCULAR; INTRAVENOUS
Status: DISCONTINUED | OUTPATIENT
Start: 2024-04-26 | End: 2024-04-26 | Stop reason: HOSPADM

## 2024-04-26 RX ORDER — ONDANSETRON 2 MG/ML
4 INJECTION INTRAMUSCULAR; INTRAVENOUS EVERY 6 HOURS PRN
Status: DISCONTINUED | OUTPATIENT
Start: 2024-04-26 | End: 2024-04-27 | Stop reason: HOSPADM

## 2024-04-26 RX ORDER — FENTANYL CITRATE 50 UG/ML
INJECTION, SOLUTION INTRAMUSCULAR; INTRAVENOUS
Status: DISCONTINUED | OUTPATIENT
Start: 2024-04-26 | End: 2024-04-26 | Stop reason: HOSPADM

## 2024-04-26 RX ORDER — HYDROCODONE BITARTRATE AND ACETAMINOPHEN 5; 325 MG/1; MG/1
1 TABLET ORAL EVERY 4 HOURS PRN
Status: DISCONTINUED | OUTPATIENT
Start: 2024-04-26 | End: 2024-04-27 | Stop reason: HOSPADM

## 2024-04-26 RX ORDER — LIDOCAINE HYDROCHLORIDE 20 MG/ML
INJECTION, SOLUTION INFILTRATION; PERINEURAL
Status: DISCONTINUED | OUTPATIENT
Start: 2024-04-26 | End: 2024-04-26 | Stop reason: HOSPADM

## 2024-04-26 RX ORDER — SODIUM CHLORIDE 9 MG/ML
50 INJECTION, SOLUTION INTRAVENOUS CONTINUOUS
Status: ACTIVE | OUTPATIENT
Start: 2024-04-26 | End: 2024-04-27

## 2024-04-26 RX ORDER — HEPARIN SODIUM 1000 [USP'U]/ML
INJECTION, SOLUTION INTRAVENOUS; SUBCUTANEOUS
Status: DISCONTINUED | OUTPATIENT
Start: 2024-04-26 | End: 2024-04-26 | Stop reason: HOSPADM

## 2024-04-26 RX ORDER — ONDANSETRON 4 MG/1
4 TABLET, ORALLY DISINTEGRATING ORAL EVERY 6 HOURS PRN
Status: DISCONTINUED | OUTPATIENT
Start: 2024-04-26 | End: 2024-04-27 | Stop reason: HOSPADM

## 2024-04-26 RX ORDER — ACETAMINOPHEN 325 MG/1
650 TABLET ORAL EVERY 4 HOURS PRN
Status: DISCONTINUED | OUTPATIENT
Start: 2024-04-26 | End: 2024-04-27 | Stop reason: HOSPADM

## 2024-04-26 RX ORDER — VERAPAMIL HYDROCHLORIDE 2.5 MG/ML
INJECTION, SOLUTION INTRAVENOUS
Status: DISCONTINUED | OUTPATIENT
Start: 2024-04-26 | End: 2024-04-26 | Stop reason: HOSPADM

## 2024-04-26 RX ADMIN — OXYBUTYNIN CHLORIDE 10 MG: 10 TABLET, EXTENDED RELEASE ORAL at 09:04

## 2024-04-26 RX ADMIN — NITROGLYCERIN 1 INCH: 20 OINTMENT TOPICAL at 05:41

## 2024-04-26 RX ADMIN — ATORVASTATIN CALCIUM 40 MG: 20 TABLET, FILM COATED ORAL at 20:10

## 2024-04-26 RX ADMIN — Medication 10 ML: at 09:07

## 2024-04-26 RX ADMIN — Medication 10 ML: at 20:18

## 2024-04-26 RX ADMIN — IOPAMIDOL 85 ML: 755 INJECTION, SOLUTION INTRAVENOUS at 22:10

## 2024-04-26 RX ADMIN — ASPIRIN 81 MG: 81 TABLET, COATED ORAL at 09:04

## 2024-04-26 RX ADMIN — METOPROLOL SUCCINATE 12.5 MG: 25 TABLET, EXTENDED RELEASE ORAL at 09:04

## 2024-04-26 RX ADMIN — VALSARTAN 320 MG: 320 TABLET, FILM COATED ORAL at 20:10

## 2024-04-26 RX ADMIN — NITROGLYCERIN 1 INCH: 20 OINTMENT TOPICAL at 12:05

## 2024-04-26 RX ADMIN — METOPROLOL SUCCINATE 12.5 MG: 25 TABLET, EXTENDED RELEASE ORAL at 20:11

## 2024-04-26 NOTE — PROGRESS NOTES
LOS: 1 day   Patient Care Team:  Magen Olson MD as PCP - General  Magen Olson MD as PCP - Family Medicine  [facility] as [facility]ing Physician (Otolaryngology)  Casper Hodge MD as Consulting Physician (Gastroenterology)    Chief Complaint: Follow-up NSTEMI.    Interval History: She does have some chest pressure this morning, but not severe.  No shortness of breath.  No other acute events.    Vital Signs:  Temp:  [98 °F (36.7 °C)-99 °F (37.2 °C)] 98.2 °F (36.8 °C)  Heart Rate:  [74-80] 74  Resp:  [16-18] 16  BP: (112-138)/(50-59) 134/50    Intake/Output Summary (Last 24 hours) at 4/26/2024 1035  Last data filed at 4/26/2024 0824  Gross per 24 hour   Intake 240 ml   Output --   Net 240 ml       Physical Exam:   General Appearance:    No acute distress, alert and oriented x4   Lungs:     Clear to auscultation bilaterally     Heart:    Regular rhythm and normal rate. II/VI SM RUSB and LUSB.    Abdomen:     Soft, nontender, nondistended.    Extremities:   No clubbing, cyanosis, or edema.     Results Review:    Results from last 7 days   Lab Units 04/26/24  0524   SODIUM mmol/L 140   POTASSIUM mmol/L 4.1   CHLORIDE mmol/L 107   CO2 mmol/L 22.0   BUN mg/dL 13   CREATININE mg/dL 0.89   GLUCOSE mg/dL 102*   CALCIUM mg/dL 8.4*     Results from last 7 days   Lab Units 04/26/24  0524 04/25/24  1447 04/25/24  0957   HSTROP T ng/L 54* 132* 23*     Results from last 7 days   Lab Units 04/26/24  0523   WBC 10*3/mm3 7.76   HEMOGLOBIN g/dL 13.4   HEMATOCRIT % 41.3   PLATELETS 10*3/mm3 132*     Results from last 7 days   Lab Units 04/26/24  0523 04/25/24  2321 04/25/24  1755   INR   --   --  1.19*   APTT seconds 72.0* 66.3* 38.2*     Results from last 7 days   Lab Units 04/26/24  0524   CHOLESTEROL mg/dL 108         Results from last 7 days   Lab Units 04/26/24  0524   CHOLESTEROL mg/dL 108   TRIGLYCERIDES mg/dL 57   HDL CHOL mg/dL 59   LDL CHOL mg/dL 36       I reviewed the patient's new clinical results.         Assessment:  1.  Chest pressure and elevated troponin - type I NSTEMI  2.  Chronic right bundle branch block  3.  Moderate mitral regurgitation on echo  4.  Asymptomatic PVCs  5.  Recently diagnosed cirrhosis  6.  History of diverticulitis  7.  Hypertension  8.  Borderline diabetes    Plan:  -Plan for left heart catheterization today.  Return heparin off on-call to cath lab.    -Continue aspirin 81 mg/day.      -Lipitor increased to 40 mg/day.  Will need to watch LFTs in the setting of recently diagnosed cirrhosis.    -Continue home Diovan 320 mg/day for hypertension.  Toprol-XL 12.5 mg twice daily added.    -Echocardiogram with normal ejection fraction of 65 to 70%.    -Cardiac rehab referral placed.    -Further plans after the heart catheterization later today.    Irving Fajardo MD  04/26/24  10:35 EDT

## 2024-04-26 NOTE — PLAN OF CARE
Goal Outcome Evaluation:         Patient resting at this time, vital signs stable, cont on heparin drip , pt denies chest pain nor discomfort, NPO after midnight  cont to monitor.

## 2024-04-26 NOTE — PROGRESS NOTES
[facility] Clinical Pharmacy Services: Medication Reconciliation     Viktoria Villasenor is a 83 y.o. female presenting to [facility] for Steatosis of liver [K76.0]  Diverticulitis [K57.92]  Chest pain [R07.9]       She  has a past medical history of ACE-inhibitor cough, Asthma, Asymptomatic postmenopausal status, Cardiac murmur, Cholelithiasis, Colon polyp (10/2016), Conjunctivitis, Diverticulitis of colon (08/23), Elevated LFTs (06/28/2016), Fatty liver, GERD (gastroesophageal reflux disease), GI (gastrointestinal bleed), History of colon polyps, HLD (hyperlipidemia), Hypertension, Hypopigmentation, Left shoulder tendonitis, Menopausal syndrome, Motion sickness, Plantar fasciitis of left foot, Pruritus, SOB (shortness of breath), SOBOE (shortness of breath on exertion), Thyroid cyst, UTI (urinary tract infection), and Viral syndrome.       Allergies as of 12/20/2023 - Reviewed 12/20/2023   Allergen Reaction Noted    Lisinopril Cough 10/02/2019          Medication information was obtained from: EMR   Pharmacy and Phone Number:  [facility] [phone]      Prior to Admission Medications       Prescriptions Last Dose Informant Patient Reported? Taking?    atorvastatin (LIPITOR) 10 MG tablet 4/23/2024  No Yes    Take 1 tablet by mouth Every Night for 270 days.    valsartan (DIOVAN) 320 MG tablet 4/23/2024  No Yes    Take 1 tablet by mouth Every Night for 270 days.           Medication notes: surescript med rec matched 100%       This medication list is complete to the best of my knowledge as of 4/26/2024       Please call if questions.     Rahat Cuab, [facility]  Clinical Pharmacist

## 2024-04-26 NOTE — PLAN OF CARE
Goal Outcome Evaluation:  Plan of Care Reviewed With: patient, spouse        Progress: improving  Outcome Evaluation: VSS, AOx4. Left heart cath performed today, no interventions at this time. No new issues or pain reported after arrival back from cath lab. TR band in place with 13 cc air. Educated on not using arm. Hep gtt d/c per orders. Decent appetite and fluid intake after arrival back from cath lab. WCTM.

## 2024-04-27 ENCOUNTER — READMISSION MANAGEMENT (OUTPATIENT)
Dept: CALL CENTER | Facility: HOSPITAL | Age: 84
End: 2024-04-27
Payer: MEDICARE

## 2024-04-27 VITALS
RESPIRATION RATE: 16 BRPM | WEIGHT: 200.6 LBS | DIASTOLIC BLOOD PRESSURE: 51 MMHG | BODY MASS INDEX: 33.42 KG/M2 | HEIGHT: 65 IN | SYSTOLIC BLOOD PRESSURE: 125 MMHG | HEART RATE: 75 BPM | OXYGEN SATURATION: 94 % | TEMPERATURE: 98 F

## 2024-04-27 PROBLEM — I5A NONISCHEMIC NONTRAUMATIC MYOCARDIAL INJURY: Status: ACTIVE | Noted: 2024-04-27

## 2024-04-27 LAB
ANION GAP SERPL CALCULATED.3IONS-SCNC: 11.1 MMOL/L (ref 5–15)
BUN SERPL-MCNC: 12 MG/DL (ref 8–23)
BUN/CREAT SERPL: 16.2 (ref 7–25)
CALCIUM SPEC-SCNC: 8.4 MG/DL (ref 8.6–10.5)
CHLORIDE SERPL-SCNC: 108 MMOL/L (ref 98–107)
CO2 SERPL-SCNC: 20.9 MMOL/L (ref 22–29)
CREAT SERPL-MCNC: 0.74 MG/DL (ref 0.57–1)
EGFRCR SERPLBLD CKD-EPI 2021: 81.4 ML/MIN/1.73
GLUCOSE SERPL-MCNC: 95 MG/DL (ref 65–99)
POTASSIUM SERPL-SCNC: 4 MMOL/L (ref 3.5–5.2)
QT INTERVAL: 448 MS
QTC INTERVAL: 501 MS
SODIUM SERPL-SCNC: 140 MMOL/L (ref 136–145)

## 2024-04-27 PROCEDURE — 80048 BASIC METABOLIC PNL TOTAL CA: CPT | Performed by: INTERNAL MEDICINE

## 2024-04-27 PROCEDURE — 99239 HOSP IP/OBS DSCHRG MGMT >30: CPT | Performed by: NURSE PRACTITIONER

## 2024-04-27 RX ADMIN — ASPIRIN 81 MG: 81 TABLET, COATED ORAL at 08:37

## 2024-04-27 RX ADMIN — OXYBUTYNIN CHLORIDE 10 MG: 10 TABLET, EXTENDED RELEASE ORAL at 08:37

## 2024-04-27 RX ADMIN — Medication 10 ML: at 08:37

## 2024-04-27 RX ADMIN — METOPROLOL SUCCINATE 12.5 MG: 25 TABLET, EXTENDED RELEASE ORAL at 08:37

## 2024-04-27 NOTE — PLAN OF CARE
Goal Outcome Evaluation:  Plan of Care Reviewed With: patient        Progress: improving  Outcome Evaluation: TR band off, no bleeding. gauze and border dsg in place. no acute pain or concern at this time, care ongoing

## 2024-04-27 NOTE — DISCHARGE SUMMARY
Patient Name: Viktoria Villasenor  :1940  83 y.o.    Date of Admit: 2024  Date of Discharge:  2024    Discharge Diagnosis:  Problems Addressed this Visit       Steatosis of liver    * (Principal) Diverticulitis    NSTEMI, initial episode of care - Primary    Relevant Orders    Case Request Cath Lab: Left Heart Cath (Completed)    Cardiac Catheterization/Vascular Study (Completed)     Diagnoses         Codes Comments    NSTEMI, initial episode of care    -  Primary ICD-10-CM: I21.4  ICD-9-CM: 410.71     Steatosis of liver     ICD-10-CM: K76.0  ICD-9-CM: 571.8     Diverticulitis     ICD-10-CM: K57.92  ICD-9-CM: 562.11             Hospital Course:   This is an 84 y/o female who has a pmhx of borderline diabetes, hypertension, and hyperlipidemia.  The patient was recently diagnosed with diverticulitis and cirrhosis.  She presented for an elective colonoscopy and EGD.  While she was preparing for her procedures in the endoscopy department, she developed severe substernal chest pressure.  This radiated through into her back and was fairly intense.  It lasted for several minutes, and she became short of breath.  She also has had gradually worsening dyspnea on exertion for the last several months.  She was having multiple PVCs on the monitor, but was fairly pain-free when I saw her.  She does have a history of a normal heart catheterization in 2016.  Her EKG showed a chronic right bundle branch block without ischemic changes.  Her initial troponin was 23, although the repeat was 132.     She was started on aspirin and lipitor was increased to 40 mg /day. Toprol XL was added as well. An echocardiogram was performed that showed a normal EF of 65-70%, no wall motion abnormalities. She underwent a left heart cath that showed normal coronaries. She has recovered well overnight with no complications to her right radial cath site. No further reports of chest pain. She is stable for discharge home from a cardiac  standpoint. In light of her normal coronaries and recent diagnosis of cirrhosis, I am going to reduce her atorvastatin back to her home dose of 10 mg. This is not a true NSTEMI, no need for aspirin or beta blocker. BP is stable on her home dose of Diovan. She will follow up in the office in 2 weeks with WARREN Lim.    Procedures Performed  Procedure(s):  Left Heart Cath       Consults       No orders found from 3/27/2024 to 4/26/2024.            Pertinent Test Results:   Results from last 7 days   Lab Units 04/27/24  0429 04/26/24  0524 04/25/24  0957   SODIUM mmol/L 140   < > 139   POTASSIUM mmol/L 4.0   < > 4.4   CHLORIDE mmol/L 108*   < > 103   CO2 mmol/L 20.9*   < > 23.0   BUN mg/dL 12   < > 14   CREATININE mg/dL 0.74   < > 0.94   CALCIUM mg/dL 8.4*   < > 9.2   BILIRUBIN mg/dL  --   --  1.0   ALK PHOS U/L  --   --  85   ALT (SGPT) U/L  --   --  30   AST (SGOT) U/L  --   --  36*   GLUCOSE mg/dL 95   < > 104*    < > = values in this interval not displayed.     Results from last 7 days   Lab Units 04/26/24  0524 04/25/24  1447 04/25/24  0957   HSTROP T ng/L 54* 132* 23*     @LABRCNT(bnp)@  Results from last 7 days   Lab Units 04/26/24  0523   WBC 10*3/mm3 7.76   HEMOGLOBIN g/dL 13.4   HEMATOCRIT % 41.3   PLATELETS 10*3/mm3 132*     Results from last 7 days   Lab Units 04/26/24  0523 04/25/24  2321 04/25/24  1755   INR   --   --  1.19*   APTT seconds 72.0* 66.3* 38.2*         Results from last 7 days   Lab Units 04/26/24  0524   CHOLESTEROL mg/dL 108   TRIGLYCERIDES mg/dL 57   HDL CHOL mg/dL 59   LDL CHOL mg/dL 36       Condition on Discharge: stable    Discharge Medications     Discharge Medications        Continue These Medications        Instructions Start Date   atorvastatin 10 MG tablet  Commonly known as: LIPITOR   10 mg, Oral, Nightly      valsartan 320 MG tablet  Commonly known as: DIOVAN   320 mg, Oral, Nightly               Discharge Diet:   Diet Instructions      Heart Healthy Diet            Activity at Discharge:   Activity Instructions        Activity as tolerated           Discharge disposition: home    Follow-up Appointments  No future appointments.      Test Results Pending at Discharge       WARREN Mccallum, [facility]  04/27/24  13:38 EDT    Time: Discharge 35 min  Electronically signed by WARREN Mccallum, 04/27/24, 1:38 PM EDT.

## 2024-04-27 NOTE — NURSING NOTE
0800 pt is A/Ox4; VSS; afebrile; no c/o pain; up ad be; encouraged with her discharge orders today

## 2024-04-27 NOTE — OUTREACH NOTE
Prep Survey      Flowsheet Row Responses   [facility] patient discharged from? [facility]   Is LACE score < 7 ? No   Eligibility [facility]   Date of Admission 04/25/24   Date of Discharge 04/27/24   Discharge Disposition Home or Self Care   Discharge diagnosis Diverticulitis  [Left Heart Cath]   Does the patient have one of the following disease processes/diagnoses(primary or secondary)? Other   Does the patient have Home health ordered? No   Is there a DME ordered? No   Prep survey completed? Yes            Thais BRAXTON - Registered Nurse

## 2024-04-29 ENCOUNTER — TRANSITIONAL CARE MANAGEMENT TELEPHONE ENCOUNTER (OUTPATIENT)
Dept: CALL CENTER | Facility: HOSPITAL | Age: 84
End: 2024-04-29
Payer: MEDICARE

## 2024-04-29 NOTE — OUTREACH NOTE
Call Center TCM Note      Flowsheet Row Responses   [facility] patient discharged from? [place]   Does the patient have one of the following disease processes/diagnoses(primary or secondary)? Other   TCM attempt successful? Yes   Call start time 0924   Call end time 0925   Discharge diagnosis Diverticulitis   Person spoke with today (if not patient) and relationship pt   Meds reviewed with patient/caregiver? Yes   Is the patient having any side effects they believe may be caused by any medication additions or changes? No   Does the patient have all medications ordered at discharge? Yes   Is the patient taking all medications as directed (includes completed medication regime)? Yes   Comments Awaiting call from Cardiology office for fu appt   Does the patient have an appointment with their PCP within 7-14 days of discharge? No appointments available   Nursing Interventions Routed TCM call to PCP office   Psychosocial issues? No   Did the patient receive a copy of their discharge instructions? Yes   Nursing interventions Reviewed instructions with patient   What is the patient's perception of their health status since discharge? Improving   Is the patient/caregiver able to teach back signs and symptoms related to disease process for when to call PCP? Yes   Is the patient/caregiver able to teach back signs and symptoms related to disease process for when to call 911? Yes   Is the patient/caregiver able to teach back the hierarchy of who to call/visit for symptoms/problems? PCP, Specialist, Home health nurse, Urgent Care, ED, 911 Yes   If the patient is a current smoker, are they able to teach back resources for cessation? Not a smoker   TCM call completed? Yes   Wrap up additional comments Pt shares she is doing better, and denies any abdominal/chest pain. Pt reports have a normal BM without any issues/discomfort. No medication changes/issues. Will route message to PCP office as there are no appts available  that satisfy TCM. Pt is awaiting call from Cardiology office to call with fu appt.   Call end time 0925   Would this patient benefit from a Referral to [facility] Social Work? No   Is the patient interested in additional calls from an ambulatory ? No            Sharron Lehman RN    4/29/2024, 09:28 EDT

## 2024-05-30 ENCOUNTER — OFFICE VISIT (OUTPATIENT)
Dept: CARDIOLOGY | Facility: CLINIC | Age: 84
End: 2024-05-30
Payer: MEDICARE

## 2024-05-30 VITALS
HEART RATE: 82 BPM | DIASTOLIC BLOOD PRESSURE: 60 MMHG | BODY MASS INDEX: 33.99 KG/M2 | WEIGHT: 204 LBS | HEIGHT: 65 IN | OXYGEN SATURATION: 96 % | SYSTOLIC BLOOD PRESSURE: 144 MMHG

## 2024-05-30 DIAGNOSIS — I10 ESSENTIAL HYPERTENSION: Primary | ICD-10-CM

## 2024-05-30 NOTE — PROGRESS NOTES
"      CARDIOLOGY    Chuy Orellana MD    ENCOUNTER DATE:  05/30/2024    Viktoria Villasenor / 83 y.o. / female        CHIEF COMPLAINT / REASON FOR OFFICE VISIT     Hospital Follow Up Visit (04/27/2024 )  Chest pain  Hypertension    HISTORY OF PRESENT ILLNESS       HPI  Viktoria Villasenor is a 83 y.o. female who presents today for reevaluation after recent hospitalization.  Patient was to undergo a GI procedure which she subsequently complained about some chest discomforts.  Patient underwent a heart catheterization because her troponins were slightly elevated.  Cath was unremarkable.  She denies chest pain shortness of breath palpitations lightheadedness swelling or fatigue.      The following portions of the patient's history were reviewed and updated as appropriate: allergies, current medications, past family history, past medical history, past social history, past surgical history and problem list.      VITAL SIGNS     Visit Vitals  /60 (BP Location: Left arm)   Pulse 82   Ht 163.8 cm (64.5\")   Wt 92.5 kg (204 lb)   SpO2 96%   BMI 34.48 kg/m²         Wt Readings from Last 3 Encounters:   05/30/24 92.5 kg (204 lb)   04/25/24 91 kg (200 lb 9.6 oz)   02/12/24 91.8 kg (202 lb 6.4 oz)     Body mass index is 34.48 kg/m².      REVIEW OF SYSTEMS   ROS        PHYSICAL EXAMINATION     Vitals reviewed.   Constitutional:       Appearance: Healthy appearance.   Pulmonary:      Effort: Pulmonary effort is normal.   Cardiovascular:      Normal rate. Regular rhythm. Normal S1. Normal S2.       Murmurs: There is no murmur.      No gallop.  No click. No rub.   Pulses:     Intact distal pulses.   Edema:     Peripheral edema absent.   Neurological:      Mental Status: Alert.           REVIEWED DATA     Procedures    Cardiac Procedures:      Lipid Panel          12/20/2023    11:22 1/25/2024    13:07 4/26/2024    05:24   Lipid Panel   Total Cholesterol   108    Total Cholesterol  134     Triglycerides 99  83  57    HDL " Cholesterol  63  59    VLDL Cholesterol  16  13    LDL Cholesterol   55  36    LDL/HDL Ratio   0.64          ASSESSMENT & PLAN      Diagnosis Plan   1. Essential hypertension              SUMMARY/DISCUSSION  Hypertension.  Blood pressure is slightly elevated.  She is doing fine I want her to follow her blood pressure at home and see how it is there and will intervene depending upon that.  Patient did not have a heart attack but an elevated troponin with distal luminal irregularities.  Her LDL is at 55.  She does not take any aspirin because it really upsets her stomach.  I encouraged her to exercise and follow her blood pressure and will reassess in 6 months sooner if issues arise.        MEDICATIONS         Discharge Medications            Accurate as of May 30, 2024  1:10 PM. If you have any questions, ask your nurse or doctor.                Continue These Medications        Instructions Start Date   atorvastatin 10 MG tablet  Commonly known as: LIPITOR   10 mg, Oral, Nightly      valsartan 320 MG tablet  Commonly known as: DIOVAN   320 mg, Oral, Nightly                   **Dragon Disclaimer:   Much of this encounter note is an electronic transcription/translation of spoken language to printed text. The electronic translation of spoken language may permit erroneous, or at times, nonsensical words or phrases to be inadvertently transcribed. Although I have reviewed the note for such errors, some may still exist.

## 2024-11-11 ENCOUNTER — APPOINTMENT (OUTPATIENT)
Dept: GENERAL RADIOLOGY | Facility: HOSPITAL | Age: 84
DRG: 433 | End: 2024-11-11
Payer: MEDICARE

## 2024-11-11 ENCOUNTER — APPOINTMENT (OUTPATIENT)
Dept: CT IMAGING | Facility: HOSPITAL | Age: 84
DRG: 433 | End: 2024-11-11
Payer: MEDICARE

## 2024-11-11 ENCOUNTER — HOSPITAL ENCOUNTER (INPATIENT)
Facility: HOSPITAL | Age: 84
LOS: 4 days | Discharge: HOME | DRG: 433 | End: 2024-11-15
Attending: STUDENT IN AN ORGANIZED HEALTH CARE EDUCATION/TRAINING PROGRAM | Admitting: INTERNAL MEDICINE
Payer: MEDICARE

## 2024-11-11 DIAGNOSIS — I48.91 ATRIAL FIBRILLATION WITH RAPID VENTRICULAR RESPONSE: ICD-10-CM

## 2024-11-11 DIAGNOSIS — K74.60 DECOMPENSATED CIRRHOSIS: ICD-10-CM

## 2024-11-11 DIAGNOSIS — I48.91 NEW ONSET A-FIB: Primary | ICD-10-CM

## 2024-11-11 DIAGNOSIS — J90 PLEURAL EFFUSION: ICD-10-CM

## 2024-11-11 DIAGNOSIS — K72.90 DECOMPENSATED CIRRHOSIS: ICD-10-CM

## 2024-11-11 PROBLEM — I48.0 PAROXYSMAL ATRIAL FIBRILLATION: Status: ACTIVE | Noted: 2024-11-11

## 2024-11-11 LAB
ALBUMIN SERPL-MCNC: 4 G/DL (ref 3.5–5.2)
ALBUMIN/GLOB SERPL: 1.3 G/DL
ALP SERPL-CCNC: 79 U/L (ref 39–117)
ALT SERPL W P-5'-P-CCNC: 16 U/L (ref 1–33)
ANION GAP SERPL CALCULATED.3IONS-SCNC: 10.2 MMOL/L (ref 5–15)
AST SERPL-CCNC: 18 U/L (ref 1–32)
BASOPHILS # BLD AUTO: 0.02 10*3/MM3 (ref 0–0.2)
BASOPHILS NFR BLD AUTO: 0.3 % (ref 0–1.5)
BILIRUB SERPL-MCNC: 0.8 MG/DL (ref 0–1.2)
BUN BLDA-MCNC: 11 MG/DL (ref 8–26)
BUN SERPL-MCNC: 11 MG/DL (ref 8–23)
BUN/CREAT SERPL: 11.1 (ref 7–25)
CA-I BLDA-SCNC: 1.11 MMOL/L (ref 1.15–1.33)
CALCIUM SPEC-SCNC: 9.7 MG/DL (ref 8.6–10.5)
CHLORIDE BLDA-SCNC: 107 MMOL/L (ref 98–107)
CHLORIDE SERPL-SCNC: 103 MMOL/L (ref 98–107)
CO2 BLDA-SCNC: 26.3 MMOL/L (ref 23–27)
CO2 SERPL-SCNC: 24.8 MMOL/L (ref 22–29)
CREAT BLDA-MCNC: 0.97 MG/DL (ref 0.51–1.19)
CREAT SERPL-MCNC: 0.99 MG/DL (ref 0.57–1)
D DIMER PPP FEU-MCNC: 0.93 MCGFEU/ML (ref 0–0.81)
D-LACTATE SERPL-SCNC: 1.8 MMOL/L (ref 0.5–2)
DEPRECATED RDW RBC AUTO: 43.2 FL (ref 37–54)
EGFRCR SERPLBLD CKD-EPI 2021: 57.4 ML/MIN/1.73
EOSINOPHIL # BLD AUTO: 0.18 10*3/MM3 (ref 0–0.4)
EOSINOPHIL NFR BLD AUTO: 2.5 % (ref 0.3–6.2)
ERYTHROCYTE [DISTWIDTH] IN BLOOD BY AUTOMATED COUNT: 13.2 % (ref 12.3–15.4)
GEN 5 2HR TROPONIN T REFLEX: 18 NG/L
GLOBULIN UR ELPH-MCNC: 3.1 GM/DL
GLUCOSE BLDC GLUCOMTR-MCNC: 99 MG/DL (ref 74–100)
GLUCOSE SERPL-MCNC: 105 MG/DL (ref 65–99)
HCT VFR BLD AUTO: 42.1 % (ref 34–46.6)
HGB BLD-MCNC: 13.1 G/DL (ref 12–15.9)
IMM GRANULOCYTES # BLD AUTO: 0.01 10*3/MM3 (ref 0–0.05)
IMM GRANULOCYTES NFR BLD AUTO: 0.1 % (ref 0–0.5)
INR PPP: 1.3
LIPASE SERPL-CCNC: 22 U/L (ref 13–60)
LYMPHOCYTES # BLD AUTO: 1.42 10*3/MM3 (ref 0.7–3.1)
LYMPHOCYTES NFR BLD AUTO: 19.4 % (ref 19.6–45.3)
MCH RBC QN AUTO: 27.3 PG (ref 26.6–33)
MCHC RBC AUTO-ENTMCNC: 31.1 G/DL (ref 31.5–35.7)
MCV RBC AUTO: 87.7 FL (ref 79–97)
MONOCYTES # BLD AUTO: 0.69 10*3/MM3 (ref 0.1–0.9)
MONOCYTES NFR BLD AUTO: 9.4 % (ref 5–12)
NEUTROPHILS NFR BLD AUTO: 5 10*3/MM3 (ref 1.7–7)
NEUTROPHILS NFR BLD AUTO: 68.3 % (ref 42.7–76)
NT-PROBNP SERPL-MCNC: 1323 PG/ML (ref 0–1800)
PLATELET # BLD AUTO: 127 10*3/MM3 (ref 140–450)
PMV BLD AUTO: 11.8 FL (ref 6–12)
POTASSIUM BLDA-SCNC: 4.1 MMOL/L (ref 3.5–4.5)
POTASSIUM SERPL-SCNC: 4.1 MMOL/L (ref 3.5–5.2)
PROT SERPL-MCNC: 7.1 G/DL (ref 6–8.5)
PROTHROMBIN TIME: 13.9 SECONDS (ref 11–15)
RBC # BLD AUTO: 4.8 10*6/MM3 (ref 3.77–5.28)
SODIUM BLD-SCNC: 142 MMOL/L (ref 138–146)
SODIUM SERPL-SCNC: 138 MMOL/L (ref 136–145)
TROPONIN T DELTA: -1 NG/L
TROPONIN T SERPL HS-MCNC: 19 NG/L
WBC NRBC COR # BLD AUTO: 7.32 10*3/MM3 (ref 3.4–10.8)

## 2024-11-11 PROCEDURE — 71275 CT ANGIOGRAPHY CHEST: CPT

## 2024-11-11 PROCEDURE — 84484 ASSAY OF TROPONIN QUANT: CPT | Performed by: STUDENT IN AN ORGANIZED HEALTH CARE EDUCATION/TRAINING PROGRAM

## 2024-11-11 PROCEDURE — 85610 PROTHROMBIN TIME: CPT

## 2024-11-11 PROCEDURE — 93005 ELECTROCARDIOGRAM TRACING: CPT | Performed by: STUDENT IN AN ORGANIZED HEALTH CARE EDUCATION/TRAINING PROGRAM

## 2024-11-11 PROCEDURE — 83880 ASSAY OF NATRIURETIC PEPTIDE: CPT | Performed by: STUDENT IN AN ORGANIZED HEALTH CARE EDUCATION/TRAINING PROGRAM

## 2024-11-11 PROCEDURE — 85025 COMPLETE CBC W/AUTO DIFF WBC: CPT | Performed by: STUDENT IN AN ORGANIZED HEALTH CARE EDUCATION/TRAINING PROGRAM

## 2024-11-11 PROCEDURE — 99285 EMERGENCY DEPT VISIT HI MDM: CPT

## 2024-11-11 PROCEDURE — 83690 ASSAY OF LIPASE: CPT | Performed by: STUDENT IN AN ORGANIZED HEALTH CARE EDUCATION/TRAINING PROGRAM

## 2024-11-11 PROCEDURE — 80053 COMPREHEN METABOLIC PANEL: CPT | Performed by: STUDENT IN AN ORGANIZED HEALTH CARE EDUCATION/TRAINING PROGRAM

## 2024-11-11 PROCEDURE — 80047 BASIC METABLC PNL IONIZED CA: CPT

## 2024-11-11 PROCEDURE — 85379 FIBRIN DEGRADATION QUANT: CPT | Performed by: STUDENT IN AN ORGANIZED HEALTH CARE EDUCATION/TRAINING PROGRAM

## 2024-11-11 PROCEDURE — 83605 ASSAY OF LACTIC ACID: CPT | Performed by: STUDENT IN AN ORGANIZED HEALTH CARE EDUCATION/TRAINING PROGRAM

## 2024-11-11 PROCEDURE — 71045 X-RAY EXAM CHEST 1 VIEW: CPT

## 2024-11-11 PROCEDURE — 99285 EMERGENCY DEPT VISIT HI MDM: CPT | Performed by: STUDENT IN AN ORGANIZED HEALTH CARE EDUCATION/TRAINING PROGRAM

## 2024-11-11 PROCEDURE — 74177 CT ABD & PELVIS W/CONTRAST: CPT

## 2024-11-11 PROCEDURE — 25010000002 FUROSEMIDE PER 20 MG: Performed by: STUDENT IN AN ORGANIZED HEALTH CARE EDUCATION/TRAINING PROGRAM

## 2024-11-11 PROCEDURE — 25510000001 IOPAMIDOL PER 1 ML: Performed by: STUDENT IN AN ORGANIZED HEALTH CARE EDUCATION/TRAINING PROGRAM

## 2024-11-11 PROCEDURE — 93010 ELECTROCARDIOGRAM REPORT: CPT | Performed by: STUDENT IN AN ORGANIZED HEALTH CARE EDUCATION/TRAINING PROGRAM

## 2024-11-11 PROCEDURE — 36415 COLL VENOUS BLD VENIPUNCTURE: CPT

## 2024-11-11 RX ORDER — BISACODYL 5 MG/1
5 TABLET, DELAYED RELEASE ORAL DAILY PRN
Status: DISCONTINUED | OUTPATIENT
Start: 2024-11-11 | End: 2024-11-15 | Stop reason: HOSPADM

## 2024-11-11 RX ORDER — ONDANSETRON 4 MG/1
4 TABLET, ORALLY DISINTEGRATING ORAL EVERY 6 HOURS PRN
Status: DISCONTINUED | OUTPATIENT
Start: 2024-11-11 | End: 2024-11-15 | Stop reason: HOSPADM

## 2024-11-11 RX ORDER — SODIUM CHLORIDE 0.9 % (FLUSH) 0.9 %
10 SYRINGE (ML) INJECTION AS NEEDED
Status: DISCONTINUED | OUTPATIENT
Start: 2024-11-11 | End: 2024-11-15 | Stop reason: HOSPADM

## 2024-11-11 RX ORDER — METOPROLOL SUCCINATE 50 MG/1
50 TABLET, EXTENDED RELEASE ORAL
Status: DISCONTINUED | OUTPATIENT
Start: 2024-11-11 | End: 2024-11-13

## 2024-11-11 RX ORDER — CALCIUM CARBONATE 500 MG/1
2 TABLET, CHEWABLE ORAL 3 TIMES DAILY PRN
Status: DISCONTINUED | OUTPATIENT
Start: 2024-11-11 | End: 2024-11-15 | Stop reason: HOSPADM

## 2024-11-11 RX ORDER — IOPAMIDOL 755 MG/ML
100 INJECTION, SOLUTION INTRAVASCULAR
Status: COMPLETED | OUTPATIENT
Start: 2024-11-11 | End: 2024-11-11

## 2024-11-11 RX ORDER — FUROSEMIDE 10 MG/ML
40 INJECTION INTRAMUSCULAR; INTRAVENOUS EVERY 12 HOURS
Status: DISCONTINUED | OUTPATIENT
Start: 2024-11-12 | End: 2024-11-13

## 2024-11-11 RX ORDER — PANTOPRAZOLE SODIUM 40 MG/1
40 TABLET, DELAYED RELEASE ORAL
Status: DISCONTINUED | OUTPATIENT
Start: 2024-11-12 | End: 2024-11-15 | Stop reason: HOSPADM

## 2024-11-11 RX ORDER — ONDANSETRON 2 MG/ML
4 INJECTION INTRAMUSCULAR; INTRAVENOUS EVERY 6 HOURS PRN
Status: DISCONTINUED | OUTPATIENT
Start: 2024-11-11 | End: 2024-11-15 | Stop reason: HOSPADM

## 2024-11-11 RX ORDER — BISACODYL 10 MG
10 SUPPOSITORY, RECTAL RECTAL DAILY PRN
Status: DISCONTINUED | OUTPATIENT
Start: 2024-11-11 | End: 2024-11-15 | Stop reason: HOSPADM

## 2024-11-11 RX ORDER — FUROSEMIDE 10 MG/ML
80 INJECTION INTRAMUSCULAR; INTRAVENOUS ONCE
Status: COMPLETED | OUTPATIENT
Start: 2024-11-11 | End: 2024-11-11

## 2024-11-11 RX ORDER — POLYETHYLENE GLYCOL 3350 17 G/17G
17 POWDER, FOR SOLUTION ORAL DAILY PRN
Status: DISCONTINUED | OUTPATIENT
Start: 2024-11-11 | End: 2024-11-15 | Stop reason: HOSPADM

## 2024-11-11 RX ORDER — ACETAMINOPHEN 160 MG/5ML
650 SOLUTION ORAL EVERY 4 HOURS PRN
Status: DISCONTINUED | OUTPATIENT
Start: 2024-11-11 | End: 2024-11-15 | Stop reason: HOSPADM

## 2024-11-11 RX ORDER — SODIUM CHLORIDE 0.9 % (FLUSH) 0.9 %
10 SYRINGE (ML) INJECTION EVERY 12 HOURS SCHEDULED
Status: DISCONTINUED | OUTPATIENT
Start: 2024-11-11 | End: 2024-11-15 | Stop reason: HOSPADM

## 2024-11-11 RX ORDER — NITROGLYCERIN 0.4 MG/1
0.4 TABLET SUBLINGUAL
Status: DISCONTINUED | OUTPATIENT
Start: 2024-11-11 | End: 2024-11-15 | Stop reason: HOSPADM

## 2024-11-11 RX ORDER — ATORVASTATIN CALCIUM 20 MG/1
10 TABLET, FILM COATED ORAL NIGHTLY
Status: DISCONTINUED | OUTPATIENT
Start: 2024-11-11 | End: 2024-11-15 | Stop reason: HOSPADM

## 2024-11-11 RX ORDER — SODIUM CHLORIDE 9 MG/ML
40 INJECTION, SOLUTION INTRAVENOUS AS NEEDED
Status: DISCONTINUED | OUTPATIENT
Start: 2024-11-11 | End: 2024-11-15 | Stop reason: HOSPADM

## 2024-11-11 RX ORDER — AMOXICILLIN 250 MG
2 CAPSULE ORAL 2 TIMES DAILY PRN
Status: DISCONTINUED | OUTPATIENT
Start: 2024-11-11 | End: 2024-11-15 | Stop reason: HOSPADM

## 2024-11-11 RX ORDER — ACETAMINOPHEN 325 MG/1
650 TABLET ORAL EVERY 4 HOURS PRN
Status: DISCONTINUED | OUTPATIENT
Start: 2024-11-11 | End: 2024-11-15 | Stop reason: HOSPADM

## 2024-11-11 RX ADMIN — METOPROLOL TARTRATE 5 MG: 1 INJECTION, SOLUTION INTRAVENOUS at 18:37

## 2024-11-11 RX ADMIN — ATORVASTATIN CALCIUM 10 MG: 20 TABLET, FILM COATED ORAL at 23:20

## 2024-11-11 RX ADMIN — IOPAMIDOL 100 ML: 755 INJECTION, SOLUTION INTRAVENOUS at 16:54

## 2024-11-11 RX ADMIN — METOPROLOL SUCCINATE 50 MG: 50 TABLET, EXTENDED RELEASE ORAL at 21:26

## 2024-11-11 RX ADMIN — FUROSEMIDE 80 MG: 10 INJECTION, SOLUTION INTRAMUSCULAR; INTRAVENOUS at 17:15

## 2024-11-11 RX ADMIN — Medication 10 ML: at 21:26

## 2024-11-11 RX ADMIN — IOPAMIDOL 100 ML: 755 INJECTION, SOLUTION INTRAVENOUS at 18:20

## 2024-11-11 RX ADMIN — METOPROLOL TARTRATE 5 MG: 1 INJECTION, SOLUTION INTRAVENOUS at 17:50

## 2024-11-11 RX ADMIN — METOPROLOL TARTRATE 5 MG: 1 INJECTION, SOLUTION INTRAVENOUS at 17:15

## 2024-11-11 NOTE — FSED PROVIDER NOTE
Subjective   History of Present Illness  Pt is a 83 y.o. female with PMH as listed who presents for   Chief Complaint   Patient presents with    Shortness of Breath     X several weeks,        Patient is an 83-year-old female who presents for shortness of breath that has been gradually increasing over the past several months and more acutely over the past 1 to 2 weeks.  She was diagnosed with cirrhosis in April of this year but did not follow-up with anyone.  Was going to have a colonoscopy but due to chest pain issues that also got canceled.  She is not experiencing any chest pain but does have epigastric pain, unclear if this is new.  Intermittent nausea none currently, no episodes of vomiting.  No dysuria or hematuria.  No changes in bowel movements.  She is not on any medication for her cirrhosis, no spironolactone or Lasix.  She is markedly tachycardic and tachypneic upon arrival.  Broad workup initiated given her constellation of symptoms concern for fluid volume overload acutely.    Review of Systems    Past Medical History:   Diagnosis Date    ACE-inhibitor cough     Asthma     Asymptomatic postmenopausal status     Cardiac murmur     Cholelithiasis     Colon polyp 10/2016    Conjunctivitis     right    Diverticulitis of colon 08/23    Elevated LFTs 06/28/2016    Fatty liver     GERD (gastroesophageal reflux disease)     GI (gastrointestinal bleed)     History of colon polyps     HLD (hyperlipidemia)     Hypertension     Hypopigmentation     Left shoulder tendonitis     Menopausal syndrome     Motion sickness     Plantar fasciitis of left foot     nodule    Pruritus     possibly due to Benicar    SOB (shortness of breath)     SOBOE (shortness of breath on exertion)     Thyroid cyst     UTI (urinary tract infection)     Viral syndrome        Allergies   Allergen Reactions    Lisinopril Cough       Past Surgical History:   Procedure Laterality Date    CARDIAC CATHETERIZATION      in the 80's    CARDIAC  CATHETERIZATION N/A 05/04/2016    Procedure: Left Heart Cath;  Surgeon: Dale Vasquez MD;  Location: [facility] CATH INVASIVE LOCATION;  Service:     CARDIAC CATHETERIZATION N/A 05/04/2016    Procedure: Coronary angiography;  Surgeon: Dale Vasquez MD;  Location: [facility] CATH INVASIVE LOCATION;  Service:     CARDIAC CATHETERIZATION N/A 05/04/2016    Procedure: Left ventriculography;  Surgeon: Dale Vasquez MD;  Location: [facility] CATH INVASIVE LOCATION;  Service:     CARDIAC CATHETERIZATION N/A 4/26/2024    Procedure: Left Heart Cath;  Surgeon: Devon Cardona MD;  Location: [facility] CATH INVASIVE LOCATION;  Service: Cardiovascular;  Laterality: N/A;    CARDIAC CATHETERIZATION N/A 4/26/2024    Procedure: Coronary angiography;  Surgeon: Devon Cardona MD;  Location: [facility] CATH INVASIVE LOCATION;  Service: Cardiovascular;  Laterality: N/A;    CARDIAC CATHETERIZATION N/A 4/26/2024    Procedure: Left ventriculography;  Surgeon: Devon Cardona MD;  Location: [facility] CATH INVASIVE LOCATION;  Service: Cardiovascular;  Laterality: N/A;    CHOLECYSTECTOMY  1980    COLONOSCOPY      >5 years    COLONOSCOPY  12/01/2016    tics, IH, rectal biopsy showed changes suggestive of Schwannoma     ENDOSCOPY  12/01/2016    LA grade B esophagitis, mild Schatzki ring, HH, gastric polyp, erythematous mucosa in stomach    LASIK Bilateral     RECTAL ULTRASOUND N/A 02/07/2017    Procedure: ENDOSCOPIC ULTRASOUND (LOWER);  Surgeon: Casper Rodríguez MD;  Location: [facility] ENDOSCOPY;  Service:     SHOULDER SURGERY Left 06/17/2010    Dr. Green; repair L shoulder adhesive capsulitis    SKIN BIOPSY      TOTAL ABDOMINAL HYSTERECTOMY  1989    fibroid tumors    TUBAL ABDOMINAL LIGATION         Family History   Problem Relation Age of Onset    Anxiety disorder Mother     Uterine cancer Mother     Dementia Mother     Cancer Father         Prostate Cancer     Other Father         AAA, aneurysm rupture    Leukemia Brother     [facility]  Hyperthermia Neg Hx        Social History     Socioeconomic History    Marital status:      Spouse name: Abram    Number of children: 2   Tobacco Use    Smoking status: Former     Current packs/day: 0.00     Average packs/day: 1 pack/day for 30.0 years (30.0 ttl pk-yrs)     Types: Cigarettes     Start date: 1964     Quit date: 1994     Years since quittin.8    Smokeless tobacco: Never   Vaping Use    Vaping status: Never Used   Substance and Sexual Activity    Alcohol use: No    Drug use: No    Sexual activity: Not Currently     Partners: Male           Objective   Physical Exam  Constitutional:       Appearance: Normal appearance.   HENT:      Head: Normocephalic and atraumatic.      Mouth/Throat:      Mouth: Mucous membranes are moist.      Pharynx: Oropharynx is clear.   Eyes:      Conjunctiva/sclera: Conjunctivae normal.   Cardiovascular:      Rate and Rhythm: Regular rhythm. Tachycardia present.   Pulmonary:      Effort: Pulmonary effort is normal. Tachypnea present.      Breath sounds: Normal breath sounds. No decreased breath sounds or wheezing.   Chest:      Chest wall: No tenderness.   Abdominal:      General: Abdomen is protuberant. There is distension.      Tenderness: There is abdominal tenderness (epigastric).   Musculoskeletal:      Cervical back: Neck supple.   Skin:     General: Skin is warm and dry.   Neurological:      Mental Status: She is alert.   Psychiatric:         Mood and Affect: Mood normal.         Procedures           ED Course  ED Course as of 24   1616 Patient is an 83-year-old female who presents for shortness of breath that has been gradually increasing over the past several months and more acutely over the past 1 to 2 weeks.  She was diagnosed with cirrhosis in April of this year but did not follow-up with anyone.  Was going to have a colonoscopy but due to chest pain issues that also got canceled.  She is not experiencing any chest  pain but does have epigastric pain, unclear if this is new.  Intermittent nausea none currently, no episodes of vomiting.  No dysuria or hematuria.  No changes in bowel movements.  She is not on any medication for her cirrhosis, no spironolactone or Lasix.  She is markedly tachycardic and tachypneic upon arrival.  Broad workup initiated given her constellation of symptoms concern for fluid volume overload acutely. [JF]   1649 Chest x-ray significant for pleural effusion, likely secondary to fluid volume overload from patient's cirrhosis.  EKG concerning for atrial fibrillation with RVR.  Patient given metoprolol.  No prior history of A-fib on chart review.  Patient given Lasix for fluid volume overload. [JF]   1821 CT shows decompensated cirrhosis with pleural effusions.  D-dimer was elevated CTA chest obtained, result pending.  Heart rate did improve with metoprolol, was maintaining around 110.  Spoke Dr. Woodall regarding plan for admission for decompensated cirrhosis new onset A-fib.  He discussed patient for admission.  Discussed with patient plan for admission, she understands and agrees.  All questions answered. [JF]      ED Course User Index  [JF] Yosef Ulloa MD                                           Medical Decision Making  My differential diagnosis for dyspnea includes but is not limited to:  Asthma, COPD, pneumonia, pulmonary embolus, acute respiratory distress syndrome, pneumothorax, pleural effusion, pulmonary fibrosis, congestive heart failure, myocardial infarction, DKA, uremia, acidosis, sepsis, anemia, drug related, hyperventilation, CNS disease    My differential diagnosis for abdominal pain includes but is not limited to:  Gastritis, gastroenteritis, peptic ulcer disease, GERD, esophageal perforation, acute appendicitis, mesenteric adenitis, Meckel's diverticulum, epiploic appendagitis, diverticulitis, colon cancer, ulcerative colitis, Crohn's disease, intussusception, small bowel  obstruction, adhesions, ischemic bowel, perforated viscus, ileus, obstipation, biliary colic, cholecystitis, cholelithiasis, Sterling-Yo Satya, hepatitis, pancreatitis, common bile duct obstruction, cholangitis, bile leak, splenic trauma, splenic rupture, splenic infarction, splenic abscess, abdominal abscess, ascites, spontaneous bacterial peritonitis, hernia, UTI, cystitis,ureterolithiasis, urinary obstruction, ovarian cyst, torsion, pregnancy, ectopic pregnancy, PID, pelvic abscess, mittelschmerz, endometriosis, AAA, myocardial infarction, pneumonia, cancer, porphyria, DKA, medications, sickle cell, viral syndrome, zoster      Problems Addressed:  Atrial fibrillation with rapid ventricular response: complicated acute illness or injury  Decompensated cirrhosis: complicated acute illness or injury  New onset a-fib: complicated acute illness or injury  Pleural effusion: complicated acute illness or injury    Amount and/or Complexity of Data Reviewed  Labs: ordered. Decision-making details documented in ED Course.  Radiology: ordered and independent interpretation performed. Decision-making details documented in ED Course.     Details: Chest x-ray shows bilateral pleural effusions based on my interpretation.  ECG/medicine tests: ordered.     Details: EKG  11/11/2024 at 1636  Irregularly irregular rhythm, rate 146  Normal axis, nonspecific ST changes, difficult to establish due to rate  Right bundle branch old compared to prior from April 2024  EKG interpreted by me contemporaneously by me with care  Discussion of management or test interpretation with external provider(s): Spoke with Dr. Woodall regarding results of workup thus far and plan for admission.  He agrees to admit patient, heart rate around 110 at time of admission.    Risk  Prescription drug management.  Decision regarding hospitalization.        Final diagnoses:   New onset a-fib   Atrial fibrillation with rapid ventricular response   Decompensated  cirrhosis   Pleural effusion       ED Disposition  ED Disposition       ED Disposition   Decision to Admit    Condition   --    Comment   Level of Care: Telemetry [5]   Diagnosis: Decompensated cirrhosis [[id number]]   Admitting Physician: BETSY MEDINA [[id number]]   Certification: I Certify That Inpatient Hospital Services Are Medically Necessary For Greater Than 2 Midnights                 No follow-up provider specified.       Medication List      No changes were made to your prescriptions during this visit.

## 2024-11-12 ENCOUNTER — APPOINTMENT (OUTPATIENT)
Dept: ULTRASOUND IMAGING | Facility: HOSPITAL | Age: 84
DRG: 433 | End: 2024-11-12
Payer: MEDICARE

## 2024-11-12 LAB
ALBUMIN SERPL-MCNC: 3.9 G/DL (ref 3.5–5.2)
ALBUMIN/GLOB SERPL: 1.5 G/DL
ALP SERPL-CCNC: 69 U/L (ref 39–117)
ALT SERPL W P-5'-P-CCNC: 13 U/L (ref 1–33)
ANION GAP SERPL CALCULATED.3IONS-SCNC: 13.4 MMOL/L (ref 5–15)
ANION GAP SERPL CALCULATED.3IONS-SCNC: 14.5 MMOL/L (ref 5–15)
AST SERPL-CCNC: 20 U/L (ref 1–32)
BASOPHILS # BLD AUTO: 0.06 10*3/MM3 (ref 0–0.2)
BASOPHILS NFR BLD AUTO: 0.7 % (ref 0–1.5)
BILIRUB SERPL-MCNC: 0.7 MG/DL (ref 0–1.2)
BUN SERPL-MCNC: 13 MG/DL (ref 8–23)
BUN SERPL-MCNC: 15 MG/DL (ref 8–23)
BUN/CREAT SERPL: 12.6 (ref 7–25)
BUN/CREAT SERPL: 14.6 (ref 7–25)
CALCIUM SPEC-SCNC: 8.7 MG/DL (ref 8.6–10.5)
CALCIUM SPEC-SCNC: 9.3 MG/DL (ref 8.6–10.5)
CHLORIDE SERPL-SCNC: 101 MMOL/L (ref 98–107)
CHLORIDE SERPL-SCNC: 102 MMOL/L (ref 98–107)
CO2 SERPL-SCNC: 25.6 MMOL/L (ref 22–29)
CO2 SERPL-SCNC: 26.5 MMOL/L (ref 22–29)
CREAT SERPL-MCNC: 1.03 MG/DL (ref 0.57–1)
CREAT SERPL-MCNC: 1.03 MG/DL (ref 0.57–1)
DEPRECATED RDW RBC AUTO: 38.8 FL (ref 37–54)
EGFRCR SERPLBLD CKD-EPI 2021: 54.7 ML/MIN/1.73
EGFRCR SERPLBLD CKD-EPI 2021: 54.7 ML/MIN/1.73
EOSINOPHIL # BLD AUTO: 0.2 10*3/MM3 (ref 0–0.4)
EOSINOPHIL NFR BLD AUTO: 2.4 % (ref 0.3–6.2)
ERYTHROCYTE [DISTWIDTH] IN BLOOD BY AUTOMATED COUNT: 12.6 % (ref 12.3–15.4)
GLOBULIN UR ELPH-MCNC: 2.6 GM/DL
GLUCOSE SERPL-MCNC: 84 MG/DL (ref 65–99)
GLUCOSE SERPL-MCNC: 88 MG/DL (ref 65–99)
HBA1C MFR BLD: 5.4 % (ref 4.8–5.6)
HCT VFR BLD AUTO: 38.4 % (ref 34–46.6)
HGB BLD-MCNC: 12.5 G/DL (ref 12–15.9)
IMM GRANULOCYTES # BLD AUTO: 0.03 10*3/MM3 (ref 0–0.05)
IMM GRANULOCYTES NFR BLD AUTO: 0.4 % (ref 0–0.5)
INR PPP: 1.18 (ref 0.9–1.1)
LYMPHOCYTES # BLD AUTO: 1.81 10*3/MM3 (ref 0.7–3.1)
LYMPHOCYTES NFR BLD AUTO: 22 % (ref 19.6–45.3)
MCH RBC QN AUTO: 27.6 PG (ref 26.6–33)
MCHC RBC AUTO-ENTMCNC: 32.6 G/DL (ref 31.5–35.7)
MCV RBC AUTO: 84.8 FL (ref 79–97)
MONOCYTES # BLD AUTO: 0.93 10*3/MM3 (ref 0.1–0.9)
MONOCYTES NFR BLD AUTO: 11.3 % (ref 5–12)
NEUTROPHILS NFR BLD AUTO: 5.18 10*3/MM3 (ref 1.7–7)
NEUTROPHILS NFR BLD AUTO: 63.2 % (ref 42.7–76)
NRBC BLD AUTO-RTO: 0 /100 WBC (ref 0–0.2)
PLATELET # BLD AUTO: 142 10*3/MM3 (ref 140–450)
PMV BLD AUTO: 12 FL (ref 6–12)
POTASSIUM SERPL-SCNC: 3.9 MMOL/L (ref 3.5–5.2)
POTASSIUM SERPL-SCNC: 3.9 MMOL/L (ref 3.5–5.2)
PROT SERPL-MCNC: 6.5 G/DL (ref 6–8.5)
PROTHROMBIN TIME: 15.2 SECONDS (ref 11.7–14.2)
QT INTERVAL: 311 MS
QTC INTERVAL: 497 MS
RBC # BLD AUTO: 4.53 10*6/MM3 (ref 3.77–5.28)
SODIUM SERPL-SCNC: 140 MMOL/L (ref 136–145)
SODIUM SERPL-SCNC: 143 MMOL/L (ref 136–145)
TSH SERPL DL<=0.05 MIU/L-ACNC: 1.19 UIU/ML (ref 0.27–4.2)
WBC NRBC COR # BLD AUTO: 8.21 10*3/MM3 (ref 3.4–10.8)

## 2024-11-12 PROCEDURE — 99222 1ST HOSP IP/OBS MODERATE 55: CPT | Performed by: NURSE PRACTITIONER

## 2024-11-12 PROCEDURE — 97162 PT EVAL MOD COMPLEX 30 MIN: CPT | Performed by: PHYSICAL THERAPIST

## 2024-11-12 PROCEDURE — 84443 ASSAY THYROID STIM HORMONE: CPT | Performed by: INTERNAL MEDICINE

## 2024-11-12 PROCEDURE — 99222 1ST HOSP IP/OBS MODERATE 55: CPT | Performed by: INTERNAL MEDICINE

## 2024-11-12 PROCEDURE — 85025 COMPLETE CBC W/AUTO DIFF WBC: CPT | Performed by: INTERNAL MEDICINE

## 2024-11-12 PROCEDURE — 85610 PROTHROMBIN TIME: CPT | Performed by: INTERNAL MEDICINE

## 2024-11-12 PROCEDURE — 25010000002 DIGOXIN PER 500 MCG: Performed by: INTERNAL MEDICINE

## 2024-11-12 PROCEDURE — 80053 COMPREHEN METABOLIC PANEL: CPT | Performed by: INTERNAL MEDICINE

## 2024-11-12 PROCEDURE — 83036 HEMOGLOBIN GLYCOSYLATED A1C: CPT | Performed by: INTERNAL MEDICINE

## 2024-11-12 PROCEDURE — 25010000002 FUROSEMIDE PER 20 MG: Performed by: INTERNAL MEDICINE

## 2024-11-12 PROCEDURE — 97165 OT EVAL LOW COMPLEX 30 MIN: CPT

## 2024-11-12 PROCEDURE — 76705 ECHO EXAM OF ABDOMEN: CPT

## 2024-11-12 RX ORDER — DIGOXIN 0.25 MG/ML
250 INJECTION INTRAMUSCULAR; INTRAVENOUS ONCE
Status: COMPLETED | OUTPATIENT
Start: 2024-11-12 | End: 2024-11-12

## 2024-11-12 RX ADMIN — Medication 10 ML: at 20:17

## 2024-11-12 RX ADMIN — ACETAMINOPHEN 325MG 650 MG: 325 TABLET ORAL at 20:12

## 2024-11-12 RX ADMIN — FUROSEMIDE 40 MG: 10 INJECTION, SOLUTION INTRAMUSCULAR; INTRAVENOUS at 05:32

## 2024-11-12 RX ADMIN — ATORVASTATIN CALCIUM 10 MG: 20 TABLET, FILM COATED ORAL at 20:12

## 2024-11-12 RX ADMIN — DIGOXIN 250 MCG: 0.25 INJECTION INTRAMUSCULAR; INTRAVENOUS at 11:12

## 2024-11-12 RX ADMIN — PANTOPRAZOLE SODIUM 40 MG: 40 TABLET, DELAYED RELEASE ORAL at 05:32

## 2024-11-12 RX ADMIN — FUROSEMIDE 40 MG: 10 INJECTION, SOLUTION INTRAMUSCULAR; INTRAVENOUS at 17:31

## 2024-11-12 RX ADMIN — Medication 10 ML: at 09:11

## 2024-11-12 NOTE — PLAN OF CARE
Goal Outcome Evaluation:  Plan of Care Reviewed With: patient           Outcome Evaluation: Pt presented from home with SOA and was admitted with decompensated cirrhois and Afib. Pt reports she is independently mobile with no AD at baseline. 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 Her son lives close by. Pt demonstrated independent mobility both in the room and ambulating in the hallway. Seated rest break required after using the bathroom secondary to decreased O2 and elevated HR. After resting, pt was able to ambulate in the hallwayx 200 ft on RA. O2 sat dropped to 88-90% while ambulating on RA. Pt recovered quickly after returning to the room. Pt is safe to d/c home when medically stable. Encouraged pt to ambulate in the hallway as tolerated 2-3 times per day with nursing supervision. PT will sign off.    Anticipated Discharge Disposition (PT): home

## 2024-11-12 NOTE — PLAN OF CARE
Goal Outcome Evaluation:  Plan of Care Reviewed With: patient        Progress: no change  Outcome Evaluation: 83 y.o. admitted to [facility] complaining of shortness of breath with a history of HTN, HLD, asthma, pre-diabetes, and MARSHALL cirrhosis. prior to admission pt. 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 Pt. reports he son lives 2 doors down and assists also. Currently pt. presents near baseline, improved reports of SOB, O2 SATS 96% at rest. 93% with activity on RA. Pt. does not require ongoing skilled OT intervention and rec DC back home. OT to sign off at this time    Anticipated Discharge Disposition (OT): home

## 2024-11-12 NOTE — CONSULTS
I was requested to see patient regarding her Advance Directive.  She was told that we did not have a copy on file.  I checked her chart and we do have it..  It is an old AD, but still good to be honored.  We also had prayer together.

## 2024-11-12 NOTE — CONSULTS
Gastroenterology   Initial Inpatient Consult Note    Referring Provider: Dr. Fajardo    Reason for Consultation: Cirrhosis, needs scopes    Subjective     History of present illness:    83 y.o. female with history of MARSHALL cirrhosis, prediabetes, HTN, and HLD presented to the hospital complaint of shortness of breath GI consulted for management of cirrhosis.    Patient has noticed worsening shortness of breath over the past couple weeks heart palpitations and 10 pound weight gain.  She was found to have volume overload and A-fib with RVR, she has been treated with Lasix and metoprolol, and symptoms have improved.  She reports abdominal distention and abdomen felt very tight yesterday, it is better today.  She has some upper abdominal tenderness with palpation.  Denies nausea or vomiting.  She has some dysphagia with food but is able to eat.  Denies reflux or heartburn.  Denies constipation, diarrhea, or blood in stool.    She underwent a workup for her cirrhosis in 2018, workup consistent with fatty liver. CT last year showed cirrhosis with portal hypertension. She reports she was always healthy until the past 10 years when she began to gain weight.  No history of alcohol abuse.  She formerly smoked tobacco.  She was diagnosed with mild sigmoid diverticulitis last year, symptoms resolved with oral antibiotics.  Reports prior episodes of diverticulitis as well.  She was supposed to have a bidirectional endoscopy in April of this year but she developed chest pain so the procedures were cancelled. She underwent cardiac workup including echocardiogram and coronary angiography which were negative.  She has not followed up with GI or rescheduled scopes.          Results Reviewed:  MELD = 9   Protime-INR (11/12/2024 05:24) -INR 1.18, pro time 15.2  Comprehensive Metabolic Panel (11/12/2024 05:24) -CR 1.03, LFTs normal  CBC & Differential (11/12/2024 05:24) -normal    CT Angiogram Chest Pulmonary Embolism (11/11/2024 18:19)  - No pulmonary embolism. Mild bilateral pleural effusions. Mildly prominent, nonspecific subcarinal lymph node  CT Abdomen Pelvis With Contrast (11/11/2024 16:54) -proximal duodenitis, cirrhosis.  Mild bilateral pleural effusions.  Nonspecific presacral edema.  Nonobstructive right nephrolithiasis.  CT Abdomen Pelvis With Contrast (08/11/2023 10:49) - Hepatic cirrhosis with sequela of portal hypertension to include portosystemic collaterals. No ascites.  Diverticulosis with possible acute mild diverticulitis or resolving diverticulitis.    LOWER EUS (02/07/2017 10:27) -normal  SCANNED - COLONOSCOPY (12/01/2016) -diverticulosis, benign rectal schwannoma  Reports prior EGD, no records available        Past Medical History:  Past Medical History:   Diagnosis Date    ACE-inhibitor cough     Asthma     Asymptomatic postmenopausal status     Cardiac murmur     Cholelithiasis     Colon polyp 10/2016    Conjunctivitis     right    Diverticulitis of colon 08/23    Elevated LFTs 06/28/2016    Fatty liver     GERD (gastroesophageal reflux disease)     GI (gastrointestinal bleed)     History of colon polyps     HLD (hyperlipidemia)     Hypertension     Hypopigmentation     Left shoulder tendonitis     Menopausal syndrome     Motion sickness     Plantar fasciitis of left foot     nodule    Pruritus     possibly due to Benicar    SOB (shortness of breath)     SOBOE (shortness of breath on exertion)     Thyroid cyst     UTI (urinary tract infection)     Viral syndrome      Past Surgical History:  Past Surgical History:   Procedure Laterality Date    CARDIAC CATHETERIZATION      in the 80's    CARDIAC CATHETERIZATION N/A 05/04/2016    Procedure: Left Heart Cath;  Surgeon: Dale Vasquez MD;  [facility]:  [facility];  Service:     CARDIAC CATHETERIZATION N/A 05/04/2016    Procedure: Coronary angiography;  Surgeon: Dale Vasquez MD;  [facility]:  [facility];  Service:     CARDIAC CATHETERIZATION N/A  2016    Procedure: Left ventriculography;  Surgeon: Dale Vasquez MD;  [facility]:  [facility];  Service:     CARDIAC CATHETERIZATION N/A 2024    Procedure: Left Heart Cath;  Surgeon: Devon Cardona MD;  [facility];  Service: Cardiovascular;  Laterality: N/A;    CARDIAC CATHETERIZATION N/A 2024    Procedure: Coronary angiography;  Surgeon: Devon Cardona MD;  Location: [facility] C[facility];  Service: Cardiovascular;  Laterality: N/A;    CARDIAC CATHETERIZATION N/A 2024    Procedure: Left ventriculography;  Surgeon: Devon Cardona MD;  Location: [facility] C[facility] LOC[facility];  Service: Cardiovascular;  Laterality: N/A;    CHOLECYSTECTOMY      COLONOSCOPY      >5 years    COLONOSCOPY  2016    tics, IH, rectal biopsy showed changes suggestive of Schwannoma     ENDOSCOPY  2016    LA grade B esophagitis, mild Schatzki ring, HH, gastric polyp, erythematous mucosa in stomach    LASIK Bilateral     RECTAL ULTRASOUND N/A 2017    Procedure: ENDOSCOPIC ULTRASOUND (LOWER);  Surgeon: Casper Rodríguez MD;  Location: [facility] ENDOSCOPY;  Service:     SHOULDER SURGERY Left 2010    Dr. Green; repair L shoulder adhesive capsulitis    SKIN BIOPSY      TOTAL ABDOMINAL HYSTERECTOMY      fibroid tumors    TUBAL ABDOMINAL LIGATION        Social History:   Social History     Tobacco Use    Smoking status: Former     Current packs/day: 0.00     Average packs/day: 1 pack/day for 30.0 years (30.0 ttl pk-yrs)     Types: Cigarettes     Start date: 1964     Quit date: 1994     Years since quittin.8    Smokeless tobacco: Never   Substance Use Topics    Alcohol use: No      Family History:  Family History   Problem Relation Age of Onset    Anxiety disorder Mother     Uterine cancer Mother     Dementia Mother     Cancer Father         Prostate Cancer     Other Father         AAA, aneurysm rupture     Leukemia Brother     Dayanna Hyperthermia Neg Hx        Home Meds:  Medications Prior to Admission   Medication Sig Dispense Refill Last Dose/Taking    atorvastatin (LIPITOR) 10 MG tablet Take 1 tablet by mouth Every Night for 270 days. 90 tablet 2 Taking    valsartan (DIOVAN) 320 MG tablet Take 1 tablet by mouth Every Night for 270 days. 90 tablet 2 Taking     Current Meds:   atorvastatin, 10 mg, Oral, Nightly  furosemide, 40 mg, Intravenous, Q12H  metoprolol succinate XL, 50 mg, Oral, Q24H  pantoprazole, 40 mg, Oral, Q AM  sodium chloride, 10 mL, Intravenous, Q12H      Allergies:  Allergies   Allergen Reactions    Lisinopril Cough         Review of Systems  Review of Systems   Constitutional:  Positive for unexpected weight change.   HENT: Negative.     Eyes: Negative.    Respiratory:  Positive for shortness of breath.    Cardiovascular:  Positive for leg swelling.   Gastrointestinal:  Positive for abdominal distention. Negative for abdominal pain, constipation, diarrhea, nausea and vomiting.   Endocrine: Negative.    Genitourinary: Negative.    Musculoskeletal: Negative.    Skin: Negative.    Allergic/Immunologic: Negative.    Neurological: Negative.    Hematological: Negative.    Psychiatric/Behavioral: Negative.           Objective     Vital Signs  Temp:  [97.5 °F (36.4 °C)-98 °F (36.7 °C)] 97.5 °F (36.4 °C)  Heart Rate:  [110-135] 113  Resp:  [16-20] 18  BP: ()/(65-93) 108/69      Physical Exam  Vitals reviewed.   Constitutional:       Appearance: She is obese.   HENT:      Head: Normocephalic.      Nose: Nose normal.   Eyes:      Pupils: Pupils are equal, round, and reactive to light.   Cardiovascular:      Rate and Rhythm: Normal rate.      Pulses: Normal pulses.   Pulmonary:      Effort: Pulmonary effort is normal.      Breath sounds: Normal breath sounds.   Abdominal:      General: There is distension.      Palpations: Abdomen is soft.      Tenderness: There is abdominal tenderness.      Comments:  Upper abdominal tenderness   Musculoskeletal:         General: Normal range of motion.      Cervical back: Normal range of motion.      Right lower leg: Edema present.      Left lower leg: Edema present.   Skin:     General: Skin is warm and dry.   Neurological:      General: No focal deficit present.      Mental Status: She is alert and oriented to person, place, and time.   Psychiatric:         Mood and Affect: Mood normal.             Results Review:     Results from last 7 days   Lab Units 11/12/24  0524 11/11/24  1619   WBC 10*3/mm3 8.21 7.32   HEMOGLOBIN g/dL 12.5 13.1   HEMATOCRIT % 38.4 42.1   PLATELETS 10*3/mm3 142 127*     Results from last 7 days   Lab Units 11/12/24  0524 11/11/24  1640 11/11/24  1619   SODIUM mmol/L 140  --  138   POTASSIUM mmol/L 3.9  --  4.1   CHLORIDE mmol/L 101  --  103   CO2 mmol/L 25.6  --  24.8   BUN mg/dL 13  --  11   CREATININE mg/dL 1.03* 0.97 0.99   CALCIUM mg/dL 8.7  --  9.7   BILIRUBIN mg/dL 0.7  --  0.8   ALK PHOS U/L 69  --  79   ALT (SGPT) U/L 13  --  16   AST (SGOT) U/L 20  --  18   GLUCOSE mg/dL 88  --  105*     Results from last 7 days   Lab Units 11/12/24  0524 11/11/24  1655   INR  1.18* 1.30     Lab Results   Lab Value Date/Time    LIPASE 22 11/11/2024 1619    LIPASE 24 08/11/2023 1018       Radiology:  CT Angiogram Chest Pulmonary Embolism   Final Result       No pulmonary embolism. Mild bilateral pleural effusions. Mildly   prominent, nonspecific subcarinal lymph node, as described.       This report was finalized on 11/11/2024 6:52 PM by Dr. Hubert Em M.D on Workstation: [email or web address]          CT Abdomen Pelvis With Contrast   Final Result       1. Evidence of proximal duodenitis, endoscopic correlation could be   considered as indicated.       2. Cirrhosis. Mild bilateral pleural effusions. Minimal pelvic free   fluid. Nonspecific presacral edema.       3. No obstructive uropathy. Nonobstructive right nephrolithiasis.               This report was  finalized on 11/11/2024 5:41 PM by Dr. Hubert Em M.D on Workstation: [id number]          XR Chest 1 View   Final Result   1. FINDINGS are consistent with mild to moderate congestive heart   failure. There could be an element of atelectasis or pneumonia in the   right lung base. Please correlate with the clinical findings.   2. Follow-up films recommended       This report was finalized on 11/11/2024 5:06 PM by Dr. Laci Gannon M.D on Workstation: [id number]                Assessment & Plan   Active Hospital Problems    Diagnosis     **Decompensated cirrhosis     Pleural effusion, bacterial     Paroxysmal atrial fibrillation     Prediabetes     Essential hypertension     Other hyperlipidemia     Mild intermittent asthma without complication        Assessment:  MARSHALL cirrhosis - known diagnosis since at least 2018. MELD = 9.  Abdominal distention -rule out ascites  Duodenitis  A-fib with RVR -new diagnosis, heart rate improved, not currently on AC  Prediabetes, HTN, HLD      Plan:  Recommend EGD for screening for varices and also due for surveillance colonoscopy, this can be done as outpatient. No symptoms reported at this time to indicate scopes need to be done this admission.  She likely will be started on anticoagulation, will need GI clearance in the future to hold this for scopes.  Check liver ultrasound, ammonia, and AFP  Continue daily PPI  Continue diuretics, consider paracentesis if ascites seen on ultrasound  She is established with Dr. Marcos Franco      I discussed the patients findings and my recommendations with patient, family, primary care team, and Dr. Garcia .             WARREN Ibarra  [facility] [place] [address]  Office: [phone]

## 2024-11-12 NOTE — CONSULTS
Cardiology History & Physical / Consultation      Patient Name: Viktoria Villasenor  Age/Sex: 83 y.o. female  : 1940  MRN: [id number]    Date of Admission: 2024  Date of Encounter Visit: 24  Encounter Provider: Chuy Orellana MD  Referring Provider: No Known Provider  Place of Service: [facility] CARDIOLOGY  Patient Care Team:  Magen Olson MD as PCP - General  Magen Olson MD as PCP - Family Medicine  Ralph Turner MD as Consulting Physician (Otolaryngology)  Casper Hodge MD as Consulting Physician (Gastroenterology)          Subjective:     Chief Complaint: Shortness of breath    Reason for consultation: Atrial fibrillation    History of Present Illness:  Viktoria Villasenor is a 83 y.o. female who follows with me in the office.  She has past medical history significant for diabetes, hypertension, and hyperlipidemia.    In 2024, she had some complaints of chest pain after a GI procedure.  She was diagnosed with cirrhosis but did not follow up with anyone.  Her troponins were slightly elevated and she underwent a heart catheterization.  This was unremarkable.  I saw her in May 2024.  At that time, she was doing well.  She had a slightly elevated blood pressure.  There were no changes made at that time.    She presented to the emergency department at [facility] on 2024 with complaints of shortness of breath that has been getting worse over the past several months.  Upon arrival to the emergency department her blood pressure was 142/89 with a heart rate of 121.  She was afebrile, tachypneic, with an SpO2 of 95% on room air.  A CT angiogram of the chest was done that showed mild bilateral pleural effusions, mildly prominent, nonspecific subcarinal lymph node, and no PE.    Workup was notable for an initial troponin of 9, proBNP of 1323, unremarkable BMP, normal transaminases, D-dimer 0.93, platelets 127.  Her EKG showed atrial  fibrillation with RVR.  A-fib is a new diagnosis for her.  She was transferred to [facility] for further evaluation and treatment.    Cardiology has been asked to see this patient for new onset A-fib.  Patient says she can feel her heart fluttering from time to time especially at nighttime before she goes to bed.    Cath 4/26/2024  Conclusions:   1. Left main: Normal  2. LAD: Luminal irregularities mid segment  3. LCX: Dominant vessel.  Normal.  4. RCA: Small caliber, nondominant vessel.  Normal.  5.  Normal left ventricular size and systolic function    ECHO 4/25/2024    The left ventricular cavity is mildly dilated.    Left ventricular systolic function is normal. Left ventricular ejection fraction appears to be 66 - 70%.    Left ventricular diastolic function is consistent with (grade I) impaired relaxation.    Normal right ventricular cavity size and systolic function noted.    The left atrial cavity is mildly dilated.    The aortic valve leaflets are mildly to moderately calcified    Moderate mitral valve regurgitation is present.    Mild tricuspid valve regurgitation is present.    Calculated right ventricular systolic pressure from tricuspid regurgitation is 31 mmHg.    There is no evidence of pericardial effusion       Past Medical History:  Past Medical History:   Diagnosis Date    ACE-inhibitor cough     Asthma     Asymptomatic postmenopausal status     Cardiac murmur     Cholelithiasis     Colon polyp 10/2016    Conjunctivitis     right    Diverticulitis of colon 08/23    Elevated LFTs 06/28/2016    Fatty liver     GERD (gastroesophageal reflux disease)     GI (gastrointestinal bleed)     History of colon polyps     HLD (hyperlipidemia)     Hypertension     Hypopigmentation     Left shoulder tendonitis     Menopausal syndrome     Motion sickness     Plantar fasciitis of left foot     nodule    Pruritus     possibly due to Benicar    SOB (shortness of breath)     SOBOE (shortness of breath  on exertion)     Thyroid cyst     UTI (urinary tract infection)     Viral syndrome        Past Surgical History:   Procedure Laterality Date    CARDIAC CATHETERIZATION      in the 80's    CARDIAC CATHETERIZATION N/A 05/04/2016    Procedure: Left Heart Cath;  Surgeon: Dale Vasquez MD;  Location: [facility] CATH INVASIVE LOCATION;  Service:     CARDIAC CATHETERIZATION N/A 05/04/2016    Procedure: Coronary angiography;  Surgeon: Dale Vasquez MD;  Location: [facility] CATH INVASIVE LOCATION;  Service:     CARDIAC CATHETERIZATION N/A 05/04/2016    Procedure: Left ventriculography;  Surgeon: Dale Vasquez MD;  [facility];  Service:     CARDIAC CATHETERIZATION N/A 4/26/2024    Procedure: Left Heart Cath;  Surgeon: Devon Cardona MD;  Location: [facility] CATH INVASIVE LOCATION;  Service: Cardiovascular;  Laterality: N/A;    CARDIAC CATHETERIZATION N/A 4/26/2024    Procedure: Coronary angiography;  Surgeon: Devon Cardona MD;  Location: [facility] CATH INVASIVE LOCATION;  Service: Cardiovascular;  Laterality: N/A;    CARDIAC CATHETERIZATION N/A 4/26/2024    Procedure: Left ventriculography;  Surgeon: Devon Cardona MD;  Location: [facility] CATH INVASIVE LOCATION;  Service: Cardiovascular;  Laterality: N/A;    CHOLECYSTECTOMY  1980    COLONOSCOPY      >5 years    COLONOSCOPY  12/01/2016    tics, IH, rectal biopsy showed changes suggestive of Schwannoma     ENDOSCOPY  12/01/2016    LA grade B esophagitis, mild Schatzki ring, HH, gastric polyp, erythematous mucosa in stomach    LASIK Bilateral     RECTAL ULTRASOUND N/A 02/07/2017    Procedure: ENDOSCOPIC ULTRASOUND (LOWER);  Surgeon: Casper Rodríguez MD;  Location: [facility] ENDOSCOPY;  Service:     SHOULDER SURGERY Left 06/17/2010    Dr. Green; repair L shoulder adhesive capsulitis    SKIN BIOPSY      TOTAL ABDOMINAL HYSTERECTOMY  1989    fibroid tumors    TUBAL ABDOMINAL LIGATION         Home Medications:   Medications Prior to  Admission   Medication Sig Dispense Refill Last Dose/Taking    atorvastatin (LIPITOR) 10 MG tablet Take 1 tablet by mouth Every Night for 270 days. 90 tablet 2 Taking    valsartan (DIOVAN) 320 MG tablet Take 1 tablet by mouth Every Night for 270 days. 90 tablet 2 Taking       Allergies:  Allergies   Allergen Reactions    Lisinopril Cough       Past Social History:  Social History     Socioeconomic History    Marital status:      Spouse name: Abram    Number of children: 2   Tobacco Use    Smoking status: Former     Current packs/day: 0.00     Average packs/day: 1 pack/day for 30.0 years (30.0 ttl pk-yrs)     Types: Cigarettes     Start date: 1964     Quit date: 1994     Years since quittin.8    Smokeless tobacco: Never   Vaping Use    Vaping status: Never Used   Substance and Sexual Activity    Alcohol use: No    Drug use: No    Sexual activity: Not Currently     Partners: Male       Past Family History: History reviewed. No pertinent family history.   Family History   Problem Relation Age of Onset    Anxiety disorder Mother     Uterine cancer Mother     Dementia Mother     Cancer Father         Prostate Cancer     Other Father         AAA, aneurysm rupture    Leukemia Brother     Malig Hyperthermia Neg Hx        Review of Systems        Objective:     Objective:  Temp:  [97.5 °F (36.4 °C)-98 °F (36.7 °C)] 97.5 °F (36.4 °C)  Heart Rate:  [110-135] 113  Resp:  [16-20] 18  BP: ()/(65-93) 108/69    Intake/Output Summary (Last 24 hours) at 2024 1025  Last data filed at 2024  Gross per 24 hour   Intake 240 ml   Output --   Net 240 ml     Body mass index is 35.62 kg/m².      24  1556 24   Weight: 89.8 kg (198 lb) 95.6 kg (210 lb 12.8 oz)           Physical Exam:   Vitals reviewed.   Constitutional:       Appearance: Healthy appearance.   Pulmonary:      Effort: Pulmonary effort is normal.   Cardiovascular:      Normal rate. Irregularly irregular rhythm. Normal  S1. Normal S2.       Murmurs: There is no murmur.      No gallop.  No click. No rub.   Pulses:     Intact distal pulses.   Edema:     Peripheral edema absent.   Neurological:      Mental Status: Alert.          Labs:   Lab Review:     Results from last 7 days   Lab Units 11/12/24  0524 11/11/24  1640 11/11/24  1619   SODIUM mmol/L 140  --  138   POTASSIUM mmol/L 3.9  --  4.1   CHLORIDE mmol/L 101  --  103   CO2 mmol/L 25.6  --  24.8   BUN mg/dL 13  --  11   CREATININE mg/dL 1.03* 0.97 0.99   GLUCOSE mg/dL 88  --  105*   CALCIUM mg/dL 8.7  --  9.7   AST (SGOT) U/L 20  --  18   ALT (SGPT) U/L 13  --  16     Results from last 7 days   Lab Units 11/11/24  1803 11/11/24  1619   HSTROP T ng/L 18* 19*     Results from last 7 days   Lab Units 11/12/24  0524   WBC 10*3/mm3 8.21   HEMOGLOBIN g/dL 12.5   HEMATOCRIT % 38.4   PLATELETS 10*3/mm3 142     Results from last 7 days   Lab Units 11/12/24  0524 11/11/24  1655   INR  1.18* 1.30                 Results from last 7 days   Lab Units 11/11/24  1619   PROBNP pg/mL 1,323.0                 PREVIOUS EKG:    EKG:                   Assessment:       Decompensated cirrhosis    Other hyperlipidemia    Essential hypertension    Mild intermittent asthma without complication    Prediabetes    Pleural effusion, bacterial    Paroxysmal atrial fibrillation        Plan:     1.  New onset atrial fibrillation.  Patient just had a workup earlier this year.  She had a negative cath she had an echocardiogram that showed normal LV function with mild left atrial enlargement.  At this point I would rate control ultimately will need to anticoagulate her.  This is not an easy decision due to her cirrhosis possible duodenitis.  Will have to see what evolves before we ultimately make that decision.  2.  Hypertension  3.  Decompensated cirrhosis.  4.  Duodenitis.  Patient was started on a PPI.  5.  Will follow heart rate.  Blood pressure is little bit low at times.  Will give 1 dose of digoxin and see  how she responds.    Thank you for allowing me to participate in the care of Viktoria Villasenor. Feel free to contact me directly with any further questions or concerns.    Chuy Orellana MD  [facility]  11/12/24  10:25 EST

## 2024-11-12 NOTE — PROGRESS NOTES
Name: Viktoria Villasenor ADMIT: 2024   : 1940  PCP: Magen Olson MD    MRN: [id number] LOS: 1 days   AGE/SEX: 83 y.o. female  ROOM: [facility]     Subjective   Subjective   No acute events overnight. Patient feeling better. Still endorsing some SOB but it has improved with diuresis. Denies any chest pain.     Objective   Objective     Vital Signs  Temp:  [97.5 °F (36.4 °C)-98.2 °F (36.8 °C)] 98.2 °F (36.8 °C)  Heart Rate:  [105-135] 105  Resp:  [16-20] 20  BP: ()/(65-93) 113/70  SpO2:  [92 %-97 %] 92 %  on  Flow (L/min) (Oxygen Therapy):  [2] 2;   Device (Oxygen Therapy): room air  Body mass index is 35.62 kg/m².    Physical Exam  General: Alert, no acute distress. Lying in bed. Chronically ill-appearing.   ENT: No conjunctival injection or scleral icterus. Moist mucous membranes.  Neuro: Eyes open and moving in all directions, strength normal in all extremities, no focal deficits.   Lungs: Clear to auscultation bilaterally. No wheeze or crackles. No distress.   Heart: RRR, no murmurs.   Abdomen: Soft, non-distended. Normal bowel sounds. Tenderness to palpation in RUQ.   Ext: Warm and well-perfused.  Skin: No rashes or lesions. IV site without swelling or erythema.     Results Review     I reviewed the patient's new clinical results:  Results from last 7 days   Lab Units 24  0524 24  1619   WBC 10*3/mm3 8.21 7.32   HEMOGLOBIN g/dL 12.5 13.1   PLATELETS 10*3/mm3 142 127*     Results from last 7 days   Lab Units 24  1502 24  0524 24  1640 24  1619   SODIUM mmol/L 143 140  --  138   POTASSIUM mmol/L 3.9 3.9  --  4.1   CHLORIDE mmol/L 102 101  --  103   CO2 mmol/L 26.5 25.6  --  24.8   BUN mg/dL 15 13  --  11   CREATININE mg/dL 1.03* 1.03* 0.97 0.99   GLUCOSE mg/dL 84 88  --  105*   EGFR mL/min/1.73 54.7* 54.7*  --  57.4*     Results from last 7 days   Lab Units 24  0524 24  1619   ALBUMIN g/dL 3.9 4.0   BILIRUBIN mg/dL 0.7 0.8   ALK PHOS U/L 69 79    AST (SGOT) U/L 20 18   ALT (SGPT) U/L 13 16     Results from last 7 days   Lab Units 11/12/24  1502 11/12/24  0524 11/11/24  1619   CALCIUM mg/dL 9.3 8.7 9.7   ALBUMIN g/dL  --  3.9 4.0     Results from last 7 days   Lab Units 11/11/24  1619   LACTATE mmol/L 1.8     Glucose   Date/Time Value Ref Range Status   11/11/2024 1640 99 74 - 100 mg/dL Final     Comment:     Serial Number: [id number]:  [id number]       US Liver    Result Date: 11/12/2024  1. Cirrhotic liver. No ultrasound evidence of liver lesion 2. Right pleural effusion 3. Cholecystectomy    This report was finalized on 11/12/2024 4:09 PM by Dr. Neel Blanca M.D on Workstation: [id number]      CT Angiogram Chest Pulmonary Embolism    Result Date: 11/11/2024   No pulmonary embolism. Mild bilateral pleural effusions. Mildly prominent, nonspecific subcarinal lymph node, as described.  This report was finalized on 11/11/2024 6:52 PM by Dr. uHbert Em M.D on Workstation: [id number]      CT Abdomen Pelvis With Contrast    Result Date: 11/11/2024   1. Evidence of proximal duodenitis, endoscopic correlation could be considered as indicated.  2. Cirrhosis. Mild bilateral pleural effusions. Minimal pelvic free fluid. Nonspecific presacral edema.  3. No obstructive uropathy. Nonobstructive right nephrolithiasis.    This report was finalized on 11/11/2024 5:41 PM by Dr. Hubert Em M.D on Workstation: [id number]      XR Chest 1 View    Result Date: 11/11/2024  1. FINDINGS are consistent with mild to moderate congestive heart failure. There could be an element of atelectasis or pneumonia in the right lung base. Please correlate with the clinical findings. 2. Follow-up films recommended  This report was finalized on 11/11/2024 5:06 PM by Dr. Laci Gannon M.D on Workstation: [id number]       I have personally reviewed all medications:  Scheduled Medications  atorvastatin, 10 mg, Oral, Nightly  furosemide, 40 mg, Intravenous, Q12H  metoprolol  succinate XL, 50 mg, Oral, Q24H  pantoprazole, 40 mg, Oral, Q AM  sodium chloride, 10 mL, Intravenous, Q12H    Infusions   Diet  Diet: Cardiac; Low Sodium (2g); Texture: Regular (IDDSI 7); Fluid Consistency: Thin (IDDSI 0)      Intake/Output Summary (Last 24 hours) at 11/12/2024 1712  Last data filed at 11/11/2024 2030  Gross per 24 hour   Intake 240 ml   Output --   Net 240 ml       Assessment/Plan     Active Hospital Problems    Diagnosis  POA    **Decompensated cirrhosis [K72.90, K74.60]  Yes    Pleural effusion, bacterial [J90]  Yes    Paroxysmal atrial fibrillation [I48.0]  Yes    Prediabetes [R73.03]  Yes    Essential hypertension [I10]  Yes    Other hyperlipidemia [E78.49]  Yes    Mild intermittent asthma without complication [J45.20]  Yes      Resolved Hospital Problems   No resolved problems to display.       83 y.o. female with Decompensated cirrhosis.    Volume Overload/Pleural Effusions  - possibly from decompensated cirrhosis vs acute diastolic CHF from Afib/RVR  - continue IV lasix and monitor ins/outs, daily weights, and chemistries-pleural effusions are not likely to require thoracentesis  - no evidence of ascites on CT scan or US   - appreciate cardiology input   - possibly transition to PO Lasix and Spironolactone tomorrow      Afib/RVR  - new diagnosis this admission  - continue metoprolol succinate   - hold off on AC for now given cirrhosis and possible duodenitis  - consulted cardiology   - s/p digoxin, HR has improved      MARSHALL Cirrhosis  - diuresis as above  - no evidence of PSE or coagulopathy, has mild thrombocytopenia  - GI consulted  - abdominal US with no evidence of ascites   - discussed with GI APRN at bedside  - will need screening EGD      Duodenitis  - continue PPI     HTN  - BP is actually on the low side now  - hold ARB    SCDs for DVT prophylaxis.  Full code.  Discussed with patient, family, nursing staff, and consulting provider.  Anticipate discharge home timing yet to be  determined.      Kristin Fajardo MD  [facility]  11/12/24  17:12 EST

## 2024-11-12 NOTE — PLAN OF CARE
Goal Outcome Evaluation:   Pt admitted this shift . Database and assessment completed . Cardiology and GI consult was placed . A fib on tele, 2 L NC . Turn self. Plan of care is ongoing.

## 2024-11-12 NOTE — THERAPY EVALUATION
Patient Name: Viktoria Villasenor  : 1940    MRN: [id number]                              Today's Date: 2024       Admit Date: 2024    Visit Dx:     ICD-10-CM ICD-9-CM   1. New onset a-fib  I48.91 427.31   2. Atrial fibrillation with rapid ventricular response  I48.91 427.31   3. Decompensated cirrhosis  K72.90 571.5    K74.60 572.8   4. Pleural effusion  J90 511.9     Patient Active Problem List   Diagnosis    Other hyperlipidemia    Essential hypertension    SOBOE (shortness of breath on exertion)    Thyroid cysts    Mild intermittent asthma without complication    Esophagitis    Prediabetes    Class 1 obesity due to excess calories with serious comorbidity and body mass index (BMI) of 33.0 to 33.9 in adult    Steatosis of liver    Cardiac murmur    Hearing problem of both ears    Diverticulitis    Chest pain    NSTEMI, initial episode of care    Nonischemic nontraumatic myocardial injury    Decompensated cirrhosis    Pleural effusion, bacterial    Paroxysmal atrial fibrillation     Past Medical History:   Diagnosis Date    ACE-inhibitor cough     Asthma     Asymptomatic postmenopausal status     Cardiac murmur     Cholelithiasis     Colon polyp 10/2016    Conjunctivitis     right    Diverticulitis of colon     Elevated LFTs 2016    Fatty liver     GERD (gastroesophageal reflux disease)     GI (gastrointestinal bleed)     History of colon polyps     HLD (hyperlipidemia)     Hypertension     Hypopigmentation     Left shoulder tendonitis     Menopausal syndrome     Motion sickness     Plantar fasciitis of left foot     nodule    Pruritus     possibly due to Benicar    SOB (shortness of breath)     SOBOE (shortness of breath on exertion)     Thyroid cyst     UTI (urinary tract infection)     Viral syndrome      Past Surgical History:   Procedure Laterality Date    CARDIAC CATHETERIZATION      in the     CARDIAC CATHETERIZATION N/A 2016    Procedure: Left Heart Cath;  Surgeon:  Dale Vasquez MD;  [facility]AS[facility] LOC[facility];  Service:     CARDIAC CATHETERIZATION N/A 05/04/2016    Procedure: Coronary angiography;  Surgeon: Dale Vasquez MD;  [facility];  Service:     CARDIAC CATHETERIZATION N/A 05/04/2016    Procedure: Left ventriculography;  Surgeon: Dale Vasquez MD;  Location: [facility] CATH INVASIVE LOCATION;  Service:     CARDIAC CATHETERIZATION N/A 4/26/2024    Procedure: Left Heart Cath;  Surgeon: Devon Cardona MD;  Location: [facility] CATH INVASIVE LOCATION;  Service: Cardiovascular;  Laterality: N/A;    CARDIAC CATHETERIZATION N/A 4/26/2024    Procedure: Coronary angiography;  Surgeon: Devon Cardona MD;  [facility]: [facility];  Service: Cardiovascular;  Laterality: N/A;    CARDIAC CATHETERIZATION N/A 4/26/2024    Procedure: Left ventriculography;  Surgeon: Devon Cardona MD;  Location: [facility] CATH INVASIVE LOCATION;  Service: Cardiovascular;  Laterality: N/A;    CHOLECYSTECTOMY  1980    COLONOSCOPY      >5 years    COLONOSCOPY  12/01/2016    tics, IH, rectal biopsy showed changes suggestive of Schwannoma     ENDOSCOPY  12/01/2016    LA grade B esophagitis, mild Schatzki ring, HH, gastric polyp, erythematous mucosa in stomach    LASIK Bilateral     RECTAL ULTRASOUND N/A 02/07/2017    Procedure: ENDOSCOPIC ULTRASOUND (LOWER);  Surgeon: Casper Rodríguez MD;  Location: [facility] ENDOSCOPY;  Service:     SHOULDER SURGERY Left 06/17/2010    Dr. Green; repair L shoulder adhesive capsulitis    SKIN BIOPSY      TOTAL ABDOMINAL HYSTERECTOMY  1989    fibroid tumors    TUBAL ABDOMINAL LIGATION        General Information       Row Name 11/12/24 0924          OT Time and Intention    Document Type discharge evaluation/summary  -AG     Mode of Treatment occupational therapy  -AG       Row Name 11/12/24 0924          General Information    Patient Profile Reviewed yes  -AG     Prior Level of Function independent:   -AG     Barriers to Rehab none identified  -AG       Row Name 11/12/24 0924          Living Environment    People in Home spouse  spouse with dementia, pt serves as primary caregiver  -AG       Row Name 11/12/24 0924          Cognition    Orientation Status (Cognition) oriented x 4  -AG       Row Name 11/12/24 0924          Safety Issues/Impairments Affecting Functional Mobility    Impairments Affecting Function (Mobility) endurance/activity tolerance  -AG               User Key  (r) = Recorded By, (t) = Taken By, (c) = Cosigned By      Initials Name Provider Type    AG Jalen Echevarria OT Occupational Therapist                     Mobility/ADL's       Row Name 11/12/24 0924          Bed Mobility    Bed Mobility supine-sit;sit-supine  -AG     Supine-Sit [place] (Bed Mobility) modified independence  -AG     Sit-Supine [place] (Bed Mobility) modified independence  -AG     Assistive Device (Bed Mobility) head of bed elevated;bed rails  -AG       Row Name 11/12/24 0924          Transfers    Transfers sit-stand transfer;toilet transfer  -AG       Row Name 11/12/24 0924          Sit-Stand Transfer    Sit-Stand [place] (Transfers) independent  -AG     Comment, (Sit-Stand Transfer) no AD  -AG       Row Name 11/12/24 0924          Toilet Transfer    [place] Level (Toilet Transfer) modified independence  -AG     Assistive Device (Toilet Transfer) commode;grab bars/safety frame  -AG       Row Name 11/12/24 0924          Functional Mobility    Functional Mobility- Comment household simulated distance with no AD  -AG       Row Name 11/12/24 0924          Activities of Daily Living    BADL Assessment/Intervention grooming;toileting;lower body dressing  -AG       Row Name 11/12/24 0924          Grooming Assessment/Training    [place] Level (Grooming) grooming skills;wash face, hands;independent  -AG     Position (Grooming) sink side  -AG       Row Name 11/12/24 0924          Toileting  Assessment/Training    [place] Level (Toileting) toileting skills;adjust/manage clothing;perform perineal hygiene;modified independence  -AG     Assistive Devices (Toileting) commode  -AG       Row Name 11/12/24 0924          Lower Body Dressing Assessment/Training    [place] Level (Lower Body Dressing) lower body dressing skills;don;socks;standby assist  -AG     Position (Lower Body Dressing) edge of bed sitting  -AG               User Key  (r) = Recorded By, (t) = Taken By, (c) = Cosigned By      Initials Name Provider Type    AG Jalen Echevarria OT Occupational Therapist                   Obj/Interventions       Row Name 11/12/24 0925          Sensory Assessment (Somatosensory)    Sensory Assessment (Somatosensory) sensation intact  -AG       Row Name 11/12/24 0925          Vision Assessment/Intervention    Visual Impairment/Limitations WFL  -AG       Row Name 11/12/24 0925          Range of Motion Comprehensive    General Range of Motion no range of motion deficits identified  -AG       Row Name 11/12/24 0925          Strength Comprehensive (MMT)    General Manual Muscle Testing (MMT) Assessment no strength deficits identified  -AG       Row Name 11/12/24 0925          Balance    Balance Assessment sitting static balance;sitting dynamic balance;standing dynamic balance;standing static balance  -AG     Static Sitting Balance independent  -AG     Dynamic Sitting Balance independent  -AG     Position, Sitting Balance unsupported  -AG     Static Standing Balance independent  -AG     Dynamic Standing Balance standby assist  -AG     Position/Device Used, Standing Balance unsupported  -AG               User Key  (r) = Recorded By, (t) = Taken By, (c) = Cosigned By      Initials Name Provider Type    AG Jalen Echevarria OT Occupational Therapist                   Goals/Plan    No documentation.                  Clinical Impression       Row Name 11/12/24 0926          Pain Assessment    Pretreatment Pain  Rating 0/10 - no pain  -AG     Posttreatment Pain Rating 0/10 - no pain  -AG       Row Name 11/12/24 0926          Plan of Care Review    Plan of Care Reviewed With patient  -AG     Progress no change  -AG     Outcome Evaluation 83 y.o. admitted to [facility] complaining of shortness of breath with a history of HTN, HLD, asthma, pre-diabetes, and MARSHALL cirrhosis. prior to admission pt. 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 Pt. reports he son lives 2 doors down and assists also. Currently pt. presents near baseline, improved reports of SOB, O2 SATS 96% at rest. 93% with activity on RA. Pt. does not require ongoing skilled OT intervention and rec DC back home. OT to sign off at this time  -AG       Row Name 11/12/24 0926          Therapy Assessment/Plan (OT)    Therapy Frequency (OT) evaluation only  -AG       Row Name 11/12/24 0926          Therapy Plan Review/Discharge Plan (OT)    Anticipated Discharge Disposition (OT) home  -       Row Name 11/12/24 0926          Positioning and Restraints    Pre-Treatment Position in bed  -AG     Post Treatment Position bed  -AG     In Bed notified nsg;fowlers;call light within reach;encouraged to call for assist;with other staff  -               User Key  (r) = Recorded By, (t) = Taken By, (c) = Cosigned By      Initials Name Provider Type    Jalen Dawkins, OT Occupational Therapist                   Outcome Measures       Row Name 11/12/24 0963          How much help from another is currently needed...    Putting on and taking off regular lower body clothing? 4  -AG     Bathing (including washing, rinsing, and drying) 4  -AG     Toileting (which includes using toilet bed pan or urinal) 4  -AG     Putting on and taking off regular upper body clothing 4  -AG     Taking care of personal grooming (such as brushing teeth) 4  -AG     Eating meals 4  -AG     AM-PAC 6 Clicks Score (OT) 24  -AG       Row Name 11/12/24 0186           How much help from another person do you currently need...    Turning from your back to your side while in flat bed without using bedrails? 4  -KE     Moving from lying on back to sitting on the side of a flat bed without bedrails? 4  -KE     Moving to and from a bed to a chair (including a wheelchair)? 4  -KE     Standing up from a chair using your arms (e.g., wheelchair, bedside chair)? 4  -KE     Climbing 3-5 steps with a railing? 4  -KE     To walk in hospital room? 4  -KE     AM-PAC 6 Clicks Score (PT) 24  -KE       Row Name 11/12/24 0928          Functional Assessment    Outcome Measure Options AM-PAC 6 Clicks Daily Activity (OT)  -AG               User Key  (r) = Recorded By, (t) = Taken By, (c) = Cosigned By      Initials Name Provider Type    Tessie Romeo, RN Registered Nurse    Jalen Dawkins OT Occupational Therapist                    Occupational Therapy Education        No education to display                  OT Recommendation and Plan  Therapy Frequency (OT): evaluation only  Plan of Care Review  Plan of Care Reviewed With: patient  Progress: no change  Outcome Evaluation: 83 y.o. admitted to [facility] complaining of shortness of breath with a history of HTN, HLD, asthma, pre-diabetes, and MARSHALL cirrhosis. prior to admission pt. 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 Pt. reports he son lives 2 doors down and assists also. Currently pt. presents near baseline, improved reports of SOB, O2 SATS 96% at rest. 93% with activity on RA. Pt. does not require ongoing skilled OT intervention and rec DC back home. OT to sign off at this time     Time Calculation:   Evaluation Complexity (OT)  Review Occupational Profile/Medical/Therapy History Complexity: brief/low complexity  Clinical Decision Making Complexity (OT): problem focused assessment/low complexity  Overall Complexity of Evaluation (OT): low complexity     Time Calculation- OT       Row Name  11/12/24 0928             Time Calculation- OT    OT Start Time 0824  -AG      OT Stop Time 0835  -AG      OT Time Calculation (min) 11 min  -AG      Total Timed Code Minutes- OT 0 minute(s)  -AG      OT Received On 11/12/24  -AG         Untimed Charges    OT Eval/Re-eval Minutes 11  -AG         Total Minutes    Untimed Charges Total Minutes 11  -AG       Total Minutes 11  -AG                User Key  (r) = Recorded By, (t) = Taken By, (c) = Cosigned By      Initials Name Provider Type     Jalen Echevarria OT Occupational Therapist                  Therapy Charges for Today       Code Description Service Date Service Provider Modifiers Qty    [id number] HC OT EVAL LOW COMPLEXITY 2 11/12/2024 Jalen Echevarria OT GO 1                 Jalen Echevarria OT  11/12/2024

## 2024-11-12 NOTE — CASE MANAGEMENT/SOCIAL WORK
Discharge Planning Assessment  [facility]     Patient Name: Viktoria Villasenor  MRN: [id number]  Today's Date: 11/12/2024    Admit Date: 11/11/2024    Plan: Home, family to transport   Discharge Needs Assessment       Row Name 11/12/24 1400       Living Environment    People in Home spouse    Current Living Arrangements home    Primary Care Provided by self    Provides Primary Care For no one    Family Caregiver if Needed spouse    Able to Return to Prior Arrangements yes       Resource/Environmental Concerns    Resource/Environmental Concerns none       Transition Planning    Patient/Family Anticipates Transition to home with family    Patient/Family Anticipated Services at Transition none    Transportation Anticipated family or friend will provide       Discharge Needs Assessment    Equipment Currently Used at Home none    Concerns to be Addressed denies needs/concerns at this time;no discharge needs identified                   Discharge Plan       Row Name 11/12/24 1400       Plan    Plan Home, family to transport    Patient/Family in Agreement with Plan yes    Plan Comments CCP spoke with patient, spouse Abram, and son Guzman at bedside; explained role, verified facesheet, and discussed dc plan. Patient uses no DME and is independent for mobility at baseline. She lives with her spouse in a 2 level home with no steps to enter. She has no issues with ambulation. She has no history of HH or SNF. Patient denies any HH or DME needs and reports plan is home via family transport. ANGELA, BETINAW                  Continued Care and Services - Admitted Since 11/11/2024    No active coordination exists for this encounter.          Demographic Summary       Row Name 11/12/24 4467       General Information    Admission Type inpatient    Arrived From home    Referral Source admission list    Reason for Consult discharge planning    Preferred Language English       Contact Information    Permission Granted to Share Info With  family/designee                   Functional Status       Row Name 11/12/24 1400       Functional Status    Usual Activity Tolerance good    Current Activity Tolerance good       Functional Status, IADL    Medications independent    Meal Preparation independent    Housekeeping independent    Laundry independent    Shopping independent       Mental Status    General Appearance WDL WDL                   Psychosocial    No documentation.                  Abuse/Neglect    No documentation.                  Legal    No documentation.                  Substance Abuse    No documentation.                  Patient Forms    No documentation.                     LUIS Foreman

## 2024-11-12 NOTE — PLAN OF CARE
Goal Outcome Evaluation:      A/O x4, room air, a fib on tele. One time dose of IV dig given. IV Lasix continues. US liver completed. Up ad be in room

## 2024-11-12 NOTE — THERAPY DISCHARGE NOTE
Patient Name: Viktoria Villasenor  : 1940    MRN: [id number]                              Today's Date: 2024       Admit Date: 2024    Visit Dx:     ICD-10-CM ICD-9-CM   1. New onset a-fib  I48.91 427.31   2. Atrial fibrillation with rapid ventricular response  I48.91 427.31   3. Decompensated cirrhosis  K72.90 571.5    K74.60 572.8   4. Pleural effusion  J90 511.9     Patient Active Problem List   Diagnosis    Other hyperlipidemia    Essential hypertension    SOBOE (shortness of breath on exertion)    Thyroid cysts    Mild intermittent asthma without complication    Esophagitis    Prediabetes    Class 1 obesity due to excess calories with serious comorbidity and body mass index (BMI) of 33.0 to 33.9 in adult    Steatosis of liver    Cardiac murmur    Hearing problem of both ears    Diverticulitis    Chest pain    NSTEMI, initial episode of care    Nonischemic nontraumatic myocardial injury    Decompensated cirrhosis    Pleural effusion, bacterial    Paroxysmal atrial fibrillation     Past Medical History:   Diagnosis Date    ACE-inhibitor cough     Asthma     Asymptomatic postmenopausal status     Cardiac murmur     Cholelithiasis     Colon polyp 10/2016    Conjunctivitis     right    Diverticulitis of colon     Elevated LFTs 2016    Fatty liver     GERD (gastroesophageal reflux disease)     GI (gastrointestinal bleed)     History of colon polyps     HLD (hyperlipidemia)     Hypertension     Hypopigmentation     Left shoulder tendonitis     Menopausal syndrome     Motion sickness     Plantar fasciitis of left foot     nodule    Pruritus     possibly due to Benicar    SOB (shortness of breath)     SOBOE (shortness of breath on exertion)     Thyroid cyst     UTI (urinary tract infection)     Viral syndrome      Past Surgical History:   Procedure Laterality Date    CARDIAC CATHETERIZATION      in the     CARDIAC CATHETERIZATION N/A 2016    Procedure: Left Heart Cath;  Surgeon:  Dale Vasquez MD;  Location: [facility] CATH INVASIVE LOCATION;  Service:     CARDIAC CATHETERIZATION N/A 05/04/2016    Procedure: Coronary angiography;  Surgeon: Dale Vasquez MD;  Location: [facility] CATH INVASIVE LOCATION;  Service:     CARDIAC CATHETERIZATION N/A 05/04/2016    Procedure: Left ventriculography;  Surgeon: Dale Vasquez MD;  Location: [facility] CATH INVASIVE LOCATION;  Service:     CARDIAC CATHETERIZATION N/A 4/26/2024    Procedure: Left Heart Cath;  Surgeon: Devon Cardona MD;  Location: [facility] CATH INVASIVE LOCATION;  Service: Cardiovascular;  Laterality: N/A;    CARDIAC CATHETERIZATION N/A 4/26/2024    Procedure: Coronary angiography;  Surgeon: Devon Cardona MD;  Location: [facility] CATH INVASIVE LOCATION;  Service: Cardiovascular;  Laterality: N/A;    CARDIAC CATHETERIZATION N/A 4/26/2024    Procedure: Left ventriculography;  Surgeon: Devon Cardona MD;  Location: [facility] CATH INVASIVE LOCATION;  Service: Cardiovascular;  Laterality: N/A;    CHOLECYSTECTOMY  1980    COLONOSCOPY      >5 years    COLONOSCOPY  12/01/2016    tics, IH, rectal biopsy showed changes suggestive of Schwannoma     ENDOSCOPY  12/01/2016    LA grade B esophagitis, mild Schatzki ring, HH, gastric polyp, erythematous mucosa in stomach    LASIK Bilateral     RECTAL ULTRASOUND N/A 02/07/2017    Procedure: ENDOSCOPIC ULTRASOUND (LOWER);  Surgeon: Casper Rodríguez MD;  Location: [facility] ENDOSCOPY;  Service:     SHOULDER SURGERY Left 06/17/2010    Dr. Green; repair L shoulder adhesive capsulitis    SKIN BIOPSY      TOTAL ABDOMINAL HYSTERECTOMY  1989    fibroid tumors    TUBAL ABDOMINAL LIGATION        General Information       Row Name 11/12/24 5883          Physical Therapy Time and Intention    Document Type evaluation  -KH     Mode of Treatment individual therapy;physical therapy  -       Row Name 11/12/24 0840          General Information    Patient Profile Reviewed yes  -KH     Prior Level of Function  independent:  -     Barriers to Rehab none identified  -       Row Name 11/12/24 1333          Living Environment    People in Home spouse  -[facility] Name 11/12/24 1333          Home Main Entrance    Number of Stairs, Main Entrance two  -     Stair Railings, Main Entrance none  -       Row Name 11/12/24 1333          Cognition    Orientation Status (Cognition) oriented x 4  -[facility] Name 11/12/24 1333          Safety Issues/Impairments Affecting Functional Mobility    Impairments Affecting Function (Mobility) endurance/activity tolerance  -               User Key  (r) = Recorded By, (t) = Taken By, (c) = Cosigned By      Initials Name Provider Type    Jonna Santana, PT Physical Therapist                   Mobility       Row Name 11/12/24 1334          Bed Mobility    Supine-Sit [place] (Bed Mobility) modified independence  -     Sit-Supine [place] (Bed Mobility) modified independence  -     Assistive Device (Bed Mobility) head of bed elevated;bed rails  -[facility] Name 11/12/24 1334          Sit-Stand Transfer    Sit-Stand [place] (Transfers) supervision  -[facility] Name 11/12/24 1334          Gait/Stairs (Locomotion)    [place] Level (Gait) standby assist  -     Distance in Feet (Gait) 200  -     Deviations/Abnormal Patterns (Gait) gait speed decreased  -     Comment, (Gait/Stairs) distance limited by SOA  -               User Key  (r) = Recorded By, (t) = Taken By, (c) = Cosigned By      Initials Name Provider Type    Jonna Santana, PT Physical Therapist                   Obj/Interventions       [facility] Name 11/12/24 1334          Range of Motion Comprehensive    General Range of Motion no range of motion deficits identified  -[facility] Name 11/12/24 1334          Strength Comprehensive (MMT)    General Manual Muscle Testing (MMT) Assessment no strength deficits identified  -[facility] Name 11/12/24 1334          Balance    Static  Sitting Balance independent  -     Dynamic Sitting Balance independent  -     Position, Sitting Balance unsupported;sitting edge of bed  -     Static Standing Balance independent  -     Dynamic Standing Balance standby assist  -               User Key  (r) = Recorded By, (t) = Taken By, (c) = Cosigned By      Initials Name Provider Type    Jonna Santana, PT Physical Therapist                   Goals/Plan    No documentation.                  Clinical Impression       Row Name 11/12/24 1335 11/12/24 1301       Pain    Pretreatment Pain Rating 0/10 - no pain  -KH 0/10 - no pain  -KH    Posttreatment Pain Rating 0/10 - no pain  -KH 0/10 - no pain  -      Row Name 11/12/24 1335          Plan of Care Review    Plan of Care Reviewed With patient  -     Outcome Evaluation Pt presented from home with SOA and was admitted with decompensated cirrhois and Afib. Pt reports she is independently mobile with no AD at baseline. 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 Her son lives close by. Pt demonstrated independent mobility both in the room and ambulating in the hallway. Seated rest break required after using the bathroom secondary to decreased O2 and elevated HR. After resting, pt was able to ambulate in the hallwayx 200 ft on RA. O2 sat dropped to 88-90% while ambulating on RA. Pt recovered quickly after returning to the room. Pt is safe to d/c home when medically stable. Encouraged pt to ambulate in the hallway as tolerated 2-3 times per day with nursing supervision. PT will sign off.  -       Row Name 11/12/24 1335 11/12/24 1301       Therapy Assessment/Plan (PT)    Patient/Family Therapy Goals Statement (PT) return to PLOF  - --    Criteria for Skilled Interventions Met (PT) no problems identified which require skilled intervention  - no problems identified which require skilled intervention  -    Therapy Frequency (PT) evaluation only  - evaluation only  -      Row Name  11/12/24 1335          Vital Signs    Pre SpO2 (%) 96  -KH     O2 Delivery Pre Treatment room air  -KH     Intra SpO2 (%) 88  -KH     O2 Delivery Intra Treatment room air  -KH     Post SpO2 (%) 94  -KH     O2 Delivery Post Treatment room air  -KH       Row Name 11/12/24 1301          Positioning and Restraints    Pre-Treatment Position in bed  no alarm  -KH     Post Treatment Position bed  -KH     In Bed sitting EOB;call light within reach;encouraged to call for assist  -KH               User Key  (r) = Recorded By, (t) = Taken By, (c) = Cosigned By      Initials Name Provider Type    Jonna Santana, PT Physical Therapist                   Outcome Measures       Row Name 11/12/24 1340 11/12/24 0850       How much help from another person do you currently need...    Turning from your back to your side while in flat bed without using bedrails? 4  -KH 4  -KE    Moving from lying on back to sitting on the side of a flat bed without bedrails? 4  -KH 4  -KE    Moving to and from a bed to a chair (including a wheelchair)? 4  -KH 4  -KE    Standing up from a chair using your arms (e.g., wheelchair, bedside chair)? 4  -KH 4  -KE    Climbing 3-5 steps with a railing? 3  -KH 4  -KE    To walk in hospital room? 4  -KH 4  -KE    AM-PAC 6 Clicks Score (PT) 23  -KH 24  -KE    Highest Level of Mobility Goal 7 --> Walk 25 feet or more  -KH 8 --> Walked 250 feet or more  -KE      Row Name 11/12/24 1340 11/12/24 0928       Functional Assessment    Outcome Measure Options AM-PAC 6 Clicks Basic Mobility (PT)  -KH AM-PAC 6 Clicks Daily Activity (OT)  -AG              User Key  (r) = Recorded By, (t) = Taken By, (c) = Cosigned By      Initials Name Provider Type    Jonna Santana, PT Physical Therapist    Tessie Romeo, RN Registered Nurse    Jalen Dawkins, OT Occupational Therapist                  Physical Therapy Education        No education to display                  PT Recommendation and Plan      Outcome Evaluation: Pt presented from home with SOA and was admitted with decompensated cirrhois and Afib. Pt reports she is independently mobile with no AD at baseline. 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 Her son lives close by. Pt demonstrated independent mobility both in the room and ambulating in the hallway. Seated rest break required after using the bathroom secondary to decreased O2 and elevated HR. After resting, pt was able to ambulate in the hallwayx 200 ft on RA. O2 sat dropped to 88-90% while ambulating on RA. Pt recovered quickly after returning to the room. Pt is safe to d/c home when medically stable. Encouraged pt to ambulate in the hallway as tolerated 2-3 times per day with nursing supervision. PT will sign off.     Time Calculation:         PT Charges       Row Name 11/12/24 1300             Time Calculation    Start Time 1055  -      Stop Time 1111  -KH      Time Calculation (min) 16 min  -KH      PT Received On 11/12/24  -                User Key  (r) = Recorded By, (t) = Taken By, (c) = Cosigned By      Initials Name Provider Type    Jonna Santana, PT Physical Therapist                  Therapy Charges for Today       Code Description Service Date Service Provider Modifiers Qty    [id number] HC PT EVAL MOD COMPLEXITY 2 11/12/2024 Jonna Galloway, PT GP 1            PT G-Codes  Outcome Measure Options: AM-PAC 6 Clicks Basic Mobility (PT)  AM-PAC 6 Clicks Score (PT): 23  AM-PAC 6 Clicks Score (OT): 24    PT Discharge Summary  Anticipated Discharge Disposition (PT): home    Jonna Galloway PT  11/12/2024

## 2024-11-12 NOTE — H&P
Patient Name:  Viktoria Villasenor  YOB: 1940  MRN:  [id number]  Admit Date:  11/11/2024  Patient Care Team:  Magen Olson MD as PCP - General  Magen Olson MD as PCP - Family Medicine  [facility]ing Physician (Otolaryngology)  Casper Hodge MD as Consulting Physician (Gastroenterology)      Subjective   History Present Illness     Chief Complaint   Patient presents with    Shortness of Breath     X several weeks,        Ms. Villasenor is a 83 y.o. former smoker with a history of HTN, HLD, asthma, pre-diabetes, and MARSHALL cirrhosis that presents to [facility] complaining of shortness of breath which has been worsening over the past couple of weeks. She has had intermittent heart palpitations and a 10lb weight gain during this time as well. She presented to the [facility] ER and was found to have volume overload and afib with RVR and she received lasix and IV metoprolol. She states that she is already feeling much better.   She underwent a workup for her cirrhosis in 2018 and was supposed to have an EGD/Colonoscopy here in April of this year but she developed chest pain so the procedures were cancelled. She underwent cardiac workup including echocardiogram and coronary angiography which were negative. She did not end up following up with GI after this.    Review of Systems   All other systems reviewed and are negative.       Personal History     Past Medical History:   Diagnosis Date    ACE-inhibitor cough     Asthma     Asymptomatic postmenopausal status     Cardiac murmur     Cholelithiasis     Colon polyp 10/2016    Conjunctivitis     right    Diverticulitis of colon 08/23    Elevated LFTs 06/28/2016    Fatty liver     GERD (gastroesophageal reflux disease)     GI (gastrointestinal bleed)     History of colon polyps     HLD (hyperlipidemia)     Hypertension     Hypopigmentation     Left shoulder tendonitis     Menopausal syndrome     Motion sickness      Plantar fasciitis of left foot     nodule    Pruritus     possibly due to Benicar    SOB (shortness of breath)     SOBOE (shortness of breath on exertion)     Thyroid cyst     UTI (urinary tract infection)     Viral syndrome      Past Surgical History:   Procedure Laterality Date    CARDIAC CATHETERIZATION      in the 80's    CARDIAC CATHETERIZATION N/A 05/04/2016    Procedure: Left Heart Cath;  Surgeon: Dale Vasquez MD;  Location: [facility] CATH INVASIVE LOCATION;  Service:     CARDIAC CATHETERIZATION N/A 05/04/2016    Procedure: Coronary angiography;  Surgeon: Dale Vasquez MD;  Location: [facility] CATH INVASIVE LOCATION;  Service:     CARDIAC CATHETERIZATION N/A 05/04/2016    Procedure: Left ventriculography;  Surgeon: Dale Vasquez MD;  Location: [facility] CATH INVASIVE LOCATION;  Service:     CARDIAC CATHETERIZATION N/A 4/26/2024    Procedure: Left Heart Cath;  Surgeon: Devon Cardona MD;  Location: [facility] CATH INVASIVE LOCATION;  Service: Cardiovascular;  Laterality: N/A;    CARDIAC CATHETERIZATION N/A 4/26/2024    Procedure: Coronary angiography;  Surgeon: Devon Cardona MD;  Location: [facility] CATH INVASIVE LOCATION;  Service: Cardiovascular;  Laterality: N/A;    CARDIAC CATHETERIZATION N/A 4/26/2024    Procedure: Left ventriculography;  Surgeon: Devon Cardona MD;  Location: [facility] CATH INVASIVE LOCATION;  Service: Cardiovascular;  Laterality: N/A;    CHOLECYSTECTOMY  1980    COLONOSCOPY      >5 years    COLONOSCOPY  12/01/2016    tics, IH, rectal biopsy showed changes suggestive of Schwannoma     ENDOSCOPY  12/01/2016    LA grade B esophagitis, mild Schatzki ring, HH, gastric polyp, erythematous mucosa in stomach    LASIK Bilateral     RECTAL ULTRASOUND N/A 02/07/2017    Procedure: ENDOSCOPIC ULTRASOUND (LOWER);  Surgeon: Casper Rodríguez MD;  Location: [facility] ENDOSCOPY;  Service:     SHOULDER SURGERY Left 06/17/2010    Dr. Green; repair L shoulder adhesive capsulitis    SKIN  BIOPSY      TOTAL ABDOMINAL HYSTERECTOMY      fibroid tumors    TUBAL ABDOMINAL LIGATION       Family History   Problem Relation Age of Onset    Anxiety disorder Mother     Uterine cancer Mother     Dementia Mother     Cancer Father         Prostate Cancer     Other Father         AAA, aneurysm rupture    Leukemia Brother     Malig Hyperthermia Neg Hx      Social History     Tobacco Use    Smoking status: Former     Current packs/day: 0.00     Average packs/day: 1 pack/day for 30.0 years (30.0 ttl pk-yrs)     Types: Cigarettes     Start date: 1964     Quit date: 1994     Years since quittin.8    Smokeless tobacco: Never   Vaping Use    Vaping status: Never Used   Substance Use Topics    Alcohol use: No    Drug use: No     No current facility-administered medications on file prior to encounter.     Current Outpatient Medications on File Prior to Encounter   Medication Sig Dispense Refill    atorvastatin (LIPITOR) 10 MG tablet Take 1 tablet by mouth Every Night for 270 days. 90 tablet 2    valsartan (DIOVAN) 320 MG tablet Take 1 tablet by mouth Every Night for 270 days. 90 tablet 2     Allergies   Allergen Reactions    Lisinopril Cough       Objective    Objective     Vital Signs  Temp:  [97.7 °F (36.5 °C)-98 °F (36.7 °C)] 98 °F (36.7 °C)  Heart Rate:  [110-135] 118  Resp:  [16-20] 16  BP: (117-142)/(75-93) 120/83  SpO2:  [95 %-96 %] 95 %  on   ;   Device (Oxygen Therapy): room air  Body mass index is 33.46 kg/m².    Physical Exam  Vitals and nursing note reviewed.   Constitutional:       General: She is not in acute distress.     Appearance: She is not toxic-appearing or diaphoretic.   HENT:      Head: Normocephalic and atraumatic.      Nose: Nose normal.      Mouth/Throat:      Mouth: Mucous membranes are moist.      Pharynx: Oropharynx is clear.   Eyes:      Conjunctiva/sclera: Conjunctivae normal.      Pupils: Pupils are equal, round, and reactive to light.   Cardiovascular:      Rate and Rhythm:  Tachycardia present. Rhythm irregular.      Pulses: Normal pulses.   Pulmonary:      Effort: Pulmonary effort is normal.      Breath sounds: Rales (bibasilar) present.   Abdominal:      General: Bowel sounds are normal.      Palpations: Abdomen is soft.      Tenderness: There is no abdominal tenderness.   Musculoskeletal:         General: Swelling (2+ BLE) present. No tenderness.      Cervical back: Neck supple.   Skin:     General: Skin is warm and dry.      Capillary Refill: Capillary refill takes less than 2 seconds.   Neurological:      General: No focal deficit present.      Mental Status: She is alert and oriented to person, place, and time.   Psychiatric:         Mood and Affect: Mood normal.         Behavior: Behavior normal.       Results Review:  I reviewed the patient's new clinical results.  I reviewed the patient's new imaging results and agree with the interpretation.  I reviewed the patient's other test results and agree with the interpretation  I personally viewed and interpreted the patient's EKG/Telemetry data  Discussed with ED provider.    Lab Results (last 24 hours)       Procedure Component Value Units Date/Time    CBC & Differential [[id number]]  (Abnormal) Collected: 11/11/24 1619    Specimen: Blood Updated: 11/11/24 1625    Narrative:      The following orders were created for panel order CBC & Differential.  Procedure                               Abnormality         Status                     ---------                               -----------         ------                     CBC Auto Differential[[id number]]        Abnormal            Final result                 Please view results for these tests on the individual orders.    Comprehensive Metabolic Panel [[id number]]  (Abnormal) Collected: 11/11/24 1619    Specimen: Blood Updated: 11/11/24 6455     Glucose 105 mg/dL      BUN 11 mg/dL      Creatinine 0.99 mg/dL      Sodium 138 mmol/L      Potassium 4.1 mmol/L      Chloride 103 mmol/L       "CO2 24.8 mmol/L      Calcium 9.7 mg/dL      Total Protein 7.1 g/dL      Albumin 4.0 g/dL      ALT (SGPT) 16 U/L      AST (SGOT) 18 U/L      Alkaline Phosphatase 79 U/L      Total Bilirubin 0.8 mg/dL      Globulin 3.1 gm/dL      A/G Ratio 1.3 g/dL      BUN/Creatinine Ratio 11.1     Anion Gap 10.2 mmol/L      eGFR 57.4 mL/min/1.73     Narrative:      GFR Normal >60  Chronic Kidney Disease <60  Kidney Failure <15    The GFR formula is only valid for adults with stable renal function between ages 18 and 70.    Lipase [[id number]]  (Normal) Collected: 11/11/24 1619    Specimen: Blood Updated: 11/11/24 1733     Lipase 22 U/L     D-dimer, Quantitative [[id number]]  (Abnormal) Collected: 11/11/24 1619    Specimen: Blood Updated: 11/11/24 1724     D-Dimer, Quantitative 0.93 MCGFEU/mL     Narrative:      According to the assay 's published package insert, a normal (<0.50 MCGFEU/mL) D-dimer result in conjunction with a non-high clinical probability assessment, excludes deep vein thrombosis (DVT) and pulmonary embolism (PE) with high sensitivity.    D-dimer values increase with age and this can make VTE exclusion of an older population difficult. To address this, the [employer], based on best available evidence and recent guidelines, recommends that clinicians use age-adjusted D-dimer thresholds in patients greater than 50 years of age with: a) a low probability of PE who do not meet all Pulmonary Embolism Rule Out Criteria, or b) in those with intermediate probability of PE.   The formula for an age-adjusted D-dimer cut-off is \"age/100\".  For example, a 60 year old patient would have an age-adjusted cut-off of 0.60 MCGFEU/mL and an 80 year old 0.80 MCGFEU/mL.    High Sensitivity Troponin T [[id number]]  (Abnormal) Collected: 11/11/24 1619    Specimen: Blood Updated: 11/11/24 1715     HS Troponin T 19 ng/L     Narrative:      High Sensitive Troponin T Reference Range:  <14.0 ng/L- Negative Female " for AMI  <22.0 ng/L- Negative Male for AMI  >=14 - Abnormal Female indicating possible myocardial injury.  >=22 - Abnormal Male indicating possible myocardial injury.   Clinicians would have to utilize clinical acumen, EKG, Troponin, and serial changes to determine if it is an Acute Myocardial Infarction or myocardial injury due to an underlying chronic condition.         BNP [[id number]]  (Normal) Collected: 11/11/24 1619    Specimen: Blood Updated: 11/11/24 1709     proBNP 1,323.0 pg/mL     Narrative:      This assay is used as an aid in the diagnosis of individuals suspected of having heart failure. It can be used as an aid in the diagnosis of acute decompensated heart failure (ADHF) in patients presenting with signs and symptoms of ADHF to the emergency department (ED). In addition, NT-proBNP of <300 pg/mL indicates ADHF is not likely.    Age Range Result Interpretation  NT-proBNP Concentration (pg/mL:      <50             Positive            >450                   Gray                 300-450                    Negative             <300    50-75           Positive            >900                  Gray                300-900                  Negative            <300      >75             Positive            >1800                  Gray                300-1800                  Negative            <300    CBC Auto Differential [[id number]]  (Abnormal) Collected: 11/11/24 1619    Specimen: Blood Updated: 11/11/24 1625     WBC 7.32 10*3/mm3      RBC 4.80 10*6/mm3      Hemoglobin 13.1 g/dL      Hematocrit 42.1 %      MCV 87.7 fL      MCH 27.3 pg      MCHC 31.1 g/dL      RDW 13.2 %      RDW-SD 43.2 fl      MPV 11.8 fL      Platelets 127 10*3/mm3      Neutrophil % 68.3 %      Lymphocyte % 19.4 %      Monocyte % 9.4 %      Eosinophil % 2.5 %      Basophil % 0.3 %      Immature Grans % 0.1 %      Neutrophils, Absolute 5.00 10*3/mm3      Lymphocytes, Absolute 1.42 10*3/mm3      Monocytes, Absolute 0.69 10*3/mm3       Eosinophils, Absolute 0.18 10*3/mm3      Basophils, Absolute 0.02 10*3/mm3      Immature Grans, Absolute 0.01 10*3/mm3     Lactic Acid, Plasma [[id number]]  (Normal) Collected: 11/11/24 1619    Specimen: Blood Updated: 11/11/24 1727     Lactate 1.8 mmol/L     POC Chem Panel [[id number]]  (Abnormal) Collected: 11/11/24 1640    Specimen: Venous Blood Updated: 11/11/24 1646     Glucose 99 mg/dL      Comment: Serial Number: [id number]:  [id number]        Sodium 142 mmol/L      POC Potassium 4.1 mmol/L      Ionized Calcium 1.11 mmol/L      Chloride 107 mmol/L      Creatinine 0.97 mg/dL      BUN 11 mg/dL      CO2 Content 26.3 mmol/L     Protime-INR, Fingerstick [[id number]] Collected: 11/11/24 1655    Specimen: Capillary Blood Updated: 11/11/24 1701     Protime 13.9 Seconds      Comment: Serial Number: [id number]:  [id number]        INR 1.30    High Sensitivity Troponin T 2Hr [[id number]]  (Abnormal) Collected: 11/11/24 1803    Specimen: Blood Updated: 11/11/24 1842     HS Troponin T 18 ng/L      Troponin T Delta -1 ng/L     Narrative:      High Sensitive Troponin T Reference Range:  <14.0 ng/L- Negative Female for AMI  <22.0 ng/L- Negative Male for AMI  >=14 - Abnormal Female indicating possible myocardial injury.  >=22 - Abnormal Male indicating possible myocardial injury.   Clinicians would have to utilize clinical acumen, EKG, Troponin, and serial changes to determine if it is an Acute Myocardial Infarction or myocardial injury due to an underlying chronic condition.                 Imaging Results (Last 24 Hours)       Procedure Component Value Units Date/Time    CT Angiogram Chest Pulmonary Embolism [[id number]] Collected: 11/11/24 1837     Updated: 11/11/24 1855    Narrative:      CT ANGIOGRAM CHEST PULMONARY EMBOLISM-     INDICATIONS: Dyspnea     TECHNIQUE: Radiation dose reduction techniques were utilized, including  automated exposure control and exposure modulation based on body size.  CT angiography of the  chest. Three-dimensional reconstructions     COMPARISON: 4/26/2024     FINDINGS:     No pulmonary embolism. Aortic lumen is not characterized owing to phase  of contrast enhancement     The heart size is enlarged without pericardial effusion. A few small  subcentimeter short axis mediastinal lymph nodes are seen that are not  significant by size criteria. A 1 cm short axis right hilar lymph node  on axial image 64 is borderline, nonspecific, stable. A 1.2 cm short  axis subcarinal lymph node on axial image 70 is mildly prominent, could  be reactive in nature or potentially evidence of neoplasm (previously  0.7 cm), clinical correlation and follow-up recommended (if further  imaging evaluation is indicated, PET/CT could be considered).     The airways appear clear.     Mild right more than left pleural effusions are seen.     The lungs show no focal pulmonary consolidation or mass. Atelectasis is  present in both lungs. Old granulomatous disease is seen.     Upper abdominal structures show no acute findings, and appear grossly  stable from the CT of the abdomen and pelvis from earlier today (please  see separate report).     Degenerative changes are seen in the spine. No acute fracture is  identified.       Impression:         No pulmonary embolism. Mild bilateral pleural effusions. Mildly  prominent, nonspecific subcarinal lymph node, as described.     This report was finalized on 11/11/2024 6:52 PM by Dr. Hubert Em M.D on Workstation: [id number]       CT Abdomen Pelvis With Contrast [[id number]] Collected: 11/11/24 1722     Updated: 11/11/24 1744    Narrative:      CT ABDOMEN PELVIS W CONTRAST-     INDICATIONS: Abdominal distention and pain, cirrhosis     TECHNIQUE: Radiation dose reduction techniques were utilized, including  automated exposure control and exposure modulation based on body size.  Enhanced ABDOMEN AND PELVIS CT     COMPARISON: 8/11/2023     FINDINGS:     Nodular contour of the liver is noted,  compatible with cirrhosis.     The gallbladder is surgically absent. Mild nonspecific thickening of the  left adrenal gland is chronic.     Focal cortical thinning in the right kidney could be result of prior  infection or infarct. Tiny nonobstructive right nephrolithiasis. Small  right renal low density is noted that is too small to characterize.     The size of the spleen is borderline, 12.8 cm     Otherwise unremarkable appearance of the liver, spleen, adrenal glands,  pancreas, kidneys, bladder.     No bowel obstruction. Stranding around the proximal to mid duodenum  suggests duodenitis. The appendix does not appear inflamed. Colonic  diverticula are seen that do not appear inflamed. Some presacral edema  is present that may relate to volume status or potentially evidence of  proctitis, correlate clinically.     No free intraperitoneal gas. Minimal pelvic free fluid.     Scattered small mesenteric and para-aortic lymph nodes are seen that are  not significant by size criteria. A 9 mm short axis precaval lymph node  on axial image 44 is nonspecific, stable. Likewise, a 1.3 cm short axis  lymph node superior to the pancreas on axial image 39 and is not  significantly changed.     Abdominal aorta is not aneurysmal. Aortic and other arterial  calcifications are present. Some hazy density around the superior  mesenteric artery in the celiac artery is noted; this appearance is  chronic.     The lung bases show atelectasis, mild bilateral pleural effusions. The  heart is enlarged.     Degenerative changes are seen in the spine. No acute fracture is  identified.             Impression:         1. Evidence of proximal duodenitis, endoscopic correlation could be  considered as indicated.     2. Cirrhosis. Mild bilateral pleural effusions. Minimal pelvic free  fluid. Nonspecific presacral edema.     3. No obstructive uropathy. Nonobstructive right nephrolithiasis.           This report was finalized on 11/11/2024 5:41 PM  by Dr. Hubert Em M.D on Workstation: [id number]       XR Chest 1 View [[id number]] Collected: 11/11/24 1705     Updated: 11/11/24 1709    Narrative:      XR CHEST 1 VW-11/11/2024     HISTORY: Shortness of breath.     Heart size is at the upper limits of normal. There is bilateral vascular  congestion. There is increased density in the right lung base which may  represent asymmetric pulmonary edema atelectasis or pneumonia. There may  be bilateral pleural effusions.       Impression:      1. FINDINGS are consistent with mild to moderate congestive heart  failure. There could be an element of atelectasis or pneumonia in the  right lung base. Please correlate with the clinical findings.  2. Follow-up films recommended     This report was finalized on 11/11/2024 5:06 PM by Dr. Laci Gannon M.D on Workstation: [id number]               Results for orders placed during the hospital encounter of 04/25/24    Adult Transthoracic Echo Complete W/ Cont if Necessary Per Protocol    Interpretation Summary    The left ventricular cavity is mildly dilated.    Left ventricular systolic function is normal. Left ventricular ejection fraction appears to be 66 - 70%.    Left ventricular diastolic function is consistent with (grade I) impaired relaxation.    Normal right ventricular cavity size and systolic function noted.    The left atrial cavity is mildly dilated.    The aortic valve leaflets are mildly to moderately calcified    Moderate mitral valve regurgitation is present.    Mild tricuspid valve regurgitation is present.    Calculated right ventricular systolic pressure from tricuspid regurgitation is 31 mmHg.    There is no evidence of pericardial effusion      ECG 12 Lead Dyspnea   Preliminary Result   HEART XJDG=940  bpm   RR Xblsdjcc=363  ms   VA Interval=  ms   P Horizontal Axis=  deg   P Front Axis=  deg   QRSD Kuvopsmo=773  ms   QT Brcoesfo=485  ms   JKaP=836  ms   QRS Axis=94  deg   T Wave Axis=9  deg   -  ABNORMAL ECG -   Atrial fibrillation with rapid V-rate   Incomplete right bundle branch block   Low voltage, extremity and precordial leads   Date and Time of Study:2024-11-11 16:35:17           Assessment/Plan     Active Hospital Problems    Diagnosis  POA    **Decompensated cirrhosis [K72.90, K74.60]  Yes    Pleural effusion, bacterial [J90]  Yes    Paroxysmal atrial fibrillation [I48.0]  Yes    Prediabetes [R73.03]  Yes    Essential hypertension [I10]  Yes    Other hyperlipidemia [E78.49]  Yes    Mild intermittent asthma without complication [J45.20]  Yes      Resolved Hospital Problems   No resolved problems to display.   Volume Overload/Pleural Effusions  - possibly from decompensated cirrhosis vs acute diastolic CHF from Afib/RVR  - will start on IV lasix and monitor ins/outs, daily weights, and chemistries-pleural effusions are not likely to require thoracentesis    Afib/RVR  - new diagnosis this admission  - HR improved after IV metoprolol, will start on metoprolol succinate   - hold off on AC for now given cirrhosis and possible duodenitis  - consult cardiology     MARSHALL Cirrhosis  - diuresis as above  - no evidence of PSE or coagulopathy, has mild thrombocytopenia  - GI consulted    Duodenitis  - start PPI    Hx of Prediabetes  - check A1C    HTN  - BP is actually on the low side now  - hold ARB    I discussed the patient's findings and my recommendations with patient, nursing staff, and ED provider.    VTE Prophylaxis - SCDs.  Code Status - Full code.       Ruddy Woodall MD  [facility]  11/11/24  20:28 EST

## 2024-11-13 LAB
ALBUMIN SERPL-MCNC: 3.9 G/DL (ref 3.5–5.2)
ALBUMIN/GLOB SERPL: 1.6 G/DL
ALP SERPL-CCNC: 70 U/L (ref 39–117)
ALPHA-FETOPROTEIN: 2.72 NG/ML (ref 0–8.3)
ALT SERPL W P-5'-P-CCNC: 18 U/L (ref 1–33)
AMMONIA BLD-SCNC: 34 UMOL/L (ref 11–51)
ANION GAP SERPL CALCULATED.3IONS-SCNC: 11 MMOL/L (ref 5–15)
AST SERPL-CCNC: 22 U/L (ref 1–32)
BASOPHILS # BLD AUTO: 0.05 10*3/MM3 (ref 0–0.2)
BASOPHILS NFR BLD AUTO: 0.6 % (ref 0–1.5)
BILIRUB SERPL-MCNC: 0.9 MG/DL (ref 0–1.2)
BUN SERPL-MCNC: 15 MG/DL (ref 8–23)
BUN/CREAT SERPL: 16 (ref 7–25)
CALCIUM SPEC-SCNC: 8.8 MG/DL (ref 8.6–10.5)
CHLORIDE SERPL-SCNC: 100 MMOL/L (ref 98–107)
CO2 SERPL-SCNC: 29 MMOL/L (ref 22–29)
CREAT SERPL-MCNC: 0.94 MG/DL (ref 0.57–1)
DEPRECATED RDW RBC AUTO: 40.3 FL (ref 37–54)
EGFRCR SERPLBLD CKD-EPI 2021: 61.1 ML/MIN/1.73
EOSINOPHIL # BLD AUTO: 0.23 10*3/MM3 (ref 0–0.4)
EOSINOPHIL NFR BLD AUTO: 2.9 % (ref 0.3–6.2)
ERYTHROCYTE [DISTWIDTH] IN BLOOD BY AUTOMATED COUNT: 12.9 % (ref 12.3–15.4)
GLOBULIN UR ELPH-MCNC: 2.5 GM/DL
GLUCOSE SERPL-MCNC: 89 MG/DL (ref 65–99)
HCT VFR BLD AUTO: 40.5 % (ref 34–46.6)
HGB BLD-MCNC: 12.6 G/DL (ref 12–15.9)
IMM GRANULOCYTES # BLD AUTO: 0.02 10*3/MM3 (ref 0–0.05)
IMM GRANULOCYTES NFR BLD AUTO: 0.3 % (ref 0–0.5)
LYMPHOCYTES # BLD AUTO: 1.4 10*3/MM3 (ref 0.7–3.1)
LYMPHOCYTES NFR BLD AUTO: 17.6 % (ref 19.6–45.3)
MCH RBC QN AUTO: 27 PG (ref 26.6–33)
MCHC RBC AUTO-ENTMCNC: 31.1 G/DL (ref 31.5–35.7)
MCV RBC AUTO: 86.9 FL (ref 79–97)
MONOCYTES # BLD AUTO: 0.93 10*3/MM3 (ref 0.1–0.9)
MONOCYTES NFR BLD AUTO: 11.7 % (ref 5–12)
NEUTROPHILS NFR BLD AUTO: 5.31 10*3/MM3 (ref 1.7–7)
NEUTROPHILS NFR BLD AUTO: 66.9 % (ref 42.7–76)
NRBC BLD AUTO-RTO: 0 /100 WBC (ref 0–0.2)
PLATELET # BLD AUTO: 129 10*3/MM3 (ref 140–450)
PMV BLD AUTO: 11.9 FL (ref 6–12)
POTASSIUM SERPL-SCNC: 3.4 MMOL/L (ref 3.5–5.2)
POTASSIUM SERPL-SCNC: 3.8 MMOL/L (ref 3.5–5.2)
PROT SERPL-MCNC: 6.4 G/DL (ref 6–8.5)
RBC # BLD AUTO: 4.66 10*6/MM3 (ref 3.77–5.28)
SODIUM SERPL-SCNC: 140 MMOL/L (ref 136–145)
WBC NRBC COR # BLD AUTO: 7.94 10*3/MM3 (ref 3.4–10.8)

## 2024-11-13 PROCEDURE — 80053 COMPREHEN METABOLIC PANEL: CPT | Performed by: INTERNAL MEDICINE

## 2024-11-13 PROCEDURE — 82140 ASSAY OF AMMONIA: CPT | Performed by: NURSE PRACTITIONER

## 2024-11-13 PROCEDURE — 25010000002 FUROSEMIDE PER 20 MG: Performed by: INTERNAL MEDICINE

## 2024-11-13 PROCEDURE — 85025 COMPLETE CBC W/AUTO DIFF WBC: CPT | Performed by: INTERNAL MEDICINE

## 2024-11-13 PROCEDURE — 82105 ALPHA-FETOPROTEIN SERUM: CPT | Performed by: NURSE PRACTITIONER

## 2024-11-13 PROCEDURE — 84132 ASSAY OF SERUM POTASSIUM: CPT | Performed by: STUDENT IN AN ORGANIZED HEALTH CARE EDUCATION/TRAINING PROGRAM

## 2024-11-13 PROCEDURE — 99232 SBSQ HOSP IP/OBS MODERATE 35: CPT | Performed by: NURSE PRACTITIONER

## 2024-11-13 RX ORDER — METOPROLOL TARTRATE 50 MG
50 TABLET ORAL 3 TIMES DAILY
Status: DISCONTINUED | OUTPATIENT
Start: 2024-11-14 | End: 2024-11-15 | Stop reason: HOSPADM

## 2024-11-13 RX ORDER — SPIRONOLACTONE 100 MG/1
100 TABLET, FILM COATED ORAL DAILY
Status: DISCONTINUED | OUTPATIENT
Start: 2024-11-13 | End: 2024-11-15 | Stop reason: HOSPADM

## 2024-11-13 RX ORDER — POTASSIUM CHLORIDE 750 MG/1
40 TABLET, FILM COATED, EXTENDED RELEASE ORAL EVERY 4 HOURS
Status: COMPLETED | OUTPATIENT
Start: 2024-11-13 | End: 2024-11-13

## 2024-11-13 RX ORDER — FUROSEMIDE 40 MG/1
40 TABLET ORAL
Status: DISCONTINUED | OUTPATIENT
Start: 2024-11-13 | End: 2024-11-13

## 2024-11-13 RX ORDER — FUROSEMIDE 40 MG/1
40 TABLET ORAL DAILY
Status: DISCONTINUED | OUTPATIENT
Start: 2024-11-13 | End: 2024-11-15

## 2024-11-13 RX ORDER — METOPROLOL TARTRATE 50 MG
50 TABLET ORAL EVERY 8 HOURS
Status: DISCONTINUED | OUTPATIENT
Start: 2024-11-13 | End: 2024-11-13

## 2024-11-13 RX ADMIN — FUROSEMIDE 40 MG: 40 TABLET ORAL at 12:26

## 2024-11-13 RX ADMIN — Medication 10 ML: at 08:01

## 2024-11-13 RX ADMIN — Medication 10 ML: at 20:10

## 2024-11-13 RX ADMIN — ACETAMINOPHEN 325MG 650 MG: 325 TABLET ORAL at 23:55

## 2024-11-13 RX ADMIN — ATORVASTATIN CALCIUM 10 MG: 20 TABLET, FILM COATED ORAL at 20:05

## 2024-11-13 RX ADMIN — FUROSEMIDE 40 MG: 10 INJECTION, SOLUTION INTRAMUSCULAR; INTRAVENOUS at 06:12

## 2024-11-13 RX ADMIN — SPIRONOLACTONE 100 MG: 100 TABLET ORAL at 12:26

## 2024-11-13 RX ADMIN — POTASSIUM CHLORIDE 40 MEQ: 750 TABLET, EXTENDED RELEASE ORAL at 10:10

## 2024-11-13 RX ADMIN — POTASSIUM CHLORIDE 40 MEQ: 750 TABLET, EXTENDED RELEASE ORAL at 14:27

## 2024-11-13 RX ADMIN — METOPROLOL SUCCINATE 50 MG: 50 TABLET, EXTENDED RELEASE ORAL at 08:00

## 2024-11-13 RX ADMIN — PANTOPRAZOLE SODIUM 40 MG: 40 TABLET, DELAYED RELEASE ORAL at 06:12

## 2024-11-13 NOTE — PROGRESS NOTES
Name: Viktoria Villasenor ADMIT: 2024   : 1940  PCP: Magen Olson MD    MRN: [id number] LOS: 2 days   AGE/SEX: 83 y.o. female  ROOM: [facility]     Subjective   Subjective   No acute events overnight.  Patient states she is overall feeling better.  Continues to have some shortness of breath but denies any chest pain.  No significant abdominal pain.  Eating and drinking well.    Objective   Objective     Vital Signs  Temp:  [97.3 °F (36.3 °C)-98.4 °F (36.9 °C)] 98.4 °F (36.9 °C)  Heart Rate:  [] 109  Resp:  [16-20] 16  BP: (105-135)/(61-88) 114/63  SpO2:  [90 %-96 %] 90 %  on   ;   Device (Oxygen Therapy): room air  Body mass index is 35.62 kg/m².    Physical Exam  General: Alert, no acute distress. Lying in bed. Chronically ill-appearing.   ENT: No conjunctival injection or scleral icterus. Moist mucous membranes.  Neuro: Eyes open and moving in all directions, strength normal in all extremities, no focal deficits.   Lungs: Clear to auscultation bilaterally. No wheeze or crackles. No distress.   Heart: RRR, no murmurs.   Abdomen: Soft, non-distended. Normal bowel sounds. Tenderness to palpation in RUQ.   Ext: Warm and well-perfused.  Skin: No rashes or lesions. IV site without swelling or erythema.     Results Review     I reviewed the patient's new clinical results:  Results from last 7 days   Lab Units 24  0516 24  0524 24  1619   WBC 10*3/mm3 7.94 8.21 7.32   HEMOGLOBIN g/dL 12.6 12.5 13.1   PLATELETS 10*3/mm3 129* 142 127*     Results from last 7 days   Lab Units 24  0516 24  1502 24  0524 24  1640 24  1619   SODIUM mmol/L 140 143 140  --  138   POTASSIUM mmol/L 3.4* 3.9 3.9  --  4.1   CHLORIDE mmol/L 100 102 101  --  103   CO2 mmol/L 29.0 26.5 25.6  --  24.8   BUN mg/dL 15 15 13  --  11   CREATININE mg/dL 0.94 1.03* 1.03* 0.97 0.99   GLUCOSE mg/dL 89 84 88  --  105*   EGFR mL/min/1.73 61.1 54.7* 54.7*  --  57.4*     Results from last 7 days    Lab Units 11/13/24  0516 11/12/24  0524 11/11/24  1619   ALBUMIN g/dL 3.9 3.9 4.0   BILIRUBIN mg/dL 0.9 0.7 0.8   ALK PHOS U/L 70 69 79   AST (SGOT) U/L 22 20 18   ALT (SGPT) U/L 18 13 16     Results from last 7 days   Lab Units 11/13/24  0516 11/12/24  1502 11/12/24  0524 11/11/24  1619   CALCIUM mg/dL 8.8 9.3 8.7 9.7   ALBUMIN g/dL 3.9  --  3.9 4.0     Results from last 7 days   Lab Units 11/11/24  1619   LACTATE mmol/L 1.8     Hemoglobin A1C   Date/Time Value Ref Range Status   11/12/2024 2028 5.40 4.80 - 5.60 % Final     Glucose   Date/Time Value Ref Range Status   11/11/2024 1640 99 74 - 100 mg/dL Final     Comment:     Serial Number: [id number]  [id number]       US Liver    Result Date: 11/12/2024  1. Cirrhotic liver. No ultrasound evidence of liver lesion 2. Right pleural effusion 3. Cholecystectomy    This report was finalized on 11/12/2024 4:09 PM by Dr. Neel Blanca M.D on Workstation: [id number]      CT Angiogram Chest Pulmonary Embolism    Result Date: 11/11/2024   No pulmonary embolism. Mild bilateral pleural effusions. Mildly prominent, nonspecific subcarinal lymph node, as described.  This report was finalized on 11/11/2024 6:52 PM by Dr. Hubert Em M.D on Workstation: [id number]      CT Abdomen Pelvis With Contrast    Result Date: 11/11/2024   1. Evidence of proximal duodenitis, endoscopic correlation could be considered as indicated.  2. Cirrhosis. Mild bilateral pleural effusions. Minimal pelvic free fluid. Nonspecific presacral edema.  3. No obstructive uropathy. Nonobstructive right nephrolithiasis.    This report was finalized on 11/11/2024 5:41 PM by Dr. Hubert Em M.D on Workstation: [id number]      XR Chest 1 View    Result Date: 11/11/2024  1. FINDINGS are consistent with mild to moderate congestive heart failure. There could be an element of atelectasis or pneumonia in the right lung base. Please correlate with the clinical findings. 2. Follow-up films recommended  This  report was finalized on 11/11/2024 5:06 PM by Dr. Laci Gannon M.D on Workstation: [id number]       I have personally reviewed all medications:  Scheduled Medications  atorvastatin, 10 mg, Oral, Nightly  furosemide, 40 mg, Oral, BID  [START ON 11/14/2024] metoprolol tartrate, 50 mg, Oral, TID  pantoprazole, 40 mg, Oral, Q AM  potassium chloride ER, 40 mEq, Oral, Q4H  sodium chloride, 10 mL, Intravenous, Q12H    Infusions   Diet  Diet: Cardiac; Low Sodium (2g); Texture: Regular (IDDSI 7); Fluid Consistency: Thin (IDDSI 0)      Intake/Output Summary (Last 24 hours) at 11/13/2024 1141  Last data filed at 11/13/2024 0612  Gross per 24 hour   Intake 360 ml   Output --   Net 360 ml       Assessment/Plan     Active Hospital Problems    Diagnosis  POA    **Decompensated cirrhosis [K72.90, K74.60]  Yes    Pleural effusion, bacterial [J90]  Yes    Paroxysmal atrial fibrillation [I48.0]  Yes    Prediabetes [R73.03]  Yes    Essential hypertension [I10]  Yes    Other hyperlipidemia [E78.49]  Yes    Mild intermittent asthma without complication [J45.20]  Yes      Resolved Hospital Problems   No resolved problems to display.       83 y.o. female with Decompensated cirrhosis.    Volume Overload/Pleural Effusions  -Likely secondary to decompensated cirrhosis versus A-fib with RVR  -  monitor ins/outs, daily weights, and chemistries-pleural effusions are not likely to require thoracentesis  - no evidence of ascites on CT scan or US   - appreciate cardiology input   -Transition to oral diuretics today     Afib/RVR  - new diagnosis this admission  - hold off on AC for now given cirrhosis and possible duodenitis  - consulted cardiology   - s/p digoxin, HR has improved but still trending a bit high  - continue metoprolol succinate, dose increased today     MARSHALL Cirrhosis  Thrombocytopenia  - diuresis as above  - no evidence of PSE or coagulopathy, has mild thrombocytopenia  -Given mild thrombocytopenia, check B12 and folate  - GI  consulted  - abdominal US with no evidence of ascites   - discussed with GI APRN at bedside  - will need screening EGD in the outpatient setting  -GI will follow-up in clinic after discharge     Duodenitis  - continue PPI     HTN  - BP trend is appropriate off antihypertensives for now  - hold ARB    Hypokalemia  -Potassium low at 3.4  -Replace potassium as needed, repeat BMP with morning labs    SCDs for DVT prophylaxis.  Full code.  Discussed with patient, family, and nursing staff.  Anticipate discharge home in 1-2 days.      Kristin Fajardo MD  [facility]  11/13/24  11:41 EST

## 2024-11-13 NOTE — PLAN OF CARE
Goal Outcome Evaluation:              Outcome Evaluation: (P) A/Ox4. RA. Afib on monitor. Pt up ad be in room. PO lasix and spironolactone given. K replaced.

## 2024-11-13 NOTE — PROGRESS NOTES
Gastroenterology   Inpatient Progress Note    Reason for Follow Up:  cirrhosis    Subjective     Interval History:   Patient feels well today. Ambulating around room independently, reports mild SOA. She reports chronic intermittent RUQ pain that is worse with certain body movements, it is described as sharp. No N/V. No change in bowel habits.      Current Facility-Administered Medications:     acetaminophen (TYLENOL) tablet 650 mg, 650 mg, Oral, Q4H PRN, 650 mg at 11/12/24 2012 **OR** acetaminophen (TYLENOL) 160 MG/5ML oral solution 650 mg, 650 mg, Oral, Q4H PRN **OR** acetaminophen (TYLENOL) suppository 500 mg, 500 mg, Rectal, Q4H PRN, Ruddy Woodall MD    atorvastatin (LIPITOR) tablet 10 mg, 10 mg, Oral, Nightly, Ruddy Woodall MD, 10 mg at 11/12/24 2012    sennosides-docusate (PERICOLACE) 8.6-50 MG per tablet 2 tablet, 2 tablet, Oral, BID PRN **AND** polyethylene glycol (MIRALAX) packet 17 g, 17 g, Oral, Daily PRN **AND** bisacodyl (DULCOLAX) EC tablet 5 mg, 5 mg, Oral, Daily PRN **AND** bisacodyl (DULCOLAX) suppository 10 mg, 10 mg, Rectal, Daily PRN, Ruddy Woodall MD    calcium carbonate (TUMS) chewable tablet 500 mg (200 mg elemental), 2 tablet, Oral, TID PRN, Ruddy Woodall MD    furosemide (LASIX) tablet 40 mg, 40 mg, Oral, BID, Stanford Hernandezen A, APRN    melatonin tablet 5 mg, 5 mg, Oral, Nightly PRN, Ruddy Woodall MD    [START ON 11/14/2024] metoprolol tartrate (LOPRESSOR) tablet 50 mg, 50 mg, Oral, TID, Bibb, Loyda A, APRN    nitroglycerin (NITROSTAT) SL tablet 0.4 mg, 0.4 mg, Sublingual, Q5 Min PRN, Ruddy Woodall MD    ondansetron ODT (ZOFRAN-ODT) disintegrating tablet 4 mg, 4 mg, Oral, Q6H PRN **OR** ondansetron (ZOFRAN) injection 4 mg, 4 mg, Intravenous, Q6H PRN, Ruddy Woodall MD    pantoprazole (PROTONIX) EC tablet 40 mg, 40 mg, Oral, Q AM, Ruddy Woodall MD, 40 mg at 11/13/24 0612    sodium chloride 0.9 % flush 10 mL, 10 mL, Intravenous, Q12H, Ruddy Woodall  MD GUNNER, 10 mL at 11/13/24 0801    sodium chloride 0.9 % flush 10 mL, 10 mL, Intravenous, PRN, Ruddy Woodall MD    sodium chloride 0.9 % infusion 40 mL, 40 mL, Intravenous, PRN, Ruddy Woodall MD      Review of Systems   Constitutional: Negative.    HENT: Negative.     Eyes: Negative.    Respiratory:  Positive for shortness of breath.    Cardiovascular: Negative.    Gastrointestinal:  Positive for abdominal pain. Negative for abdominal distention, blood in stool, diarrhea, nausea and vomiting.   Endocrine: Negative.    Genitourinary: Negative.    Musculoskeletal: Negative.    Skin: Negative.    Allergic/Immunologic: Negative.    Neurological: Negative.    Hematological: Negative.    Psychiatric/Behavioral: Negative.           Objective     Vital Signs  Temp:  [97.3 °F (36.3 °C)-98.4 °F (36.9 °C)] 98.4 °F (36.9 °C)  Heart Rate:  [] 109  Resp:  [16-20] 16  BP: (105-135)/(61-88) 114/63  Body mass index is 35.62 kg/m².    Intake/Output Summary (Last 24 hours) at 11/13/2024 0933  Last data filed at 11/13/2024 0612  Gross per 24 hour   Intake 360 ml   Output --   Net 360 ml     No intake/output data recorded.       Physical Exam  Vitals reviewed.   Constitutional:       Appearance: Normal appearance.   HENT:      Head: Normocephalic.      Nose: Nose normal.   Eyes:      Pupils: Pupils are equal, round, and reactive to light.   Cardiovascular:      Rate and Rhythm: Normal rate.      Pulses: Normal pulses.   Pulmonary:      Effort: Pulmonary effort is normal.      Breath sounds: Normal breath sounds.   Abdominal:      General: Abdomen is flat.      Palpations: Abdomen is soft.      Tenderness: There is abdominal tenderness.      Comments: Right flank and right side of rib     Musculoskeletal:         General: Normal range of motion.      Cervical back: Normal range of motion.   Skin:     General: Skin is warm and dry.   Neurological:      General: No focal deficit present.      Mental Status: She is  alert and oriented to person, place, and time.   Psychiatric:         Mood and Affect: Mood normal.            Results Review      Results from last 7 days   Lab Units 11/13/24  0516 11/12/24  0524 11/11/24  1619   WBC 10*3/mm3 7.94 8.21 7.32   HEMOGLOBIN g/dL 12.6 12.5 13.1   HEMATOCRIT % 40.5 38.4 42.1   PLATELETS 10*3/mm3 129* 142 127*     Results from last 7 days   Lab Units 11/13/24  0516 11/12/24  1502 11/12/24  0524 11/11/24  1640 11/11/24  1619   SODIUM mmol/L 140 143 140  --  138   POTASSIUM mmol/L 3.4* 3.9 3.9  --  4.1   CHLORIDE mmol/L 100 102 101  --  103   CO2 mmol/L 29.0 26.5 25.6  --  24.8   BUN mg/dL 15 15 13  --  11   CREATININE mg/dL 0.94 1.03* 1.03*   < > 0.99   CALCIUM mg/dL 8.8 9.3 8.7  --  9.7   BILIRUBIN mg/dL 0.9  --  0.7  --  0.8   ALK PHOS U/L 70  --  69  --  79   ALT (SGPT) U/L 18  --  13  --  16   AST (SGOT) U/L 22  --  20  --  18   GLUCOSE mg/dL 89 84 88  --  105*    < > = values in this interval not displayed.     Results from last 7 days   Lab Units 11/12/24  0524 11/11/24  1655   INR  1.18* 1.30     Lab Results   Lab Value Date/Time    LIPASE 22 11/11/2024 1619    LIPASE 24 08/11/2023 1018       Radiology:  US Liver   Final Result   1. Cirrhotic liver. No ultrasound evidence of liver lesion   2. Right pleural effusion   3. Cholecystectomy               This report was finalized on 11/12/2024 4:09 PM by Dr. Neel Blanca M.D on Workstation: [id number]          CT Angiogram Chest Pulmonary Embolism   Final Result       No pulmonary embolism. Mild bilateral pleural effusions. Mildly   prominent, nonspecific subcarinal lymph node, as described.       This report was finalized on 11/11/2024 6:52 PM by Dr. Hubert Em M.D on Workstation: [id number]          CT Abdomen Pelvis With Contrast   Final Result       1. Evidence of proximal duodenitis, endoscopic correlation could be   considered as indicated.       2. Cirrhosis. Mild bilateral pleural effusions. Minimal pelvic free    fluid. Nonspecific presacral edema.       3. No obstructive uropathy. Nonobstructive right nephrolithiasis.               This report was finalized on 11/11/2024 5:41 PM by Dr. Hubert Em M.D on Workstation: [id number]          XR Chest 1 View   Final Result   1. FINDINGS are consistent with mild to moderate congestive heart   failure. There could be an element of atelectasis or pneumonia in the   right lung base. Please correlate with the clinical findings.   2. Follow-up films recommended       This report was finalized on 11/11/2024 5:06 PM by Dr. Laci Gannon M.D on Workstation: [id number]                Assessment & Plan     Active Hospital Problems    Diagnosis     **Decompensated cirrhosis     Pleural effusion, bacterial     Paroxysmal atrial fibrillation     Prediabetes     Essential hypertension     Other hyperlipidemia     Mild intermittent asthma without complication        Assessment:  MARSHALL cirrhosis - known diagnosis since at least 2018. MELD = 9 on 11/12  Abdominal distention - improved. No ascites on US.  Right side abdominal pain - suspect neuromuscular   Duodenitis  A-fib with RVR -new diagnosis, heart rate improved, not currently on AC  Prediabetes, HTN, HLD      Results Reviewed:  Ammonia (11/13/2024 05:16) - normal  AFP Tumor Marker (11/13/2024 05:16) - pending  Comprehensive Metabolic Panel (11/13/2024 05:16) - k 3.4, ow normal  CBC & Differential (11/13/2024 05:16) - normal    MELD = 9 on 11/12    US Liver (11/12/2024 16:14) - cirrhosis, no liver lesion, right pleural effusion, s/p cholecystectomy. No ascites.  CT Angiogram Chest Pulmonary Embolism (11/11/2024 18:19) - No pulmonary embolism. Mild bilateral pleural effusions. Mildly prominent, nonspecific subcarinal lymph node  CT Abdomen Pelvis With Contrast (11/11/2024 16:54) -proximal duodenitis, cirrhosis.  Mild bilateral pleural effusions.  Nonspecific presacral edema.  Nonobstructive right nephrolithiasis.  CT Abdomen  Pelvis With Contrast (08/11/2023 10:49) - Hepatic cirrhosis with sequela of portal hypertension to include portosystemic collaterals. No ascites.  Diverticulosis with possible acute mild diverticulitis or resolving diverticulitis.     LOWER EUS (02/07/2017 10:27) -normal  SCANNED - COLONOSCOPY (12/01/2016) -diverticulosis, benign rectal schwannoma  Reports prior EGD, no records available        Plan:  She reports chronic intermittent RUQ pain worse with body movements. She is tender with palpation of right flank and right side of rib cage, c/w path of a dermatome. I suspect neuromuscular etiology of pain. Could consider referral to pain management for trigger point injections.   Recommend EGD for screening for varices and also due for surveillance colonoscopy, this can be done as outpatient. No symptoms reported at this time to indicate scopes need to be done this admission.  AFP pending  Continue daily PPI  Furosemide ordered. No ascites seen on US.  She is established with Dr. Rodríguez - follow up with [facility]  GI will see as needed in the hospital, call with questions.       I discussed the patients findings and my recommendations with patient.             WARREN Ibarra  [facility]  [address] [place] [address]  Office: [phone]

## 2024-11-13 NOTE — PLAN OF CARE
Goal Outcome Evaluation:  Plan of Care Reviewed With: patient        Progress: improving     VSS. Pt c/o sore throat-tylenol given with no relief. Some abd throughout night. Pt up ad be to BR. Some SOA with exertion. Swelling in LE improving. IV lasix q12h. Good UOP. Pt rested well overnight. Will need anticoagulation DC-TBD.

## 2024-11-13 NOTE — PROGRESS NOTES
"[facility]    Patient Name: Viktoria Villasenor  :1940  83 y.o.  LOS: 2  Encounter Provider: WARREN Hay      Patient Care Team:  Magen Olson MD as PCP - General  Magen Olson MD as PCP - Family Medicine  Yue, Ralph MARTINO MD as Consulting Physician (Otolaryngology)  Casper Hodge MD as Consulting Physician (Gastroenterology)    Chief Complaint: Follow-up atrial fibrillation    Interval History: Still has some dyspnea and tachycardia       Objective   Vital Signs  Temp:  [97.3 °F (36.3 °C)-98.4 °F (36.9 °C)] 98.4 °F (36.9 °C)  Heart Rate:  [] 109  Resp:  [16-20] 16  BP: (105-135)/(61-88) 114/63    Intake/Output Summary (Last 24 hours) at 2024 0831  Last data filed at 2024 0612  Gross per 24 hour   Intake 360 ml   Output --   Net 360 ml     Flowsheet Rows      Flowsheet Row First Filed Value   Admission Height 163.8 cm (64.5\") Documented at 2024 1556   Admission Weight 89.8 kg (198 lb) Documented at 2024 1556              Vitals and nursing note reviewed.   Constitutional:       Appearance: Normal appearance. Well-developed.   Eyes:      Conjunctiva/sclera: Conjunctivae normal.   Neck:      Vascular: No carotid bruit.   Pulmonary:      Breath sounds: Normal breath sounds.   Cardiovascular:      Tachycardia present. Irregularly irregular rhythm. Normal S1 with normal intensity. Normal S2 with normal intensity.       Murmurs: There is no murmur.      No gallop.  No click. No rub.   Edema:     Peripheral edema absent.   Musculoskeletal: Normal range of motion. Skin:     General: Skin is warm and dry.   Neurological:      Mental Status: Alert and oriented to person, place, and time.      GCS: GCS eye subscore is 4. GCS verbal subscore is 5. GCS motor subscore is 6.   Psychiatric:         Speech: Speech normal.         Behavior: Behavior normal.         Thought Content: Thought content normal.         Judgment: Judgment normal. " "          Pertinent Test Results:  Results from last 7 days   Lab Units 11/13/24  0516 11/12/24  1502 11/12/24  0524 11/11/24  1640 11/11/24  1619   SODIUM mmol/L 140 143 140  --  138   POTASSIUM mmol/L 3.4* 3.9 3.9  --  4.1   CHLORIDE mmol/L 100 102 101  --  103   CO2 mmol/L 29.0 26.5 25.6  --  24.8   BUN mg/dL 15 15 13  --  11   CREATININE mg/dL 0.94 1.03* 1.03* 0.97 0.99   GLUCOSE mg/dL 89 84 88  --  105*   CALCIUM mg/dL 8.8 9.3 8.7  --  9.7   AST (SGOT) U/L 22  --  20  --  18   ALT (SGPT) U/L 18  --  13  --  16     Results from last 7 days   Lab Units 11/11/24  1803 11/11/24  1619   HSTROP T ng/L 18* 19*     Results from last 7 days   Lab Units 11/13/24  0516 11/12/24  0524 11/11/24  1619   WBC 10*3/mm3 7.94 8.21 7.32   HEMOGLOBIN g/dL 12.6 12.5 13.1   HEMATOCRIT % 40.5 38.4 42.1   PLATELETS 10*3/mm3 129* 142 127*     Results from last 7 days   Lab Units 11/12/24  0524 11/11/24  1655   INR  1.18* 1.30               Invalid input(s): \"LDLCALC\"  Results from last 7 days   Lab Units 11/11/24  1619   PROBNP pg/mL 1,323.0     Results from last 7 days   Lab Units 11/12/24  2028   TSH uIU/mL 1.190           Medication Review:   atorvastatin, 10 mg, Oral, Nightly  furosemide, 40 mg, Intravenous, Q12H  metoprolol succinate XL, 50 mg, Oral, Q24H  pantoprazole, 40 mg, Oral, Q AM  sodium chloride, 10 mL, Intravenous, Q12H              Assessment & Plan     Active Hospital Problems    Diagnosis  POA    **Decompensated cirrhosis [K72.90, K74.60]  Yes    Pleural effusion, bacterial [J90]  Yes    Paroxysmal atrial fibrillation [I48.0]  Yes    Prediabetes [R73.03]  Yes    Essential hypertension [I10]  Yes    Other hyperlipidemia [E78.49]  Yes    Mild intermittent asthma without complication [J45.20]  Yes      Resolved Hospital Problems   No resolved problems to display.          New onset atrial fibrillation  Patient had extensive cardiac evaluation earlier this year with normal coronary angiography on cardiac catheterization.  " She is also had an echocardiogram with normal LV function and mild left atrial enlargement  Will ultimately need systemic anticoagulation given CHADS2 vascular score.  Difficult situation given cirrhosis and possible duodenitis.  We will see how clinical course evolves before prescribing  Patient with mildly elevated heart rates, I do think that patient would benefit from short acting metoprolol so that we can give it more frequently.  Transition to metoprolol tartrate 50 mg every 8 hours.  Hold parameters for blood pressure in place.  Patient has received metoprolol succinate today so we will start this regimen tomorrow.  Transition IV diuretics to oral starting this evening  HTN  BP soft, monitor closely  Decompensated cirrhosis  Duodenitis           WARREN Hay  [facility]   [facility]  [address] [place] [address] [address]  [place] [address]  Office: [phone]    11/13/24  08:31 EST

## 2024-11-14 LAB
ALBUMIN SERPL-MCNC: 3.8 G/DL (ref 3.5–5.2)
ALBUMIN/GLOB SERPL: 1.3 G/DL
ALP SERPL-CCNC: 70 U/L (ref 39–117)
ALT SERPL W P-5'-P-CCNC: 19 U/L (ref 1–33)
ANION GAP SERPL CALCULATED.3IONS-SCNC: 12 MMOL/L (ref 5–15)
AST SERPL-CCNC: 28 U/L (ref 1–32)
BILIRUB SERPL-MCNC: 0.8 MG/DL (ref 0–1.2)
BUN SERPL-MCNC: 18 MG/DL (ref 8–23)
BUN/CREAT SERPL: 20.7 (ref 7–25)
CALCIUM SPEC-SCNC: 8.7 MG/DL (ref 8.6–10.5)
CHLORIDE SERPL-SCNC: 100 MMOL/L (ref 98–107)
CO2 SERPL-SCNC: 25 MMOL/L (ref 22–29)
CREAT SERPL-MCNC: 0.87 MG/DL (ref 0.57–1)
EGFRCR SERPLBLD CKD-EPI 2021: 67 ML/MIN/1.73
FOLATE SERPL-MCNC: 9.04 NG/ML (ref 4.78–24.2)
GLOBULIN UR ELPH-MCNC: 2.9 GM/DL
GLUCOSE SERPL-MCNC: 90 MG/DL (ref 65–99)
MAGNESIUM SERPL-MCNC: 2.2 MG/DL (ref 1.6–2.4)
POTASSIUM SERPL-SCNC: 4.4 MMOL/L (ref 3.5–5.2)
PROT SERPL-MCNC: 6.7 G/DL (ref 6–8.5)
SODIUM SERPL-SCNC: 137 MMOL/L (ref 136–145)
VIT B12 BLD-MCNC: 500 PG/ML (ref 211–946)

## 2024-11-14 PROCEDURE — 99232 SBSQ HOSP IP/OBS MODERATE 35: CPT | Performed by: NURSE PRACTITIONER

## 2024-11-14 PROCEDURE — 82746 ASSAY OF FOLIC ACID SERUM: CPT | Performed by: STUDENT IN AN ORGANIZED HEALTH CARE EDUCATION/TRAINING PROGRAM

## 2024-11-14 PROCEDURE — 80053 COMPREHEN METABOLIC PANEL: CPT | Performed by: INTERNAL MEDICINE

## 2024-11-14 PROCEDURE — 83735 ASSAY OF MAGNESIUM: CPT | Performed by: HOSPITALIST

## 2024-11-14 PROCEDURE — 82607 VITAMIN B-12: CPT | Performed by: STUDENT IN AN ORGANIZED HEALTH CARE EDUCATION/TRAINING PROGRAM

## 2024-11-14 RX ADMIN — METOPROLOL TARTRATE 50 MG: 50 TABLET, FILM COATED ORAL at 08:08

## 2024-11-14 RX ADMIN — METOPROLOL TARTRATE 50 MG: 50 TABLET, FILM COATED ORAL at 20:31

## 2024-11-14 RX ADMIN — PANTOPRAZOLE SODIUM 40 MG: 40 TABLET, DELAYED RELEASE ORAL at 06:10

## 2024-11-14 RX ADMIN — Medication 10 ML: at 20:31

## 2024-11-14 RX ADMIN — SPIRONOLACTONE 100 MG: 100 TABLET ORAL at 08:08

## 2024-11-14 RX ADMIN — Medication 10 ML: at 08:09

## 2024-11-14 RX ADMIN — METOPROLOL TARTRATE 50 MG: 50 TABLET, FILM COATED ORAL at 15:58

## 2024-11-14 RX ADMIN — FUROSEMIDE 40 MG: 40 TABLET ORAL at 08:08

## 2024-11-14 RX ADMIN — ATORVASTATIN CALCIUM 10 MG: 20 TABLET, FILM COATED ORAL at 20:28

## 2024-11-14 NOTE — PROGRESS NOTES
Name: Viktoria Villasenor ADMIT: 2024   : 1940  PCP: Magen Olson MD    MRN: [id number] LOS: 3 days   AGE/SEX: 83 y.o. female  ROOM: [facility]     Subjective   Subjective   Feeling much better today. Having some cramping in legs at times. No N/V/D/abd pain. Tolerating diet. Voiding well. No SOA or CP or palp. No F/C/NS.       Objective   Objective   Vital Signs  Temp:  [97.7 °F (36.5 °C)-98.1 °F (36.7 °C)] 97.7 °F (36.5 °C)  Heart Rate:  [100-115] 100  Resp:  [16] 16  BP: (104-113)/(55-71) 110/69  SpO2:  [92 %-94 %] 94 %  on   ;   Device (Oxygen Therapy): room air  Body mass index is 35.62 kg/m².  Physical Exam  Vitals and nursing note reviewed.   Constitutional:       General: She is not in acute distress.     Appearance: She is not ill-appearing, toxic-appearing or diaphoretic.   HENT:      Head: Normocephalic.      Nose: Nose normal.      Mouth/Throat:      Mouth: Mucous membranes are moist.      Pharynx: Oropharynx is clear.   Eyes:      General: No scleral icterus.        Right eye: No discharge.         Left eye: No discharge.      Conjunctiva/sclera: Conjunctivae normal.   Cardiovascular:      Rate and Rhythm: Normal rate. Rhythm irregular.      Pulses: Normal pulses.   Pulmonary:      Effort: Pulmonary effort is normal. No respiratory distress.      Breath sounds: Normal breath sounds. No wheezing or rales.      Comments: Anteriorly   Abdominal:      General: Bowel sounds are normal. There is no distension.      Palpations: Abdomen is soft.      Tenderness: There is no abdominal tenderness.   Musculoskeletal:         General: No swelling.      Cervical back: Neck supple.   Skin:     General: Skin is warm and dry.      Capillary Refill: Capillary refill takes less than 2 seconds.      Coloration: Skin is not jaundiced.   Neurological:      General: No focal deficit present.      Mental Status: She is alert and oriented to person, place, and time. Mental status is at baseline.      Cranial  Nerves: No cranial nerve deficit.      Coordination: Coordination normal.   Psychiatric:         Mood and Affect: Mood normal.         Behavior: Behavior normal.         Thought Content: Thought content normal.       Results Review     I reviewed the patient's new clinical results.  Results from last 7 days   Lab Units 11/13/24  0516 11/12/24  0524 11/11/24  1619   WBC 10*3/mm3 7.94 8.21 7.32   HEMOGLOBIN g/dL 12.6 12.5 13.1   PLATELETS 10*3/mm3 129* 142 127*     Results from last 7 days   Lab Units 11/14/24  0526 11/13/24  1416 11/13/24  0516 11/12/24  1502 11/12/24  0524   SODIUM mmol/L 137  --  140 143 140   POTASSIUM mmol/L 4.4 3.8 3.4* 3.9 3.9   CHLORIDE mmol/L 100  --  100 102 101   CO2 mmol/L 25.0  --  29.0 26.5 25.6   BUN mg/dL 18  --  15 15 13   CREATININE mg/dL 0.87  --  0.94 1.03* 1.03*   GLUCOSE mg/dL 90  --  89 84 88   EGFR mL/min/1.73 67.0  --  61.1 54.7* 54.7*     Results from last 7 days   Lab Units 11/14/24  0526 11/13/24  0516 11/12/24  0524 11/11/24  1619   ALBUMIN g/dL 3.8 3.9 3.9 4.0   BILIRUBIN mg/dL 0.8 0.9 0.7 0.8   ALK PHOS U/L 70 70 69 79   AST (SGOT) U/L 28 22 20 18   ALT (SGPT) U/L 19 18 13 16     Results from last 7 days   Lab Units 11/14/24  0526 11/13/24  0516 11/12/24  1502 11/12/24  0524 11/11/24  1619   CALCIUM mg/dL 8.7 8.8 9.3 8.7 9.7   ALBUMIN g/dL 3.8 3.9  --  3.9 4.0     Results from last 7 days   Lab Units 11/11/24  1619   LACTATE mmol/L 1.8     Hemoglobin A1C   Date/Time Value Ref Range Status   11/12/2024 2028 5.40 4.80 - 5.60 % Final     Glucose   Date/Time Value Ref Range Status   11/11/2024 1640 99 74 - 100 mg/dL Final     Comment:     Serial Number: [id number]:  [id number]       US Liver    Result Date: 11/12/2024  1. Cirrhotic liver. No ultrasound evidence of liver lesion 2. Right pleural effusion 3. Cholecystectomy    This report was finalized on 11/12/2024 4:09 PM by Dr. Neel Blanca M.D on Workstation: [id number]       I have personally reviewed all  medications:  Scheduled Medications  atorvastatin, 10 mg, Oral, Nightly  furosemide, 40 mg, Oral, Daily  metoprolol tartrate, 50 mg, Oral, TID  pantoprazole, 40 mg, Oral, Q AM  sodium chloride, 10 mL, Intravenous, Q12H  spironolactone, 100 mg, Oral, Daily    Infusions   Diet  Diet: Cardiac; Low Sodium (2g); Texture: Regular (IDDSI 7); Fluid Consistency: Thin (IDDSI 0)    I have personally reviewed:  [x]  Laboratory   []  Microbiology   []  Radiology   [x]  EKG/Telemetry  []  Cardiology/Vascular   []  Pathology    [x]  Records       Assessment/Plan     Active Hospital Problems    Diagnosis  POA    **Decompensated cirrhosis [K72.90, K74.60]  Yes    Pleural effusion, bacterial [J90]  Yes    Paroxysmal atrial fibrillation [I48.0]  Yes    Prediabetes [R73.03]  Yes    Essential hypertension [I10]  Yes    Other hyperlipidemia [E78.49]  Yes    Mild intermittent asthma without complication [J45.20]  Yes      Resolved Hospital Problems   No resolved problems to display.       83 y.o. female with decompensated cirrhosis and volume overload.     Volume Overload/Pleural Effusions  - likely secondary to decompensated cirrhosis versus A-fib with RVR  - pleural effusions are not likely to require thoracentesis  - no evidence of ascites on CT scan or US   - appreciate Cardiology input   - transitioned to oral diuretics now     New Afib/RVR  - new diagnosis this admission  - hold off on AC for now given cirrhosis and possible duodenitis--defer to Cardiology  - s/p digoxin, HR has improved but still trending a bit high  - continue metoprolol but changed to tartrate every 8h today     MARSHALL Cirrhosis  Thrombocytopenia  - diuresis as above  - no evidence of PSE or coagulopathy, has mild thrombocytopenia  -Given mild thrombocytopenia checked B12 and folate--both fine  - GI consulted  - abdominal US with no evidence of ascites   - will need screening EGD but can be done in the outpatient setting  - GI will follow-up in clinic after  discharge (sees Dr. Rodríguez)     Duodenitis  - continue PPI     HTN  - BP trend is appropriate on current dose of metoprolol  - holding ARB     Hypokalemia  -Replaced  -check Mg++ level       SCDs for DVT prophylaxis.  Full code.  Discussed with patient.  Anticipate discharge home with family when cleared by consultants. Suspect maybe tomorrow if HRs and BPs acceptable on current BB and diuretic, and if labs okay on same.  Expected Discharge Date: 11/15/2024; Expected Discharge Time:       Bull Brown MD  [facility]  11/14/24  12:39 EST

## 2024-11-14 NOTE — CASE MANAGEMENT/SOCIAL WORK
After Visit Summary   3/27/2017    Teresa Salinas    MRN: [id number]           Patient Information     Date Of Birth          1985        Visit Information        Provider Department      3/27/2017 4:00 PM Mariya Hutchinson APRN [facility]        Today's Diagnoses     Need for vaccination    -  1    Travel advice encounter          Care Instructions    Today March 27, 2017 you received the    Hepatitis A Vaccine - Please return on 9/23/17 or later for your 2nd and final dose.    Typhoid - injectable. This vaccine is valid for two years.   .    These appointments can be made as a NURSE ONLY visit.    **It is very important for the vaccinations to be given on the scheduled day(s), this helps ensure you receive the full effectiveness of the vaccine.**    Please call [facility] with any questions [phone]    Thank you for visiting [facility]            Follow-ups after your visit        Your next 10 appointments already scheduled     Apr 26, 2017  2:00 PM CDT   Return Visit with Tiffanyp Deep Ponce MD   [facility] ([address] [address]   [phone]              Future tests that were ordered for you today     Open Future Orders        Priority Expected Expires Ordered    HEPA VACCINE ADULT IM Routine  3/27/2018 3/27/2017            Who to contact     If you have questions or need follow up information about today's clinic visit or your schedule please contact [facility] directly at [phone].  Normal or non-critical lab and imaging results will be communicated to you by MyChart, letter or phone within 4 business days after the clinic has received the results. If you do not hear from us within 7 days, please contact the clinic through MyChart or phone. If you have a critical or abnormal lab result, we will notify you by phone as soon as possible.  Submit  Continued Stay Note  [facility]     Patient Name: Viktoria Villasenor  MRN: [id number]  Today's Date: 11/14/2024    Admit Date: 11/11/2024    Plan: Home, family to transport   Discharge Plan       Row Name 11/14/24 6577       Plan    Plan Home, family to transport    Plan Comments Clinical chart reviewed; plan remains home. Family can transport. CCP will continue to follow for dc planning needs pending clinical course. ANGELA, BETINAW                   Discharge Codes    No documentation.                 Expected Discharge Date and Time       Expected Discharge Date Expected Discharge Time    Nov 15, 2024               LUIS Foreman     "refill requests through [facility] or call your pharmacy and they will forward the refill request to us. Please allow 3 business days for your refill to be completed.          Additional Information About Your Visit        [facility] Information     [facility] lets you send messages to your doctor, view your test results, renew your prescriptions, schedule appointments and more. To sign up, go to [email or web address] . Click on \"Log in\" on the left side of the screen, which will take you to the Welcome page. Then click on \"Sign up Now\" on the right side of the page.     You will be asked to enter the access code listed below, as well as some personal information. Please follow the directions to create your username and password.     Your access code is: [email or web address]  Expires: 2017  4:42 PM     Your access code will  in 90 days. If you need help or a new code, please call your [place] [facility] clinic or [phone].        Care EveryWhere ID     This is your Care EveryWhere ID. This could be used by other organizations to access your [facility] medical records  [id number]        Your Vitals Were     Temperature                   98.8  F (37.1  C) (Oral)            Blood Pressure from Last 3 Encounters:   17 120/70   17 133/82   16 131/67    Weight from Last 3 Encounters:   17 176 lb 6.4 oz (80 kg)   16 175 lb (79.4 kg)   16 174 lb 6.4 oz (79.1 kg)              We Performed the Following     HEPA VACCINE ADULT IM     TYPHOID VACCINE, IM          Today's Medication Changes          These changes are accurate as of: 3/27/17  4:42 PM.  If you have any questions, ask your nurse or doctor.               Start taking these medicines.        Dose/Directions    azithromycin 500 MG tablet   Commonly known as:  ZITHROMAX   Used for:  Travel advice encounter   Started by:  Mariya Hutchinson APRN CNP        Dose:  500 mg   Take 1 tablet (500 mg) by mouth daily for 3 doses Take 1 tablet a day for " up to 3 days for severe diarrhea   Quantity:  3 tablet   Refills:  0            Where to get your medicines      These medications were sent to [place] [address] [facility] - [place], [place] - [facility] [address] [facility]  [address] [facility] [address]     Phone:  [phone]     azithromycin 500 MG tablet                Primary Care Provider Office Phone # Fax #    Kacie Infantefreddy Vazquez [phone]       [facility] [address] [facility] [address]        Thank you!     Thank you for choosing [facility]  for your care. Our goal is always to provide you with excellent care. Hearing back from our patients is one way we can continue to improve our services. Please take a few minutes to complete the written survey that you may receive in the mail after your visit with us. Thank you!             Your Updated Medication List - Protect others around you: Learn how to safely use, store and throw away your medicines at www.disposemymeds.org.          This list is accurate as of: 3/27/17  4:42 PM.  Always use your most recent med list.                   Brand Name Dispense Instructions for use    albuterol 108 (90 BASE) MCG/ACT Inhaler    PROAIR HFA/PROVENTIL HFA/VENTOLIN HFA    2 Inhaler    Inhale 2 puffs into the lungs every 6 hours as needed for shortness of breath / dyspnea or wheezing       azithromycin 500 MG tablet    ZITHROMAX    3 tablet    Take 1 tablet (500 mg) by mouth daily for 3 doses Take 1 tablet a day for up to 3 days for severe diarrhea       BIOTIN 5000 5 MG Caps   Generic drug:  biotin     30 capsule        cetirizine 10 MG tablet    zyrTEC    30 tablet    Take 1 tablet (10 mg) by mouth every evening       etonogestrel 68 MG Impl    IMPLANON/NEXPLANON    1 each    1 each (68 mg) by Subdermal route once       gabapentin 100 MG capsule    NEURONTIN    270 capsule    Week 1 and 2: 100 mg bed time. Week 3 and 4: 100 mg two times/day. Week 5 and afterwards: 100 mg three  times/day       minoxidil 5 % Soln     60 mL    Apply topically twice daily.       Multi-vitamin Tabs tablet     100 tablet    Take 1 tablet by mouth daily       vitamin D 2000 UNITS tablet     100 tablet    Take 2,000 Units by mouth daily

## 2024-11-14 NOTE — PROGRESS NOTES
"[facility]    Patient Name: Viktoria Villasenor  :1940  83 y.o.  LOS: 3  Encounter Provider: WARREN Hay      Patient Care Team:  Magen Olson MD as PCP - General  Magen Olson MD as PCP - Family Medicine  Yue, Ralph MARTINO MD as Consulting Physician (Otolaryngology)  Casper Hodge MD as Consulting Physician (Gastroenterology)    Chief Complaint: Follow-up atrial fibrillation    Interval History: Dyspnea is improving.        Objective   Vital Signs  Temp:  [97.5 °F (36.4 °C)-98.1 °F (36.7 °C)] 97.7 °F (36.5 °C)  Heart Rate:  [100-115] 100  Resp:  [16] 16  BP: (104-121)/(55-71) 110/69    Intake/Output Summary (Last 24 hours) at 2024 0821  Last data filed at 2024 0612  Gross per 24 hour   Intake 840 ml   Output --   Net 840 ml     Flowsheet Rows      Flowsheet Row First Filed Value   Admission Height 163.8 cm (64.5\") Documented at 2024 1556   Admission Weight 89.8 kg (198 lb) Documented at 2024 1556              Vitals and nursing note reviewed.   Constitutional:       Appearance: Normal appearance. Well-developed.   Eyes:      Conjunctiva/sclera: Conjunctivae normal.   Neck:      Vascular: No carotid bruit.   Pulmonary:      Breath sounds: Normal breath sounds.   Cardiovascular:      Tachycardia present. Irregularly irregular rhythm. Normal S1 with normal intensity. Normal S2 with normal intensity.       Murmurs: There is no murmur.      No gallop.  No click. No rub.   Edema:     Peripheral edema absent.   Musculoskeletal: Normal range of motion. Skin:     General: Skin is warm and dry.   Neurological:      Mental Status: Alert and oriented to person, place, and time.      GCS: GCS eye subscore is 4. GCS verbal subscore is 5. GCS motor subscore is 6.   Psychiatric:         Speech: Speech normal.         Behavior: Behavior normal.         Thought Content: Thought content normal.         Judgment: Judgment normal.           Pertinent Test " "Results:  Results from last 7 days   Lab Units 11/14/24  0526 11/13/24  1416 11/13/24  0516 11/12/24  1502 11/12/24  0524 11/11/24  1640 11/11/24  1619   SODIUM mmol/L 137  --  140 143 140  --  138   POTASSIUM mmol/L 4.4 3.8 3.4* 3.9 3.9  --  4.1   CHLORIDE mmol/L 100  --  100 102 101  --  103   CO2 mmol/L 25.0  --  29.0 26.5 25.6  --  24.8   BUN mg/dL 18  --  15 15 13  --  11   CREATININE mg/dL 0.87  --  0.94 1.03* 1.03* 0.97 0.99   GLUCOSE mg/dL 90  --  89 84 88  --  105*   CALCIUM mg/dL 8.7  --  8.8 9.3 8.7  --  9.7   AST (SGOT) U/L 28  --  22  --  20  --  18   ALT (SGPT) U/L 19  --  18  --  13  --  16     Results from last 7 days   Lab Units 11/11/24  1803 11/11/24  1619   HSTROP T ng/L 18* 19*     Results from last 7 days   Lab Units 11/13/24  0516 11/12/24  0524 11/11/24  1619   WBC 10*3/mm3 7.94 8.21 7.32   HEMOGLOBIN g/dL 12.6 12.5 13.1   HEMATOCRIT % 40.5 38.4 42.1   PLATELETS 10*3/mm3 129* 142 127*     Results from last 7 days   Lab Units 11/12/24  0524 11/11/24  1655   INR  1.18* 1.30               Invalid input(s): \"LDLCALC\"  Results from last 7 days   Lab Units 11/11/24  1619   PROBNP pg/mL 1,323.0     Results from last 7 days   Lab Units 11/12/24  2028   TSH uIU/mL 1.190           Medication Review:   atorvastatin, 10 mg, Oral, Nightly  furosemide, 40 mg, Oral, Daily  metoprolol tartrate, 50 mg, Oral, TID  pantoprazole, 40 mg, Oral, Q AM  sodium chloride, 10 mL, Intravenous, Q12H  spironolactone, 100 mg, Oral, Daily              Assessment & Plan     Active Hospital Problems    Diagnosis  POA    **Decompensated cirrhosis [K72.90, K74.60]  Yes    Pleural effusion, bacterial [J90]  Yes    Paroxysmal atrial fibrillation [I48.0]  Yes    Prediabetes [R73.03]  Yes    Essential hypertension [I10]  Yes    Other hyperlipidemia [E78.49]  Yes    Mild intermittent asthma without complication [J45.20]  Yes      Resolved Hospital Problems   No resolved problems to display.          New onset atrial " fibrillation  Patient had extensive cardiac evaluation earlier this year with normal coronary angiography on cardiac catheterization.  She is also had an echocardiogram with normal LV function and mild left atrial enlargement  Will ultimately need systemic anticoagulation given CHADS2 vascular score.  Difficult situation given cirrhosis and possible duodenitis.  We will see how clinical course evolves before prescribing  Patient with mildly elevated heart rates, I do think that patient would benefit from short acting metoprolol so that we can give it more frequently.  Transition to metoprolol tartrate 50 mg every 8 hours.  Hold parameters for blood pressure in place.    HTN  BP soft, monitor closely  Decompensated cirrhosis  Duodenitis           WARREN Hay  [facility]   [facility]  [address] [place] [address]  Office: [phone]    11/14/24  08:21 EST

## 2024-11-14 NOTE — PLAN OF CARE
Goal Outcome Evaluation:  Plan of Care Reviewed With: patient        Progress: improving        VSS. Pt up ad be in room. Tylenol given for back and muscle pain. Still in afib. Metoprolol increased to short-acting TID starting in AM. Pt on PO lasix and spirolactone-good UOP. Anticoagulation on hold for now d/t MARSHALL. DC today or tomorrow per MD notes.

## 2024-11-15 ENCOUNTER — READMISSION MANAGEMENT (OUTPATIENT)
Dept: CALL CENTER | Facility: HOSPITAL | Age: 84
End: 2024-11-15
Payer: MEDICARE

## 2024-11-15 VITALS
SYSTOLIC BLOOD PRESSURE: 122 MMHG | HEIGHT: 65 IN | WEIGHT: 210.8 LBS | TEMPERATURE: 97.5 F | RESPIRATION RATE: 18 BRPM | OXYGEN SATURATION: 96 % | BODY MASS INDEX: 35.12 KG/M2 | DIASTOLIC BLOOD PRESSURE: 63 MMHG | HEART RATE: 105 BPM

## 2024-11-15 PROBLEM — J90 PLEURAL EFFUSION: Status: ACTIVE | Noted: 2024-11-15

## 2024-11-15 LAB
ALBUMIN SERPL-MCNC: 3.8 G/DL (ref 3.5–5.2)
ALBUMIN/GLOB SERPL: 1.2 G/DL
ALP SERPL-CCNC: 68 U/L (ref 39–117)
ALT SERPL W P-5'-P-CCNC: 14 U/L (ref 1–33)
ANION GAP SERPL CALCULATED.3IONS-SCNC: 9.4 MMOL/L (ref 5–15)
AST SERPL-CCNC: 26 U/L (ref 1–32)
BASOPHILS # BLD AUTO: 0.06 10*3/MM3 (ref 0–0.2)
BASOPHILS NFR BLD AUTO: 0.7 % (ref 0–1.5)
BILIRUB SERPL-MCNC: 0.7 MG/DL (ref 0–1.2)
BUN SERPL-MCNC: 22 MG/DL (ref 8–23)
BUN/CREAT SERPL: 19 (ref 7–25)
CALCIUM SPEC-SCNC: 8.7 MG/DL (ref 8.6–10.5)
CHLORIDE SERPL-SCNC: 98 MMOL/L (ref 98–107)
CO2 SERPL-SCNC: 29.6 MMOL/L (ref 22–29)
CREAT SERPL-MCNC: 1.16 MG/DL (ref 0.57–1)
DEPRECATED RDW RBC AUTO: 39.2 FL (ref 37–54)
EGFRCR SERPLBLD CKD-EPI 2021: 47.5 ML/MIN/1.73
EOSINOPHIL # BLD AUTO: 0.64 10*3/MM3 (ref 0–0.4)
EOSINOPHIL NFR BLD AUTO: 7 % (ref 0.3–6.2)
ERYTHROCYTE [DISTWIDTH] IN BLOOD BY AUTOMATED COUNT: 12.7 % (ref 12.3–15.4)
GLOBULIN UR ELPH-MCNC: 3.2 GM/DL
GLUCOSE SERPL-MCNC: 90 MG/DL (ref 65–99)
HCT VFR BLD AUTO: 42.4 % (ref 34–46.6)
HGB BLD-MCNC: 13.4 G/DL (ref 12–15.9)
IMM GRANULOCYTES # BLD AUTO: 0.03 10*3/MM3 (ref 0–0.05)
IMM GRANULOCYTES NFR BLD AUTO: 0.3 % (ref 0–0.5)
LYMPHOCYTES # BLD AUTO: 1.97 10*3/MM3 (ref 0.7–3.1)
LYMPHOCYTES NFR BLD AUTO: 21.6 % (ref 19.6–45.3)
MAGNESIUM SERPL-MCNC: 2.1 MG/DL (ref 1.6–2.4)
MCH RBC QN AUTO: 27.2 PG (ref 26.6–33)
MCHC RBC AUTO-ENTMCNC: 31.6 G/DL (ref 31.5–35.7)
MCV RBC AUTO: 86.2 FL (ref 79–97)
MONOCYTES # BLD AUTO: 1.11 10*3/MM3 (ref 0.1–0.9)
MONOCYTES NFR BLD AUTO: 12.2 % (ref 5–12)
NEUTROPHILS NFR BLD AUTO: 5.31 10*3/MM3 (ref 1.7–7)
NEUTROPHILS NFR BLD AUTO: 58.2 % (ref 42.7–76)
NRBC BLD AUTO-RTO: 0 /100 WBC (ref 0–0.2)
PHOSPHATE SERPL-MCNC: 4 MG/DL (ref 2.5–4.5)
PLATELET # BLD AUTO: 149 10*3/MM3 (ref 140–450)
PMV BLD AUTO: 11.9 FL (ref 6–12)
POTASSIUM SERPL-SCNC: 4.3 MMOL/L (ref 3.5–5.2)
PROT SERPL-MCNC: 7 G/DL (ref 6–8.5)
RBC # BLD AUTO: 4.92 10*6/MM3 (ref 3.77–5.28)
SODIUM SERPL-SCNC: 137 MMOL/L (ref 136–145)
WBC NRBC COR # BLD AUTO: 9.12 10*3/MM3 (ref 3.4–10.8)

## 2024-11-15 PROCEDURE — 80053 COMPREHEN METABOLIC PANEL: CPT | Performed by: INTERNAL MEDICINE

## 2024-11-15 PROCEDURE — 85025 COMPLETE CBC W/AUTO DIFF WBC: CPT | Performed by: INTERNAL MEDICINE

## 2024-11-15 PROCEDURE — 99232 SBSQ HOSP IP/OBS MODERATE 35: CPT | Performed by: INTERNAL MEDICINE

## 2024-11-15 PROCEDURE — 84100 ASSAY OF PHOSPHORUS: CPT | Performed by: HOSPITALIST

## 2024-11-15 PROCEDURE — 83735 ASSAY OF MAGNESIUM: CPT | Performed by: HOSPITALIST

## 2024-11-15 RX ORDER — SPIRONOLACTONE 100 MG/1
50 TABLET, FILM COATED ORAL DAILY
Qty: 30 TABLET | Refills: 0 | Status: SHIPPED | OUTPATIENT
Start: 2024-11-16

## 2024-11-15 RX ORDER — FUROSEMIDE 20 MG/1
20 TABLET ORAL DAILY
Status: DISCONTINUED | OUTPATIENT
Start: 2024-11-16 | End: 2024-11-15 | Stop reason: HOSPADM

## 2024-11-15 RX ORDER — SPIRONOLACTONE 100 MG/1
100 TABLET, FILM COATED ORAL DAILY
Qty: 30 TABLET | Refills: 0 | Status: SHIPPED | OUTPATIENT
Start: 2024-11-16 | End: 2024-11-15

## 2024-11-15 RX ORDER — PANTOPRAZOLE SODIUM 40 MG/1
40 TABLET, DELAYED RELEASE ORAL
Qty: 30 TABLET | Refills: 0 | Status: SHIPPED | OUTPATIENT
Start: 2024-11-16

## 2024-11-15 RX ORDER — METOPROLOL TARTRATE 50 MG
50 TABLET ORAL 3 TIMES DAILY
Qty: 90 TABLET | Refills: 0 | Status: SHIPPED | OUTPATIENT
Start: 2024-11-15 | End: 2024-11-22 | Stop reason: HOSPADM

## 2024-11-15 RX ORDER — FUROSEMIDE 20 MG/1
20 TABLET ORAL DAILY
Qty: 30 TABLET | Refills: 0 | Status: SHIPPED | OUTPATIENT
Start: 2024-11-16

## 2024-11-15 RX ADMIN — PANTOPRAZOLE SODIUM 40 MG: 40 TABLET, DELAYED RELEASE ORAL at 06:11

## 2024-11-15 RX ADMIN — Medication 10 ML: at 08:38

## 2024-11-15 RX ADMIN — SPIRONOLACTONE 100 MG: 100 TABLET ORAL at 08:38

## 2024-11-15 RX ADMIN — FUROSEMIDE 40 MG: 40 TABLET ORAL at 08:38

## 2024-11-15 RX ADMIN — METOPROLOL TARTRATE 50 MG: 50 TABLET, FILM COATED ORAL at 08:38

## 2024-11-15 NOTE — CASE MANAGEMENT/SOCIAL WORK
Case Management Discharge Note      Final Note: home no needs         Selected Continued Care - Discharged on 11/15/2024 Admission date: 11/11/2024 - Discharge disposition: Home or Self Care      Destination    No services have been selected for the patient.                Durable Medical Equipment    No services have been selected for the patient.                Dialysis/Infusion    No services have been selected for the patient.                Home Medical Care    No services have been selected for the patient.                Therapy    No services have been selected for the patient.                Community Resources    No services have been selected for the patient.                Community & DME    No services have been selected for the patient.                    Transportation Services  Private: Car    Final Discharge Disposition Code: 01 - home or self-care

## 2024-11-15 NOTE — DISCHARGE SUMMARY
Patient Name: Viktoria Villasenor  : 1940  MRN: [id number]    Date of Admission: 2024  Date of Discharge:  11/15/2024  Primary Care Physician: Magen Olson MD      Chief Complaint:   Shortness of Breath (X several weeks, )      Discharge Diagnoses     Active Hospital Problems    Diagnosis  POA    **Decompensated cirrhosis [K72.90, K74.60]  Yes    Pleural effusion [J90]  Yes    Paroxysmal atrial fibrillation [I48.0]  Yes    Prediabetes [R73.03]  Yes    Essential hypertension [I10]  Yes    Other hyperlipidemia [E78.49]  Yes    Mild intermittent asthma without complication [J45.20]  Yes      Resolved Hospital Problems   No resolved problems to display.        Hospital Course     Very pleasant 83 y.o. female admitted with decompensated cirrhosis and volume overload. Please see below for details of admission by problem:      Volume Overload/Pleural Effusions  - likely secondary to decompensated cirrhosis vs A-fib with RVR  - pleural effusions do not require thoracentesis at this time  - no evidence of ascites on CT scan or US   - appreciate Cardiology input   - transitioned to oral diuretics now (Lasix and Aldactone)  - f/u with PCP at next available appt     New Afib/RVR  - new diagnosis this admission  - pt would like to hold off on AC for now given cirrhosis and possible duodenitis, Card has discussed with her, they recommend she f/u in office in 1-2 weeks for recheck of HRs and further discussion of AC  - s/p digoxin, HR has improved and remains stable on Q8h metoprolol     MARSHALL Cirrhosis  Thrombocytopenia, mild, resolved  - diuresis as above  - no evidence of PSE or coagulopathy, had mild thrombocytopenia  -Given mild thrombocytopenia checked B12 and folate--both fine  - GI consulted  - abdominal US with no evidence of ascites   - will need screening EGD but can be done in the outpatient setting  - GI will follow-up in clinic after discharge, has appt on  with Madeleine TOBIN     Duodenitis  -  continue PPI     HTN  - BP trend is appropriate on current dose of metoprolol  - ARB stopped     Hypokalemia  -Replaced  -Mg++ level fine        SCDs for DVT prophylaxis while here.  Full code confirmed.  Discussed with patient and RN.  Discharge home with family this AM.    Day of Discharge     Subjective:  Feeling much better again today. No N/V/D/abd pain. Tolerating diet. Voiding well. No SOA or CP or palp. No F/C/NS. Very eager to go home.    Physical Exam:  Temp:  [97.5 °F (36.4 °C)-98.2 °F (36.8 °C)] 97.5 °F (36.4 °C)  Heart Rate:  [] 105  Resp:  [16-18] 18  BP: ()/(63-75) 122/63  Body mass index is 35.62 kg/m².  Physical Exam  Vitals and nursing note reviewed.   Constitutional:       General: She is not in acute distress.     Appearance: She is not ill-appearing, toxic-appearing or diaphoretic.   Cardiovascular:      Rate and Rhythm: Normal rate. Rhythm irregular.      Pulses: Normal pulses.   Pulmonary:      Effort: Pulmonary effort is normal. No respiratory distress.      Breath sounds: Normal breath sounds. No wheezing or rales.      Comments: Anteriorly   Abdominal:      General: Bowel sounds are normal. There is no distension.      Palpations: Abdomen is soft.      Tenderness: There is no abdominal tenderness.   Musculoskeletal:         General: No swelling.      Cervical back: Neck supple.   Skin:     General: Skin is warm and dry.      Capillary Refill: Capillary refill takes less than 2 seconds.      Coloration: Skin is not jaundiced.   Neurological:      General: No focal deficit present.      Mental Status: She is alert and oriented to person, place, and time. Mental status is at baseline.      Cranial Nerves: No cranial nerve deficit.      Coordination: Coordination normal.   Psychiatric:         Mood and Affect: Mood normal.         Behavior: Behavior normal.         Thought Content: Thought content normal.         Consultants     Consult Orders (all) (From admission, onward)        Start     Ordered    11/13/24 2206  Inpatient Case Management  Consult  Once        Provider:  (Not yet assigned)    11/13/24 2206 11/12/24 0000  Inpatient Consult to Advance Care Planning  Once        Provider:  (Not yet assigned)    11/11/24 2035 11/11/24 2135  Inpatient Cardiology Consult  Once        Specialty:  Cardiology  Provider:  Irving Fajardo MD    11/11/24 2135 11/11/24 2135  Inpatient Gastroenterology Consult  Once        Specialty:  Gastroenterology  Provider:  Casper Rodríguez MD    11/11/24 2135                  Procedures     * Surgery not found *    Imaging Results (All)       Procedure Component Value Units Date/Time    US Liver [[id number]] Collected: 11/12/24 1607     Updated: 11/12/24 1615    Narrative:      RIGHT UPPER QUADRANT ULTRASOUND     HISTORY: Cirrhosis, HCC surveillance     COMPARISON: CT of the abdomen pelvis from yesterday     TECHNIQUE: Gray scale and color doppler ultrasound imaging of the right  upper quadrant was performed.     FINDINGS: Only a small portion of the body of the pancreas is  visualized. The remainder the pancreas is obscured by bowel gas.  Visualized portions of the pancreas are unremarkable. The liver is  cirrhotic with nodular contour and coarsened echotexture. No ultrasound  evidence of liver lesion. Cholecystectomy. Common duct measures about 8  mm. The right kidney measures 10.2 cm in length and is normal in  appearance. There is no ascites. A right pleural effusion is noted.       Impression:      1. Cirrhotic liver. No ultrasound evidence of liver lesion  2. Right pleural effusion  3. Cholecystectomy           This report was finalized on 11/12/2024 4:09 PM by Dr. Neel Blanca M.D on Workstation: [id number]       CT Angiogram Chest Pulmonary Embolism [[id number]] Collected: 11/11/24 1837     Updated: 11/11/24 1855    Narrative:      CT ANGIOGRAM CHEST PULMONARY EMBOLISM-     INDICATIONS: Dyspnea     TECHNIQUE: Radiation  dose reduction techniques were utilized, including  automated exposure control and exposure modulation based on body size.  CT angiography of the chest. Three-dimensional reconstructions     COMPARISON: 4/26/2024     FINDINGS:     No pulmonary embolism. Aortic lumen is not characterized owing to phase  of contrast enhancement     The heart size is enlarged without pericardial effusion. A few small  subcentimeter short axis mediastinal lymph nodes are seen that are not  significant by size criteria. A 1 cm short axis right hilar lymph node  on axial image 64 is borderline, nonspecific, stable. A 1.2 cm short  axis subcarinal lymph node on axial image 70 is mildly prominent, could  be reactive in nature or potentially evidence of neoplasm (previously  0.7 cm), clinical correlation and follow-up recommended (if further  imaging evaluation is indicated, PET/CT could be considered).     The airways appear clear.     Mild right more than left pleural effusions are seen.     The lungs show no focal pulmonary consolidation or mass. Atelectasis is  present in both lungs. Old granulomatous disease is seen.     Upper abdominal structures show no acute findings, and appear grossly  stable from the CT of the abdomen and pelvis from earlier today (please  see separate report).     Degenerative changes are seen in the spine. No acute fracture is  identified.       Impression:         No pulmonary embolism. Mild bilateral pleural effusions. Mildly  prominent, nonspecific subcarinal lymph node, as described.     This report was finalized on 11/11/2024 6:52 PM by Dr. Hubert Em M.D on Workstation: [id number]       CT Abdomen Pelvis With Contrast [[id number]] Collected: 11/11/24 1722     Updated: 11/11/24 1744    Narrative:      CT ABDOMEN PELVIS W CONTRAST-     INDICATIONS: Abdominal distention and pain, cirrhosis     TECHNIQUE: Radiation dose reduction techniques were utilized, including  automated exposure control and exposure  modulation based on body size.  Enhanced ABDOMEN AND PELVIS CT     COMPARISON: 8/11/2023     FINDINGS:     Nodular contour of the liver is noted, compatible with cirrhosis.     The gallbladder is surgically absent. Mild nonspecific thickening of the  left adrenal gland is chronic.     Focal cortical thinning in the right kidney could be result of prior  infection or infarct. Tiny nonobstructive right nephrolithiasis. Small  right renal low density is noted that is too small to characterize.     The size of the spleen is borderline, 12.8 cm     Otherwise unremarkable appearance of the liver, spleen, adrenal glands,  pancreas, kidneys, bladder.     No bowel obstruction. Stranding around the proximal to mid duodenum  suggests duodenitis. The appendix does not appear inflamed. Colonic  diverticula are seen that do not appear inflamed. Some presacral edema  is present that may relate to volume status or potentially evidence of  proctitis, correlate clinically.     No free intraperitoneal gas. Minimal pelvic free fluid.     Scattered small mesenteric and para-aortic lymph nodes are seen that are  not significant by size criteria. A 9 mm short axis precaval lymph node  on axial image 44 is nonspecific, stable. Likewise, a 1.3 cm short axis  lymph node superior to the pancreas on axial image 39 and is not  significantly changed.     Abdominal aorta is not aneurysmal. Aortic and other arterial  calcifications are present. Some hazy density around the superior  mesenteric artery in the celiac artery is noted; this appearance is  chronic.     The lung bases show atelectasis, mild bilateral pleural effusions. The  heart is enlarged.     Degenerative changes are seen in the spine. No acute fracture is  identified.             Impression:         1. Evidence of proximal duodenitis, endoscopic correlation could be  considered as indicated.     2. Cirrhosis. Mild bilateral pleural effusions. Minimal pelvic free  fluid. Nonspecific  presacral edema.     3. No obstructive uropathy. Nonobstructive right nephrolithiasis.           This report was finalized on 11/11/2024 5:41 PM by Dr. Hubert Em M.D on Workstation: [id number]       XR Chest 1 View [[id number]] Collected: 11/11/24 1705     Updated: 11/11/24 1709    Narrative:      XR CHEST 1 VW-11/11/2024     HISTORY: Shortness of breath.     Heart size is at the upper limits of normal. There is bilateral vascular  congestion. There is increased density in the right lung base which may  represent asymmetric pulmonary edema atelectasis or pneumonia. There may  be bilateral pleural effusions.       Impression:      1. FINDINGS are consistent with mild to moderate congestive heart  failure. There could be an element of atelectasis or pneumonia in the  right lung base. Please correlate with the clinical findings.  2. Follow-up films recommended     This report was finalized on 11/11/2024 5:06 PM by Dr. Laci Gannon M.D on Workstation: [id number]             Results for orders placed during the hospital encounter of 09/09/21    Duplex Venous Lower Extremity - Bilateral CAR    Interpretation Summary  · Normal bilateral lower extremity venous duplex scan.    Results for orders placed during the hospital encounter of 04/25/24    Adult Transthoracic Echo Complete W/ Cont if Necessary Per Protocol    Interpretation Summary    The left ventricular cavity is mildly dilated.    Left ventricular systolic function is normal. Left ventricular ejection fraction appears to be 66 - 70%.    Left ventricular diastolic function is consistent with (grade I) impaired relaxation.    Normal right ventricular cavity size and systolic function noted.    The left atrial cavity is mildly dilated.    The aortic valve leaflets are mildly to moderately calcified    Moderate mitral valve regurgitation is present.    Mild tricuspid valve regurgitation is present.    Calculated right ventricular systolic pressure from  "tricuspid regurgitation is 31 mmHg.    There is no evidence of pericardial effusion    Pertinent Labs     Results from last 7 days   Lab Units 11/15/24  0517 11/13/24  0516 11/12/24  0524 11/11/24  1619   WBC 10*3/mm3 9.12 7.94 8.21 7.32   HEMOGLOBIN g/dL 13.4 12.6 12.5 13.1   PLATELETS 10*3/mm3 149 129* 142 127*     Results from last 7 days   Lab Units 11/15/24  0517 11/14/24  0526 11/13/24  1416 11/13/24  0516 11/12/24  1502   SODIUM mmol/L 137 137  --  140 143   POTASSIUM mmol/L 4.3 4.4 3.8 3.4* 3.9   CHLORIDE mmol/L 98 100  --  100 102   CO2 mmol/L 29.6* 25.0  --  29.0 26.5   BUN mg/dL 22 18  --  15 15   CREATININE mg/dL 1.16* 0.87  --  0.94 1.03*   GLUCOSE mg/dL 90 90  --  89 84   EGFR mL/min/1.73 47.5* 67.0  --  61.1 54.7*     Results from last 7 days   Lab Units 11/15/24  0517 11/14/24  0526 11/13/24  0516 11/12/24  0524   ALBUMIN g/dL 3.8 3.8 3.9 3.9   BILIRUBIN mg/dL 0.7 0.8 0.9 0.7   ALK PHOS U/L 68 70 70 69   AST (SGOT) U/L 26 28 22 20   ALT (SGPT) U/L 14 19 18 13     Results from last 7 days   Lab Units 11/15/24  0517 11/14/24  0526 11/13/24  0516 11/12/24  1502 11/12/24  0524   CALCIUM mg/dL 8.7 8.7 8.8 9.3 8.7   ALBUMIN g/dL 3.8 3.8 3.9  --  3.9   MAGNESIUM mg/dL 2.1 2.2  --   --   --    PHOSPHORUS mg/dL 4.0  --   --   --   --      Results from last 7 days   Lab Units 11/13/24  0516 11/11/24  1619   LIPASE U/L  --  22   AMMONIA umol/L 34  --      Results from last 7 days   Lab Units 11/11/24  1803 11/11/24  1619   HSTROP T ng/L 18* 19*   PROBNP pg/mL  --  1,323.0   D DIMER QUANT MCGFEU/mL  --  0.93*           Invalid input(s): \"LDLCALC\"          Test Results Pending at Discharge     Pending Results       None              Discharge Details        Discharge Medications        New Medications        Instructions Start Date   furosemide 20 MG tablet  Commonly known as: LASIX   20 mg, Oral, Daily   Start Date: November 16, 2024     metoprolol tartrate 50 MG tablet  Commonly known as: LOPRESSOR   50 mg, " Oral, 3 times daily      pantoprazole 40 MG EC tablet  Commonly known as: PROTONIX   40 mg, Oral, Every Early Morning   Start Date: November 16, 2024     spironolactone 100 MG tablet  Commonly known as: ALDACTONE   50 mg, Oral, Daily   Start Date: November 16, 2024            Continue These Medications        Instructions Start Date   atorvastatin 10 MG tablet  Commonly known as: LIPITOR   10 mg, Oral, Nightly             Stop These Medications      valsartan 320 MG tablet  Commonly known as: DIOVAN              Allergies   Allergen Reactions    Lisinopril Cough       Discharge Disposition:  Home or Self Care      Discharge Diet:  Diet Order   Procedures    Diet: Cardiac; Low Sodium (2g); Texture: Regular (IDDSI 7); Fluid Consistency: Thin (IDDSI 0)       Discharge Activity:   As tolerated    CODE STATUS:    Code Status and Medical Interventions: CPR (Attempt to Resuscitate); Full Support   Ordered at: 11/11/24 2028     Code Status (Patient has no pulse and is not breathing):    CPR (Attempt to Resuscitate)     Medical Interventions (Patient has pulse or is breathing):    Full Support       Future Appointments   Date Time Provider Department Center   11/26/2024  9:45 AM Samara Salvador APRN [facility]     Additional Instructions for the Follow-ups that You Need to Schedule       Discharge Follow-up with PCP   As directed       Currently Documented PCP:    Magen Olson MD    PCP Phone Number:    [phone]     Follow Up Details: Dr. Olson (PCP) at next available appt        Discharge Follow-up with Specified Provider: Madeleine TOBIN (GI) on 11/26   As directed      To: Madeleine TOBIN (GI) on 11/26        Discharge Follow-up with Specified Provider: David TOBIN (Cardiology); 1 Week   As directed      To: David TOBIN (Cardiology)   Follow Up: 1 Week               Follow-up Information       Magen Olson MD .    Specialty: Family Medicine  Why: Dr. Olson (PCP) at next available appt  Contact information:  [id number]  [facility] [address]  [place] [id number] [phone]               Samara Salvador APRN Follow up on 11/26/2024.    Specialties: Gastroenterology, Nurse Practitioner  Contact information:  [address]  [facility] [address]  [facility] [id number] [phone]               Loyda Hernandez APRN Follow up in 1 week(s).    Specialties: Cardiology, Nurse Practitioner  Contact information:  [address] [facility] [address]  [place] [id number] [phone]                             Additional Instructions for the Follow-ups that You Need to Schedule       Discharge Follow-up with PCP   As directed       Currently Documented PCP:    Magen Olson MD    PCP Phone Number:    [phone]     Follow Up Details: Dr. Olson (PCP) at next available appt        Discharge Follow-up with Specified Provider: Madeleine TOBIN (GI) on 11/26   As directed      To: Madeleine TOBIN (GI) on 11/26        Discharge Follow-up with Specified Provider: David TOBIN (Cardiology); 1 Week   As directed      To: David TOBIN (Cardiology)   Follow Up: 1 Week            Time Spent on Discharge:  Greater than 30 minutes      Bull Brown MD  [facility]  11/15/24  10:17 EST

## 2024-11-15 NOTE — PROGRESS NOTES
Hospital Follow Up    LOS: 4  Patient Name: Viktoria Villasenor  Age/Sex: 83 y.o. female  : 1940  MRN: [id number]    Day of Service: 11/15/24   Length of Stay: 4  Encounter Provider: Chuy Orellana MD  Place of Service: [facility] CARDIOLOGY  Patient Care Team:  Magen Olson MD as PCP - General  Magen Olson MD as PCP - Family Medicine  Ralph Turner MD as Consulting Physician (Otolaryngology)  Casper Hodge MD as Consulting Physician (Gastroenterology)    Subjective:     Chief Complaint: Atrial fibrillation    Interval History: Patient is doing better she looks good today.  She says she is about 75% back to normal.    Objective:     Objective:  Temp:  [97.5 °F (36.4 °C)-98.2 °F (36.8 °C)] 97.5 °F (36.4 °C)  Heart Rate:  [] 105  Resp:  [16-18] 18  BP: ()/(63-75) 122/63   No intake or output data in the 24 hours ending 11/15/24 0909  Body mass index is 35.62 kg/m².      24  1556 24   Weight: 89.8 kg (198 lb) 95.6 kg (210 lb 12.8 oz)     Weight change:       Physical Exam:   General :  Alert, cooperative, in no acute distress.  Neuro:   Alert,cooperative and oriented.  Lungs:  CTAB. Normal respiratory effort and rate.  CV:  irregular rate and rhythm, normal S1 and S2, no murmurs, gallops or rubs.   ABD:  Soft, nontender, nondistended. Positive bowel sounds.  Extr:  No edema or cyanosis, moves all extremities.    Lab Review:   Results from last 7 days   Lab Units 11/15/24  0517 24  0526   SODIUM mmol/L 137 137   POTASSIUM mmol/L 4.3 4.4   CHLORIDE mmol/L 98 100   CO2 mmol/L 29.6* 25.0   BUN mg/dL 22 18   CREATININE mg/dL 1.16* 0.87   GLUCOSE mg/dL 90 90   CALCIUM mg/dL 8.7 8.7   AST (SGOT) U/L 26 28   ALT (SGPT) U/L 14 19     Results from last 7 days   Lab Units 24  1803 24  1619   HSTROP T ng/L 18* 19*     Results from last 7 days   Lab Units 11/15/24  0517 24  0516   WBC 10*3/mm3 9.12 7.94   HEMOGLOBIN g/dL  "13.4 12.6   HEMATOCRIT % 42.4 40.5   PLATELETS 10*3/mm3 149 129*     Results from last 7 days   Lab Units 11/12/24  0524 11/11/24  1655   INR  1.18* 1.30     Results from last 7 days   Lab Units 11/15/24  0517 11/14/24  0526   MAGNESIUM mg/dL 2.1 2.2           Invalid input(s): \"LDLCALC\"  Results from last 7 days   Lab Units 11/11/24  1619   PROBNP pg/mL 1,323.0     Results from last 7 days   Lab Units 11/12/24  2028   TSH uIU/mL 1.190       Current Medications:   Scheduled Meds:atorvastatin, 10 mg, Oral, Nightly  furosemide, 40 mg, Oral, Daily  metoprolol tartrate, 50 mg, Oral, TID  pantoprazole, 40 mg, Oral, Q AM  sodium chloride, 10 mL, Intravenous, Q12H  spironolactone, 100 mg, Oral, Daily      Continuous Infusions:     Allergies:  Allergies   Allergen Reactions    Lisinopril Cough       Assessment:       Decompensated cirrhosis    Other hyperlipidemia    Essential hypertension    Mild intermittent asthma without complication    Prediabetes    Pleural effusion, bacterial    Paroxysmal atrial fibrillation        Plan:   1.  Atrial fibrillation.  Patient's heart rate is under control.  Clinically she continues to do better.  We had a long discussion this morning about anticoagulation.  At this point patient does not want to take anticoagulation.  She understands the risk of having a stroke.  2.  Cirrhosis  3.  Hypertension  4.  Duodenitis.  5.  Okay to discharge from a cardiovascular standpoint.  She is going to have a further discussion as an outpatient about the risks and benefits of anticoagulation.  Again I informed her she could have a stroke she understood the risks and did not want to get anticoagulated at this time.  She is going to think about it and discuss it.  Follow-up with WARREN Robbins in 1 to 2 weeks postdischarge.        Chuy Orellana MD  11/15/24  09:09 EST      "

## 2024-11-15 NOTE — OUTREACH NOTE
Prep Survey      Flowsheet Row Responses   [facility] facility patient discharged from? [place]   Is LACE score < 7 ? No   Eligibility [facility]   Date of Admission 11/11/24   Date of Discharge 11/15/24   Discharge Disposition Home or Self Care   Discharge diagnosis Decompensated cirrhosis  Principal problem   Does the patient have one of the following disease processes/diagnoses(primary or secondary)? Other   Does the patient have Home health ordered? No   Is there a DME ordered? No   Prep survey completed? Yes            MOHIT ROBLES - Registered Nurse

## 2024-11-17 ENCOUNTER — TRANSITIONAL CARE MANAGEMENT TELEPHONE ENCOUNTER (OUTPATIENT)
Dept: CALL CENTER | Facility: HOSPITAL | Age: 84
End: 2024-11-17
Payer: MEDICARE

## 2024-11-17 ENCOUNTER — APPOINTMENT (OUTPATIENT)
Dept: GENERAL RADIOLOGY | Facility: HOSPITAL | Age: 84
End: 2024-11-17
Payer: MEDICARE

## 2024-11-17 ENCOUNTER — HOSPITAL ENCOUNTER (INPATIENT)
Facility: HOSPITAL | Age: 84
LOS: 3 days | Discharge: HOME | End: 2024-11-22
Attending: EMERGENCY MEDICINE | Admitting: INTERNAL MEDICINE
Payer: MEDICARE

## 2024-11-17 DIAGNOSIS — I48.91 ATRIAL FIBRILLATION WITH TACHYCARDIC VENTRICULAR RATE: ICD-10-CM

## 2024-11-17 DIAGNOSIS — J45.901 EXACERBATION OF ASTHMA, UNSPECIFIED ASTHMA SEVERITY, UNSPECIFIED WHETHER PERSISTENT: Primary | ICD-10-CM

## 2024-11-17 DIAGNOSIS — J40 BRONCHITIS: ICD-10-CM

## 2024-11-17 LAB
ALBUMIN SERPL-MCNC: 4.2 G/DL (ref 3.5–5.2)
ALBUMIN/GLOB SERPL: 1.4 G/DL
ALP SERPL-CCNC: 74 U/L (ref 39–117)
ALT SERPL W P-5'-P-CCNC: 19 U/L (ref 1–33)
ANION GAP SERPL CALCULATED.3IONS-SCNC: 10.2 MMOL/L (ref 5–15)
AST SERPL-CCNC: 25 U/L (ref 1–32)
BASOPHILS # BLD AUTO: 0.03 10*3/MM3 (ref 0–0.2)
BASOPHILS NFR BLD AUTO: 0.3 % (ref 0–1.5)
BILIRUB SERPL-MCNC: 0.7 MG/DL (ref 0–1.2)
BUN SERPL-MCNC: 25 MG/DL (ref 8–23)
BUN/CREAT SERPL: 19.1 (ref 7–25)
CALCIUM SPEC-SCNC: 9.3 MG/DL (ref 8.6–10.5)
CHLORIDE SERPL-SCNC: 99 MMOL/L (ref 98–107)
CO2 SERPL-SCNC: 26.8 MMOL/L (ref 22–29)
CREAT SERPL-MCNC: 1.31 MG/DL (ref 0.57–1)
D DIMER PPP FEU-MCNC: 0.75 MCGFEU/ML (ref 0–0.81)
D-LACTATE SERPL-SCNC: 1.3 MMOL/L (ref 0.5–2)
DEPRECATED RDW RBC AUTO: 42.1 FL (ref 37–54)
EGFRCR SERPLBLD CKD-EPI 2021: 41 ML/MIN/1.73
EOSINOPHIL # BLD AUTO: 0.42 10*3/MM3 (ref 0–0.4)
EOSINOPHIL NFR BLD AUTO: 4.5 % (ref 0.3–6.2)
ERYTHROCYTE [DISTWIDTH] IN BLOOD BY AUTOMATED COUNT: 13 % (ref 12.3–15.4)
FLUAV SUBTYP SPEC NAA+PROBE: NOT DETECTED
FLUBV RNA ISLT QL NAA+PROBE: NOT DETECTED
GLOBULIN UR ELPH-MCNC: 3.1 GM/DL
GLUCOSE SERPL-MCNC: 126 MG/DL (ref 65–99)
HCT VFR BLD AUTO: 44.1 % (ref 34–46.6)
HGB BLD-MCNC: 14 G/DL (ref 12–15.9)
IMM GRANULOCYTES # BLD AUTO: 0.02 10*3/MM3 (ref 0–0.05)
IMM GRANULOCYTES NFR BLD AUTO: 0.2 % (ref 0–0.5)
INR PPP: 1.1
LYMPHOCYTES # BLD AUTO: 1.65 10*3/MM3 (ref 0.7–3.1)
LYMPHOCYTES NFR BLD AUTO: 17.6 % (ref 19.6–45.3)
MAGNESIUM SERPL-MCNC: 2.4 MG/DL (ref 1.6–2.4)
MCH RBC QN AUTO: 27.3 PG (ref 26.6–33)
MCHC RBC AUTO-ENTMCNC: 31.7 G/DL (ref 31.5–35.7)
MCV RBC AUTO: 86.1 FL (ref 79–97)
MONOCYTES # BLD AUTO: 0.86 10*3/MM3 (ref 0.1–0.9)
MONOCYTES NFR BLD AUTO: 9.2 % (ref 5–12)
NEUTROPHILS NFR BLD AUTO: 6.38 10*3/MM3 (ref 1.7–7)
NEUTROPHILS NFR BLD AUTO: 68.2 % (ref 42.7–76)
PLATELET # BLD AUTO: 164 10*3/MM3 (ref 140–450)
PMV BLD AUTO: 11.9 FL (ref 6–12)
POTASSIUM SERPL-SCNC: 4.4 MMOL/L (ref 3.5–5.2)
PROT SERPL-MCNC: 7.3 G/DL (ref 6–8.5)
PROTHROMBIN TIME: 12.8 SECONDS (ref 11–15)
RBC # BLD AUTO: 5.12 10*6/MM3 (ref 3.77–5.28)
SARS-COV-2 RNA RESP QL NAA+PROBE: NOT DETECTED
SODIUM SERPL-SCNC: 136 MMOL/L (ref 136–145)
TROPONIN T SERPL HS-MCNC: 17 NG/L
TROPONIN T SERPL HS-MCNC: 18 NG/L
WBC NRBC COR # BLD AUTO: 9.36 10*3/MM3 (ref 3.4–10.8)

## 2024-11-17 PROCEDURE — 84484 ASSAY OF TROPONIN QUANT: CPT

## 2024-11-17 PROCEDURE — 71046 X-RAY EXAM CHEST 2 VIEWS: CPT

## 2024-11-17 PROCEDURE — 93010 ELECTROCARDIOGRAM REPORT: CPT | Performed by: INTERNAL MEDICINE

## 2024-11-17 PROCEDURE — 84484 ASSAY OF TROPONIN QUANT: CPT | Performed by: EMERGENCY MEDICINE

## 2024-11-17 PROCEDURE — 83605 ASSAY OF LACTIC ACID: CPT | Performed by: EMERGENCY MEDICINE

## 2024-11-17 PROCEDURE — 85025 COMPLETE CBC W/AUTO DIFF WBC: CPT

## 2024-11-17 PROCEDURE — EDLOS: Performed by: EMERGENCY MEDICINE

## 2024-11-17 PROCEDURE — 80053 COMPREHEN METABOLIC PANEL: CPT

## 2024-11-17 PROCEDURE — 83735 ASSAY OF MAGNESIUM: CPT | Performed by: EMERGENCY MEDICINE

## 2024-11-17 PROCEDURE — 25810000003 SODIUM CHLORIDE 0.9 % SOLUTION: Performed by: EMERGENCY MEDICINE

## 2024-11-17 PROCEDURE — 99285 EMERGENCY DEPT VISIT HI MDM: CPT

## 2024-11-17 PROCEDURE — 85610 PROTHROMBIN TIME: CPT

## 2024-11-17 PROCEDURE — 85379 FIBRIN DEGRADATION QUANT: CPT | Performed by: EMERGENCY MEDICINE

## 2024-11-17 PROCEDURE — 36415 COLL VENOUS BLD VENIPUNCTURE: CPT

## 2024-11-17 PROCEDURE — 87040 BLOOD CULTURE FOR BACTERIA: CPT | Performed by: EMERGENCY MEDICINE

## 2024-11-17 PROCEDURE — 87636 SARSCOV2 & INF A&B AMP PRB: CPT | Performed by: EMERGENCY MEDICINE

## 2024-11-17 PROCEDURE — 25010000002 METHYLPREDNISOLONE PER 125 MG: Performed by: EMERGENCY MEDICINE

## 2024-11-17 PROCEDURE — 93005 ELECTROCARDIOGRAM TRACING: CPT | Performed by: EMERGENCY MEDICINE

## 2024-11-17 PROCEDURE — 25010000002 CEFTRIAXONE PER 250 MG: Performed by: EMERGENCY MEDICINE

## 2024-11-17 RX ORDER — DILTIAZEM HYDROCHLORIDE 5 MG/ML
20 INJECTION INTRAVENOUS ONCE
Status: COMPLETED | OUTPATIENT
Start: 2024-11-17 | End: 2024-11-17

## 2024-11-17 RX ORDER — ASPIRIN 81 MG/1
324 TABLET, CHEWABLE ORAL ONCE
Status: COMPLETED | OUTPATIENT
Start: 2024-11-17 | End: 2024-11-17

## 2024-11-17 RX ORDER — IPRATROPIUM BROMIDE AND ALBUTEROL SULFATE 2.5; .5 MG/3ML; MG/3ML
3 SOLUTION RESPIRATORY (INHALATION) ONCE
Status: COMPLETED | OUTPATIENT
Start: 2024-11-17 | End: 2024-11-17

## 2024-11-17 RX ORDER — METHYLPREDNISOLONE SODIUM SUCCINATE 125 MG/2ML
125 INJECTION, POWDER, LYOPHILIZED, FOR SOLUTION INTRAMUSCULAR; INTRAVENOUS ONCE
Status: COMPLETED | OUTPATIENT
Start: 2024-11-17 | End: 2024-11-17

## 2024-11-17 RX ORDER — DOXYCYCLINE 100 MG/1
100 CAPSULE ORAL ONCE
Status: COMPLETED | OUTPATIENT
Start: 2024-11-17 | End: 2024-11-17

## 2024-11-17 RX ORDER — SODIUM CHLORIDE 9 MG/ML
100 INJECTION, SOLUTION INTRAVENOUS CONTINUOUS
Status: DISCONTINUED | OUTPATIENT
Start: 2024-11-17 | End: 2024-11-18

## 2024-11-17 RX ORDER — SODIUM CHLORIDE 0.9 % (FLUSH) 0.9 %
10 SYRINGE (ML) INJECTION AS NEEDED
Status: DISCONTINUED | OUTPATIENT
Start: 2024-11-17 | End: 2024-11-22 | Stop reason: HOSPADM

## 2024-11-17 RX ADMIN — DOXYCYCLINE 100 MG: 100 CAPSULE ORAL at 22:13

## 2024-11-17 RX ADMIN — CEFTRIAXONE SODIUM 1000 MG: 1 INJECTION, POWDER, FOR SOLUTION INTRAMUSCULAR; INTRAVENOUS at 22:16

## 2024-11-17 RX ADMIN — IPRATROPIUM BROMIDE AND ALBUTEROL SULFATE 3 ML: .5; 3 SOLUTION RESPIRATORY (INHALATION) at 22:14

## 2024-11-17 RX ADMIN — IPRATROPIUM BROMIDE AND ALBUTEROL SULFATE 3 ML: .5; 3 SOLUTION RESPIRATORY (INHALATION) at 19:51

## 2024-11-17 RX ADMIN — METHYLPREDNISOLONE SODIUM SUCCINATE 125 MG: 125 INJECTION INTRAMUSCULAR; INTRAVENOUS at 19:52

## 2024-11-17 RX ADMIN — ASPIRIN 324 MG: 81 TABLET, CHEWABLE ORAL at 19:51

## 2024-11-17 RX ADMIN — SODIUM CHLORIDE 100 ML/HR: 9 INJECTION, SOLUTION INTRAVENOUS at 19:53

## 2024-11-17 RX ADMIN — DILTIAZEM HYDROCHLORIDE 20 MG: 5 INJECTION, SOLUTION INTRAVENOUS at 22:47

## 2024-11-18 PROBLEM — J45.901 ASTHMA EXACERBATION: Status: ACTIVE | Noted: 2024-11-18

## 2024-11-18 PROBLEM — J20.6 ACUTE BRONCHITIS DUE TO RHINOVIRUS: Status: ACTIVE | Noted: 2024-11-18

## 2024-11-18 LAB
ANION GAP SERPL CALCULATED.3IONS-SCNC: 14 MMOL/L (ref 5–15)
B PARAPERT DNA SPEC QL NAA+PROBE: NOT DETECTED
B PERT DNA SPEC QL NAA+PROBE: NOT DETECTED
BUN SERPL-MCNC: 21 MG/DL (ref 8–23)
BUN/CREAT SERPL: 18.6 (ref 7–25)
C PNEUM DNA NPH QL NAA+NON-PROBE: NOT DETECTED
CALCIUM SPEC-SCNC: 8.7 MG/DL (ref 8.6–10.5)
CHLORIDE SERPL-SCNC: 99 MMOL/L (ref 98–107)
CO2 SERPL-SCNC: 24 MMOL/L (ref 22–29)
CREAT SERPL-MCNC: 1.13 MG/DL (ref 0.57–1)
DEPRECATED RDW RBC AUTO: 40.4 FL (ref 37–54)
EGFRCR SERPLBLD CKD-EPI 2021: 49 ML/MIN/1.73
ERYTHROCYTE [DISTWIDTH] IN BLOOD BY AUTOMATED COUNT: 12.9 % (ref 12.3–15.4)
FLUAV SUBTYP SPEC NAA+PROBE: NOT DETECTED
FLUBV RNA ISLT QL NAA+PROBE: NOT DETECTED
GLUCOSE SERPL-MCNC: 164 MG/DL (ref 65–99)
HADV DNA SPEC NAA+PROBE: NOT DETECTED
HCOV 229E RNA SPEC QL NAA+PROBE: NOT DETECTED
HCOV HKU1 RNA SPEC QL NAA+PROBE: NOT DETECTED
HCOV NL63 RNA SPEC QL NAA+PROBE: NOT DETECTED
HCOV OC43 RNA SPEC QL NAA+PROBE: NOT DETECTED
HCT VFR BLD AUTO: 40.6 % (ref 34–46.6)
HGB BLD-MCNC: 13 G/DL (ref 12–15.9)
HMPV RNA NPH QL NAA+NON-PROBE: NOT DETECTED
HPIV1 RNA ISLT QL NAA+PROBE: NOT DETECTED
HPIV2 RNA SPEC QL NAA+PROBE: NOT DETECTED
HPIV3 RNA NPH QL NAA+PROBE: NOT DETECTED
HPIV4 P GENE NPH QL NAA+PROBE: NOT DETECTED
M PNEUMO IGG SER IA-ACNC: NOT DETECTED
MCH RBC QN AUTO: 27.5 PG (ref 26.6–33)
MCHC RBC AUTO-ENTMCNC: 32 G/DL (ref 31.5–35.7)
MCV RBC AUTO: 85.8 FL (ref 79–97)
PLATELET # BLD AUTO: 123 10*3/MM3 (ref 140–450)
PMV BLD AUTO: 12.3 FL (ref 6–12)
POTASSIUM SERPL-SCNC: 3.8 MMOL/L (ref 3.5–5.2)
PROCALCITONIN SERPL-MCNC: 0.04 NG/ML (ref 0–0.25)
QT INTERVAL: 330 MS
QT INTERVAL: 351 MS
QTC INTERVAL: 482 MS
QTC INTERVAL: 516 MS
RBC # BLD AUTO: 4.73 10*6/MM3 (ref 3.77–5.28)
RHINOVIRUS RNA SPEC NAA+PROBE: DETECTED
RSV RNA NPH QL NAA+NON-PROBE: NOT DETECTED
SARS-COV-2 RNA NPH QL NAA+NON-PROBE: NOT DETECTED
SODIUM SERPL-SCNC: 137 MMOL/L (ref 136–145)
WBC NRBC COR # BLD AUTO: 5.62 10*3/MM3 (ref 3.4–10.8)

## 2024-11-18 PROCEDURE — 94760 N-INVAS EAR/PLS OXIMETRY 1: CPT

## 2024-11-18 PROCEDURE — 94799 UNLISTED PULMONARY SVC/PX: CPT

## 2024-11-18 PROCEDURE — 84145 PROCALCITONIN (PCT): CPT

## 2024-11-18 PROCEDURE — 63710000001 PREDNISONE PER 1 MG: Performed by: INTERNAL MEDICINE

## 2024-11-18 PROCEDURE — 85027 COMPLETE CBC AUTOMATED: CPT

## 2024-11-18 PROCEDURE — 94761 N-INVAS EAR/PLS OXIMETRY MLT: CPT

## 2024-11-18 PROCEDURE — 99214 OFFICE O/P EST MOD 30 MIN: CPT | Performed by: NURSE PRACTITIONER

## 2024-11-18 PROCEDURE — 94664 DEMO&/EVAL PT USE INHALER: CPT

## 2024-11-18 PROCEDURE — 93010 ELECTROCARDIOGRAM REPORT: CPT | Performed by: INTERNAL MEDICINE

## 2024-11-18 PROCEDURE — 0202U NFCT DS 22 TRGT SARS-COV-2: CPT

## 2024-11-18 PROCEDURE — 80048 BASIC METABOLIC PNL TOTAL CA: CPT

## 2024-11-18 PROCEDURE — 25010000002 DIGOXIN PER 500 MCG: Performed by: NURSE PRACTITIONER

## 2024-11-18 PROCEDURE — 93005 ELECTROCARDIOGRAM TRACING: CPT | Performed by: STUDENT IN AN ORGANIZED HEALTH CARE EDUCATION/TRAINING PROGRAM

## 2024-11-18 PROCEDURE — 94640 AIRWAY INHALATION TREATMENT: CPT

## 2024-11-18 PROCEDURE — G0378 HOSPITAL OBSERVATION PER HR: HCPCS

## 2024-11-18 RX ORDER — BISACODYL 10 MG
10 SUPPOSITORY, RECTAL RECTAL DAILY PRN
Status: DISCONTINUED | OUTPATIENT
Start: 2024-11-18 | End: 2024-11-22 | Stop reason: HOSPADM

## 2024-11-18 RX ORDER — METOPROLOL TARTRATE 50 MG
50 TABLET ORAL 3 TIMES DAILY
Status: DISCONTINUED | OUTPATIENT
Start: 2024-11-18 | End: 2024-11-21

## 2024-11-18 RX ORDER — SODIUM CHLORIDE FOR INHALATION 3 %
4 VIAL, NEBULIZER (ML) INHALATION
Status: DISCONTINUED | OUTPATIENT
Start: 2024-11-18 | End: 2024-11-22 | Stop reason: HOSPADM

## 2024-11-18 RX ORDER — AMOXICILLIN 250 MG
2 CAPSULE ORAL 2 TIMES DAILY PRN
Status: DISCONTINUED | OUTPATIENT
Start: 2024-11-18 | End: 2024-11-22 | Stop reason: HOSPADM

## 2024-11-18 RX ORDER — FUROSEMIDE 20 MG/1
20 TABLET ORAL DAILY
Status: DISCONTINUED | OUTPATIENT
Start: 2024-11-18 | End: 2024-11-22 | Stop reason: HOSPADM

## 2024-11-18 RX ORDER — BISACODYL 5 MG/1
5 TABLET, DELAYED RELEASE ORAL DAILY PRN
Status: DISCONTINUED | OUTPATIENT
Start: 2024-11-18 | End: 2024-11-22 | Stop reason: HOSPADM

## 2024-11-18 RX ORDER — POLYETHYLENE GLYCOL 3350 17 G/17G
17 POWDER, FOR SOLUTION ORAL DAILY PRN
Status: DISCONTINUED | OUTPATIENT
Start: 2024-11-18 | End: 2024-11-22 | Stop reason: HOSPADM

## 2024-11-18 RX ORDER — ATORVASTATIN CALCIUM 10 MG/1
10 TABLET, FILM COATED ORAL NIGHTLY
COMMUNITY
End: 2024-11-22 | Stop reason: HOSPADM

## 2024-11-18 RX ORDER — NITROGLYCERIN 0.4 MG/1
0.4 TABLET SUBLINGUAL
Status: DISCONTINUED | OUTPATIENT
Start: 2024-11-18 | End: 2024-11-22 | Stop reason: HOSPADM

## 2024-11-18 RX ORDER — PREDNISONE 20 MG/1
20 TABLET ORAL
Status: DISCONTINUED | OUTPATIENT
Start: 2024-11-18 | End: 2024-11-19

## 2024-11-18 RX ORDER — IPRATROPIUM BROMIDE AND ALBUTEROL SULFATE 2.5; .5 MG/3ML; MG/3ML
3 SOLUTION RESPIRATORY (INHALATION) EVERY 4 HOURS PRN
Status: DISCONTINUED | OUTPATIENT
Start: 2024-11-18 | End: 2024-11-22 | Stop reason: HOSPADM

## 2024-11-18 RX ORDER — DIGOXIN 0.25 MG/ML
250 INJECTION INTRAMUSCULAR; INTRAVENOUS ONCE
Status: COMPLETED | OUTPATIENT
Start: 2024-11-18 | End: 2024-11-18

## 2024-11-18 RX ORDER — BENZONATATE 100 MG/1
200 CAPSULE ORAL 3 TIMES DAILY PRN
Status: DISCONTINUED | OUTPATIENT
Start: 2024-11-18 | End: 2024-11-22 | Stop reason: HOSPADM

## 2024-11-18 RX ORDER — SPIRONOLACTONE 25 MG/1
50 TABLET ORAL DAILY
Status: DISCONTINUED | OUTPATIENT
Start: 2024-11-18 | End: 2024-11-22 | Stop reason: HOSPADM

## 2024-11-18 RX ORDER — ONDANSETRON 2 MG/ML
4 INJECTION INTRAMUSCULAR; INTRAVENOUS EVERY 6 HOURS PRN
Status: DISCONTINUED | OUTPATIENT
Start: 2024-11-18 | End: 2024-11-22 | Stop reason: HOSPADM

## 2024-11-18 RX ORDER — ATORVASTATIN CALCIUM 20 MG/1
10 TABLET, FILM COATED ORAL NIGHTLY
Status: DISCONTINUED | OUTPATIENT
Start: 2024-11-18 | End: 2024-11-22 | Stop reason: HOSPADM

## 2024-11-18 RX ORDER — PANTOPRAZOLE SODIUM 40 MG/1
40 TABLET, DELAYED RELEASE ORAL
Status: DISCONTINUED | OUTPATIENT
Start: 2024-11-18 | End: 2024-11-22 | Stop reason: HOSPADM

## 2024-11-18 RX ORDER — ONDANSETRON 4 MG/1
4 TABLET, ORALLY DISINTEGRATING ORAL EVERY 6 HOURS PRN
Status: DISCONTINUED | OUTPATIENT
Start: 2024-11-18 | End: 2024-11-22 | Stop reason: HOSPADM

## 2024-11-18 RX ORDER — ACETAMINOPHEN 325 MG/1
650 TABLET ORAL EVERY 4 HOURS PRN
Status: DISCONTINUED | OUTPATIENT
Start: 2024-11-18 | End: 2024-11-22 | Stop reason: HOSPADM

## 2024-11-18 RX ORDER — IPRATROPIUM BROMIDE AND ALBUTEROL SULFATE 2.5; .5 MG/3ML; MG/3ML
3 SOLUTION RESPIRATORY (INHALATION)
Status: DISCONTINUED | OUTPATIENT
Start: 2024-11-18 | End: 2024-11-22 | Stop reason: HOSPADM

## 2024-11-18 RX ADMIN — SODIUM CHLORIDE 5 MG/HR: 900 INJECTION, SOLUTION INTRAVENOUS at 03:05

## 2024-11-18 RX ADMIN — METOPROLOL TARTRATE 50 MG: 50 TABLET, FILM COATED ORAL at 20:26

## 2024-11-18 RX ADMIN — IPRATROPIUM BROMIDE AND ALBUTEROL SULFATE 3 ML: 2.5; .5 SOLUTION RESPIRATORY (INHALATION) at 19:18

## 2024-11-18 RX ADMIN — FUROSEMIDE 20 MG: 20 TABLET ORAL at 11:54

## 2024-11-18 RX ADMIN — PANTOPRAZOLE SODIUM 40 MG: 40 TABLET, DELAYED RELEASE ORAL at 05:25

## 2024-11-18 RX ADMIN — METOPROLOL TARTRATE 50 MG: 50 TABLET, FILM COATED ORAL at 08:05

## 2024-11-18 RX ADMIN — BENZONATATE 200 MG: 100 CAPSULE ORAL at 06:57

## 2024-11-18 RX ADMIN — METOPROLOL TARTRATE 50 MG: 50 TABLET, FILM COATED ORAL at 15:31

## 2024-11-18 RX ADMIN — PREDNISONE 20 MG: 20 TABLET ORAL at 15:31

## 2024-11-18 RX ADMIN — ATORVASTATIN CALCIUM 10 MG: 20 TABLET, FILM COATED ORAL at 20:26

## 2024-11-18 RX ADMIN — IPRATROPIUM BROMIDE AND ALBUTEROL SULFATE 3 ML: 2.5; .5 SOLUTION RESPIRATORY (INHALATION) at 12:43

## 2024-11-18 RX ADMIN — DIGOXIN 250 MCG: 0.25 INJECTION INTRAMUSCULAR; INTRAVENOUS at 11:55

## 2024-11-18 RX ADMIN — SPIRONOLACTONE 50 MG: 25 TABLET, FILM COATED ORAL at 11:54

## 2024-11-18 NOTE — OUTREACH NOTE
Call Center TCM Note      Flowsheet Row Responses   [facility] patient discharged from? [place]   Does the patient have one of the following disease processes/diagnoses(primary or secondary)? Other   TCM attempt successful? No   Unsuccessful attempts Attempt 1   Change in Health Status Readmitted            Lay Blanca RN    11/17/2024, 21:23 EST

## 2024-11-18 NOTE — CONSULTS
[facility]        Patient Name: Viktoria Villasenor  Age/Sex: 83 y.o. female  : 1940  MRN: [id number]    Date of Admission: 2024  Date of Encounter Visit: 24  Encounter Provider: WARREN Hay  Referring Provider: Magen Olson MD  Place of Service: [facility] CARDIOLOGY  Patient Care Team:  Magen Olson MD as PCP - General  Magen Olson MD as PCP - Family Medicine  Ralph Turner MD as Consulting Physician (Otolaryngology)  Casper Hodge MD as Consulting Physician (Gastroenterology)    Subjective:     Chief Complaint: Rapid heart rate, dyspnea    Reason for consult: Atrial fibrillation with RVR    History of Present Illness:  Viktoria Villasenor is a 83 y.o. female who follows Dr. Orellana and myself in our office.  Patient was admitted with atrial fibrillation with RVR and rhinovirus.  We have been asked to be seen for atrial fibrillation with RVR.     Patient with history significant for fairly new onset atrial fibrillation with RVR, MARSHALL cirrhosis, thrombocytopenia, volume overload and pleural effusions, duodenitis, HTN.    Patient with recent hospitalization where she was discharged 11/15/2024.  Patient was found to have newly diagnosed atrial fibrillation with RVR.  She was not placed on systemic anticoagulation given history of cirrhosis and possible duodenitis.  Patient heart rate was improved with metoprolol tartrate as well as digoxin.  Patient received diuretics for volume overload and pleural effusions.  She was placed on PPI for duodenitis.    Patient presented to [facility], [place], operated by [facility] emergency department with complaints of rapid heart rate and productive yellow sputum cough.  She was noted to be in atrial fibrillation with heart rate in the 130s.  She was placed on a diltiazem drip and transferred to [facility] for further care.  RPP was positive for rhinovirus.  Creatinine 1.13, hemoglobin 13.0.    Prior  Cardiac Testing:  Echocardiogram 4/25/2024.  LVEF 66-70%.  Grade 1 diastolic dysfunction.  Aortic valve leaflets are mildly to moderately calcified.  Moderate mitral valve regurgitation.  Calculated RVSP 31 mmHg.  Cardiac catheterization 4/26/2024.  Normal coronary angiography.      Past Medical History:  Past Medical History:   Diagnosis Date    ACE-inhibitor cough     Asthma     Asymptomatic postmenopausal status     Cardiac murmur     Cholelithiasis     Colon polyp 10/2016    Conjunctivitis     right    Diverticulitis of colon 08/23    Elevated LFTs 06/28/2016    Fatty liver     GERD (gastroesophageal reflux disease)     GI (gastrointestinal bleed)     History of colon polyps     HLD (hyperlipidemia)     Hypertension     Hypopigmentation     Left shoulder tendonitis     Menopausal syndrome     Motion sickness     Plantar fasciitis of left foot     nodule    Pruritus     possibly due to Benicar    SOB (shortness of breath)     SOBOE (shortness of breath on exertion)     Thyroid cyst     UTI (urinary tract infection)     Viral syndrome        Past Surgical History:   Procedure Laterality Date    CARDIAC CATHETERIZATION      in the 80's    CARDIAC CATHETERIZATION N/A 05/04/2016    Procedure: Left Heart Cath;  Surgeon: Dale Vasquez MD;  Location: [facility] CATH INVASIVE LOCATION;  Service:     CARDIAC CATHETERIZATION N/A 05/04/2016    Procedure: Coronary angiography;  Surgeon: Dale Vasquez MD;  [facility];  Service:     CARDIAC CATHETERIZATION N/A 05/04/2016    Procedure: Left ventriculography;  Surgeon: Dale Vasquez MD;  [facility];  Service:     CARDIAC CATHETERIZATION N/A 4/26/2024    Procedure: Left Heart Cath;  Surgeon: Devon Cardona MD;  Location: [facility] CATH INVASIVE LOCATION;  Service: Cardiovascular;  Laterality: N/A;    CARDIAC CATHETERIZATION N/A 4/26/2024    Procedure: Coronary angiography;  Surgeon: Devon Cardona MD;   Location:  [facility] CATH INVASIVE LOCATION;  Service: Cardiovascular;  Laterality: N/A;    CARDIAC CATHETERIZATION N/A 4/26/2024    Procedure: Left ventriculography;  Surgeon: Devon Cardona MD;  [facility];  Service: Cardiovascular;  Laterality: N/A;    CHOLECYSTECTOMY  1980    COLONOSCOPY      >5 years    COLONOSCOPY  12/01/2016    tics, IH, rectal biopsy showed changes suggestive of Schwannoma     ENDOSCOPY  12/01/2016    LA grade B esophagitis, mild Schatzki ring, HH, gastric polyp, erythematous mucosa in stomach    LASIK Bilateral     RECTAL ULTRASOUND N/A 02/07/2017    Procedure: ENDOSCOPIC ULTRASOUND (LOWER);  Surgeon: Casper Rodríguez MD;  Location: [facility] ENDOSCOPY;  Service:     SHOULDER SURGERY Left 06/17/2010    Dr. Green; repair L shoulder adhesive capsulitis    SKIN BIOPSY      TOTAL ABDOMINAL HYSTERECTOMY  1989    fibroid tumors    TUBAL ABDOMINAL LIGATION         Home Medications:   Medications Prior to Admission   Medication Sig Dispense Refill Last Dose/Taking    atorvastatin (LIPITOR) 10 MG tablet Take 1 tablet by mouth Every Night.   11/16/2024    furosemide (LASIX) 20 MG tablet Take 1 tablet by mouth Daily. 30 tablet 0 11/17/2024    metoprolol tartrate (LOPRESSOR) 50 MG tablet Take 1 tablet by mouth 3 times a day. 90 tablet 0 11/17/2024    pantoprazole (PROTONIX) 40 MG EC tablet Take 1 tablet by mouth Every Morning. 30 tablet 0 11/17/2024    spironolactone (ALDACTONE) 100 MG tablet Take 0.5 tablets by mouth Daily. 30 tablet 0 11/17/2024    atorvastatin (LIPITOR) 10 MG tablet Take 1 tablet by mouth Every Night for 270 days. 90 tablet 2        Allergies:  Allergies   Allergen Reactions    Lisinopril Cough       Past Social History:  Social History     Socioeconomic History    Marital status:      Spouse name: Abram    Number of children: 2   Tobacco Use    Smoking status: Former     Current packs/day: 0.00     Average packs/day: 1 pack/day for 30.0  years (30.0 ttl pk-yrs)     Types: Cigarettes     Start date: 1964     Quit date: 1994     Years since quittin.9    Smokeless tobacco: Never   Vaping Use    Vaping status: Never Used   Substance and Sexual Activity    Alcohol use: No    Drug use: No    Sexual activity: Not Currently     Partners: Male       Past Family History:  Family History   Problem Relation Age of Onset    Anxiety disorder Mother     Uterine cancer Mother     Dementia Mother     Cancer Father         Prostate Cancer     Other Father         AAA, aneurysm rupture    Leukemia Brother     Malig Hyperthermia Neg Hx        Review of Systems   Constitutional: Positive for malaise/fatigue. Negative for chills, fever, weight gain and weight loss.   Cardiovascular:  Positive for dyspnea on exertion and irregular heartbeat. Negative for leg swelling.   Respiratory:  Positive for shortness of breath. Negative for cough, snoring and wheezing.    Hematologic/Lymphatic: Negative for bleeding problem. Does not bruise/bleed easily.   Skin:  Negative for color change.   Musculoskeletal:  Negative for falls, joint pain and myalgias.   Gastrointestinal:  Negative for melena.   Genitourinary:  Negative for hematuria.   Neurological:  Negative for excessive daytime sleepiness.   Psychiatric/Behavioral:  Negative for depression. The patient is not nervous/anxious.          Objective:   Temp:  [97.2 °F (36.2 °C)-97.9 °F (36.6 °C)] 97.9 °F (36.6 °C)  Heart Rate:  [] 119  Resp:  [16-20] 16  BP: ()/(56-93) 116/56     Intake/Output Summary (Last 24 hours) at 2024 0831  Last data filed at 2024 0659  Gross per 24 hour   Intake --   Output 100 ml   Net -100 ml     Body mass index is 34.18 kg/m².      24  1757 24  0138 24  0602   Weight: 95.6 kg (210 lb 12.2 oz) 91.8 kg (202 lb 4.8 oz) 91.7 kg (202 lb 2.6 oz)     Weight change:     Vitals and nursing note reviewed.   Constitutional:       Appearance: Normal appearance.  Well-developed.   Eyes:      Conjunctiva/sclera: Conjunctivae normal.   Neck:      Vascular: No carotid bruit.   Pulmonary:      Breath sounds: Normal breath sounds.   Cardiovascular:      Tachycardia present. Irregularly irregular rhythm. Normal S1 with normal intensity. Normal S2 with normal intensity.       Murmurs: There is no murmur.      No gallop.  No click. No rub.   Edema:     Peripheral edema absent.   Musculoskeletal: Normal range of motion. Skin:     General: Skin is warm and dry.   Neurological:      Mental Status: Alert and oriented to person, place, and time.      GCS: GCS eye subscore is 4. GCS verbal subscore is 5. GCS motor subscore is 6.   Psychiatric:         Speech: Speech normal.         Behavior: Behavior normal.         Thought Content: Thought content normal.         Judgment: Judgment normal.           Lab Review:   Results from last 7 days   Lab Units 11/18/24  0249 11/17/24  1811 11/15/24  0517 11/14/24  0526 11/13/24  1416 11/13/24  0516 11/12/24  1502 11/12/24  0524 11/11/24  1640 11/11/24  1619   SODIUM mmol/L 137 136 137 137  --  140 143 140  --  138   POTASSIUM mmol/L 3.8 4.4 4.3 4.4 3.8 3.4* 3.9 3.9  --  4.1   CHLORIDE mmol/L 99 99 98 100  --  100 102 101  --  103   CO2 mmol/L 24.0 26.8 29.6* 25.0  --  29.0 26.5 25.6  --  24.8   BUN mg/dL 21 25* 22 18  --  15 15 13  --  11   CREATININE mg/dL 1.13* 1.31* 1.16* 0.87  --  0.94 1.03* 1.03*   < > 0.99   GLUCOSE mg/dL 164* 126* 90 90  --  89 84 88  --  105*   CALCIUM mg/dL 8.7 9.3 8.7 8.7  --  8.8 9.3 8.7  --  9.7   AST (SGOT) U/L  --  25 26 28  --  22  --  20  --  18   ALT (SGPT) U/L  --  19 14 19  --  18  --  13  --  16    < > = values in this interval not displayed.     Results from last 7 days   Lab Units 11/17/24  1951 11/17/24  1811 11/11/24  1803 11/11/24  1619   HSTROP T ng/L 17* 18* 18* 19*     Results from last 7 days   Lab Units 11/18/24  0249 11/17/24  1811 11/15/24  0517 11/13/24  0516 11/12/24  0524 11/11/24  1619   WBC  "10*3/mm3 5.62 9.36 9.12 7.94 8.21 7.32   HEMOGLOBIN g/dL 13.0 14.0 13.4 12.6 12.5 13.1   HEMATOCRIT % 40.6 44.1 42.4 40.5 38.4 42.1   PLATELETS 10*3/mm3 123* 164 149 129* 142 127*     Results from last 7 days   Lab Units 11/17/24  2041 11/12/24  0524 11/11/24  1655   INR  1.10 1.18* 1.30     Results from last 7 days   Lab Units 11/17/24  1811 11/15/24  0517 11/14/24  0526   MAGNESIUM mg/dL 2.4 2.1 2.2           Invalid input(s): \"LDLCALC\"  Results from last 7 days   Lab Units 11/11/24  1619   PROBNP pg/mL 1,323.0     Results from last 7 days   Lab Units 11/12/24  2028   TSH uIU/mL 1.190       Echo EF Estimated  Results for orders placed during the hospital encounter of 04/25/24    Adult Transthoracic Echo Complete W/ Cont if Necessary Per Protocol    Interpretation Summary    The left ventricular cavity is mildly dilated.    Left ventricular systolic function is normal. Left ventricular ejection fraction appears to be 66 - 70%.    Left ventricular diastolic function is consistent with (grade I) impaired relaxation.    Normal right ventricular cavity size and systolic function noted.    The left atrial cavity is mildly dilated.    The aortic valve leaflets are mildly to moderately calcified    Moderate mitral valve regurgitation is present.    Mild tricuspid valve regurgitation is present.    Calculated right ventricular systolic pressure from tricuspid regurgitation is 31 mmHg.    There is no evidence of pericardial effusion      EKG:   I personally viewed and interpreted the patient's EKG      Imaging:  Imaging Results (Most Recent)       Procedure Component Value Units Date/Time    XR Chest 2 View [[id number]] Collected: 11/17/24 1829     Updated: 11/17/24 1835    Narrative:      XR CHEST 2 VW-     Clinical: Shortness of breath     COMPARISON examination dated 11/11/2024     FINDINGS: There is mild cardiac enlargement with atherosclerotic  calcification of the aorta. The right lung is clear. There is a patchy  area " of infiltrate or atelectasis along the left costophrenic sulcus, an  area of subpleural atelectasis seen at this location on prior CT.  Pleural effusions demonstrated on the previous 11/11/2024 CT chest are  no longer present. The remainder is unremarkable.           This report was finalized on 11/17/2024 6:31 PM by Dr. Rod Ferguson M.D on Workstation: [id number]                   Assessment:     Active Hospital Problems    Diagnosis  POA    **Atrial fibrillation with RVR [I48.91]  Yes    Asthma exacerbation [J45.901]  Yes    Paroxysmal atrial fibrillation [I48.0]  Yes    Essential hypertension [I10]  Yes      Resolved Hospital Problems   No resolved problems to display.          Plan:     Atrial fibrillation with RVR  Continue metoprolol tartrate 50 mg every 8 hours.  Cannot uptitrate due to soft BP  Patient utilized digoxin with previous admission with good success.  Will give 250 mcg IV bolus after diltiazem drip has been stopped for 2 hours  Stop diltiazem drip  No anticoagulation due to history of cirrhosis  Positive for rhinovirus  Pneumonia  On and IV antibiotics  MARSHALL cirrhosis    Thank you for allowing me to participate in the care of Viktoria Villasenor. Feel free to contact me directly with any further questions or concerns.         WARREN Hay  [facility]   [facility]  [address] [place] [address] - [address]  [place] [id number]  Office: [phone]    11/18/24  08:32 EST      EMR Dragon/Transcription disclaimer:   Parts of this note may be an electronic transcription/translation of spoken language to printed text using the Dragon dictation system

## 2024-11-18 NOTE — H&P
Patient Name:  Viktoria Villasenor  YOB: 1940  MRN:  [id number]  Admit Date:  11/17/2024  Patient Care Team:  Magen Olson MD as PCP - General  Magen Olson MD as PCP - Family Medicine  [facility], Ralph MARTINO MD as Consulting Physician (Otolaryngology)  Casper Hodge MD as Consulting Physician (Gastroenterology)      Subjective   History Present Illness     Chief Complaint   Patient presents with    Shortness of Breath     PT STATES SHE WAS RECENTLY ADMITTED TO [facility] FOR SOA AND WAS D/C FRIDAY; STATES THAT SHORTNESS OF BREATH HAS INCREASED WITH A PRODUCTIVE COUGH        Ms. Villasenor is a 83 y.o. who was just admitted last week with decompensated cirrhosis with volume overload as well as new onset afib with RVR. He presents with increased dyspnea and cough as well as rapid heart rate. + for rhinovirus.     History of Present Illness  Review of Systems   Constitutional:  Positive for activity change. Negative for fever.   HENT:  Positive for congestion and rhinorrhea.    Eyes: Negative.    Respiratory:  Positive for cough and shortness of breath.    Cardiovascular:  Positive for leg swelling. Negative for chest pain and palpitations.   Gastrointestinal: Negative.    Endocrine: Negative.    Genitourinary: Negative.    Musculoskeletal: Negative.    Skin: Negative.    Allergic/Immunologic: Negative.    Neurological: Negative.    Hematological: Negative.    Psychiatric/Behavioral: Negative.          Personal History     Past Medical History:   Diagnosis Date    ACE-inhibitor cough     Asthma     Asymptomatic postmenopausal status     Cardiac murmur     Cholelithiasis     Colon polyp 10/2016    Conjunctivitis     right    Diverticulitis of colon 08/23    Elevated LFTs 06/28/2016    Fatty liver     GERD (gastroesophageal reflux disease)     GI (gastrointestinal bleed)     History of colon polyps     HLD (hyperlipidemia)     Hypertension     Hypopigmentation     Left shoulder tendonitis      Menopausal syndrome     Motion sickness     Plantar fasciitis of left foot     nodule    Pruritus     possibly due to Benicar    SOB (shortness of breath)     SOBOE (shortness of breath on exertion)     Thyroid cyst     UTI (urinary tract infection)     Viral syndrome      Past Surgical History:   Procedure Laterality Date    CARDIAC CATHETERIZATION      in the 80's    CARDIAC CATHETERIZATION N/A 05/04/2016    Procedure: Left Heart Cath;  Surgeon: Dale Vasquez MD;  Location: [facility] CATH INVASIVE LOCATION;  Service:     CARDIAC CATHETERIZATION N/A 05/04/2016    Procedure: Coronary angiography;  Surgeon: Dale Vasquez MD;  Location: [facility] CATH INVASIVE LOCATION;  Service:     CARDIAC CATHETERIZATION N/A 05/04/2016    Procedure: Left ventriculography;  Surgeon: Dale Vasquez MD;  Location: [facility] CATH INVASIVE LOCATION;  Service:     CARDIAC CATHETERIZATION N/A 4/26/2024    Procedure: Left Heart Cath;  Surgeon: Devon Cardona MD;  Location: [facility] CATH INVASIVE LOCATION;  Service: Cardiovascular;  Laterality: N/A;    CARDIAC CATHETERIZATION N/A 4/26/2024    Procedure: Coronary angiography;  Surgeon: Devon Cardona MD;  Location: [facility] CATH INVASIVE LOCATION;  Service: Cardiovascular;  Laterality: N/A;    CARDIAC CATHETERIZATION N/A 4/26/2024    Procedure: Left ventriculography;  Surgeon: Devon Cardona MD;  Location: [facility] CATH INVASIVE LOCATION;  Service: Cardiovascular;  Laterality: N/A;    CHOLECYSTECTOMY  1980    COLONOSCOPY      >5 years    COLONOSCOPY  12/01/2016    tics, IH, rectal biopsy showed changes suggestive of Schwannoma     ENDOSCOPY  12/01/2016    LA grade B esophagitis, mild Schatzki ring, HH, gastric polyp, erythematous mucosa in stomach    LASIK Bilateral     RECTAL ULTRASOUND N/A 02/07/2017    Procedure: ENDOSCOPIC ULTRASOUND (LOWER);  Surgeon: Casper Rodríguez MD;  Location: [facility] ENDOSCOPY;  Service:     SHOULDER SURGERY Left 06/17/2010    Dr. Green; repair L  shoulder adhesive capsulitis    SKIN BIOPSY      TOTAL ABDOMINAL HYSTERECTOMY      fibroid tumors    TUBAL ABDOMINAL LIGATION       Family History   Problem Relation Age of Onset    Anxiety disorder Mother     Uterine cancer Mother     Dementia Mother     Cancer Father         Prostate Cancer     Other Father         AAA, aneurysm rupture    Leukemia Brother     [place] Hyperthermia Neg Hx      Social History     Tobacco Use    Smoking status: Former     Current packs/day: 0.00     Average packs/day: 1 pack/day for 30.0 years (30.0 ttl pk-yrs)     Types: Cigarettes     Start date: 1964     Quit date: 1994     Years since quittin.9    Smokeless tobacco: Never   Vaping Use    Vaping status: Never Used   Substance Use Topics    Alcohol use: No    Drug use: No     Medications Prior to Admission   Medication Sig Dispense Refill Last Dose/Taking    atorvastatin (LIPITOR) 10 MG tablet Take 1 tablet by mouth Every Night.   2024    furosemide (LASIX) 20 MG tablet Take 1 tablet by mouth Daily. 30 tablet 0 2024    metoprolol tartrate (LOPRESSOR) 50 MG tablet Take 1 tablet by mouth 3 times a day. 90 tablet 0 2024    pantoprazole (PROTONIX) 40 MG EC tablet Take 1 tablet by mouth Every Morning. 30 tablet 0 2024    spironolactone (ALDACTONE) 100 MG tablet Take 0.5 tablets by mouth Daily. 30 tablet 0 2024    atorvastatin (LIPITOR) 10 MG tablet Take 1 tablet by mouth Every Night for 270 days. 90 tablet 2      Allergies:    Allergies   Allergen Reactions    Lisinopril Cough       Objective    Objective     Vital Signs  Temp:  [97.2 °F (36.2 °C)-97.9 °F (36.6 °C)] 97.5 °F (36.4 °C)  Heart Rate:  [] 102  Resp:  [16-20] 20  BP: ()/(56-93) 105/65  SpO2:  [92 %-100 %] 97 %  on   ;   Device (Oxygen Therapy): room air  Body mass index is 34.18 kg/m².    Physical Exam  Vitals and nursing note reviewed.   Constitutional:       General: She is not in acute distress.     Appearance: She  is well-developed. She is ill-appearing.   HENT:      Head: Normocephalic and atraumatic.   Eyes:      General: No scleral icterus.     Pupils: Pupils are equal, round, and reactive to light.   Cardiovascular:      Rate and Rhythm: Tachycardia present. Rhythm irregular.   Pulmonary:      Effort: Pulmonary effort is normal. No respiratory distress.      Breath sounds: No wheezing.   Abdominal:      General: Bowel sounds are normal. There is no distension.      Palpations: Abdomen is soft.      Tenderness: There is no abdominal tenderness.   Musculoskeletal:      Cervical back: Normal range of motion and neck supple.   Skin:     General: Skin is warm and dry.      Findings: No rash.   Neurological:      Mental Status: She is alert and oriented to person, place, and time.      Cranial Nerves: No cranial nerve deficit.   Psychiatric:         Behavior: Behavior normal.         Thought Content: Thought content normal.     Trace LE edema    Results Review:  I reviewed the patient's new clinical results.    Lab Results (last 24 hours)       Procedure Component Value Units Date/Time    CBC & Differential [[id number]]  (Abnormal) Collected: 11/17/24 1811    Specimen: Blood Updated: 11/17/24 1815    Narrative:      The following orders were created for panel order CBC & Differential.  Procedure                               Abnormality         Status                     ---------                               -----------         ------                     CBC Auto Differential[[id number]]        Abnormal            Final result                 Please view results for these tests on the individual orders.    Comprehensive Metabolic Panel [[id number]]  (Abnormal) Collected: 11/17/24 1811    Specimen: Blood Updated: 11/17/24 1837     Glucose 126 mg/dL      BUN 25 mg/dL      Creatinine 1.31 mg/dL      Sodium 136 mmol/L      Potassium 4.4 mmol/L      Chloride 99 mmol/L      CO2 26.8 mmol/L      Calcium 9.3 mg/dL      Total Protein 7.3  g/dL      Albumin 4.2 g/dL      ALT (SGPT) 19 U/L      AST (SGOT) 25 U/L      Alkaline Phosphatase 74 U/L      Total Bilirubin 0.7 mg/dL      Globulin 3.1 gm/dL      A/G Ratio 1.4 g/dL      BUN/Creatinine Ratio 19.1     Anion Gap 10.2 mmol/L      eGFR 41.0 mL/min/1.73     Narrative:      GFR Normal >60  Chronic Kidney Disease <60  Kidney Failure <15    The GFR formula is only valid for adults with stable renal function between ages 18 and 70.    Single High Sensitivity Troponin T [[id number]]  (Abnormal) Collected: 11/17/24 1811    Specimen: Blood Updated: 11/17/24 1837     HS Troponin T 18 ng/L     Narrative:      High Sensitive Troponin T Reference Range:  <14.0 ng/L- Negative Female for AMI  <22.0 ng/L- Negative Male for AMI  >=14 - Abnormal Female indicating possible myocardial injury.  >=22 - Abnormal Male indicating possible myocardial injury.   Clinicians would have to utilize clinical acumen, EKG, Troponin, and serial changes to determine if it is an Acute Myocardial Infarction or myocardial injury due to an underlying chronic condition.         CBC Auto Differential [[id number]]  (Abnormal) Collected: 11/17/24 1811    Specimen: Blood Updated: 11/17/24 1815     WBC 9.36 10*3/mm3      RBC 5.12 10*6/mm3      Hemoglobin 14.0 g/dL      Hematocrit 44.1 %      MCV 86.1 fL      MCH 27.3 pg      MCHC 31.7 g/dL      RDW 13.0 %      RDW-SD 42.1 fl      MPV 11.9 fL      Platelets 164 10*3/mm3      Neutrophil % 68.2 %      Lymphocyte % 17.6 %      Monocyte % 9.2 %      Eosinophil % 4.5 %      Basophil % 0.3 %      Immature Grans % 0.2 %      Neutrophils, Absolute 6.38 10*3/mm3      Lymphocytes, Absolute 1.65 10*3/mm3      Monocytes, Absolute 0.86 10*3/mm3      Eosinophils, Absolute 0.42 10*3/mm3      Basophils, Absolute 0.03 10*3/mm3      Immature Grans, Absolute 0.02 10*3/mm3     Magnesium [[id number]]  (Normal) Collected: 11/17/24 1811    Specimen: Blood Updated: 11/17/24 2029     Magnesium 2.4 mg/dL     COVID-19 and FLU  A/B PCR, 1 HR TAT - Swab, Nasopharynx [[id number]]  (Normal) Collected: 11/17/24 1951    Specimen: Swab from Nasopharynx Updated: 11/17/24 2027     COVID19 Not Detected     Influenza A PCR Not Detected     Influenza B PCR Not Detected    Narrative:      Fact sheet for providers: https://www.fda.gov/media/886346/download    Fact sheet for patients: https://www.fda.gov/media/480709/download    Test performed by PCR.    High Sensitivity Troponin T [[id number]]  (Abnormal) Collected: 11/17/24 1951    Specimen: Blood Updated: 11/17/24 2030     HS Troponin T 17 ng/L     Narrative:      High Sensitive Troponin T Reference Range:  <14.0 ng/L- Negative Female for AMI  <22.0 ng/L- Negative Male for AMI  >=14 - Abnormal Female indicating possible myocardial injury.  >=22 - Abnormal Male indicating possible myocardial injury.   Clinicians would have to utilize clinical acumen, EKG, Troponin, and serial changes to determine if it is an Acute Myocardial Infarction or myocardial injury due to an underlying chronic condition.         Protime-INR, Fingerstick [[id number]] Collected: 11/17/24 2041    Specimen: Capillary Blood Updated: 11/17/24 2046     Protime 12.8 Seconds      Comment: Serial Number: [id number]:  [id number]        INR 1.10    D-dimer, Quantitative [[id number]]  (Normal) Collected: 11/17/24 2045    Specimen: Blood Updated: 11/17/24 2054     D-Dimer, Quantitative 0.75 MCGFEU/mL     Narrative:      According to the assay 's published package insert, a normal (<0.50 MCGFEU/mL) D-dimer result in conjunction with a non-high clinical probability assessment, excludes deep vein thrombosis (DVT) and pulmonary embolism (PE) with high sensitivity.    D-dimer values increase with age and this can make VTE exclusion of an older population difficult. To address this, the [employer] College [employer], based on best available evidence and recent guidelines, recommends that clinicians use age-adjusted D-dimer  "thresholds in patients greater than 50 years of age with: a) a low probability of PE who do not meet all Pulmonary Embolism Rule Out Criteria, or b) in those with intermediate probability of PE.   The formula for an age-adjusted D-dimer cut-off is \"age/100\".  For example, a 60 year old patient would have an age-adjusted cut-off of 0.60 MCGFEU/mL and an 80 year old 0.80 MCGFEU/mL.    Blood Culture - Blood, Arm, Right [[id number]] Collected: 11/17/24 2209    Specimen: Blood from Arm, Right Updated: 11/17/24 2216    Lactic Acid, Plasma [[id number]]  (Normal) Collected: 11/17/24 2209    Specimen: Blood Updated: 11/17/24 2230     Lactate 1.3 mmol/L     Blood Culture - Blood, Arm, Right [[id number]] Collected: 11/17/24 2210    Specimen: Blood from Arm, Right Updated: 11/17/24 2217    Procalcitonin [[id number]]  (Normal) Collected: 11/18/24 0249    Specimen: Blood Updated: 11/18/24 0338     Procalcitonin 0.04 ng/mL     Narrative:      As a Marker for Sepsis (Non-Neonates):    1. <0.5 ng/mL represents a low risk of severe sepsis and/or septic shock.  2. >2 ng/mL represents a high risk of severe sepsis and/or septic shock.    As a Marker for Lower Respiratory Tract Infections that require antibiotic therapy:    PCT on Admission    Antibiotic Therapy       6-12 Hrs later    >0.5                Strongly Recommended  >0.25 - <0.5        Recommended   0.1 - 0.25          Discouraged              Remeasure/reassess PCT  <0.1                Strongly Discouraged     Remeasure/reassess PCT    As 28 day mortality risk marker: \"Change in Procalcitonin Result\" (>80% or <=80%) if Day 0 (or Day 1) and Day 4 values are available. Refer to http://www.[facility]-pct-calculator.com    Change in PCT <=80%  A decrease of PCT levels below or equal to 80% defines a positive change in PCT test result representing a higher risk for 28-day all-cause mortality of patients diagnosed with severe sepsis for septic shock.    Change in PCT >80%  A decrease of " PCT levels of more than 80% defines a negative change in PCT result representing a lower risk for 28-day all-cause mortality of patients diagnosed with severe sepsis or septic shock.       Basic Metabolic Panel [[id number]]  (Abnormal) Collected: 11/18/24 0249    Specimen: Blood Updated: 11/18/24 0331     Glucose 164 mg/dL      BUN 21 mg/dL      Creatinine 1.13 mg/dL      Sodium 137 mmol/L      Potassium 3.8 mmol/L      Chloride 99 mmol/L      CO2 24.0 mmol/L      Calcium 8.7 mg/dL      BUN/Creatinine Ratio 18.6     Anion Gap 14.0 mmol/L      eGFR 49.0 mL/min/1.73     Narrative:      GFR Normal >60  Chronic Kidney Disease <60  Kidney Failure <15    The GFR formula is only valid for adults with stable renal function between ages 18 and 70.    CBC (No Diff) [[id number]]  (Abnormal) Collected: 11/18/24 0249    Specimen: Blood Updated: 11/18/24 0315     WBC 5.62 10*3/mm3      RBC 4.73 10*6/mm3      Hemoglobin 13.0 g/dL      Hematocrit 40.6 %      MCV 85.8 fL      MCH 27.5 pg      MCHC 32.0 g/dL      RDW 12.9 %      RDW-SD 40.4 fl      MPV 12.3 fL      Platelets 123 10*3/mm3     Respiratory Panel PCR w/COVID-19(SARS-CoV-2) DE/CORIE/CHAU/PAD/COR/SHERRILL In-House, NP Swab in UTM/VTM, 2 HR TAT - Swab, Nasopharynx [[id number]]  (Abnormal) Collected: 11/18/24 0311    Specimen: Swab from Nasopharynx Updated: 11/18/24 0550     ADENOVIRUS, PCR Not Detected     Coronavirus 229E Not Detected     Coronavirus HKU1 Not Detected     Coronavirus NL63 Not Detected     Coronavirus OC43 Not Detected     COVID19 Not Detected     Human Metapneumovirus Not Detected     Human Rhinovirus/Enterovirus Detected     Influenza A PCR Not Detected     Influenza B PCR Not Detected     Parainfluenza Virus 1 Not Detected     Parainfluenza Virus 2 Not Detected     Parainfluenza Virus 3 Not Detected     Parainfluenza Virus 4 Not Detected     RSV, PCR Not Detected     Bordetella pertussis pcr Not Detected     Bordetella parapertussis PCR Not Detected      Chlamydophila pneumoniae PCR Not Detected     Mycoplasma pneumo by PCR Not Detected    Narrative:      In the setting of a positive respiratory panel with a viral infection PLUS a negative procalcitonin without other underlying concern for bacterial infection, consider observing off antibiotics or discontinuation of antibiotics and continue supportive care. If the respiratory panel is positive for atypical bacterial infection (Bordetella pertussis, Chlamydophila pneumoniae, or Mycoplasma pneumoniae), consider antibiotic de-escalation to target atypical bacterial infection.            Imaging Results (Last 24 Hours)       Procedure Component Value Units Date/Time    XR Chest 2 View [[id number]] Collected: 11/17/24 1829     Updated: 11/17/24 1835    Narrative:      XR CHEST 2 VW-     Clinical: Shortness of breath     COMPARISON examination dated 11/11/2024     FINDINGS: There is mild cardiac enlargement with atherosclerotic  calcification of the aorta. The right lung is clear. There is a patchy  area of infiltrate or atelectasis along the left costophrenic sulcus, an  area of subpleural atelectasis seen at this location on prior CT.  Pleural effusions demonstrated on the previous 11/11/2024 CT chest are  no longer present. The remainder is unremarkable.           This report was finalized on 11/17/2024 6:31 PM by Dr. Rod Ferguson M.D on Workstation: [id number]               Results for orders placed during the hospital encounter of 04/25/24    Adult Transthoracic Echo Complete W/ Cont if Necessary Per Protocol    Interpretation Summary    The left ventricular cavity is mildly dilated.    Left ventricular systolic function is normal. Left ventricular ejection fraction appears to be 66 - 70%.    Left ventricular diastolic function is consistent with (grade I) impaired relaxation.    Normal right ventricular cavity size and systolic function noted.    The left atrial cavity is mildly dilated.    The aortic valve  leaflets are mildly to moderately calcified    Moderate mitral valve regurgitation is present.    Mild tricuspid valve regurgitation is present.    Calculated right ventricular systolic pressure from tricuspid regurgitation is 31 mmHg.    There is no evidence of pericardial effusion      ECG 12 Lead Dyspnea   Preliminary Result   HEART NUCT=145  bpm   RR Blruqghx=788  ms   CO Interval=  ms   P Horizontal Axis=  deg   P Front Axis=  deg   QRSD Ygfyvxuv=395  ms   QT Bpjwfkrn=938  ms   QFxT=143  ms   QRS Axis=83  deg   T Wave Axis=208  deg   - ABNORMAL ECG -   Atrial fibrillation   Right bundle branch block   Nonspecific T abnormalities, lateral leads   Date and Time of Study:2024-11-18 08:38:32      ECG 12 Lead   ED Interpretation   Abnormal   Fareed Baires MD     11/17/2024 11:22 PM   ECG 12 Lead         Date/Time: 11/17/2024 10:01 PM      Performed by: Fareed Baires MD   Authorized by: Fareed Baires MD  Interpreted by ED physician   Rhythm: atrial fibrillation   Rate: tachycardic   BPM: 130   QRS axis: normal   Conduction: right bundle branch block   T depression: II, III, aVF, V1, V6, V5, V4, V3 and V2   Other findings: prolonged QTc interval   Clinical impression: abnormal ECG and dysrhythmia - atrial      ECG 12 Lead ED Triage Standing Order; SOA   Preliminary Result   HEART JBRJ=900  bpm   RR Eqlzywbk=294  ms   CO Interval=  ms   P Horizontal Axis=  deg   P Front Axis=  deg   QRSD Uigvkrml=106  ms   QT Xqgmcnjf=018  ms   CHeW=563  ms   QRS Axis=93  deg   T Wave Axis=-87  deg   - ABNORMAL ECG -   Atrial fibrillation   Right bundle branch block   Nonspecific T abnormalities, lateral leads   Date and Time of Study:2024-11-17 18:01:35      Telemetry Scan   Final Result      Telemetry Scan   Final Result      Telemetry Scan   Final Result           Assessment/Plan     Active Hospital Problems    Diagnosis  POA    **Atrial fibrillation with RVR [I48.91]  Yes    Asthma exacerbation [J45.901]  Yes    Acute  bronchitis due to Rhinovirus [J20.6]  Unknown    Paroxysmal atrial fibrillation [I48.0]  Yes    Essential hypertension [I10]  Yes         Cardiology has evaluated and titrating medications for rate control  Supportive care for rhinovirus with bronchodilators. Given ABX in ER but will hold off additional abx with normal WBC and procal symptoms c/w viral infection no indication for ABX.   Resume home diuretics  I discussed the patients findings and my recommendations with patient and nursing staff.    VTE Prophylaxis - SCDs.      Dylan Julio MD  [facility]  11/18/24  13:02 EST

## 2024-11-18 NOTE — NURSING NOTE
Paged cardiology pt uncontrolled Afib in 120 to 150 gave the scheduled metoprolol 50 mg c/o shortness of breath 98% on RA, put her on 2L of oxygen for comfort, spoke with [facility] cardi[facility], stated to resume cardizem drip.  Discontinued cardizem drip, gave digoxin one time dose.

## 2024-11-18 NOTE — PLAN OF CARE
Goal Outcome Evaluation:    Problem: Adult Inpatient Plan of Care  Goal: Absence of Hospital-Acquired Illness or Injury  Intervention: Identify and Manage Fall Risk  Recent Flowsheet Documentation  Taken 11/18/2024 1800 by Juan Perry RN  Safety Promotion/Fall Prevention: safety round/check completed  Taken 11/18/2024 1600 by Juan Perry RN  Safety Promotion/Fall Prevention: safety round/check completed  Taken 11/18/2024 1454 by Juan Perry RN  Safety Promotion/Fall Prevention: safety round/check completed  Taken 11/18/2024 1200 by Juan Perry RN  Safety Promotion/Fall Prevention: safety round/check completed  Taken 11/18/2024 1000 by Juan Perry RN  Safety Promotion/Fall Prevention: safety round/check completed  Taken 11/18/2024 0845 by Juan Perry RN  Safety Promotion/Fall Prevention: safety round/check completed   Plan of Care Reviewed With: patient alert was on 2 L  this morning c/o shortness of breathe,  to 140's gave one time dose of digoxin, denies pain, call light within reach.

## 2024-11-18 NOTE — PLAN OF CARE
Goal Outcome Evaluation:  Plan of Care Reviewed With: patient    Problem: Dysrhythmia  Goal: Normalized Cardiac Rhythm  Outcome: Progressing           Progress: improving

## 2024-11-18 NOTE — FSED PROVIDER NOTE
"Subjective   History of Present Illness  84yo female pmh significant htn/hyperlipidemia/dm2/atrial fibrillation/MARSHALL/asthma, recently discharged [facility] 11.15.2024, secondary to atrial fibrillation RVR/pleural effusion/MARSHALL, presents ED c/o 2d hx productive cough \"yellow\" sputum/congestion/soa/wheezing.  ROS (+) palpitations.    History provided by:  Patient  Shortness of Breath  Associated symptoms: cough and wheezing    Associated symptoms: no chest pain        Review of Systems   Constitutional: Negative.    HENT:  Positive for congestion.    Respiratory:  Positive for cough, shortness of breath and wheezing.    Cardiovascular:  Positive for palpitations. Negative for chest pain.   Gastrointestinal: Negative.    Genitourinary: Negative.    Allergic/Immunologic: Negative for immunocompromised state.   All other systems reviewed and are negative.      Past Medical History:   Diagnosis Date    ACE-inhibitor cough     Asthma     Asymptomatic postmenopausal status     Cardiac murmur     Cholelithiasis     Colon polyp 10/2016    Conjunctivitis     right    Diverticulitis of colon 08/23    Elevated LFTs 06/28/2016    Fatty liver     GERD (gastroesophageal reflux disease)     GI (gastrointestinal bleed)     History of colon polyps     HLD (hyperlipidemia)     Hypertension     Hypopigmentation     Left shoulder tendonitis     Menopausal syndrome     Motion sickness     Plantar fasciitis of left foot     nodule    Pruritus     possibly due to Benicar    SOB (shortness of breath)     SOBOE (shortness of breath on exertion)     Thyroid cyst     UTI (urinary tract infection)     Viral syndrome        Allergies   Allergen Reactions    Lisinopril Cough       Past Surgical History:   Procedure Laterality Date    CARDIAC CATHETERIZATION      in the 80's    CARDIAC CATHETERIZATION N/A 05/04/2016    Procedure: Left Heart Cath;  Surgeon: Dale Vasquez MD;  Location: [facility] CATH INVASIVE LOCATION;  Service:     CARDIAC " CATHETERIZATION N/A 05/04/2016    Procedure: Coronary angiography;  Surgeon: Dale Vasquez MD;  Location:  [facility] CATH INVASIVE LOC[facility];  Service:     CARDIAC CATHETERIZATION N/A 05/04/2016    Procedure: Left ventriculography;  Surgeon: Dale Vasquez MD;  Location:  [facility] CATH INVASIVE LOCATION;  Service:     CARDIAC CATHETERIZATION N/A 4/26/2024    Procedure: Left Heart Cath;  Surgeon: Devon Cardona MD;  Location:  [facility] CATH INVASIVE [facility];  Service: Cardiovascular;  Laterality: N/A;    CARDIAC CATHETERIZATION N/A 4/26/2024    Procedure: Coronary angiography;  Surgeon: Devon Cardona MD;  Location:  [facility] CATH INVASIVE [facility];  Service: Cardiovascular;  Laterality: N/A;    CARDIAC CATHETERIZATION N/A 4/26/2024    Procedure: Left ventriculography;  Surgeon: Devon Cardona MD;  Location:  [facility] CATH INVASIVE LOCATION;  Service: Cardiovascular;  Laterality: N/A;    CHOLECYSTECTOMY  1980    COLONOSCOPY      >5 years    COLONOSCOPY  12/01/2016    tics, IH, rectal biopsy showed changes suggestive of Schwannoma     ENDOSCOPY  12/01/2016    LA grade B esophagitis, mild Schatzki ring, HH, gastric polyp, erythematous mucosa in stomach    LASIK Bilateral     RECTAL ULTRASOUND N/A 02/07/2017    Procedure: ENDOSCOPIC ULTRASOUND (LOWER);  Surgeon: Casper Rodríguez MD;  Location: [facility] ENDOSCOPY;  Service:     SHOULDER SURGERY Left 06/17/2010    Dr. Green; repair L shoulder adhesive capsulitis    SKIN BIOPSY      TOTAL ABDOMINAL HYSTERECTOMY  1989    fibroid tumors    TUBAL ABDOMINAL LIGATION         Family History   Problem Relation Age of Onset    Anxiety disorder Mother     Uterine cancer Mother     Dementia Mother     Cancer Father         Prostate Cancer     Other Father         AAA, aneurysm rupture    Leukemia Brother     Malig Hyperthermia Neg Hx        Social History     Socioeconomic History    Marital status:      Spouse name: Abram    Number of children: 2   Tobacco Use     Smoking status: Former     Current packs/day: 0.00     Average packs/day: 1 pack/day for 30.0 years (30.0 ttl pk-yrs)     Types: Cigarettes     Start date: 1964     Quit date: 1994     Years since quittin.8    Smokeless tobacco: Never   Vaping Use    Vaping status: Never Used   Substance and Sexual Activity    Alcohol use: No    Drug use: No    Sexual activity: Not Currently     Partners: Male           Objective   Physical Exam  Vitals and nursing note reviewed.   Constitutional:       Appearance: Normal appearance. She is well-developed. She is not ill-appearing, toxic-appearing or diaphoretic.   HENT:      Head: Normocephalic and atraumatic.      Right Ear: External ear normal.      Left Ear: External ear normal.      Nose: Nose normal. No rhinorrhea.      Mouth/Throat:      Mouth: Mucous membranes are moist.   Eyes:      Pupils: Pupils are equal, round, and reactive to light.   Cardiovascular:      Rate and Rhythm: Tachycardia present. Rhythm irregularly irregular.      Pulses: Normal pulses.      Heart sounds: Normal heart sounds. No murmur heard.     No friction rub. No gallop.   Pulmonary:      Effort: Pulmonary effort is normal. No accessory muscle usage, prolonged expiration or respiratory distress.      Breath sounds: Examination of the right-middle field reveals wheezing. Examination of the left-middle field reveals wheezing. Examination of the right-lower field reveals wheezing. Examination of the left-lower field reveals wheezing. No rhonchi or rales.   Abdominal:      General: Abdomen is flat. Bowel sounds are normal. There is no distension.      Palpations: Abdomen is soft.      Tenderness: There is no abdominal tenderness.   Musculoskeletal:         General: No swelling or deformity.      Cervical back: Normal range of motion and neck supple. No rigidity.      Right lower leg: No edema.      Left lower leg: No edema.   Lymphadenopathy:      Cervical: No cervical adenopathy.   Skin:      General: Skin is warm and dry.   Neurological:      General: No focal deficit present.      Mental Status: She is alert and oriented to person, place, and time.      GCS: GCS eye subscore is 4. GCS verbal subscore is 5. GCS motor subscore is 6.         ECG 12 Lead      Date/Time: 11/17/2024 10:01 PM    Performed by: Fareed Biares MD  Authorized by: Fareed Baires MD  Interpreted by ED physician  Rhythm: atrial fibrillation  Rate: tachycardic  BPM: 130  QRS axis: normal  Conduction: right bundle branch block  T depression: II, III, aVF, V1, V6, V5, V4, V3 and V2  Other findings: prolonged QTc interval  Clinical impression: abnormal ECG and dysrhythmia - atrial               ED Course  ED Course as of 11/17/24 2322   Sun Nov 17, 2024   2246 A paged for admit [SD]   2320 D/w A ARN accepting for Dr. Lau. Pt stable transport. [SD]      ED Course User Index  [SD] Fareed Baires MD      Labs Reviewed   COMPREHENSIVE METABOLIC PANEL - Abnormal; Notable for the following components:       Result Value    Glucose 126 (*)     BUN 25 (*)     Creatinine 1.31 (*)     eGFR 41.0 (*)     All other components within normal limits    Narrative:     GFR Normal >60  Chronic Kidney Disease <60  Kidney Failure <15    The GFR formula is only valid for adults with stable renal function between ages 18 and 70.   SINGLE HS TROPONIN T - Abnormal; Notable for the following components:    HS Troponin T 18 (*)     All other components within normal limits    Narrative:     High Sensitive Troponin T Reference Range:  <14.0 ng/L- Negative Female for AMI  <22.0 ng/L- Negative Male for AMI  >=14 - Abnormal Female indicating possible myocardial injury.  >=22 - Abnormal Male indicating possible myocardial injury.   Clinicians would have to utilize clinical acumen, EKG, Troponin, and serial changes to determine if it is an Acute Myocardial Infarction or myocardial injury due to an underlying chronic condition.        CBC WITH AUTO  "DIFFERENTIAL - Abnormal; Notable for the following components:    Lymphocyte % 17.6 (*)     Eosinophils, Absolute 0.42 (*)     All other components within normal limits   TROPONIN - Abnormal; Notable for the following components:    HS Troponin T 17 (*)     All other components within normal limits    Narrative:     High Sensitive Troponin T Reference Range:  <14.0 ng/L- Negative Female for AMI  <22.0 ng/L- Negative Male for AMI  >=14 - Abnormal Female indicating possible myocardial injury.  >=22 - Abnormal Male indicating possible myocardial injury.   Clinicians would have to utilize clinical acumen, EKG, Troponin, and serial changes to determine if it is an Acute Myocardial Infarction or myocardial injury due to an underlying chronic condition.        COVID-19 AND FLU A/B, NP SWAB IN TRANSPORT MEDIA 1 HR TAT - Normal    Narrative:     Fact sheet for providers: https://www.fda.gov/media/468692/download    Fact sheet for patients: https://www.fda.gov/media/384273/download    Test performed by PCR.   D-DIMER, QUANTITATIVE - Normal    Narrative:     According to the assay 's published package insert, a normal (<0.50 MCGFEU/mL) D-dimer result in conjunction with a non-high clinical probability assessment, excludes deep vein thrombosis (DVT) and pulmonary embolism (PE) with high sensitivity.    D-dimer values increase with age and this can make VTE exclusion of an older population difficult. To address this, the [employer], based on best available evidence and recent guidelines, recommends that clinicians use age-adjusted D-dimer thresholds in patients greater than 50 years of age with: a) a low probability of PE who do not meet all Pulmonary Embolism Rule Out Criteria, or b) in those with intermediate probability of PE.   The formula for an age-adjusted D-dimer cut-off is \"age/100\".  For example, a 60 year old patient would have an age-adjusted cut-off of 0.60 MCGFEU/mL and an 80 year " old 0.80 MCGFEU/mL.   MAGNESIUM - Normal   LACTIC ACID, PLASMA - Normal   BLOOD CULTURE   BLOOD CULTURE   LAB PROTIME-INR, FINGERSTICK   POCT PROTIME - INR   CBC AND DIFFERENTIAL    Narrative:     The following orders were created for panel order CBC & Differential.  Procedure                               Abnormality         Status                     ---------                               -----------         ------                     CBC Auto Differential[[id number]]        Abnormal            Final result                 Please view results for these tests on the individual orders.     XR Chest 2 View    Result Date: 11/17/2024  Narrative: XR CHEST 2 VW-  Clinical: Shortness of breath  COMPARISON examination dated 11/11/2024  FINDINGS: There is mild cardiac enlargement with atherosclerotic calcification of the aorta. The right lung is clear. There is a patchy area of infiltrate or atelectasis along the left costophrenic sulcus, an area of subpleural atelectasis seen at this location on prior CT. Pleural effusions demonstrated on the previous 11/11/2024 CT chest are no longer present. The remainder is unremarkable.    This report was finalized on 11/17/2024 6:31 PM by Dr. Rod Ferguson M.D on Workstation: [id number]      US Liver    Result Date: 11/12/2024  Narrative: RIGHT UPPER QUADRANT ULTRASOUND  HISTORY: Cirrhosis, HCC surveillance  COMPARISON: CT of the abdomen pelvis from yesterday  TECHNIQUE: Gray scale and color doppler ultrasound imaging of the right upper quadrant was performed.  FINDINGS: Only a small portion of the body of the pancreas is visualized. The remainder the pancreas is obscured by bowel gas. Visualized portions of the pancreas are unremarkable. The liver is cirrhotic with nodular contour and coarsened echotexture. No ultrasound evidence of liver lesion. Cholecystectomy. Common duct measures about 8 mm. The right kidney measures 10.2 cm in length and is normal in appearance. There is no  ascites. A right pleural effusion is noted.      Impression: 1. Cirrhotic liver. No ultrasound evidence of liver lesion 2. Right pleural effusion 3. Cholecystectomy    This report was finalized on 11/12/2024 4:09 PM by Dr. Neel Blanca M.D on Workstation: [id number]      CT Angiogram Chest Pulmonary Embolism    Result Date: 11/11/2024  Narrative: CT ANGIOGRAM CHEST PULMONARY EMBOLISM-  INDICATIONS: Dyspnea  TECHNIQUE: Radiation dose reduction techniques were utilized, including automated exposure control and exposure modulation based on body size. CT angiography of the chest. Three-dimensional reconstructions  COMPARISON: 4/26/2024  FINDINGS:  No pulmonary embolism. Aortic lumen is not characterized owing to phase of contrast enhancement  The heart size is enlarged without pericardial effusion. A few small subcentimeter short axis mediastinal lymph nodes are seen that are not significant by size criteria. A 1 cm short axis right hilar lymph node on axial image 64 is borderline, nonspecific, stable. A 1.2 cm short axis subcarinal lymph node on axial image 70 is mildly prominent, could be reactive in nature or potentially evidence of neoplasm (previously 0.7 cm), clinical correlation and follow-up recommended (if further imaging evaluation is indicated, PET/CT could be considered).  The airways appear clear.  Mild right more than left pleural effusions are seen.  The lungs show no focal pulmonary consolidation or mass. Atelectasis is present in both lungs. Old granulomatous disease is seen.  Upper abdominal structures show no acute findings, and appear grossly stable from the CT of the abdomen and pelvis from earlier today (please see separate report).  Degenerative changes are seen in the spine. No acute fracture is identified.      Impression:  No pulmonary embolism. Mild bilateral pleural effusions. Mildly prominent, nonspecific subcarinal lymph node, as described.  This report was finalized on 11/11/2024 6:52  PM by Dr. Hubert Em M.D on Workstation: [id number]      CT Abdomen Pelvis With Contrast    Result Date: 11/11/2024  Narrative: CT ABDOMEN PELVIS W CONTRAST-  INDICATIONS: Abdominal distention and pain, cirrhosis  TECHNIQUE: Radiation dose reduction techniques were utilized, including automated exposure control and exposure modulation based on body size. Enhanced ABDOMEN AND PELVIS CT  COMPARISON: 8/11/2023  FINDINGS:  Nodular contour of the liver is noted, compatible with cirrhosis.  The gallbladder is surgically absent. Mild nonspecific thickening of the left adrenal gland is chronic.  Focal cortical thinning in the right kidney could be result of prior infection or infarct. Tiny nonobstructive right nephrolithiasis. Small right renal low density is noted that is too small to characterize.  The size of the spleen is borderline, 12.8 cm  Otherwise unremarkable appearance of the liver, spleen, adrenal glands, pancreas, kidneys, bladder.  No bowel obstruction. Stranding around the proximal to mid duodenum suggests duodenitis. The appendix does not appear inflamed. Colonic diverticula are seen that do not appear inflamed. Some presacral edema is present that may relate to volume status or potentially evidence of proctitis, correlate clinically.  No free intraperitoneal gas. Minimal pelvic free fluid.  Scattered small mesenteric and para-aortic lymph nodes are seen that are not significant by size criteria. A 9 mm short axis precaval lymph node on axial image 44 is nonspecific, stable. Likewise, a 1.3 cm short axis lymph node superior to the pancreas on axial image 39 and is not significantly changed.  Abdominal aorta is not aneurysmal. Aortic and other arterial calcifications are present. Some hazy density around the superior mesenteric artery in the celiac artery is noted; this appearance is chronic.  The lung bases show atelectasis, mild bilateral pleural effusions. The heart is enlarged.  Degenerative changes  are seen in the spine. No acute fracture is identified.        Impression:  1. Evidence of proximal duodenitis, endoscopic correlation could be considered as indicated.  2. Cirrhosis. Mild bilateral pleural effusions. Minimal pelvic free fluid. Nonspecific presacral edema.  3. No obstructive uropathy. Nonobstructive right nephrolithiasis.    This report was finalized on 11/11/2024 5:41 PM by Dr. Hubert Em M.D on Workstation: [id number]      XR Chest 1 View    Result Date: 11/11/2024  Narrative: XR CHEST 1 VW-11/11/2024  HISTORY: Shortness of breath.  Heart size is at the upper limits of normal. There is bilateral vascular congestion. There is increased density in the right lung base which may represent asymmetric pulmonary edema atelectasis or pneumonia. There may be bilateral pleural effusions.      Impression: 1. FINDINGS are consistent with mild to moderate congestive heart failure. There could be an element of atelectasis or pneumonia in the right lung base. Please correlate with the clinical findings. 2. Follow-up films recommended  This report was finalized on 11/11/2024 5:06 PM by Dr. Laci Gannon M.D on Workstation: [id number]                                          Medical Decision Making  Patient notably recently evaluated per cardiology during inpatient hospitalization with deferred anticoagulation secondary to MARSHALL cirrhosis.    Problems Addressed:  Atrial fibrillation with tachycardic ventricular rate: complicated acute illness or injury  Bronchitis: complicated acute illness or injury  Exacerbation of asthma, unspecified asthma severity, unspecified whether persistent: complicated acute illness or injury    Amount and/or Complexity of Data Reviewed  Labs: ordered.  Radiology: ordered.  ECG/medicine tests: ordered and independent interpretation performed.    Risk  OTC drugs.  Prescription drug management.        Final diagnoses:   Exacerbation of asthma, unspecified asthma severity,  unspecified whether persistent   Atrial fibrillation with tachycardic ventricular rate   Bronchitis       ED Disposition  ED Disposition       ED Disposition   Decision to Admit    Condition   --    Comment   Level of Care: Telemetry [5]   Diagnosis: Atrial fibrillation with RVR [[id number]]   Admitting Physician: MILAGRO CHEN [[id number]]   Attending Physician: MILAGRO CHEN [[id number]]                 No follow-up provider specified.       Medication List      No changes were made to your prescriptions during this visit.

## 2024-11-18 NOTE — PLAN OF CARE
Goal Outcome Evaluation:  Plan of Care Reviewed With: patient    Problem: Adult Inpatient Plan of Care  Goal: Plan of Care Review  Outcome: Progressing  Flowsheets (Taken 11/18/2024 0403)  Progress: improving  Plan of Care Reviewed With: patient  Goal: Patient-Specific Goal (Individualized)  Outcome: Progressing  Goal: Absence of Hospital-Acquired Illness or Injury  Outcome: Progressing  Intervention: Identify and Manage Fall Risk  Recent Flowsheet Documentation  Taken 11/18/2024 0400 by Silvia Carrasco RN  Safety Promotion/Fall Prevention: safety round/check completed  Taken 11/18/2024 0200 by Silvia Carrasco RN  Safety Promotion/Fall Prevention: safety round/check completed  Intervention: Prevent Skin Injury  Recent Flowsheet Documentation  Taken 11/18/2024 0400 by Silvia Carrasco RN  Body Position: position changed independently  Taken 11/18/2024 0200 by Silvia Carrasco RN  Body Position: position changed independently  Intervention: Prevent Infection  Recent Flowsheet Documentation  Taken 11/18/2024 0400 by Silvia Carrasco RN  Infection Prevention:   rest/sleep promoted   single patient room provided  Taken 11/18/2024 0200 by Silvia aCrrasco RN  Infection Prevention:   rest/sleep promoted   single patient room provided  Goal: Optimal Comfort and Wellbeing  Outcome: Progressing  Intervention: Provide Person-Centered Care  Recent Flowsheet Documentation  Taken 11/18/2024 0145 by Silvia Carrasco RN  Trust Relationship/Rapport: reassurance provided  Goal: Readiness for Transition of Care  Outcome: Progressing  Intervention: Mutually Develop Transition Plan  Recent Flowsheet Documentation  Taken 11/18/2024 0138 by Silvia Carrasco RN  Transportation Anticipated: family or friend will provide  Patient/Family Anticipated Services at Transition: none  Patient/Family Anticipates Transition to: home with family  Taken 11/18/2024 0136 by Silvia Carrasco RN  Equipment Currently Used at Home: none  Goal: Plan of Care Review  Outcome:  Progressing  Flowsheets (Taken 11/18/2024 0403)  Progress: improving  Plan of Care Reviewed With: patient  Goal: Patient-Specific Goal (Individualized)  Outcome: Progressing  Goal: Absence of Hospital-Acquired Illness or Injury  Outcome: Progressing  Intervention: Identify and Manage Fall Risk  Recent Flowsheet Documentation  Taken 11/18/2024 0400 by Silvia Carrasco RN  Safety Promotion/Fall Prevention: safety round/check completed  Taken 11/18/2024 0200 by Silvia Carrasco RN  Safety Promotion/Fall Prevention: safety round/check completed  Intervention: Prevent Skin Injury  Recent Flowsheet Documentation  Taken 11/18/2024 0400 by Silvia Carrasco RN  Body Position: position changed independently  Taken 11/18/2024 0200 by Silvia Carrasco RN  Body Position: position changed independently  Intervention: Prevent Infection  Recent Flowsheet Documentation  Taken 11/18/2024 0400 by Silvia Carrasco RN  Infection Prevention:   rest/sleep promoted   single patient room provided  Taken 11/18/2024 0200 by Silvia Carrasco RN  Infection Prevention:   rest/sleep promoted   single patient room provided  Goal: Optimal Comfort and Wellbeing  Outcome: Progressing  Intervention: Provide Person-Centered Care  Recent Flowsheet Documentation  Taken 11/18/2024 0145 by Silvia Carrasco RN  Trust Relationship/Rapport: reassurance provided  Goal: Readiness for Transition of Care  Outcome: Progressing  Intervention: Mutually Develop Transition Plan  Recent Flowsheet Documentation  Taken 11/18/2024 0138 by Silvia Carrasco RN  Transportation Anticipated: family or friend will provide  Patient/Family Anticipated Services at Transition: none  Patient/Family Anticipates Transition to: home with family  Taken 11/18/2024 0136 by Silvia Carrasco RN  Equipment Currently Used at Home: none           Progress: improving

## 2024-11-18 NOTE — ED NOTES
Cardizem drip ordered per Dr Baires. At time of EMS arrival. Order was not seen until 0110. EMS had already left with patient. Dara RN notified of Cardizem Drip needed.

## 2024-11-18 NOTE — CASE MANAGEMENT/SOCIAL WORK
Discharge Planning Assessment  [facility]     Patient Name: Viktoria Villasenor  MRN: [id number]  Today's Date: 11/18/2024    Admit Date: 11/17/2024    Plan: Home with spouse   Discharge Needs Assessment       Row Name 11/18/24 1323       Living Environment    People in Home spouse    Current Living Arrangements home    Potentially Unsafe Housing Conditions none    Primary Care Provided by self    Provides Primary Care For no one    Family Caregiver if Needed spouse    Quality of Family Relationships helpful;supportive    Able to Return to Prior Arrangements yes       Resource/Environmental Concerns    Resource/Environmental Concerns none    Transportation Concerns none       Transportation Needs    In the past 12 months, has lack of transportation kept you from medical appointments or from getting medications? no    In the past 12 months, has lack of transportation kept you from meetings, work, or from getting things needed for daily living? No       Transition Planning    Patient/Family Anticipates Transition to home    Patient/Family Anticipated Services at Transition none    Transportation Anticipated family or friend will provide;car, drives self       Discharge Needs Assessment    Equipment Currently Used at Home none    Concerns to be Addressed denies needs/concerns at this time    Do you want help finding or keeping work or a job? I do not need or want help    Do you want help with school or training? For example, starting or completing job training or getting a high school diploma, GED or equivalent No    Anticipated Changes Related to Illness none    Equipment Needed After Discharge none    Provided Post Acute Provider List? N/A    Provided Post Acute Provider Quality & Resource List? N/A                   Discharge Plan       Row Name 11/18/24 1508       Plan    Plan Home with spouse    Plan Comments S/W pt at bedside.   Facesheet info confirmed.  Pt lives in a house w/ her spouse Abram, is IADLs and  can drive.  She does not use any home DME and no hx of HH or SNF.  Pt plans to return home with her spouse upon DC and denies any DC needs at this time.  CCP will continue to follow and assist if needed ...........Vero ZUNIGA/ DREW                  Continued Care and Services - Admitted Since 11/17/2024    No active coordination exists for this encounter.          Demographic Summary       Row Name 11/18/24 1234       General Information    Admission Type observation    Arrived From home    Referral Source admission list    Reason for Consult discharge planning    Preferred Language English                   Functional Status       Row Name 11/18/24 1321       Functional Status    Usual Activity Tolerance good    Current Activity Tolerance good       Functional Status, IADL    Medications independent    Meal Preparation independent;assistive person    Housekeeping independent;assistive person    Laundry independent;assistive person    Shopping independent;assistive person       Mental Status    General Appearance WDL WDL       Mental Status Summary    Recent Changes in Mental Status/Cognitive Functioning no changes       Employment/    Employment Status retired                               Vero Mcgee RN

## 2024-11-19 PROBLEM — I48.91 ATRIAL FIBRILLATION WITH RAPID VENTRICULAR RESPONSE: Status: ACTIVE | Noted: 2024-11-19

## 2024-11-19 LAB
ANION GAP SERPL CALCULATED.3IONS-SCNC: 8 MMOL/L (ref 5–15)
BUN SERPL-MCNC: 25 MG/DL (ref 8–23)
BUN/CREAT SERPL: 22.3 (ref 7–25)
CALCIUM SPEC-SCNC: 9 MG/DL (ref 8.6–10.5)
CHLORIDE SERPL-SCNC: 104 MMOL/L (ref 98–107)
CO2 SERPL-SCNC: 25 MMOL/L (ref 22–29)
CREAT SERPL-MCNC: 1.12 MG/DL (ref 0.57–1)
EGFRCR SERPLBLD CKD-EPI 2021: 49.5 ML/MIN/1.73
GLUCOSE SERPL-MCNC: 94 MG/DL (ref 65–99)
POTASSIUM SERPL-SCNC: 4.4 MMOL/L (ref 3.5–5.2)
SODIUM SERPL-SCNC: 137 MMOL/L (ref 136–145)

## 2024-11-19 PROCEDURE — 94664 DEMO&/EVAL PT USE INHALER: CPT

## 2024-11-19 PROCEDURE — 93010 ELECTROCARDIOGRAM REPORT: CPT | Performed by: INTERNAL MEDICINE

## 2024-11-19 PROCEDURE — 94799 UNLISTED PULMONARY SVC/PX: CPT

## 2024-11-19 PROCEDURE — 63710000001 PREDNISONE PER 1 MG: Performed by: INTERNAL MEDICINE

## 2024-11-19 PROCEDURE — 94761 N-INVAS EAR/PLS OXIMETRY MLT: CPT

## 2024-11-19 PROCEDURE — 80048 BASIC METABOLIC PNL TOTAL CA: CPT | Performed by: INTERNAL MEDICINE

## 2024-11-19 PROCEDURE — 25010000002 ENOXAPARIN PER 10 MG: Performed by: INTERNAL MEDICINE

## 2024-11-19 PROCEDURE — 93005 ELECTROCARDIOGRAM TRACING: CPT | Performed by: INTERNAL MEDICINE

## 2024-11-19 PROCEDURE — 94760 N-INVAS EAR/PLS OXIMETRY 1: CPT

## 2024-11-19 PROCEDURE — 99214 OFFICE O/P EST MOD 30 MIN: CPT | Performed by: INTERNAL MEDICINE

## 2024-11-19 RX ORDER — PREDNISONE 20 MG/1
20 TABLET ORAL 2 TIMES DAILY WITH MEALS
Status: DISCONTINUED | OUTPATIENT
Start: 2024-11-19 | End: 2024-11-22 | Stop reason: HOSPADM

## 2024-11-19 RX ORDER — DEXTROMETHORPHAN POLISTIREX 30 MG/5ML
30 SUSPENSION ORAL EVERY 12 HOURS SCHEDULED
Status: DISCONTINUED | OUTPATIENT
Start: 2024-11-19 | End: 2024-11-22 | Stop reason: HOSPADM

## 2024-11-19 RX ORDER — ENOXAPARIN SODIUM 100 MG/ML
40 INJECTION SUBCUTANEOUS EVERY 24 HOURS
Status: DISCONTINUED | OUTPATIENT
Start: 2024-11-19 | End: 2024-11-22 | Stop reason: HOSPADM

## 2024-11-19 RX ADMIN — DEXTROMETHORPHAN POLISTIREX 30 MG: 30 SUSPENSION ORAL at 11:55

## 2024-11-19 RX ADMIN — METOPROLOL TARTRATE 50 MG: 50 TABLET, FILM COATED ORAL at 08:33

## 2024-11-19 RX ADMIN — SODIUM CHLORIDE 30 MG/ML INHALATION SOLUTION 4 ML: 30 SOLUTION INHALANT at 19:44

## 2024-11-19 RX ADMIN — METOPROLOL TARTRATE 50 MG: 50 TABLET, FILM COATED ORAL at 20:37

## 2024-11-19 RX ADMIN — ENOXAPARIN SODIUM 40 MG: 100 INJECTION SUBCUTANEOUS at 09:08

## 2024-11-19 RX ADMIN — METOPROLOL TARTRATE 50 MG: 50 TABLET, FILM COATED ORAL at 15:24

## 2024-11-19 RX ADMIN — IPRATROPIUM BROMIDE AND ALBUTEROL SULFATE 3 ML: 2.5; .5 SOLUTION RESPIRATORY (INHALATION) at 08:09

## 2024-11-19 RX ADMIN — PANTOPRAZOLE SODIUM 40 MG: 40 TABLET, DELAYED RELEASE ORAL at 05:39

## 2024-11-19 RX ADMIN — IPRATROPIUM BROMIDE AND ALBUTEROL SULFATE 3 ML: 2.5; .5 SOLUTION RESPIRATORY (INHALATION) at 14:34

## 2024-11-19 RX ADMIN — PREDNISONE 20 MG: 20 TABLET ORAL at 17:58

## 2024-11-19 RX ADMIN — PREDNISONE 20 MG: 20 TABLET ORAL at 08:04

## 2024-11-19 RX ADMIN — DEXTROMETHORPHAN POLISTIREX 30 MG: 30 SUSPENSION ORAL at 20:37

## 2024-11-19 RX ADMIN — ATORVASTATIN CALCIUM 10 MG: 20 TABLET, FILM COATED ORAL at 20:37

## 2024-11-19 RX ADMIN — SODIUM CHLORIDE 30 MG/ML INHALATION SOLUTION 4 ML: 30 SOLUTION INHALANT at 08:13

## 2024-11-19 RX ADMIN — SPIRONOLACTONE 50 MG: 25 TABLET, FILM COATED ORAL at 08:33

## 2024-11-19 RX ADMIN — FUROSEMIDE 20 MG: 20 TABLET ORAL at 09:08

## 2024-11-19 RX ADMIN — IPRATROPIUM BROMIDE AND ALBUTEROL SULFATE 3 ML: 2.5; .5 SOLUTION RESPIRATORY (INHALATION) at 19:44

## 2024-11-19 NOTE — CASE MANAGEMENT/SOCIAL WORK
Case Management Readmission Assessment Note    Case Management Readmission Assessment (all recorded)       Readmission Interview       Row Name 11/19/24 1612             Readmission Indications    Is the patient and/or family able to complete the readmission assessment questions? Yes      Is this hospitalization related to the prior hospital diagnosis? Yes        Row Name 11/19/24 1612             Recommendation for rehospitalization    Did you speak with your physician prior to coming to the hospital No        Row Name 11/19/24 1612             Follow-up Appointments    Do you have a PCP? Yes      Did you have an appointment with PCP after your hospitalization? No      Did you have an appointment with a Specialist? No      Are you current with the Pulmonary Clinic? No      Are you current with the CHF Clinic? No        Row Name 11/19/24 1612             Medications    Did you have newly prescribed medications at discharge? Yes      Did you understand the reasons for your medications at discharge and how to take them? Yes      Did you understand the side effects of your medications? No      Are you taking all of you prescribed medications? Yes      What pharmacy was used to fill prescription(s)? [facility]      Were medications picked up? Yes        Row Name 11/19/24 1612             Discharge Instructions    Did you understand your discharge instructions? Yes      Did your family/caregiver hear your instructions? Yes      Were you told to eat a special diet? No      Were you given a number of someone to call if you had questions or concerns? Yes        Row Name 11/19/24 1612             Index discharge location/services    Where did you go upon discharge? Home      Do you have supportive family or friends in the home? Yes        Row Name 11/19/24 1612             Discharge Readiness    On a scale of 1-5 (5 being well prepared), how ready were you for discharge 4        Row Name 11/19/24 1612              Palliative Care/Hospice    Are you current with Palliative Care? No      Are you current with Hospice Care? No        Row Name 11/18/24 0135             Advance Directives (For Healthcare)    Pre-existing AND/MOST/POLST Order Yes, notify physician for order      Advance Directive Status Patient has advance directive, copy requested      Type of Advance Directive Health care directive/Living will      Have you reviewed your Advance Directive and is it valid for this stay? No      Literature Provided on Advance Directives No      Patient Requests Assistance on Advance Directives Patient Declined

## 2024-11-19 NOTE — SIGNIFICANT NOTE
11/19/24 1147   OTHER   Discipline physical therapist   Therapy Assessment/Plan (PT)   Criteria for Skilled Interventions Met (PT) no;no problems identified which require skilled intervention  (Pt/family currently deny skilled PT needs, pt has been mobilizing bedside. generalized weakness due to rhinovirus. nsg to continue mobilization effort. will defer formal PT eval.)     Pt reports plans to return home w spouse, denies DME needs.

## 2024-11-19 NOTE — PLAN OF CARE
Problem: Adult Inpatient Plan of Care  Goal: Plan of Care Review  Outcome: Progressing  Flowsheets (Taken 11/19/2024 0428)  Progress: improving  Outcome Evaluation: Pt A&OX4. VSS. Denies pain. Up ad be. No shift events.  Plan of Care Reviewed With: patient  Goal: Absence of Hospital-Acquired Illness or Injury  Outcome: Progressing  Intervention: Identify and Manage Fall Risk  Recent Flowsheet Documentation  Taken 11/19/2024 0428 by Bronwyn Alvarez, RN  Safety Promotion/Fall Prevention:   assistive device/personal items within reach   clutter free environment maintained   fall prevention program maintained   nonskid shoes/slippers when out of bed   room organization consistent   safety round/check completed  Taken 11/19/2024 0219 by Bronwyn Alvarez, RN  Safety Promotion/Fall Prevention:   assistive device/personal items within reach   clutter free environment maintained   fall prevention program maintained   nonskid shoes/slippers when out of bed   room organization consistent   safety round/check completed  Taken 11/19/2024 0029 by Bronwyn Alvarez, CHING  Safety Promotion/Fall Prevention:   assistive device/personal items within reach   clutter free environment maintained   fall prevention program maintained   nonskid shoes/slippers when out of bed   room organization consistent   safety round/check completed  Taken 11/18/2024 2205 by Bronwyn Alvarez, RN  Safety Promotion/Fall Prevention:   assistive device/personal items within reach   clutter free environment maintained   fall prevention program maintained   nonskid shoes/slippers when out of bed   room organization consistent   safety round/check completed  Taken 11/18/2024 2028 by Bronwyn Alvarez, RN  Safety Promotion/Fall Prevention:   activity supervised   assistive device/personal items within reach   clutter free environment maintained   fall prevention program maintained   lighting adjusted   nonskid shoes/slippers when out of bed   room organization consistent    safety round/check completed  Intervention: Prevent Skin Injury  Recent Flowsheet Documentation  Taken 11/19/2024 0428 by Bronwyn Alvarez RN  Body Position: position changed independently  Taken 11/19/2024 0219 by Bronwyn Alvarez RN  Body Position: position changed independently  Taken 11/19/2024 0029 by Bronwyn Alvarez RN  Body Position: position changed independently  Taken 11/18/2024 2205 by Bronwyn Alvarez RN  Body Position: position changed independently  Taken 11/18/2024 2028 by Bronwyn Alvarez RN  Body Position: position changed independently  Intervention: Prevent and Manage VTE (Venous Thromboembolism) Risk  Recent Flowsheet Documentation  Taken 11/18/2024 2028 by Bronwyn Alvarez RN  VTE Prevention/Management:   bilateral   SCDs (sequential compression devices) on  Intervention: Prevent Infection  Recent Flowsheet Documentation  Taken 11/18/2024 2028 by Bronwyn Alvarez RN  Infection Prevention:   environmental surveillance performed   hand hygiene promoted   personal protective equipment utilized   rest/sleep promoted   single patient room provided  Goal: Optimal Comfort and Wellbeing  Outcome: Progressing  Intervention: Provide Person-Centered Care  Recent Flowsheet Documentation  Taken 11/18/2024 2028 by Bronwyn Alvarez RN  Trust Relationship/Rapport:   care explained   questions answered   questions encouraged  Goal: Readiness for Transition of Care  Outcome: Progressing  Goal: Plan of Care Review  Outcome: Progressing  Flowsheets (Taken 11/19/2024 0428)  Progress: improving  Outcome Evaluation: Pt A&OX4. VSS. Denies pain. Up ad be. No shift events.  Plan of Care Reviewed With: patient     Problem: Dysrhythmia  Goal: Normalized Cardiac Rhythm  Outcome: Progressing  Intervention: Monitor and Manage Cardiac Rhythm Effect  Recent Flowsheet Documentation  Taken 11/18/2024 2028 by Bronwyn Alvarez RN  VTE Prevention/Management:   bilateral   SCDs (sequential compression devices) on     Problem: Fall Injury Risk  Goal:  Absence of Fall and Fall-Related Injury  Outcome: Progressing  Intervention: Promote Injury-Free Environment  Recent Flowsheet Documentation  Taken 11/19/2024 0428 by Bronwyn Alvarez, CHING  Safety Promotion/Fall Prevention:   assistive device/personal items within reach   clutter free environment maintained   fall prevention program maintained   nonskid shoes/slippers when out of bed   room organization consistent   safety round/check completed  Taken 11/19/2024 0219 by Bronwyn Alvarez, CHING  Safety Promotion/Fall Prevention:   assistive device/personal items within reach   clutter free environment maintained   fall prevention program maintained   nonskid shoes/slippers when out of bed   room organization consistent   safety round/check completed  Taken 11/19/2024 0029 by Bronwyn Alvarez, CHING  Safety Promotion/Fall Prevention:   assistive device/personal items within reach   clutter free environment maintained   fall prevention program maintained   nonskid shoes/slippers when out of bed   room organization consistent   safety round/check completed  Taken 11/18/2024 2205 by Bronwyn Alvarez, CHING  Safety Promotion/Fall Prevention:   assistive device/personal items within reach   clutter free environment maintained   fall prevention program maintained   nonskid shoes/slippers when out of bed   room organization consistent   safety round/check completed  Taken 11/18/2024 2028 by Bronwyn Alvarez, CHING  Safety Promotion/Fall Prevention:   activity supervised   assistive device/personal items within reach   clutter free environment maintained   fall prevention program maintained   lighting adjusted   nonskid shoes/slippers when out of bed   room organization consistent   safety round/check completed   Goal Outcome Evaluation:  Plan of Care Reviewed With: patient        Progress: improving  Outcome Evaluation: Pt A&OX4. VSS. Denies pain. Up ad be. No shift events.

## 2024-11-19 NOTE — PROGRESS NOTES
Hospital Follow Up    LOS: 0  Patient Name: Viktoria Villasenor  Age/Sex: 83 y.o. female  : 1940  MRN: [id number]    Day of Service: 24   Length of Stay: 0  Encounter Provider: Chuy Orellana MD  Place of Service: [facility]  Patient Care Team:  Magen Olson MD as PCP - General  Magen Olson MD as PCP - Family Medicine  [facility] (Otolaryngology)  Casper Hodge MD as Consulting Physician (Gastroenterology)    Subjective:     Chief Complaint: Atrial fibrillation    Interval History: Patient's heart rate is significantly better.  Patient has rhinovirus and is actually coughing quite a bit from that.  She was getting a breathing treatment when I saw her today.    Objective:     Objective:  Temp:  [97.5 °F (36.4 °C)-98.5 °F (36.9 °C)] 97.6 °F (36.4 °C)  Heart Rate:  [] 101  Resp:  [16-20] 20  BP: (105-131)/(54-77) 131/73     Intake/Output Summary (Last 24 hours) at 2024 1134  Last data filed at 2024 1101  Gross per 24 hour   Intake 480 ml   Output 2960 ml   Net -2480 ml     Body mass index is 33.88 kg/m².      24  0138 24  0602 24  0607   Weight: 91.8 kg (202 lb 4.8 oz) 91.7 kg (202 lb 2.6 oz) 90.9 kg (200 lb 6.4 oz)     Weight change: -4.699 kg (-10 lb 5.8 oz)      Physical Exam:   General :  Alert, cooperative, in no acute distress.  Neuro:   Alert,cooperative and oriented.  Extr:  No edema or cyanosis, moves all extremities.    Lab Review:   Results from last 7 days   Lab Units 24  0518 24  0249 24  1811 11/15/24  0517   SODIUM mmol/L 137 137 136 137   POTASSIUM mmol/L 4.4 3.8 4.4 4.3   CHLORIDE mmol/L 104 99 99 98   CO2 mmol/L 25.0 24.0 26.8 29.6*   BUN mg/dL 25* 21 25* 22   CREATININE mg/dL 1.12* 1.13* 1.31* 1.16*   GLUCOSE mg/dL 94 164* 126* 90   CALCIUM mg/dL 9.0 8.7 9.3 8.7   AST (SGOT) U/L  --   --  25 26   ALT (SGPT) U/L  --   --  19 14     Results from  "last 7 days   Lab Units 11/17/24  1951 11/17/24  1811   HSTROP T ng/L 17* 18*     Results from last 7 days   Lab Units 11/18/24  0249 11/17/24  1811   WBC 10*3/mm3 5.62 9.36   HEMOGLOBIN g/dL 13.0 14.0   HEMATOCRIT % 40.6 44.1   PLATELETS 10*3/mm3 123* 164     Results from last 7 days   Lab Units 11/17/24  2041   INR  1.10     Results from last 7 days   Lab Units 11/17/24  1811 11/15/24  0517   MAGNESIUM mg/dL 2.4 2.1           Invalid input(s): \"LDLCALC\"      Results from last 7 days   Lab Units 11/12/24 2028   TSH uIU/mL 1.190       Current Medications:   Scheduled Meds:atorvastatin, 10 mg, Oral, Nightly  dextromethorphan polistirex ER, 30 mg, Oral, Q12H  enoxaparin, 40 mg, Subcutaneous, Q24H  furosemide, 20 mg, Oral, Daily  ipratropium-albuterol, 3 mL, Nebulization, Q6H While Awake - RT  metoprolol tartrate, 50 mg, Oral, TID  pantoprazole, 40 mg, Oral, Q AM  predniSONE, 20 mg, Oral, BID With Meals  sodium chloride, 4 mL, Nebulization, BID - RT  spironolactone, 50 mg, Oral, Daily      Continuous Infusions:     Allergies:  Allergies   Allergen Reactions    Lisinopril Cough       Assessment:       Atrial fibrillation with RVR    Essential hypertension    Paroxysmal atrial fibrillation    Asthma exacerbation    Acute bronchitis due to Rhinovirus        Plan:     1.  Acute bronchitis secondary to rhinovirus.  I wore mask when seeing patient.  2.  Atrial fibrillation.  Patient's heart rate is better today.  Patient is on a higher dose of metoprolol she did receive 1 dose of digoxin.  If her heart rate goes back up that I did put her on p.o. digoxin more long-term.  3.  Asthma exacerbation  4.  Continue supportive care will see how patient's clinical scenario evolves    Chuy Orellana MD  11/19/24  11:34 EST      "

## 2024-11-19 NOTE — SIGNIFICANT NOTE
11/19/24 0757   OTHER   Discipline occupational therapist   Rehab Time/Intention   Session Not Performed other (see comments)  (per chart, pt up ad be, CCP note indicates no needs at d/c, lives at home with spouse, (I) at baseline, please re-order if approp. Will sign off)   Therapy Assessment/Plan (PT)   Criteria for Skilled Interventions Met (PT) no problems identified which require skilled intervention

## 2024-11-19 NOTE — PROGRESS NOTES
Name: Viktoria Villasenor ADMIT: 2024   : 1940  PCP: Magen Olson MD    MRN: [id number] LOS: 0 days   AGE/SEX: 83 y.o. female  ROOM: [facility]   Subjective   Chief Complaint   Patient presents with    Shortness of Breath     PT STATES SHE WAS RECENTLY ADMITTED TO [facility] FOR SOA AND WAS D/C FRIDAY; STATES THAT SHORTNESS OF BREATH HAS INCREASED WITH A PRODUCTIVE COUGH      Still with cough  +wheezing  Getting nebs  HR better    ROS  No f/c  No n/v  No cp/palp  + soa/cough    Objective   Vital Signs  Temp:  [97.5 °F (36.4 °C)-97.7 °F (36.5 °C)] 97.7 °F (36.5 °C)  Heart Rate:  [] 101  Resp:  [16-20] 20  BP: (105-137)/(54-77) 119/77  SpO2:  [92 %-100 %] 100 %  on   ;   Device (Oxygen Therapy): room air  Body mass index is 33.88 kg/m².    Physical Exam  Constitutional:       General: She is not in acute distress.     Appearance: She is ill-appearing.   HENT:      Head: Normocephalic and atraumatic.   Eyes:      General: No scleral icterus.  Cardiovascular:      Rate and Rhythm: Rhythm irregular.   Pulmonary:      Effort: Pulmonary effort is normal. Respiratory distress present.      Breath sounds: Wheezing present.   Abdominal:      General: There is no distension.      Palpations: Abdomen is soft.   Musculoskeletal:      Cervical back: Neck supple.   Neurological:      Mental Status: She is alert.   Psychiatric:         Behavior: Behavior normal.     Trace LE edema    Results Review:       I reviewed the patient's new clinical results.  Results from last 7 days   Lab Units 24  0249 11/17/24  1811 11/15/24  0517 24  0516   WBC 10*3/mm3 5.62 9.36 9.12 7.94   HEMOGLOBIN g/dL 13.0 14.0 13.4 12.6   PLATELETS 10*3/mm3 123* 164 149 129*     Results from last 7 days   Lab Units 24  0518 24  0249 11/17/24  1811 11/15/24  0517   SODIUM mmol/L 137 137 136 137   POTASSIUM mmol/L 4.4 3.8 4.4 4.3   CHLORIDE mmol/L 104 99 99 98   CO2 mmol/L 25.0 24.0 26.8 29.6*   BUN mg/dL 25* 21  "25* 22   CREATININE mg/dL 1.12* 1.13* 1.31* 1.16*   GLUCOSE mg/dL 94 164* 126* 90   Estimated Creatinine Clearance: 43.4 mL/min (A) (by C-G formula based on SCr of 1.12 mg/dL (H)).  Results from last 7 days   Lab Units 11/17/24  1811 11/15/24  0517 11/14/24  0526 11/13/24  0516   ALBUMIN g/dL 4.2 3.8 3.8 3.9   BILIRUBIN mg/dL 0.7 0.7 0.8 0.9   ALK PHOS U/L 74 68 70 70   AST (SGOT) U/L 25 26 28 22   ALT (SGPT) U/L 19 14 19 18     Results from last 7 days   Lab Units 11/19/24  0518 11/18/24  0249 11/17/24  1811 11/15/24  0517 11/14/24  0526 11/13/24  0516   CALCIUM mg/dL 9.0 8.7 9.3 8.7 8.7 8.8   ALBUMIN g/dL  --   --  4.2 3.8 3.8 3.9   MAGNESIUM mg/dL  --   --  2.4 2.1 2.2  --    PHOSPHORUS mg/dL  --   --   --  4.0  --   --      Results from last 7 days   Lab Units 11/18/24 0249 11/17/24 2209   PROCALCITONIN ng/mL 0.04  --    LACTATE mmol/L  --  1.3       Coag   Results from last 7 days   Lab Units 11/17/24 2041   INR  1.10     HbA1C   Lab Results   Component Value Date    HGBA1C 5.40 11/12/2024    HGBA1C 5.50 03/08/2023    HGBA1C 5.7 (H) 03/15/2022     Infection   Results from last 7 days   Lab Units 11/18/24 0249 11/17/24 2210 11/17/24 2209   BLOODCX   --  No growth at 24 hours No growth at 24 hours   PROCALCITONIN ng/mL 0.04  --   --      Radiology(recent) No radiology results for the last day  HS Troponin T   Date Value Ref Range Status   11/17/2024 17 (H) <14 ng/L Final     No components found for: \"TSH;2\"    atorvastatin, 10 mg, Oral, Nightly  furosemide, 20 mg, Oral, Daily  ipratropium-albuterol, 3 mL, Nebulization, Q6H While Awake - RT  metoprolol tartrate, 50 mg, Oral, TID  pantoprazole, 40 mg, Oral, Q AM  predniSONE, 20 mg, Oral, Daily With Breakfast  sodium chloride, 4 mL, Nebulization, BID - RT  spironolactone, 50 mg, Oral, Daily       Diet: Cardiac; Healthy Heart (2-3 Na+); Fluid Consistency: Thin (IDDSI 0)      Assessment & Plan      Active Hospital Problems    Diagnosis  POA    **Atrial " fibrillation with RVR [I48.91]  Yes    Asthma exacerbation [J45.901]  Yes    Acute bronchitis due to Rhinovirus [J20.6]  Unknown    Paroxysmal atrial fibrillation [I48.0]  Yes    Essential hypertension [I10]  Yes      Resolved Hospital Problems   No resolved problems to display.       Cardiology has evaluated and titrating medications for rate control  Supportive care for rhinovirus with bronchodilators. Given ABX in ER but will hold off additional abx with normal WBC and procal symptoms c/w viral infection no indication for ABX. On bronchodilators, have added steroids, will had cough medicine. Has flutter and saline neb as well.   Resume home diuretics  I discussed the patients findings and my recommendations with patient and nursing staff.     VTE Prophylaxis - SCDs. Add loverenuka PERRY RT    Dylan Julio MD  [facility]  11/19/24  08:24 EST

## 2024-11-19 NOTE — PLAN OF CARE
Goal Outcome Evaluation:    Problem: Adult Inpatient Plan of Care  Goal: Absence of Hospital-Acquired Illness or Injury  Intervention: Identify and Manage Fall Risk  Recent Flowsheet Documentation  Taken 11/19/2024 1600 by Juan Perry RN  Safety Promotion/Fall Prevention: safety round/check completed  Taken 11/19/2024 1400 by Juan Perry RN  Safety Promotion/Fall Prevention: safety round/check completed  Taken 11/19/2024 1218 by Juan Perry RN  Safety Promotion/Fall Prevention: safety round/check completed  Taken 11/19/2024 1001 by Juan Perry RN  Safety Promotion/Fall Prevention: safety round/check completed  Taken 11/19/2024 0830 by Juan Perry RN  Safety Promotion/Fall Prevention: safety round/check completed   Plan of Care Reviewed With: patient alert, room air, in Afib heart rate elevated when coughing and on exertion, denies pain and distress, call light within reach

## 2024-11-20 PROCEDURE — 63710000001 PREDNISONE PER 1 MG: Performed by: INTERNAL MEDICINE

## 2024-11-20 PROCEDURE — 94799 UNLISTED PULMONARY SVC/PX: CPT

## 2024-11-20 PROCEDURE — 99232 SBSQ HOSP IP/OBS MODERATE 35: CPT | Performed by: NURSE PRACTITIONER

## 2024-11-20 PROCEDURE — 94664 DEMO&/EVAL PT USE INHALER: CPT

## 2024-11-20 PROCEDURE — 25010000002 ENOXAPARIN PER 10 MG: Performed by: INTERNAL MEDICINE

## 2024-11-20 PROCEDURE — 94761 N-INVAS EAR/PLS OXIMETRY MLT: CPT

## 2024-11-20 PROCEDURE — 94760 N-INVAS EAR/PLS OXIMETRY 1: CPT

## 2024-11-20 RX ORDER — DIGOXIN 125 MCG
125 TABLET ORAL
Status: DISCONTINUED | OUTPATIENT
Start: 2024-11-20 | End: 2024-11-22 | Stop reason: HOSPADM

## 2024-11-20 RX ADMIN — DIGOXIN 125 MCG: 125 TABLET ORAL at 11:51

## 2024-11-20 RX ADMIN — Medication 10 ML: at 20:37

## 2024-11-20 RX ADMIN — METOPROLOL TARTRATE 50 MG: 50 TABLET, FILM COATED ORAL at 20:37

## 2024-11-20 RX ADMIN — PANTOPRAZOLE SODIUM 40 MG: 40 TABLET, DELAYED RELEASE ORAL at 05:48

## 2024-11-20 RX ADMIN — PREDNISONE 20 MG: 20 TABLET ORAL at 08:11

## 2024-11-20 RX ADMIN — DEXTROMETHORPHAN POLISTIREX 30 MG: 30 SUSPENSION ORAL at 08:10

## 2024-11-20 RX ADMIN — ATORVASTATIN CALCIUM 10 MG: 20 TABLET, FILM COATED ORAL at 20:37

## 2024-11-20 RX ADMIN — IPRATROPIUM BROMIDE AND ALBUTEROL SULFATE 3 ML: 2.5; .5 SOLUTION RESPIRATORY (INHALATION) at 06:50

## 2024-11-20 RX ADMIN — SODIUM CHLORIDE 30 MG/ML INHALATION SOLUTION 4 ML: 30 SOLUTION INHALANT at 20:13

## 2024-11-20 RX ADMIN — SODIUM CHLORIDE 30 MG/ML INHALATION SOLUTION 4 ML: 30 SOLUTION INHALANT at 06:53

## 2024-11-20 RX ADMIN — METOPROLOL TARTRATE 50 MG: 50 TABLET, FILM COATED ORAL at 15:48

## 2024-11-20 RX ADMIN — IPRATROPIUM BROMIDE AND ALBUTEROL SULFATE 3 ML: 2.5; .5 SOLUTION RESPIRATORY (INHALATION) at 20:13

## 2024-11-20 RX ADMIN — METOPROLOL TARTRATE 50 MG: 50 TABLET, FILM COATED ORAL at 08:10

## 2024-11-20 RX ADMIN — IPRATROPIUM BROMIDE AND ALBUTEROL SULFATE 3 ML: 2.5; .5 SOLUTION RESPIRATORY (INHALATION) at 12:08

## 2024-11-20 RX ADMIN — SPIRONOLACTONE 50 MG: 25 TABLET, FILM COATED ORAL at 08:10

## 2024-11-20 RX ADMIN — FUROSEMIDE 20 MG: 20 TABLET ORAL at 08:11

## 2024-11-20 RX ADMIN — PREDNISONE 20 MG: 20 TABLET ORAL at 18:47

## 2024-11-20 RX ADMIN — ENOXAPARIN SODIUM 40 MG: 100 INJECTION SUBCUTANEOUS at 08:11

## 2024-11-20 NOTE — PROGRESS NOTES
"[facility]    Patient Name: Viktoria Villasenor  :1940  83 y.o.  LOS: 1  Encounter Provider: WARREN Hay      Patient Care Team:  Magen Olson MD as PCP - General  Magen Olson MD as PCP - Family Medicine  Ralph Turner MD as Consulting Physician (Otolaryngology)  Casper Hodge MD as Consulting Physician (Gastroenterology)    Chief Complaint: Follow-up atrial fibrillation with RVR    Interval History: HR remains rapid.  Patient continues to complain of shortness of breath and wheezing       Objective   Vital Signs  Temp:  [97.7 °F (36.5 °C)-97.9 °F (36.6 °C)] 97.8 °F (36.6 °C)  Heart Rate:  [100-144] 121  Resp:  [16-20] 18  BP: (113-130)/(59-78) 122/63    Intake/Output Summary (Last 24 hours) at 2024 0852  Last data filed at 2024 0336  Gross per 24 hour   Intake 1080 ml   Output 1800 ml   Net -720 ml     Flowsheet Rows      Flowsheet Row First Filed Value   Admission Height 163.8 cm (64.49\") Documented at 2024 1757   Admission Weight 95.6 kg (210 lb 12.2 oz) Documented at 2024 1757              Vitals and nursing note reviewed.   Constitutional:       Appearance: Normal appearance. Well-developed.   Eyes:      Conjunctiva/sclera: Conjunctivae normal.   Neck:      Vascular: No carotid bruit.   Pulmonary:      Breath sounds: Decreased breath sounds present. Wheezing present.   Cardiovascular:      Tachycardia present. Irregularly irregular rhythm. Normal S1 with normal intensity. Normal S2 with normal intensity.       Murmurs: There is no murmur.      No gallop.  No click. No rub.   Edema:     Peripheral edema absent.   Musculoskeletal: Normal range of motion. Skin:     General: Skin is warm and dry.   Neurological:      Mental Status: Alert and oriented to person, place, and time.      GCS: GCS eye subscore is 4. GCS verbal subscore is 5. GCS motor subscore is 6.   Psychiatric:         Speech: Speech normal.         Behavior: Behavior " "normal.         Thought Content: Thought content normal.         Judgment: Judgment normal.           Pertinent Test Results:  Results from last 7 days   Lab Units 11/19/24  0518 11/18/24  0249 11/17/24  1811 11/15/24  0517 11/14/24  0526 11/13/24  1416   SODIUM mmol/L 137 137 136 137 137  --    POTASSIUM mmol/L 4.4 3.8 4.4 4.3 4.4 3.8   CHLORIDE mmol/L 104 99 99 98 100  --    CO2 mmol/L 25.0 24.0 26.8 29.6* 25.0  --    BUN mg/dL 25* 21 25* 22 18  --    CREATININE mg/dL 1.12* 1.13* 1.31* 1.16* 0.87  --    GLUCOSE mg/dL 94 164* 126* 90 90  --    CALCIUM mg/dL 9.0 8.7 9.3 8.7 8.7  --    AST (SGOT) U/L  --   --  25 26 28  --    ALT (SGPT) U/L  --   --  19 14 19  --      Results from last 7 days   Lab Units 11/17/24 1951 11/17/24 1811   HSTROP T ng/L 17* 18*     Results from last 7 days   Lab Units 11/18/24  0249 11/17/24  1811 11/15/24  0517   WBC 10*3/mm3 5.62 9.36 9.12   HEMOGLOBIN g/dL 13.0 14.0 13.4   HEMATOCRIT % 40.6 44.1 42.4   PLATELETS 10*3/mm3 123* 164 149     Results from last 7 days   Lab Units 11/17/24  2041   INR  1.10     Results from last 7 days   Lab Units 11/17/24  1811 11/15/24  0517 11/14/24  0526   MAGNESIUM mg/dL 2.4 2.1 2.2           Invalid input(s): \"LDLCALC\"                Medication Review:   atorvastatin, 10 mg, Oral, Nightly  dextromethorphan polistirex ER, 30 mg, Oral, Q12H  digoxin, 125 mcg, Oral, Daily  enoxaparin, 40 mg, Subcutaneous, Q24H  furosemide, 20 mg, Oral, Daily  ipratropium-albuterol, 3 mL, Nebulization, Q6H While Awake - RT  metoprolol tartrate, 50 mg, Oral, TID  pantoprazole, 40 mg, Oral, Q AM  predniSONE, 20 mg, Oral, BID With Meals  sodium chloride, 4 mL, Nebulization, BID - RT  spironolactone, 50 mg, Oral, Daily              Assessment & Plan     Active Hospital Problems    Diagnosis  POA    **Atrial fibrillation with RVR [I48.91]  Yes    Atrial fibrillation with rapid ventricular response [I48.91]  Yes    Asthma exacerbation [J45.901]  Yes    Acute bronchitis due to " Rhinovirus [J20.6]  Unknown    Paroxysmal atrial fibrillation [I48.0]  Yes    Essential hypertension [I10]  Yes      Resolved Hospital Problems   No resolved problems to display.          Atrial fibrillation with RVR  Heart rate remains rapid, likely secondary to rhinovirus  Continue metoprolol tartrate 50 mg 3 times daily  Add digoxin 125 mcg/day  No anticoagulation secondary to history of cirrhosis  Positive for rhinovirus  Pneumonia  Defer to primary team, patient did receive IV antibiotics this admission  MARSHALL cirrhosis           WARREN Hay  [facility]   [facility]  [address] [place] [address]  Office: [phone]    11/20/24  08:52 EST

## 2024-11-20 NOTE — PLAN OF CARE
Goal Outcome Evaluation:      Patient alert & oriented x 4. Vital signs stable. A. Fib on  monitor. No signs of acute distress. Denies pain or discomfort. Plan of care ongoing. Will continue to monitor.

## 2024-11-20 NOTE — PROGRESS NOTES
Name: Viktoria Villasenor ADMIT: 2024   : 1940  PCP: Magen Olson MD    MRN: [id number] LOS: 1 days   AGE/SEX: 83 y.o. female  ROOM: [facility]   Subjective   Chief Complaint   Patient presents with    Shortness of Breath     PT STATES SHE WAS RECENTLY ADMITTED TO [facility] FOR SOA AND WAS D/C FRIDAY; STATES THAT SHORTNESS OF BREATH HAS INCREASED WITH A PRODUCTIVE COUGH      Still with cough  +wheezing  Getting nebs  Her dyspnea is better    HR still elevated at times    ROS  No f/c  No n/v  No cp/palp  + soa/cough    Objective   Vital Signs  Temp:  [97.5 °F (36.4 °C)-97.9 °F (36.6 °C)] 97.7 °F (36.5 °C)  Heart Rate:  [] 116  Resp:  [16-20] 20  BP: (100-130)/(59-78) 110/71  SpO2:  [92 %-98 %] 94 %  on   ;   Device (Oxygen Therapy): room air  Body mass index is 33.2 kg/m².    Physical Exam  Constitutional:       General: She is not in acute distress.     Appearance: She is ill-appearing.   HENT:      Head: Normocephalic and atraumatic.   Eyes:      General: No scleral icterus.  Cardiovascular:      Rate and Rhythm: Tachycardia present. Rhythm irregular.   Pulmonary:      Effort: Pulmonary effort is normal. No respiratory distress.      Breath sounds: No wheezing.   Abdominal:      General: There is no distension.      Palpations: Abdomen is soft.   Musculoskeletal:      Cervical back: Neck supple.   Neurological:      Mental Status: She is alert.   Psychiatric:         Behavior: Behavior normal.     Trace LE edema    Results Review:       I reviewed the patient's new clinical results.  Results from last 7 days   Lab Units 24  0249 11/17/24  1811 11/15/24  0517   WBC 10*3/mm3 5.62 9.36 9.12   HEMOGLOBIN g/dL 13.0 14.0 13.4   PLATELETS 10*3/mm3 123* 164 149     Results from last 7 days   Lab Units 24  0518 24  0249 11/17/24  1811 11/15/24  0517   SODIUM mmol/L 137 137 136 137   POTASSIUM mmol/L 4.4 3.8 4.4 4.3   CHLORIDE mmol/L 104 99 99 98   CO2 mmol/L 25.0 24.0 26.8 29.6*  "  BUN mg/dL 25* 21 25* 22   CREATININE mg/dL 1.12* 1.13* 1.31* 1.16*   GLUCOSE mg/dL 94 164* 126* 90   Estimated Creatinine Clearance: 43 mL/min (A) (by C-G formula based on SCr of 1.12 mg/dL (H)).  Results from last 7 days   Lab Units 11/17/24  1811 11/15/24  0517 11/14/24  0526   ALBUMIN g/dL 4.2 3.8 3.8   BILIRUBIN mg/dL 0.7 0.7 0.8   ALK PHOS U/L 74 68 70   AST (SGOT) U/L 25 26 28   ALT (SGPT) U/L 19 14 19     Results from last 7 days   Lab Units 11/19/24  0518 11/18/24  0249 11/17/24  1811 11/15/24  0517 11/14/24  0526   CALCIUM mg/dL 9.0 8.7 9.3 8.7 8.7   ALBUMIN g/dL  --   --  4.2 3.8 3.8   MAGNESIUM mg/dL  --   --  2.4 2.1 2.2   PHOSPHORUS mg/dL  --   --   --  4.0  --      Results from last 7 days   Lab Units 11/18/24 0249 11/17/24 2209   PROCALCITONIN ng/mL 0.04  --    LACTATE mmol/L  --  1.3       Coag   Results from last 7 days   Lab Units 11/17/24 2041   INR  1.10     HbA1C   Lab Results   Component Value Date    HGBA1C 5.40 11/12/2024    HGBA1C 5.50 03/08/2023    HGBA1C 5.7 (H) 03/15/2022     Infection   Results from last 7 days   Lab Units 11/18/24  0249 11/17/24 2210 11/17/24 2209   BLOODCX   --  No growth at 2 days No growth at 2 days   PROCALCITONIN ng/mL 0.04  --   --      Radiology(recent) No radiology results for the last day  HS Troponin T   Date Value Ref Range Status   11/17/2024 17 (H) <14 ng/L Final     No components found for: \"TSH;2\"    atorvastatin, 10 mg, Oral, Nightly  dextromethorphan polistirex ER, 30 mg, Oral, Q12H  digoxin, 125 mcg, Oral, Daily  enoxaparin, 40 mg, Subcutaneous, Q24H  furosemide, 20 mg, Oral, Daily  ipratropium-albuterol, 3 mL, Nebulization, Q6H While Awake - RT  metoprolol tartrate, 50 mg, Oral, TID  pantoprazole, 40 mg, Oral, Q AM  predniSONE, 20 mg, Oral, BID With Meals  sodium chloride, 4 mL, Nebulization, BID - RT  spironolactone, 50 mg, Oral, Daily       Diet: Cardiac; Healthy Heart (2-3 Na+); Fluid Consistency: Thin (IDDSI 0)      Assessment & Plan    "   Active Hospital Problems    Diagnosis  POA    **Atrial fibrillation with RVR [I48.91]  Yes    Atrial fibrillation with rapid ventricular response [I48.91]  Yes    Asthma exacerbation [J45.901]  Yes    Acute bronchitis due to Rhinovirus [J20.6]  Unknown    Paroxysmal atrial fibrillation [I48.0]  Yes    Essential hypertension [I10]  Yes      Resolved Hospital Problems   No resolved problems to display.       Cardiology has evaluated and titrating medications for rate control. Digoxin to be added today  Supportive care for rhinovirus with bronchodilators. Given ABX in ER but will hold off additional abx with normal WBC and procal symptoms c/w viral infection no indication for ABX. On bronchodilators, have added steroids, will had cough medicine. Has flutter and saline neb as well.   Resumed home diuretics  I discussed the patients findings and my recommendations with patient and nursing staff.     VTE Prophylaxis - SCDs. lovenox         VICKY RN  Dispo- likely to home on 11/21    Dylan Julio MD  [facility]  11/20/24  08:24 EST

## 2024-11-21 LAB
ANION GAP SERPL CALCULATED.3IONS-SCNC: 12 MMOL/L (ref 5–15)
BUN SERPL-MCNC: 27 MG/DL (ref 8–23)
BUN/CREAT SERPL: 23.3 (ref 7–25)
CALCIUM SPEC-SCNC: 9.1 MG/DL (ref 8.6–10.5)
CHLORIDE SERPL-SCNC: 102 MMOL/L (ref 98–107)
CO2 SERPL-SCNC: 24 MMOL/L (ref 22–29)
CREAT SERPL-MCNC: 1.16 MG/DL (ref 0.57–1)
EGFRCR SERPLBLD CKD-EPI 2021: 47.5 ML/MIN/1.73
GLUCOSE SERPL-MCNC: 120 MG/DL (ref 65–99)
MAGNESIUM SERPL-MCNC: 2.6 MG/DL (ref 1.6–2.4)
POTASSIUM SERPL-SCNC: 4.8 MMOL/L (ref 3.5–5.2)
SODIUM SERPL-SCNC: 138 MMOL/L (ref 136–145)

## 2024-11-21 PROCEDURE — 63710000001 PREDNISONE PER 1 MG: Performed by: INTERNAL MEDICINE

## 2024-11-21 PROCEDURE — 94799 UNLISTED PULMONARY SVC/PX: CPT

## 2024-11-21 PROCEDURE — 25010000002 ENOXAPARIN PER 10 MG: Performed by: INTERNAL MEDICINE

## 2024-11-21 PROCEDURE — 94761 N-INVAS EAR/PLS OXIMETRY MLT: CPT

## 2024-11-21 PROCEDURE — 94664 DEMO&/EVAL PT USE INHALER: CPT

## 2024-11-21 PROCEDURE — 83735 ASSAY OF MAGNESIUM: CPT | Performed by: INTERNAL MEDICINE

## 2024-11-21 PROCEDURE — 99232 SBSQ HOSP IP/OBS MODERATE 35: CPT | Performed by: NURSE PRACTITIONER

## 2024-11-21 PROCEDURE — 80048 BASIC METABOLIC PNL TOTAL CA: CPT | Performed by: INTERNAL MEDICINE

## 2024-11-21 RX ORDER — ATENOLOL 50 MG/1
50 TABLET ORAL EVERY 8 HOURS
Status: DISCONTINUED | OUTPATIENT
Start: 2024-11-21 | End: 2024-11-22 | Stop reason: HOSPADM

## 2024-11-21 RX ADMIN — SODIUM CHLORIDE 30 MG/ML INHALATION SOLUTION 4 ML: 30 SOLUTION INHALANT at 19:00

## 2024-11-21 RX ADMIN — FUROSEMIDE 20 MG: 20 TABLET ORAL at 09:10

## 2024-11-21 RX ADMIN — BENZONATATE 200 MG: 100 CAPSULE ORAL at 23:21

## 2024-11-21 RX ADMIN — IPRATROPIUM BROMIDE AND ALBUTEROL SULFATE 3 ML: 2.5; .5 SOLUTION RESPIRATORY (INHALATION) at 06:55

## 2024-11-21 RX ADMIN — PANTOPRAZOLE SODIUM 40 MG: 40 TABLET, DELAYED RELEASE ORAL at 06:25

## 2024-11-21 RX ADMIN — IPRATROPIUM BROMIDE AND ALBUTEROL SULFATE 3 ML: 2.5; .5 SOLUTION RESPIRATORY (INHALATION) at 18:59

## 2024-11-21 RX ADMIN — ATORVASTATIN CALCIUM 10 MG: 20 TABLET, FILM COATED ORAL at 21:10

## 2024-11-21 RX ADMIN — IPRATROPIUM BROMIDE AND ALBUTEROL SULFATE 3 ML: 2.5; .5 SOLUTION RESPIRATORY (INHALATION) at 12:23

## 2024-11-21 RX ADMIN — PREDNISONE 20 MG: 20 TABLET ORAL at 09:10

## 2024-11-21 RX ADMIN — SODIUM CHLORIDE 30 MG/ML INHALATION SOLUTION 4 ML: 30 SOLUTION INHALANT at 06:56

## 2024-11-21 RX ADMIN — DIGOXIN 125 MCG: 125 TABLET ORAL at 12:02

## 2024-11-21 RX ADMIN — ATENOLOL 50 MG: 50 TABLET ORAL at 17:07

## 2024-11-21 RX ADMIN — PREDNISONE 20 MG: 20 TABLET ORAL at 17:08

## 2024-11-21 RX ADMIN — SPIRONOLACTONE 50 MG: 25 TABLET, FILM COATED ORAL at 09:10

## 2024-11-21 RX ADMIN — DEXTROMETHORPHAN POLISTIREX 30 MG: 30 SUSPENSION ORAL at 09:09

## 2024-11-21 RX ADMIN — ATENOLOL 50 MG: 50 TABLET ORAL at 09:10

## 2024-11-21 RX ADMIN — ENOXAPARIN SODIUM 40 MG: 100 INJECTION SUBCUTANEOUS at 09:10

## 2024-11-21 NOTE — PLAN OF CARE
Goal Outcome Evaluation:    Problem: Dysrhythmia  Goal: Normalized Cardiac Rhythm  Outcome: Progressing  Intervention: Monitor and Manage Cardiac Rhythm Effect  Recent Flowsheet Documentation  Taken 11/21/2024 0810 by Juan Perry RN  VTE Prevention/Management: (lovenox) other (see comments)   Plan of Care Reviewed With: patient alert, room air, still Afib HR fluctuating from 90's to 120's denies pain and shortness of breath

## 2024-11-21 NOTE — PROGRESS NOTES
Name: Viktoria Villasenor ADMIT: 2024   : 1940  PCP: Magen Olson MD    MRN: [id number] LOS: 2 days   AGE/SEX: 83 y.o. female  ROOM: [facility]   Subjective   Chief Complaint   Patient presents with    Shortness of Breath     PT STATES SHE WAS RECENTLY ADMITTED TO [facility] FOR SOA AND WAS D/C FRIDAY; STATES THAT SHORTNESS OF BREATH HAS INCREASED WITH A PRODUCTIVE COUGH      Cough and wheezing better  Getting nebs  Her dyspnea is better    HR still elevated at times- 100-120    ROS  No f/c  No n/v  No cp/palp  + soa/cough    Objective   Vital Signs  Temp:  [97.5 °F (36.4 °C)-98.2 °F (36.8 °C)] 98.2 °F (36.8 °C)  Heart Rate:  [] 119  Resp:  [16-20] 18  BP: (112-134)/(64-85) 114/85  SpO2:  [90 %-100 %] 91 %  on   ;   Device (Oxygen Therapy): room air  Body mass index is 33.12 kg/m².    Physical Exam  Constitutional:       General: She is not in acute distress.     Appearance: She is ill-appearing.   HENT:      Head: Normocephalic and atraumatic.   Eyes:      General: No scleral icterus.  Cardiovascular:      Rate and Rhythm: Tachycardia present. Rhythm irregular.   Pulmonary:      Effort: Pulmonary effort is normal. No respiratory distress.      Breath sounds: No wheezing.   Abdominal:      General: There is no distension.      Palpations: Abdomen is soft.   Musculoskeletal:      Cervical back: Neck supple.   Neurological:      Mental Status: She is alert.   Psychiatric:         Behavior: Behavior normal.     Trace LE edema    Results Review:       I reviewed the patient's new clinical results.  Results from last 7 days   Lab Units 24  0249 24  1811 11/15/24  0517   WBC 10*3/mm3 5.62 9.36 9.12   HEMOGLOBIN g/dL 13.0 14.0 13.4   PLATELETS 10*3/mm3 123* 164 149     Results from last 7 days   Lab Units 24  0348 24  0518 24  0249 24  1811   SODIUM mmol/L 138 137 137 136   POTASSIUM mmol/L 4.8 4.4 3.8 4.4   CHLORIDE mmol/L 102 104 99 99   CO2 mmol/L 24.0 25.0  "24.0 26.8   BUN mg/dL 27* 25* 21 25*   CREATININE mg/dL 1.16* 1.12* 1.13* 1.31*   GLUCOSE mg/dL 120* 94 164* 126*   Estimated Creatinine Clearance: 41.4 mL/min (A) (by C-G formula based on SCr of 1.16 mg/dL (H)).  Results from last 7 days   Lab Units 11/17/24  1811 11/15/24  0517   ALBUMIN g/dL 4.2 3.8   BILIRUBIN mg/dL 0.7 0.7   ALK PHOS U/L 74 68   AST (SGOT) U/L 25 26   ALT (SGPT) U/L 19 14     Results from last 7 days   Lab Units 11/21/24  0348 11/19/24  0518 11/18/24  0249 11/17/24  1811 11/15/24  0517   CALCIUM mg/dL 9.1 9.0 8.7 9.3 8.7   ALBUMIN g/dL  --   --   --  4.2 3.8   MAGNESIUM mg/dL 2.6*  --   --  2.4 2.1   PHOSPHORUS mg/dL  --   --   --   --  4.0     Results from last 7 days   Lab Units 11/18/24  0249 11/17/24 2209   PROCALCITONIN ng/mL 0.04  --    LACTATE mmol/L  --  1.3       Coag   Results from last 7 days   Lab Units 11/17/24 2041   INR  1.10     HbA1C   Lab Results   Component Value Date    HGBA1C 5.40 11/12/2024    HGBA1C 5.50 03/08/2023    HGBA1C 5.7 (H) 03/15/2022     Infection   Results from last 7 days   Lab Units 11/18/24  0249 11/17/24 2210 11/17/24 2209   BLOODCX   --  No growth at 3 days No growth at 3 days   PROCALCITONIN ng/mL 0.04  --   --      Radiology(recent) No radiology results for the last day  No results found for: \"TROPONINT\", \"TROPONINI\", \"BNP\"    No components found for: \"TSH;2\"    atenolol, 50 mg, Oral, Q8H  atorvastatin, 10 mg, Oral, Nightly  dextromethorphan polistirex ER, 30 mg, Oral, Q12H  digoxin, 125 mcg, Oral, Daily  enoxaparin, 40 mg, Subcutaneous, Q24H  furosemide, 20 mg, Oral, Daily  ipratropium-albuterol, 3 mL, Nebulization, Q6H While Awake - RT  pantoprazole, 40 mg, Oral, Q AM  predniSONE, 20 mg, Oral, BID With Meals  sodium chloride, 4 mL, Nebulization, BID - RT  spironolactone, 50 mg, Oral, Daily       Diet: Cardiac; Healthy Heart (2-3 Na+); Fluid Consistency: Thin (IDDSI 0)      Assessment & Plan      Active Hospital Problems    Diagnosis  POA    " **Atrial fibrillation with RVR [I48.91]  Yes    Atrial fibrillation with rapid ventricular response [I48.91]  Yes    Asthma exacerbation [J45.901]  Yes    Acute bronchitis due to Rhinovirus [J20.6]  Unknown    Paroxysmal atrial fibrillation [I48.0]  Yes    Essential hypertension [I10]  Yes      Resolved Hospital Problems   No resolved problems to display.       Cardiology has evaluated and titrating medications for rate control. Digoxin added. HR still high today.   Supportive care for rhinovirus with bronchodilators. Given ABX in ER but will hold off additional abx with normal WBC and procal symptoms c/w viral infection no indication for ABX. On bronchodilators, have added steroids, will had cough medicine. Has flutter and saline neb as well.   Resumed home diuretics  I discussed the patients findings and my recommendations with patient and nursing staff.     VTE Prophylaxis - SCDs. lovenox         VICKY RN  Dispo- likely to home on 11/22. HR still ~120 this afternoon so can't dc today.     Dylan Julio MD  [facility]  11/21/24  08:24 EST

## 2024-11-21 NOTE — PLAN OF CARE
Goal Outcome Evaluation:    Problem: Dysrhythmia  Goal: Normalized Cardiac Rhythm  Outcome: Progressing   Plan of Care Reviewed With: patient alert, room air, denies pain and distress, started her on digoxin oral, call light within reach

## 2024-11-21 NOTE — PROGRESS NOTES
"      [facility]    Patient Name: Viktoria Villasenor  :1940  83 y.o.  LOS: 2  Encounter Provider: WARREN Hay      Patient Care Team:  Magen Olson MD as PCP - General  Magen Olson MD as PCP - Family Medicine  Ralph Turner MD as Consulting Physician (Otolaryngology)  Casper Hodge MD as Consulting Physician (Gastroenterology)    Chief Complaint: Follow-up atrial fibrillation with RVR    Interval History: HR elevated,  feels that cough is improving       Objective   Vital Signs  Temp:  [97.5 °F (36.4 °C)-98 °F (36.7 °C)] 97.9 °F (36.6 °C)  Heart Rate:  [] 120  Resp:  [16-20] 18  BP: (100-134)/(59-84) 130/74    Intake/Output Summary (Last 24 hours) at 2024 0835  Last data filed at 2024 0654  Gross per 24 hour   Intake 400 ml   Output 3250 ml   Net -2850 ml     Flowsheet Rows      Flowsheet Row First Filed Value   Admission Height 163.8 cm (64.49\") Documented at 2024 1757   Admission Weight 95.6 kg (210 lb 12.2 oz) Documented at 2024 1757              Vitals and nursing note reviewed.   Constitutional:       Appearance: Normal appearance. Well-developed.   Eyes:      Conjunctiva/sclera: Conjunctivae normal.   Neck:      Vascular: No carotid bruit.   Pulmonary:      Breath sounds: No decreased breath sounds. No wheezing.   Cardiovascular:      Tachycardia present. Irregularly irregular rhythm. Normal S1 with normal intensity. Normal S2 with normal intensity.       Murmurs: There is no murmur.      No gallop.  No click. No rub.   Edema:     Peripheral edema absent.   Musculoskeletal: Normal range of motion. Skin:     General: Skin is warm and dry.   Neurological:      Mental Status: Alert and oriented to person, place, and time.      GCS: GCS eye subscore is 4. GCS verbal subscore is 5. GCS motor subscore is 6.   Psychiatric:         Speech: Speech normal.         Behavior: Behavior normal.         Thought Content: Thought content normal.    " "     Judgment: Judgment normal.           Pertinent Test Results:  Results from last 7 days   Lab Units 11/21/24 0348 11/19/24  0518 11/18/24  0249 11/17/24  1811 11/15/24  0517   SODIUM mmol/L 138 137 137 136 137   POTASSIUM mmol/L 4.8 4.4 3.8 4.4 4.3   CHLORIDE mmol/L 102 104 99 99 98   CO2 mmol/L 24.0 25.0 24.0 26.8 29.6*   BUN mg/dL 27* 25* 21 25* 22   CREATININE mg/dL 1.16* 1.12* 1.13* 1.31* 1.16*   GLUCOSE mg/dL 120* 94 164* 126* 90   CALCIUM mg/dL 9.1 9.0 8.7 9.3 8.7   AST (SGOT) U/L  --   --   --  25 26   ALT (SGPT) U/L  --   --   --  19 14     Results from last 7 days   Lab Units 11/17/24 1951 11/17/24 1811   HSTROP T ng/L 17* 18*     Results from last 7 days   Lab Units 11/18/24  0249 11/17/24  1811 11/15/24  0517   WBC 10*3/mm3 5.62 9.36 9.12   HEMOGLOBIN g/dL 13.0 14.0 13.4   HEMATOCRIT % 40.6 44.1 42.4   PLATELETS 10*3/mm3 123* 164 149     Results from last 7 days   Lab Units 11/17/24  2041   INR  1.10     Results from last 7 days   Lab Units 11/21/24  0348 11/17/24  1811 11/15/24  0517   MAGNESIUM mg/dL 2.6* 2.4 2.1           Invalid input(s): \"LDLCALC\"                Medication Review:   atorvastatin, 10 mg, Oral, Nightly  dextromethorphan polistirex ER, 30 mg, Oral, Q12H  digoxin, 125 mcg, Oral, Daily  enoxaparin, 40 mg, Subcutaneous, Q24H  furosemide, 20 mg, Oral, Daily  ipratropium-albuterol, 3 mL, Nebulization, Q6H While Awake - RT  metoprolol tartrate, 50 mg, Oral, TID  pantoprazole, 40 mg, Oral, Q AM  predniSONE, 20 mg, Oral, BID With Meals  sodium chloride, 4 mL, Nebulization, BID - RT  spironolactone, 50 mg, Oral, Daily              Assessment & Plan     Active Hospital Problems    Diagnosis  POA    **Atrial fibrillation with RVR [I48.91]  Yes    Atrial fibrillation with rapid ventricular response [I48.91]  Yes    Asthma exacerbation [J45.901]  Yes    Acute bronchitis due to Rhinovirus [J20.6]  Unknown    Paroxysmal atrial fibrillation [I48.0]  Yes    Essential hypertension [I10]  Yes    "   Resolved Hospital Problems   No resolved problems to display.          Atrial fibrillation with RVR  Heart rate remains rapid, likely secondary to rhinovirus  Transition from metoprolol tartrate to atenolol 50 mg every 8 hours  Continue digoxin 125 mcg/day  No anticoagulation secondary to history of cirrhosis  Positive for rhinovirus  Pneumonia  Defer to primary team, patient did receive IV antibiotics this admission  MARSHALL cirrhosis    Ok for discharge when ok with other services         WARREN Hay  [facility]   [facility]  [address] [place] [address]  Office: [phone]    11/21/24  08:35 EST

## 2024-11-21 NOTE — PLAN OF CARE
Goal Outcome Evaluation:  Plan of Care Reviewed With: patient        Progress: improving     Patient alert & oriented x 4. A. Fib on monitor. Vital signs within normal limits. No signs of acute distress. Plan of care ongoing. Will continue to monitor.

## 2024-11-22 ENCOUNTER — READMISSION MANAGEMENT (OUTPATIENT)
Dept: CALL CENTER | Facility: HOSPITAL | Age: 84
End: 2024-11-22
Payer: MEDICARE

## 2024-11-22 ENCOUNTER — APPOINTMENT (OUTPATIENT)
Dept: GENERAL RADIOLOGY | Facility: HOSPITAL | Age: 84
End: 2024-11-22
Payer: MEDICARE

## 2024-11-22 VITALS
HEART RATE: 108 BPM | SYSTOLIC BLOOD PRESSURE: 121 MMHG | BODY MASS INDEX: 35.98 KG/M2 | OXYGEN SATURATION: 93 % | DIASTOLIC BLOOD PRESSURE: 91 MMHG | HEIGHT: 64 IN | TEMPERATURE: 97.3 F | WEIGHT: 210.76 LBS | RESPIRATION RATE: 18 BRPM

## 2024-11-22 LAB
BACTERIA SPEC AEROBE CULT: NORMAL
BACTERIA SPEC AEROBE CULT: NORMAL
QT INTERVAL: 296 MS
QTC INTERVAL: 446 MS

## 2024-11-22 PROCEDURE — 94799 UNLISTED PULMONARY SVC/PX: CPT

## 2024-11-22 PROCEDURE — 94664 DEMO&/EVAL PT USE INHALER: CPT

## 2024-11-22 PROCEDURE — 99232 SBSQ HOSP IP/OBS MODERATE 35: CPT | Performed by: INTERNAL MEDICINE

## 2024-11-22 PROCEDURE — 25010000002 ENOXAPARIN PER 10 MG: Performed by: INTERNAL MEDICINE

## 2024-11-22 PROCEDURE — 71045 X-RAY EXAM CHEST 1 VIEW: CPT

## 2024-11-22 PROCEDURE — 94760 N-INVAS EAR/PLS OXIMETRY 1: CPT

## 2024-11-22 PROCEDURE — 63710000001 PREDNISONE PER 1 MG: Performed by: INTERNAL MEDICINE

## 2024-11-22 PROCEDURE — 94761 N-INVAS EAR/PLS OXIMETRY MLT: CPT

## 2024-11-22 RX ORDER — PREDNISONE 10 MG/1
TABLET ORAL
Qty: 42 TABLET | Refills: 0 | Status: SHIPPED | OUTPATIENT
Start: 2024-11-22 | End: 2024-11-22

## 2024-11-22 RX ORDER — BENZONATATE 200 MG/1
200 CAPSULE ORAL 3 TIMES DAILY PRN
Qty: 30 CAPSULE | Refills: 0 | Status: SHIPPED | OUTPATIENT
Start: 2024-11-22 | End: 2024-11-25

## 2024-11-22 RX ORDER — DIGOXIN 125 MCG
125 TABLET ORAL
Qty: 30 TABLET | Refills: 0 | Status: SHIPPED | OUTPATIENT
Start: 2024-11-22

## 2024-11-22 RX ORDER — DEXTROMETHORPHAN POLISTIREX 30 MG/5ML
30 SUSPENSION ORAL EVERY 12 HOURS SCHEDULED
Qty: 89 ML | Refills: 0 | Status: SHIPPED | OUTPATIENT
Start: 2024-11-22 | End: 2024-11-25

## 2024-11-22 RX ORDER — ATENOLOL 50 MG/1
50 TABLET ORAL EVERY 8 HOURS
Qty: 90 TABLET | Refills: 0 | Status: SHIPPED | OUTPATIENT
Start: 2024-11-22

## 2024-11-22 RX ORDER — METAXALONE 800 MG/1
800 TABLET ORAL 3 TIMES DAILY PRN
Qty: 30 TABLET | Refills: 0 | Status: SHIPPED | OUTPATIENT
Start: 2024-11-22

## 2024-11-22 RX ORDER — PREDNISONE 10 MG/1
TABLET ORAL
Qty: 23 TABLET | Refills: 0 | Status: SHIPPED | OUTPATIENT
Start: 2024-11-22 | End: 2024-12-04

## 2024-11-22 RX ORDER — METAXALONE 800 MG/1
800 TABLET ORAL 3 TIMES DAILY PRN
Status: DISCONTINUED | OUTPATIENT
Start: 2024-11-22 | End: 2024-11-22 | Stop reason: HOSPADM

## 2024-11-22 RX ADMIN — ATENOLOL 50 MG: 50 TABLET ORAL at 08:24

## 2024-11-22 RX ADMIN — IPRATROPIUM BROMIDE AND ALBUTEROL SULFATE 3 ML: 2.5; .5 SOLUTION RESPIRATORY (INHALATION) at 11:32

## 2024-11-22 RX ADMIN — FUROSEMIDE 20 MG: 20 TABLET ORAL at 08:23

## 2024-11-22 RX ADMIN — SPIRONOLACTONE 50 MG: 25 TABLET, FILM COATED ORAL at 08:23

## 2024-11-22 RX ADMIN — PREDNISONE 20 MG: 20 TABLET ORAL at 08:22

## 2024-11-22 RX ADMIN — ENOXAPARIN SODIUM 40 MG: 100 INJECTION SUBCUTANEOUS at 08:22

## 2024-11-22 RX ADMIN — ATENOLOL 50 MG: 50 TABLET ORAL at 00:39

## 2024-11-22 RX ADMIN — PANTOPRAZOLE SODIUM 40 MG: 40 TABLET, DELAYED RELEASE ORAL at 06:01

## 2024-11-22 RX ADMIN — METAXALONE 800 MG: 800 TABLET ORAL at 09:01

## 2024-11-22 RX ADMIN — DEXTROMETHORPHAN POLISTIREX 30 MG: 30 SUSPENSION ORAL at 08:22

## 2024-11-22 RX ADMIN — IPRATROPIUM BROMIDE AND ALBUTEROL SULFATE 3 ML: 2.5; .5 SOLUTION RESPIRATORY (INHALATION) at 07:25

## 2024-11-22 RX ADMIN — DIGOXIN 125 MCG: 125 TABLET ORAL at 12:02

## 2024-11-22 RX ADMIN — SODIUM CHLORIDE 30 MG/ML INHALATION SOLUTION 4 ML: 30 SOLUTION INHALANT at 07:25

## 2024-11-22 NOTE — PLAN OF CARE
Goal Outcome Evaluation:  Plan of Care Reviewed With: patient        Progress: improving     Patient alert & oriented x 4. A, fib on the monitor. Vital signs within normal limits. Plan of care ongoing. Wes light within reach. Will continue to monitor.

## 2024-11-22 NOTE — CASE MANAGEMENT/SOCIAL WORK
Case Management Discharge Note      Final Note: dc home    Provided Post Acute Provider List?: N/A  Provided Post Acute Provider Quality & Resource List?: N/A    Selected Continued Care - Discharged on 11/22/2024 Admission date: 11/17/2024 - Discharge disposition: Home or Self Care      Destination    No services have been selected for the patient.                Durable Medical Equipment    No services have been selected for the patient.                Dialysis/Infusion    No services have been selected for the patient.                Home Medical Care    No services have been selected for the patient.                Therapy    No services have been selected for the patient.                Community Resources    No services have been selected for the patient.                Community & DME    No services have been selected for the patient.                         Final Discharge Disposition Code: 01 - home or self-care   Statement Selected

## 2024-11-22 NOTE — PROGRESS NOTES
Hospital Follow Up    LOS: 3  Patient Name: Viktoria Villasenor  Age/Sex: 83 y.o. female  : 1940  MRN: [id number]    Day of Service: 24   Length of Stay: 3  Encounter Provider: Chuy Orellana MD  Place of Service: [facility] CARDIOLOGY  Patient Care Team:  Magen Olson MD as PCP - General  Magen Olson MD as PCP - Family Medicine  [facility] as Consulting Physician (Otolaryngology)  Casper Hodge MD as Consulting Physician (Gastroenterology)    Subjective:     Chief Complaint: Atrial fibrillation    Interval History: Patient is feeling some better today.    Objective:     Objective:  Temp:  [97.3 °F (36.3 °C)-98.2 °F (36.8 °C)] 98.2 °F (36.8 °C)  Heart Rate:  [] 96  Resp:  [16-20] 18  BP: (105-129)/(64-85) 128/79     Intake/Output Summary (Last 24 hours) at 2024 0937  Last data filed at 2024 0604  Gross per 24 hour   Intake 600 ml   Output 2450 ml   Net -1850 ml     Body mass index is 35.63 kg/m².      24  0602 24  0602 24  0604   Weight: 89.1 kg (196 lb 6.4 oz) 88.9 kg (195 lb 14.4 oz) 95.6 kg (210 lb 12.2 oz)     Weight change: 6.74 kg (14 lb 13.8 oz)      Physical Exam:   General :  Alert, cooperative, in no acute distress.  Neuro:   Alert,cooperative and oriented.      Lab Review:   Results from last 7 days   Lab Units 24  0348 24  0518 24  0249 24  1811   SODIUM mmol/L 138 137   < > 136   POTASSIUM mmol/L 4.8 4.4   < > 4.4   CHLORIDE mmol/L 102 104   < > 99   CO2 mmol/L 24.0 25.0   < > 26.8   BUN mg/dL 27* 25*   < > 25*   CREATININE mg/dL 1.16* 1.12*   < > 1.31*   GLUCOSE mg/dL 120* 94   < > 126*   CALCIUM mg/dL 9.1 9.0   < > 9.3   AST (SGOT) U/L  --   --   --  25   ALT (SGPT) U/L  --   --   --  19    < > = values in this interval not displayed.     Results from last 7 days   Lab Units 24  1811   HSTROP T ng/L 17* 18*     Results from last 7 days   Lab Units  "11/18/24  0249 11/17/24  1811   WBC 10*3/mm3 5.62 9.36   HEMOGLOBIN g/dL 13.0 14.0   HEMATOCRIT % 40.6 44.1   PLATELETS 10*3/mm3 123* 164     Results from last 7 days   Lab Units 11/17/24  2041   INR  1.10     Results from last 7 days   Lab Units 11/21/24  0348 11/17/24  1811   MAGNESIUM mg/dL 2.6* 2.4           Invalid input(s): \"LDLCALC\"            Current Medications:   Scheduled Meds:atenolol, 50 mg, Oral, Q8H  atorvastatin, 10 mg, Oral, Nightly  dextromethorphan polistirex ER, 30 mg, Oral, Q12H  digoxin, 125 mcg, Oral, Daily  enoxaparin, 40 mg, Subcutaneous, Q24H  furosemide, 20 mg, Oral, Daily  ipratropium-albuterol, 3 mL, Nebulization, Q6H While Awake - RT  pantoprazole, 40 mg, Oral, Q AM  predniSONE, 20 mg, Oral, BID With Meals  sodium chloride, 4 mL, Nebulization, BID - RT  spironolactone, 50 mg, Oral, Daily      Continuous Infusions:     Allergies:  Allergies   Allergen Reactions    Lisinopril Cough       Assessment:       Atrial fibrillation with RVR    Essential hypertension    Paroxysmal atrial fibrillation    Asthma exacerbation    Acute bronchitis due to Rhinovirus    Atrial fibrillation with rapid ventricular response        Plan:   1.  Atrial fibrillation with a rapid rate.  Medicines were changed patient's rate is now adequately controlled.  Patient should be sent home with the current medical regimen.  Patient already has an appointment to see WARREN Robbins on December 4.  At that time she will reassess the atenolol frequency the need for the digoxin and etc.  The hope is as her rhinovirus symptoms resolve her heart rhythms will decrease.  2.  Hypertension blood pressures are good.  3.  Okay to discharge we will see patient on an as-needed basis patient already has a follow-up visit.  We will sign off.        Chuy Orellana MD  11/22/24  09:37 EST      "

## 2024-11-22 NOTE — OUTREACH NOTE
Prep Survey      Flowsheet Row Responses   [facility] patient discharged from? [place]   Is LACE score < 7 ? No   Eligibility [facility]   Date of Admission 11/17/24   Date of Discharge 11/22/24   Discharge diagnosis Atrial fibrillation with RVR   Does the patient have one of the following disease processes/diagnoses(primary or secondary)? Other   Prep survey completed? Yes            Siria MARTNIO - Registered Nurse

## 2024-11-22 NOTE — DISCHARGE SUMMARY
"Date of Admission: 11/17/2024  Date of Discharge:  11/22/2024  Primary Care Physician: Magen Olson MD     Discharge Diagnosis:  Active Hospital Problems    Diagnosis  POA    **Atrial fibrillation with RVR [I48.91]  Yes    Atrial fibrillation with rapid ventricular response [I48.91]  Yes    Asthma exacerbation [J45.901]  Yes    Acute bronchitis due to Rhinovirus [J20.6]  Unknown    Paroxysmal atrial fibrillation [I48.0]  Yes    Essential hypertension [I10]  Yes      Resolved Hospital Problems   No resolved problems to display.       Presenting Problem/History of Present Illness:  Bronchitis [J40]  Atrial fibrillation with RVR [I48.91]  Atrial fibrillation with tachycardic ventricular rate [I48.91]  Exacerbation of asthma, unspecified asthma severity, unspecified whether persistent [J45.901]  Atrial fibrillation with rapid ventricular response [I48.91]     Hospital Course:  The patient is a 83 y.o. female with a history of HTN, asthma, PAF, and liver cirrhosis who presented with cough and shortness of breath and was found to have rhinovirus bronchitis with resulting asthma exacerbation and afib with RVR. She was started on steroids and nebulized bronchodilators along with general supportive care. She was evaluated by cardiology and given rate control medications for her rapid afib. She is feeling much better and doing well on room air with good heart rate control. She is medically stable and will be discharged home on a prednisone taper and should keep her scheduled follow up with cardiology and her PCP.  Notably she did have an enlarged subcarinal lymph node which is probably reactive. This can be followed up in 3 months with a chest CT through her PCP.    Exam Today:  Blood pressure 128/79, pulse 108, temperature 98.2 °F (36.8 °C), temperature source Oral, resp. rate 16, height 163.8 cm (64.49\"), weight 95.6 kg (210 lb 12.2 oz), SpO2 92%.  Constitutional:       General: She is not in acute distress.  HENT:      " Head: Normocephalic and atraumatic.   Eyes:      General: No scleral icterus.  Cardiovascular:      Rate and Rhythm: normal rate, irregular rhythm     Intact distal pulses  Pulmonary:      Effort: Pulmonary effort is normal. No respiratory distress.      Breath sounds: No wheezing.   Abdominal:      General: There is no distension.      Palpations: Abdomen is soft.   Musculoskeletal:      Cervical back: Neck supple.   Neurological:      Mental Status: She is alert. Oriented x3  Psychiatric:         Behavior: Behavior normal.     Consults:   Consults       Date and Time Order Name Status Description    11/18/2024  2:16 AM Inpatient Cardiology Consult      11/11/2024  9:35 PM Inpatient Gastroenterology Consult Completed     11/11/2024  9:35 PM Inpatient Cardiology Consult Completed              Discharge Disposition:  Home or Self Care    Discharge Medications:     Discharge Medications        New Medications        Instructions Start Date   atenolol 50 MG tablet  Commonly known as: TENORMIN   50 mg, Oral, Every 8 Hours      benzonatate 200 MG capsule  Commonly known as: TESSALON   200 mg, Oral, 3 Times Daily PRN      dextromethorphan polistirex ER 30 MG/5ML Suspension Extended Release oral suspension  Commonly known as: DELSYM   30 mg, Oral, Every 12 Hours Scheduled      digoxin 125 MCG tablet  Commonly known as: LANOXIN   125 mcg, Oral, Daily Digoxin      metaxalone 800 MG tablet  Commonly known as: SKELAXIN   800 mg, Oral, 3 Times Daily PRN      predniSONE 10 MG tablet  Commonly known as: DELTASONE   Take 2 tablets by mouth 2 (Two) Times a Day With Meals for 1 day, THEN 3 tablets Daily for 3 days, THEN 2 tablets Daily for 3 days, THEN 1 tablet Daily for 3 days, THEN 0.5 tablets Daily for 2 days.   Start Date: November 22, 2024            Changes to Medications        Instructions Start Date   atorvastatin 10 MG tablet  Commonly known as: LIPITOR  What changed: Another medication with the same name was removed.  Continue taking this medication, and follow the directions you see here.   10 mg, Oral, Nightly             Continue These Medications        Instructions Start Date   furosemide 20 MG tablet  Commonly known as: LASIX   20 mg, Oral, Daily      pantoprazole 40 MG EC tablet  Commonly known as: PROTONIX   40 mg, Oral, Every Early Morning      spironolactone 100 MG tablet  Commonly known as: ALDACTONE   50 mg, Oral, Daily             Stop These Medications      metoprolol tartrate 50 MG tablet  Commonly known as: LOPRESSOR              Discharge Diet:   Diet Instructions       Diet: Cardiac Diets; Healthy Heart (2-3 Na+); Regular (IDDSI 7); Thin (IDDSI 0)      Discharge Diet: Cardiac Diets    Cardiac Diet: Healthy Heart (2-3 Na+)    Texture: Regular (IDDSI 7)    Fluid Consistency: Thin (IDDSI 0)            Activity at Discharge:   Activity Instructions       Activity as Tolerated              Follow-up Appointments:  Future Appointments   Date Time Provider Department Center   11/25/2024  8:30 AM Magen Olson MD [id number] [place]   11/26/2024  9:45 AM Samara Salvador APRN [facility] [place]   12/4/2024 10:00 AM Loyda Hernandez APRN [facility]         Test Results Pending at Discharge:  Pending Labs       Order Current Status    Blood Culture - Blood, Arm, Right Preliminary result    Blood Culture - Blood, Arm, Right Preliminary result             Ruddy Woodall MD  11/22/24  12:29 EST    Time Spent on Discharge Activities: Greater than 30 minutes.

## 2024-11-25 ENCOUNTER — TRANSITIONAL CARE MANAGEMENT TELEPHONE ENCOUNTER (OUTPATIENT)
Dept: CALL CENTER | Facility: HOSPITAL | Age: 84
End: 2024-11-25
Payer: MEDICARE

## 2024-11-25 ENCOUNTER — OFFICE VISIT (OUTPATIENT)
Dept: FAMILY MEDICINE CLINIC | Facility: CLINIC | Age: 84
End: 2024-11-25
Payer: MEDICARE

## 2024-11-25 VITALS
DIASTOLIC BLOOD PRESSURE: 76 MMHG | HEIGHT: 65 IN | SYSTOLIC BLOOD PRESSURE: 124 MMHG | HEART RATE: 76 BPM | BODY MASS INDEX: 34.49 KG/M2 | OXYGEN SATURATION: 96 % | WEIGHT: 207 LBS

## 2024-11-25 DIAGNOSIS — J45.901 EXACERBATION OF ASTHMA, UNSPECIFIED ASTHMA SEVERITY, UNSPECIFIED WHETHER PERSISTENT: ICD-10-CM

## 2024-11-25 DIAGNOSIS — Z09 HOSPITAL DISCHARGE FOLLOW-UP: Primary | ICD-10-CM

## 2024-11-25 DIAGNOSIS — E78.49 OTHER HYPERLIPIDEMIA: ICD-10-CM

## 2024-11-25 DIAGNOSIS — I10 ESSENTIAL HYPERTENSION: ICD-10-CM

## 2024-11-25 DIAGNOSIS — J20.6 ACUTE BRONCHITIS DUE TO RHINOVIRUS: ICD-10-CM

## 2024-11-25 DIAGNOSIS — K72.90 DECOMPENSATED CIRRHOSIS: ICD-10-CM

## 2024-11-25 DIAGNOSIS — I48.91 ATRIAL FIBRILLATION WITH RAPID VENTRICULAR RESPONSE: ICD-10-CM

## 2024-11-25 DIAGNOSIS — K74.60 DECOMPENSATED CIRRHOSIS: ICD-10-CM

## 2024-11-25 PROCEDURE — 1111F DSCHRG MED/CURRENT MED MERGE: CPT | Performed by: FAMILY MEDICINE

## 2024-11-25 PROCEDURE — 1159F MED LIST DOCD IN RCRD: CPT | Performed by: FAMILY MEDICINE

## 2024-11-25 PROCEDURE — 3074F SYST BP LT 130 MM HG: CPT | Performed by: FAMILY MEDICINE

## 2024-11-25 PROCEDURE — 1160F RVW MEDS BY RX/DR IN RCRD: CPT | Performed by: FAMILY MEDICINE

## 2024-11-25 PROCEDURE — 3078F DIAST BP <80 MM HG: CPT | Performed by: FAMILY MEDICINE

## 2024-11-25 PROCEDURE — 99495 TRANSJ CARE MGMT MOD F2F 14D: CPT | Performed by: FAMILY MEDICINE

## 2024-11-25 RX ORDER — DOXYCYCLINE HYCLATE 100 MG
100 TABLET ORAL 2 TIMES DAILY
Qty: 20 TABLET | Refills: 0 | Status: SHIPPED | OUTPATIENT
Start: 2024-11-25 | End: 2024-12-05

## 2024-11-25 NOTE — ASSESSMENT & PLAN NOTE
Condition is stable. The current medical regimen is effective;  continue present plan and medications.  Orders:    Comprehensive Metabolic Panel; Future    CBC & Differential; Future

## 2024-11-25 NOTE — ASSESSMENT & PLAN NOTE
The rapid ventricular response due to atrial fibrillation is much improved with the addition of the beta-blocker.  She does have follow-up with cardiology on December 4, 2024.  We did discuss in detail the pros and cons of Eliquis therapy.  She is a candidate due to atrial fibrillation, elevated [id number] score to 5 and creatinine within normal limits and liver function test returning to normal.  We will implement Eliquis 5 mg twice daily.  Stressed the importance of compliance every 12 hours.  Orders:    apixaban (ELIQUIS) 5 MG tablet tablet; Take 1 tablet by mouth 2 (Two) Times a Day.
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
## 2024-11-25 NOTE — OUTREACH NOTE
Call Center TCM Note      Flowsheet Row Responses   [facility] patient discharged from? [place]   Does the patient have one of the following disease processes/diagnoses(primary or secondary)? Other   TCM attempt successful? No   Unsuccessful attempts Attempt 1  [PCP appointment within 2 business days of DC fulfills the TCM requirement, no call necessary. TCM complete]            Vannessa Sousa RN    11/25/2024, 12:30 EST

## 2024-11-25 NOTE — ASSESSMENT & PLAN NOTE
Decompensated cirrhosis has been resolved with current therapy.  We will continue to monitor her liver function with serial labs.  Next set of labs recommended in 2 months with follow-up office visit  Orders:    Comprehensive Metabolic Panel; Future

## 2024-11-25 NOTE — ASSESSMENT & PLAN NOTE
The current medical regimen is effective;  continue present plan and medications.    Orders:    CK; Future    Lipid Panel; Future

## 2024-11-25 NOTE — ASSESSMENT & PLAN NOTE
Acute bronchitis due to rhinovirus is improving.  There is still some wheezing in the left lung.  Will treat with 10-day course of doxycycline.  She will complete her prednisone tapering.  Follow-up if symptoms are not resolved within the next 2 weeks.  Orders:    doxycycline (VIBRAMYICN) 100 MG tablet; Take 1 tablet by mouth 2 (Two) Times a Day for 10 days. No dairy products within 2 hours of a dose

## 2024-11-26 ENCOUNTER — OFFICE VISIT (OUTPATIENT)
Dept: GASTROENTEROLOGY | Facility: CLINIC | Age: 84
End: 2024-11-26
Payer: MEDICARE

## 2024-11-26 VITALS
TEMPERATURE: 97 F | HEIGHT: 65 IN | WEIGHT: 193.4 LBS | SYSTOLIC BLOOD PRESSURE: 122 MMHG | HEART RATE: 92 BPM | BODY MASS INDEX: 32.22 KG/M2 | DIASTOLIC BLOOD PRESSURE: 77 MMHG

## 2024-11-26 DIAGNOSIS — K29.80 DUODENITIS: ICD-10-CM

## 2024-11-26 DIAGNOSIS — K75.81 NASH (NONALCOHOLIC STEATOHEPATITIS): ICD-10-CM

## 2024-11-26 DIAGNOSIS — K74.60 CIRRHOSIS OF LIVER WITHOUT ASCITES, UNSPECIFIED HEPATIC CIRRHOSIS TYPE: Primary | ICD-10-CM

## 2024-11-26 RX ORDER — PANTOPRAZOLE SODIUM 40 MG/1
40 TABLET, DELAYED RELEASE ORAL
Qty: 90 TABLET | Refills: 3 | Status: SHIPPED | OUTPATIENT
Start: 2024-11-26

## 2024-11-26 RX ORDER — FUROSEMIDE 20 MG/1
20 TABLET ORAL DAILY
Qty: 90 TABLET | Refills: 3 | Status: SHIPPED | OUTPATIENT
Start: 2024-11-26

## 2024-11-26 RX ORDER — SPIRONOLACTONE 100 MG/1
50 TABLET, FILM COATED ORAL DAILY
Qty: 90 TABLET | Refills: 3 | Status: SHIPPED | OUTPATIENT
Start: 2024-11-26

## 2024-11-26 NOTE — PROGRESS NOTES
Chief Complaint   Patient presents with    Diverticulitis       HPI    Viktoria Villasenor is a  83 y.o. 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    This patient was last seen and evaluated by Dr. Rodríguez in December 2023, she is new to me today.    Past medical and surgical history reviewed as below.    December 2023 office notes reviewed.  Patient seen at that time after she was evaluated and treated for diverticulitis in the emergency room.  She also had findings of nodular liver and possible portal hypertension.  Plans for EGD (screen for esophageal varices) and updated colonoscopy however procedures were canceled.  Apparently she was experiencing chest pain at the time therefore canceled procedures.  She underwent cardiac workup with negative echocardiogram and coronary angiography.    She was seen by our service while admitted to the hospital earlier this month for new onset atrial fibrillation, compensated cirrhosis and volume overload.  She had a CT abdomen pelvis with contrast which showed evidence of proximal duodenitis, cirrhosis and bilateral pleural effusions.  She was treated with oral diuretic therapy (Lasix and Aldactone) along with continuation of PPI therapy.  She was to follow-up with cardiology as an outpatient to discuss risk-benefit of anticoagulation.    She then needed a second hospitalization 1 week later due to shortness of breath.  She was again found to have atrial fibrillation with RVR, bronchitis and rhinovirus.  She was treated with steroids, bronchodilators and supportive care.  She was given blood thinners with her second hospital stay - Eliquis.    On visit today she reports feeling much improved.  Denies abdominal pain, nausea or vomiting.  No issues with abdominal ascites or bilateral lower extremity edema.  She is compliant with diuretic therapy.  She is getting ready to have a follow-up with cardiology discuss whether not  she needs to stay on Eliquis long-term.    Calculated MELD score 9.    Past Medical History:   Diagnosis Date    ACE-inhibitor cough     Asthma     Asymptomatic postmenopausal status     Cardiac murmur     Cholelithiasis     Cirrhosis     Colon polyp 10/2016    Conjunctivitis     right    Diverticulitis of colon 08/23    Elevated LFTs 06/28/2016    Fatty liver     GERD (gastroesophageal reflux disease)     GI (gastrointestinal bleed)     History of colon polyps     HLD (hyperlipidemia)     Hypertension     Hypopigmentation     Left shoulder tendonitis     Menopausal syndrome     Motion sickness     Plantar fasciitis of left foot     nodule    Pruritus     possibly due to Benicar    SOB (shortness of breath)     SOBOE (shortness of breath on exertion)     Thyroid cyst     UTI (urinary tract infection)     Viral syndrome        Past Surgical History:   Procedure Laterality Date    CARDIAC CATHETERIZATION      in the 80's    CARDIAC CATHETERIZATION N/A 05/04/2016    Procedure: Left Heart Cath;  Surgeon: Dale Vasquez MD;  Location: [facility] CATH INVASIVE LOCATION;  Service:     CARDIAC CATHETERIZATION N/A 05/04/2016    Procedure: Coronary angiography;  Surgeon: Dale Vasquez MD;  Location[facility] CATH INVASIVE LOC[facility];  Service:     CARDIAC CATHETERIZATION N/A 05/04/2016    Procedure: Left ventriculography;  Surgeon: Dale Vasquez MD;  [facility];  Service:     CARDIAC CATHETERIZATION N/A 4/26/2024    Procedure: Left Heart Cath;  Surgeon: Devon Cardona MD;  [facility];  Service: Cardiovascular;  Laterality: N/A;    CARDIAC CATHETERIZATION N/A 4/26/2024    Procedure: Coronary angiography;  Surgeon: Devon Cardona MD;  Location: [facility] CATH INVASIVE LOCATION;  Service: Cardiovascular;  Laterality: N/A;    CARDIAC CATHETERIZATION N/A 4/26/2024    Procedure: Left ventriculography;  Surgeon: Devon Cardona MD;  Location: [facility] CATH  INVASIVE LOCATION;  Service: Cardiovascular;  Laterality: N/A;    CHOLECYSTECTOMY      COLONOSCOPY      >5 years    COLONOSCOPY  2016    tics, IH, rectal biopsy showed changes suggestive of Schwannoma     ENDOSCOPY  2016    LA grade B esophagitis, mild Schatzki ring, HH, gastric polyp, erythematous mucosa in stomach    LASIK Bilateral     RECTAL ULTRASOUND N/A 2017    Procedure: ENDOSCOPIC ULTRASOUND (LOWER);  Surgeon: Casper Rodríguez MD;  Location: [facility] ENDOSCOPY;  Service:     SHOULDER SURGERY Left 2010    Dr. Green; repair L shoulder adhesive capsulitis    SKIN BIOPSY      TOTAL ABDOMINAL HYSTERECTOMY      fibroid tumors    TUBAL ABDOMINAL LIGATION         Scheduled Meds:     Continuous Infusions: No current facility-administered medications for this visit.      PRN Meds:     Allergies   Allergen Reactions    Lisinopril Cough       Social History     Socioeconomic History    Marital status:      Spouse name: Abram    Number of children: 2   Tobacco Use    Smoking status: Former     Current packs/day: 0.00     Average packs/day: 1 pack/day for 30.0 years (30.0 ttl pk-yrs)     Types: Cigarettes     Start date: 1964     Quit date: 1994     Years since quittin.9    Smokeless tobacco: Never   Vaping Use    Vaping status: Never Used   Substance and Sexual Activity    Alcohol use: No    Drug use: No    Sexual activity: Not Currently     Partners: Male       Family History   Problem Relation Age of Onset    Anxiety disorder Mother     Uterine cancer Mother     Dementia Mother     Cancer Father         Prostate Cancer     Other Father         AAA, aneurysm rupture    Leukemia Brother     Malig Hyperthermia Neg Hx        Review of Systems   HENT:  Negative for trouble swallowing.    Gastrointestinal: Negative.        Vitals:    24 0938   BP: 122/77   Pulse: 92   Temp: 97 °F (36.1 °C)       Physical Exam  Constitutional:       Appearance: She is  well-developed.   Abdominal:      General: Bowel sounds are normal. There is no distension.      Palpations: Abdomen is soft. There is no mass.      Tenderness: There is no abdominal tenderness. There is no guarding.      Hernia: No hernia is present.   Skin:     General: Skin is warm and dry.      Capillary Refill: Capillary refill takes less than 2 seconds.   Neurological:      Mental Status: She is alert and oriented to person, place, and time.   Psychiatric:         Behavior: Behavior normal.     Assessment    Diagnoses and all orders for this visit:    1. Cirrhosis of liver without ascites, unspecified hepatic cirrhosis type (Primary)    2. MARSHALL (nonalcoholic steatohepatitis)    3. Duodenitis    Other orders  -     pantoprazole (PROTONIX) 40 MG EC tablet; Take 1 tablet by mouth Every Morning.  Dispense: 90 tablet; Refill: 3  -     spironolactone (ALDACTONE) 100 MG tablet; Take 0.5 tablets by mouth Daily.  Dispense: 90 tablet; Refill: 3  -     furosemide (LASIX) 20 MG tablet; Take 1 tablet by mouth Daily.  Dispense: 90 tablet; Refill: 3    Plan    Stable cirrhosis, continue diuretic therapy  MELD score 9  Continue PPI  We will reach out to cardiology for further guidance on timeline for EGD and colonoscopy         WARREN Gomez  [facility]  [address] [place] [address]  Office: [phone]    I spent 30 minutes caring for Viktoria on this date of service. This time includes time spent by me in the following activities: preparing for the visit, reviewing tests, obtaining and/or reviewing a separately obtained history, performing a medically appropriate examination and/or evaluation , counseling and educating the patient/family/caregiver, ordering medications, tests, or procedures, documenting information in the medical record, independently interpreting results and communicating that information with the patient/family/caregiver and care coordination.

## 2024-11-26 NOTE — Clinical Note
Please reach out to cardiology for clearance for patient undergo EGD and colonoscopy and make recommendations regarding holding Eliquis prior to procedure versus giving us a timeframe to work with.

## 2024-11-27 ENCOUNTER — TELEPHONE (OUTPATIENT)
Dept: GASTROENTEROLOGY | Facility: CLINIC | Age: 84
End: 2024-11-27
Payer: MEDICARE

## 2024-11-27 NOTE — TELEPHONE ENCOUNTER
----- Message from Samara Salvador sent at 11/26/2024 12:47 PM EST -----  Please reach out to cardiology for clearance for patient undergo EGD and colonoscopy and make recommendations regarding holding Eliquis prior to procedure versus giving us a timeframe to work with.

## 2024-12-03 ENCOUNTER — READMISSION MANAGEMENT (OUTPATIENT)
Dept: CALL CENTER | Facility: HOSPITAL | Age: 84
End: 2024-12-03
Payer: MEDICARE

## 2024-12-03 NOTE — OUTREACH NOTE
Medical Week 2 Survey      Flowsheet Row Responses   [facility] patient discharged from? [facility]   Does the patient have one of the following disease processes/diagnoses(primary or secondary)? Other   Week 2 attempt successful? Yes   Call start time 1008   Discharge diagnosis Atrial fibrillation with RVR   Call end time 1010   Meds reviewed with patient/caregiver? Yes   Is the patient having any side effects they believe may be caused by any medication additions or changes? No   Does the patient have all medications ordered at discharge? Yes   Is the patient taking all medications as directed (includes completed medication regime)? Yes   Does the patient have a primary care provider?  Yes   Does the patient have an appointment with their PCP within 7 days of discharge? Yes   Comments regarding PCP Has had PCP follow up   Has the patient kept scheduled appointments due by today? Yes   Has home health visited the patient within 72 hours of discharge? N/A   Psychosocial issues? No   Nursing interventions Reviewed instructions with patient   What is the patient's perception of their health status since discharge? Improving   Is the patient/caregiver able to teach back signs and symptoms related to disease process for when to call PCP? Yes   Is the patient/caregiver able to teach back signs and symptoms related to disease process for when to call 911? Yes   Is the patient/caregiver able to teach back the hierarchy of who to call/visit for symptoms/problems? PCP, Specialist, Home health nurse, Urgent Care, ED, 911 Yes   Week 2 Call Completed? Yes   Is the patient interested in additional calls from an ambulatory ? No   Would this patient benefit from a Referral to Amb Social Work? No   Wrap up additional comments Patient doing well, no concerns today.   Call end time 1010            Kaylee GREGORY - Registered Nurse

## 2024-12-04 ENCOUNTER — HOSPITAL ENCOUNTER (OUTPATIENT)
Dept: CARDIOLOGY | Facility: HOSPITAL | Age: 84
Discharge: HOME | End: 2024-12-04
Admitting: NURSE PRACTITIONER
Payer: MEDICARE

## 2024-12-04 ENCOUNTER — OFFICE VISIT (OUTPATIENT)
Dept: CARDIOLOGY | Facility: CLINIC | Age: 84
End: 2024-12-04
Payer: MEDICARE

## 2024-12-04 VITALS
HEIGHT: 65 IN | SYSTOLIC BLOOD PRESSURE: 120 MMHG | HEART RATE: 97 BPM | WEIGHT: 193 LBS | BODY MASS INDEX: 32.15 KG/M2 | DIASTOLIC BLOOD PRESSURE: 70 MMHG | OXYGEN SATURATION: 97 %

## 2024-12-04 DIAGNOSIS — I48.19 PERSISTENT ATRIAL FIBRILLATION: ICD-10-CM

## 2024-12-04 DIAGNOSIS — Z79.899 ENCOUNTER FOR MONITORING DIGOXIN THERAPY: ICD-10-CM

## 2024-12-04 DIAGNOSIS — I10 ESSENTIAL HYPERTENSION: ICD-10-CM

## 2024-12-04 DIAGNOSIS — I48.19 PERSISTENT ATRIAL FIBRILLATION: Primary | ICD-10-CM

## 2024-12-04 DIAGNOSIS — Z51.81 ENCOUNTER FOR MONITORING DIGOXIN THERAPY: ICD-10-CM

## 2024-12-04 LAB
ANION GAP SERPL CALCULATED.3IONS-SCNC: 11.2 MMOL/L (ref 5–15)
BUN SERPL-MCNC: 23 MG/DL (ref 8–23)
BUN/CREAT SERPL: 17.6 (ref 7–25)
CALCIUM SPEC-SCNC: 8.7 MG/DL (ref 8.6–10.5)
CHLORIDE SERPL-SCNC: 102 MMOL/L (ref 98–107)
CO2 SERPL-SCNC: 23.8 MMOL/L (ref 22–29)
CREAT SERPL-MCNC: 1.31 MG/DL (ref 0.57–1)
DIGOXIN SERPL-MCNC: 0.9 NG/ML (ref 0.6–1.2)
EGFRCR SERPLBLD CKD-EPI 2021: 40.8 ML/MIN/1.73
GLUCOSE SERPL-MCNC: 117 MG/DL (ref 65–99)
POTASSIUM SERPL-SCNC: 3.8 MMOL/L (ref 3.5–5.2)
SODIUM SERPL-SCNC: 137 MMOL/L (ref 136–145)

## 2024-12-04 PROCEDURE — 80048 BASIC METABOLIC PNL TOTAL CA: CPT | Performed by: NURSE PRACTITIONER

## 2024-12-04 PROCEDURE — 80162 ASSAY OF DIGOXIN TOTAL: CPT | Performed by: NURSE PRACTITIONER

## 2024-12-04 PROCEDURE — 36415 COLL VENOUS BLD VENIPUNCTURE: CPT

## 2024-12-04 NOTE — PROGRESS NOTES
CARDIOLOGY        Patient Name: Viktoria Villasenor  :1940  Age: 84 y.o.  Primary Cardiologist: Chuy Orellana MD  Encounter Provider:  WARREN Hay    Date of Service: 2024      CHIEF COMPLAINT / REASON FOR OFFICE VISIT     Hospital Follow Up Visit (New onset AF)      HISTORY OF PRESENT ILLNESS       HPI  Viktoria Villasenor is a 84 y.o. female who presents today for hospital follow-up.     Pt has a  history significant for atrial fibrillation, hypertension, liver cirrhosis    Medical record review reveals that patient was recently hospitalized with discharge date 11/15/2024.  Patient was found to be volume overloaded secondary to decompensated cirrhosis.  She also had new onset atrial fibrillation with RVR.  Given her history of liver cirrhosis and duodenitis she was not started on anticoagulation.  She was challenging to get heart rates controlled and she was discharged with metoprolol every 8 hours as well as furosemide and spironolactone.    Unfortunately, 2 days later patient again required hospitalization and was ultimately discharged 2024.  Patient was found to have acute bronchitis secondary to rhinovirus.  She again had challenges getting heart rates controlled.  She was also started on steroids.  At time of discharge patient was on atenolol as well as digoxin with adequate heart rate control.  Again, avoided systemic anticoagulation secondary to duodenitis and liver cirrhosis.    Patient presents today with her .  She has noted that her HR has been more controlled at home averaging in the 90s.  She has had intermittent nausea.  Denies shortness of breath or CASTRO.  Denies edema.  Overall she feels improved.  She has had a discussion with her PCP and has started apixiban.  Thus far she has not experienced any bleeding complications.      The following portions of the patient's history were reviewed and updated as appropriate: allergies, current medications, past  "family history, past medical history, past social history, past surgical history and problem list.      VITAL SIGNS     Visit Vitals  /70 (BP Location: Left arm, Patient Position: Sitting, Cuff Size: Adult)   Pulse 97   Ht 164.6 cm (64.8\")   Wt 87.5 kg (193 lb)   SpO2 97%   BMI 32.32 kg/m²         Wt Readings from Last 3 Encounters:   12/04/24 87.5 kg (193 lb)   11/26/24 87.7 kg (193 lb 6.4 oz)   11/25/24 93.9 kg (207 lb)     Body mass index is 32.32 kg/m².      REVIEW OF SYSTEMS     Review of Systems   Constitutional: Negative for chills, fever, weight gain and weight loss.   Cardiovascular:  Negative for leg swelling.   Respiratory:  Negative for cough, snoring and wheezing.    Hematologic/Lymphatic: Negative for bleeding problem. Does not bruise/bleed easily.   Skin:  Negative for color change.   Musculoskeletal:  Negative for falls, joint pain and myalgias.   Gastrointestinal:  Positive for nausea. Negative for melena.   Genitourinary:  Negative for hematuria.   Neurological:  Negative for excessive daytime sleepiness.   Psychiatric/Behavioral:  Negative for depression. The patient is not nervous/anxious.            PHYSICAL EXAMINATION     Vitals and nursing note reviewed.   Constitutional:       Appearance: Normal appearance. Well-developed.   Eyes:      Conjunctiva/sclera: Conjunctivae normal.   Neck:      Vascular: No carotid bruit.   Pulmonary:      Breath sounds: Normal breath sounds.   Cardiovascular:      Normal rate. Regular rhythm. Normal S1 with normal intensity. Normal S2 with normal intensity.       Murmurs: There is no murmur.      No gallop.  No click. No rub.   Edema:     Peripheral edema absent.   Musculoskeletal: Normal range of motion. Skin:     General: Skin is warm and dry.   Neurological:      Mental Status: Alert and oriented to person, place, and time.      GCS: GCS eye subscore is 4. GCS verbal subscore is 5. GCS motor subscore is 6.   Psychiatric:         Speech: Speech normal.    "      Behavior: Behavior normal.         Thought Content: Thought content normal.         Judgment: Judgment normal.           REVIEWED DATA       ECG 12 Lead    Date/Time: 12/6/2024 10:04 AM  Performed by: Loyda Hernandez APRN    Authorized by: Loyda Hernandez APRN  Comparison: compared with previous ECG from 11/19/2024  Rhythm: atrial fibrillation  Rate: normal  BPM: 96  Conduction: right bundle branch block  ST Segments: ST segments normal  T Waves: T waves normal  QRS axis: normal    Clinical impression: abnormal EKG          Cardiac Procedures:  Echocardiogram 4/25/2024.  LVEF 66-70%.  Normal RV cavity size and systolic function.  Aortic valve leaflets are mildly to moderately calcified.  Moderate mitral valve regurgitation.  Cardiac catheterization 4/26/2024.  Normal coronary angiography.    Lipid Panel          12/20/2023    11:22 1/25/2024    13:07 4/26/2024    05:24   Lipid Panel   Total Cholesterol   108    Total Cholesterol  134     Triglycerides 99  83  57    HDL Cholesterol  63  59    VLDL Cholesterol  16  13    LDL Cholesterol   55  36    LDL/HDL Ratio   0.64        Lab Results   Component Value Date     12/04/2024     11/21/2024    K 3.8 12/04/2024    K 4.8 11/21/2024     12/04/2024     11/21/2024    CO2 23.8 12/04/2024    CO2 24.0 11/21/2024    BUN 23 12/04/2024    BUN 27 (H) 11/21/2024    CREATININE 1.31 (H) 12/04/2024    CREATININE 1.16 (H) 11/21/2024    EGFRIFNONA 66 04/07/2021    EGFRIFNONA 63 09/25/2019    EGFRIFAFRI 81 04/07/2021    EGFRIFAFRI 77 09/25/2019    GLUCOSE 117 (H) 12/04/2024    GLUCOSE 120 (H) 11/21/2024    CALCIUM 8.7 12/04/2024    CALCIUM 9.1 11/21/2024    PROTENTOTREF 6.7 01/25/2024    PROTENTOTREF 7.1 03/08/2023    ALBUMIN 4.2 11/17/2024    ALBUMIN 3.8 11/15/2024    BILITOT 0.7 11/17/2024    BILITOT 0.7 11/15/2024    AST 25 11/17/2024    AST 26 11/15/2024    ALT 19 11/17/2024    ALT 14 11/15/2024     Lab Results   Component Value Date    WBC 5.62  11/18/2024    WBC 9.36 11/17/2024    HGB 13.0 11/18/2024    HGB 14.0 11/17/2024    HCT 40.6 11/18/2024    HCT 44.1 11/17/2024    MCV 85.8 11/18/2024    MCV 86.1 11/17/2024     (L) 11/18/2024     11/17/2024     Lab Results   Component Value Date    PROBNP 1,323.0 11/11/2024     Lab Results   Component Value Date    CKTOTAL 76 01/25/2024    TROPONINT 17 (H) 11/17/2024     Lab Results   Component Value Date    TSH 1.190 11/12/2024    TSH 2.030 01/25/2024             ASSESSMENT & PLAN     Diagnoses and all orders for this visit:    1. Persistent atrial fibrillation (Primary)  Overview:  CHADS-VASc Risk Assessment               4 Total Score    1 Hypertension    2 Age >/= 75    1 Sex: Female    Criteria that do not apply:    CHF    DM    PRIOR STROKE/TIA/THROMBO    Vascular Disease    Age 65-74              Assessment & Plan:  Patient has had recent hospitalizations where rates were challenging to control  She is now recovered from acute illness and heart rate is more controlled  Patient would like to discontinue her midday atenolol dose  Patient sent for a digoxin level and a CMP.  Digoxin level was normal at time of dictation  Regimen as follows:  Continue apixaban 5 mg twice daily  Continue digoxin 125 mcg/day  Decrease atenolol to 50 mg twice daily    Orders:  -     Basic Metabolic Panel; Future  -     Digoxin Level; Future  -     Holter Monitor - 24 Hour; Future    2. Encounter for monitoring digoxin therapy  -     Digoxin Level; Future    3. Essential hypertension  Assessment & Plan:  BP controlled in clinic at 120/70  Regimen as follows:  Decrease atenolol to 50 mg twice daily  Continue furosemide 20 mg/day  Continue spironolactone 50 mg/day    Hypertension is stable and controlled  Medication changes per orders.  Dietary sodium restriction.  Regular aerobic exercise.  Blood pressure will be reassessed in 6 months.      Other orders  -     ECG 12 Lead          Future Appointments         Provider  Department Center    12/13/2024 9:30 AM [facility]    1/23/2025 9:30 AM Magen Olson MD [facility] PRIMARY CARE [facility]              MEDICATIONS         Discharge Medications            Accurate as of December 4, 2024 11:59 PM. If you have any questions, ask your nurse or doctor.                Continue These Medications        Instructions Start Date   apixaban 5 MG tablet tablet  Commonly known as: ELIQUIS   5 mg, Oral, 2 Times Daily      atenolol 50 MG tablet  Commonly known as: TENORMIN   50 mg, Oral, Every 8 Hours      atorvastatin 10 MG tablet  Commonly known as: LIPITOR   10 mg, Oral, Nightly      digoxin 125 MCG tablet  Commonly known as: LANOXIN   125 mcg, Oral, Daily Digoxin      doxycycline 100 MG tablet  Commonly known as: VIBRAMYICN   100 mg, Oral, 2 Times Daily, No dairy products within 2 hours of a dose      furosemide 20 MG tablet  Commonly known as: LASIX   20 mg, Oral, Daily      metaxalone 800 MG tablet  Commonly known as: SKELAXIN   800 mg, Oral, 3 Times Daily PRN      pantoprazole 40 MG EC tablet  Commonly known as: PROTONIX   40 mg, Oral, Every Early Morning      predniSONE 10 MG tablet  Commonly known as: DELTASONE   Take 2 tablets by mouth 2 (Two) Times a Day With Meals for 1 day, THEN 3 tablets Daily for 3 days, THEN 2 tablets Daily for 3 days, THEN 1 tablet Daily for 3 days, THEN 0.5 tablets Daily for 2 days.   Start Date: November 22, 2024     spironolactone 100 MG tablet  Commonly known as: ALDACTONE   50 mg, Oral, Daily                   **Dragon Disclaimer:   Much of this encounter note is an electronic transcription/translation of spoken language to printed text. The electronic translation of spoken language may permit erroneous, or at times, nonsensical words or phrases to be inadvertently transcribed. Although I have reviewed the note for such errors, some may still exist.

## 2024-12-04 NOTE — PROGRESS NOTES
Can you please let patient know that her digoxin level is normal.  I have her decrease atenolol to twice daily, from 3 times daily.

## 2024-12-05 RX ORDER — ATENOLOL 50 MG/1
50 TABLET ORAL 2 TIMES DAILY
Qty: 60 TABLET | Refills: 2 | Status: SHIPPED | OUTPATIENT
Start: 2024-12-05

## 2024-12-05 RX ORDER — DIGOXIN 125 MCG
125 TABLET ORAL
Qty: 30 TABLET | Refills: 2 | Status: SHIPPED | OUTPATIENT
Start: 2024-12-05

## 2024-12-05 NOTE — TELEPHONE ENCOUNTER
Reviewed results and recommendations with Viktoria Villasenor.  Patient verbalized understanding of results and recommendations, with repeat back of medication instructions.    Ary,  Patient is requesting a refill for the digoxin and atenolol to be sent to her pharmacy.  Pended RX for signature.    Patient is also requesting eliquis samples and a copay card, if available.  Will check and return call to patient.    Thank you,  Delia MAIN RN  Triage Nurse MADDISON  12/05/24    08:25 EST

## 2024-12-05 NOTE — TELEPHONE ENCOUNTER
Placed two boxes of apixaban (eliquis) 5 mg tablet samples with instructions at the  on the 6th floor of the [place] office, along with a copay card.  Returned call to Viktoria Villasenor and notified her of this.  She verbalized understanding and stated she will be by to pick them up next Friday.    LOT: [id number]  Exp: 9/2025    Thank you,  Delia MAIN RN  Triage Nurse JEROME  12/05/24 08:44 EST

## 2024-12-05 NOTE — TELEPHONE ENCOUNTER
Notified Viktoria Villasenor that requested prescriptions have been sent to [facility] pharmacy on [address].  She verbalized understanding.    Thank you,  Delia MAIN RN  Triage Nurse [facility]  12/05/24 10:58 EST

## 2024-12-05 NOTE — TELEPHONE ENCOUNTER
----- Message from Loyda Hernandez sent at 12/4/2024  3:58 PM EST -----  Can you please let patient know that her digoxin level is normal.  I have her decrease atenolol to twice daily, from 3 times daily.

## 2024-12-06 NOTE — ASSESSMENT & PLAN NOTE
BP controlled in clinic at 120/70  Regimen as follows:  Decrease atenolol to 50 mg twice daily  Continue furosemide 20 mg/day  Continue spironolactone 50 mg/day    Hypertension is stable and controlled  Medication changes per orders.  Dietary sodium restriction.  Regular aerobic exercise.  Blood pressure will be reassessed in 6 months.

## 2024-12-09 ENCOUNTER — TELEPHONE (OUTPATIENT)
Dept: GASTROENTEROLOGY | Facility: CLINIC | Age: 84
End: 2024-12-09
Payer: MEDICARE

## 2024-12-09 DIAGNOSIS — K74.60 CIRRHOSIS OF LIVER WITHOUT ASCITES, UNSPECIFIED HEPATIC CIRRHOSIS TYPE: Primary | ICD-10-CM

## 2024-12-09 DIAGNOSIS — K29.80 DUODENITIS: ICD-10-CM

## 2024-12-09 DIAGNOSIS — K57.92 DIVERTICULITIS: ICD-10-CM

## 2024-12-09 NOTE — TELEPHONE ENCOUNTER
Called pt and advised of Samara NP's note.  Pt verbalized understanding.    Msg sent to Sue to call pt to schedule.     Msg sent to Dr Rodríguez for recommendation on prep.

## 2024-12-09 NOTE — TELEPHONE ENCOUNTER
RN pool - please notify the patient her cardiologist has cleared her to hold Eliquis prior to procedures.  Please review timeline (hold 48 hours prior to EGD and colonoscopy)    Sue - please call the patient to schedule EGD and colonoscopy with Dr. Rodríguez.  She needs to be done at [facility].  She needs a kidney friendly bowel prep per Dr. Rodríguez's preferences.  Likely will need [employer].

## 2024-12-09 NOTE — TELEPHONE ENCOUNTER
Hub staff attempted to follow warm transfer process and was unsuccessful     Caller: Viktoria Villasenor    Relationship to patient: Self    Best call back number: [phone]    Patient is needing:   PT RETURNING CALL TO JACOBO HENRY

## 2024-12-09 NOTE — TELEPHONE ENCOUNTER
Hub staff attempted to follow warm transfer process and was unsuccessful     Caller: Viktoria Villasenor    Relationship to patient: Self    Best call back number: [phone]    Patient is needing: PT RETURNING CALL PLEASE ADVISE.

## 2024-12-17 RX ORDER — DIGOXIN 125 MCG
125 TABLET ORAL
Qty: 30 TABLET | Refills: 2 | OUTPATIENT
Start: 2024-12-17

## 2024-12-30 ENCOUNTER — OFFICE VISIT (OUTPATIENT)
Dept: FAMILY MEDICINE CLINIC | Facility: CLINIC | Age: 84
End: 2024-12-30
Payer: MEDICARE

## 2024-12-30 VITALS
OXYGEN SATURATION: 97 % | DIASTOLIC BLOOD PRESSURE: 78 MMHG | BODY MASS INDEX: 32.99 KG/M2 | SYSTOLIC BLOOD PRESSURE: 122 MMHG | HEART RATE: 97 BPM | WEIGHT: 198 LBS | TEMPERATURE: 97.9 F | HEIGHT: 65 IN

## 2024-12-30 DIAGNOSIS — R35.0 URINARY FREQUENCY: Primary | ICD-10-CM

## 2024-12-30 LAB
BILIRUB BLD-MCNC: NEGATIVE MG/DL
CLARITY, POC: CLEAR
COLOR UR: YELLOW
EXPIRATION DATE: ABNORMAL
GLUCOSE UR STRIP-MCNC: NEGATIVE MG/DL
KETONES UR QL: NEGATIVE
LEUKOCYTE EST, POC: NEGATIVE
Lab: ABNORMAL
NITRITE UR-MCNC: NEGATIVE MG/ML
PH UR: 7 [PH] (ref 5–8)
PROT UR STRIP-MCNC: NEGATIVE MG/DL
RBC # UR STRIP: ABNORMAL /UL
SP GR UR: 1.01 (ref 1–1.03)
UROBILINOGEN UR QL: ABNORMAL

## 2024-12-30 PROCEDURE — 1159F MED LIST DOCD IN RCRD: CPT | Performed by: NURSE PRACTITIONER

## 2024-12-30 PROCEDURE — 1160F RVW MEDS BY RX/DR IN RCRD: CPT | Performed by: NURSE PRACTITIONER

## 2024-12-30 PROCEDURE — 99213 OFFICE O/P EST LOW 20 MIN: CPT | Performed by: NURSE PRACTITIONER

## 2024-12-30 PROCEDURE — 3074F SYST BP LT 130 MM HG: CPT | Performed by: NURSE PRACTITIONER

## 2024-12-30 PROCEDURE — 81003 URINALYSIS AUTO W/O SCOPE: CPT | Performed by: NURSE PRACTITIONER

## 2024-12-30 PROCEDURE — 3078F DIAST BP <80 MM HG: CPT | Performed by: NURSE PRACTITIONER

## 2024-12-30 PROCEDURE — 1126F AMNT PAIN NOTED NONE PRSNT: CPT | Performed by: NURSE PRACTITIONER

## 2024-12-30 RX ORDER — SULFAMETHOXAZOLE AND TRIMETHOPRIM 800; 160 MG/1; MG/1
TABLET ORAL
COMMUNITY
Start: 2024-12-20

## 2024-12-30 NOTE — PROGRESS NOTES
Subjective   Viktoria Villasenor is a 84 y.o. female.     History of Present Illness   Answers submitted by the patient for this visit:  Primary Reason for Visit (Submitted on 12/27/2024)  What is the primary reason for your visit?: Painful Urination  Viktoria Villasenor 84 y.o.female complains of urinary symptoms. she complains of dysuria, urgency, frequency, lower abdominal pain, and urinary incontinence. She has had symptoms for 1 week. The symptoms are marked.  Patient denies fever and gross blood in urine.  Patient has tried  nothing for relief of symptoms.  Patient does have a history of recurrent UTI. She refilled a Bactrim DS rx she was prescribed previously but she has not started it yet.            The following portions of the patient's history were reviewed and updated as appropriate: allergies, current medications, past family history, past medical history, past social history, past surgical history, and problem list.    Review of Systems   Constitutional:  Negative for chills.   Gastrointestinal:  Negative for nausea.   Genitourinary:  Positive for dysuria, frequency and urgency. Negative for flank pain and hematuria.       Objective   Physical Exam  Vitals and nursing note reviewed.   Constitutional:       Appearance: She is well-developed.   Cardiovascular:      Rate and Rhythm: Normal rate.   Pulmonary:      Effort: Pulmonary effort is normal.   Neurological:      Mental Status: She is alert and oriented to person, place, and time.   Psychiatric:         Mood and Affect: Mood normal.         Behavior: Behavior normal.         Thought Content: Thought content normal.         Judgment: Judgment normal.         Assessment & Plan   Diagnoses and all orders for this visit:    1. Urinary frequency (Primary)  -     POCT urinalysis dipstick, automated  -     Urine Culture - Urine, Urine, Clean Catch      Will start her Bactrim DS prescription she has and will send for urine culture.

## 2025-01-03 LAB
BACTERIA UR CULT: ABNORMAL
BACTERIA UR CULT: ABNORMAL
OTHER ANTIBIOTIC SUSC ISLT: ABNORMAL

## 2025-01-23 ENCOUNTER — OFFICE VISIT (OUTPATIENT)
Dept: FAMILY MEDICINE CLINIC | Facility: CLINIC | Age: 85
End: 2025-01-23
Payer: MEDICARE

## 2025-01-23 VITALS
BODY MASS INDEX: 32.65 KG/M2 | HEIGHT: 65 IN | SYSTOLIC BLOOD PRESSURE: 116 MMHG | DIASTOLIC BLOOD PRESSURE: 60 MMHG | OXYGEN SATURATION: 98 % | WEIGHT: 196 LBS | HEART RATE: 94 BPM

## 2025-01-23 DIAGNOSIS — I48.19 PERSISTENT ATRIAL FIBRILLATION: ICD-10-CM

## 2025-01-23 DIAGNOSIS — R59.9 ENLARGED LYMPH NODE: ICD-10-CM

## 2025-01-23 DIAGNOSIS — E66.811 CLASS 1 OBESITY DUE TO EXCESS CALORIES WITH SERIOUS COMORBIDITY AND BODY MASS INDEX (BMI) OF 32.0 TO 32.9 IN ADULT: ICD-10-CM

## 2025-01-23 DIAGNOSIS — Z00.00 MEDICARE ANNUAL WELLNESS VISIT, SUBSEQUENT: Primary | ICD-10-CM

## 2025-01-23 DIAGNOSIS — E66.09 CLASS 1 OBESITY DUE TO EXCESS CALORIES WITH SERIOUS COMORBIDITY AND BODY MASS INDEX (BMI) OF 32.0 TO 32.9 IN ADULT: ICD-10-CM

## 2025-01-23 DIAGNOSIS — E78.49 OTHER HYPERLIPIDEMIA: ICD-10-CM

## 2025-01-23 DIAGNOSIS — I10 ESSENTIAL HYPERTENSION: ICD-10-CM

## 2025-01-23 PROBLEM — K74.60 DECOMPENSATED CIRRHOSIS: Status: RESOLVED | Noted: 2024-11-11 | Resolved: 2025-01-23

## 2025-01-23 PROBLEM — J90 PLEURAL EFFUSION: Status: RESOLVED | Noted: 2024-11-15 | Resolved: 2025-01-23

## 2025-01-23 PROBLEM — K57.92 DIVERTICULITIS: Status: RESOLVED | Noted: 2023-12-20 | Resolved: 2025-01-23

## 2025-01-23 PROBLEM — K72.90 DECOMPENSATED CIRRHOSIS: Status: RESOLVED | Noted: 2024-11-11 | Resolved: 2025-01-23

## 2025-01-23 PROBLEM — R07.9 CHEST PAIN: Status: RESOLVED | Noted: 2024-04-25 | Resolved: 2025-01-23

## 2025-01-23 PROBLEM — J20.6 ACUTE BRONCHITIS DUE TO RHINOVIRUS: Status: RESOLVED | Noted: 2024-11-18 | Resolved: 2025-01-23

## 2025-01-23 PROBLEM — J45.901 ASTHMA EXACERBATION: Status: RESOLVED | Noted: 2024-11-18 | Resolved: 2025-01-23

## 2025-01-23 PROBLEM — Z79.899 ENCOUNTER FOR MONITORING DIGOXIN THERAPY: Status: RESOLVED | Noted: 2024-12-04 | Resolved: 2025-01-23

## 2025-01-23 PROBLEM — Z51.81 ENCOUNTER FOR MONITORING DIGOXIN THERAPY: Status: RESOLVED | Noted: 2024-12-04 | Resolved: 2025-01-23

## 2025-01-23 PROBLEM — K74.60 CIRRHOSIS OF LIVER WITHOUT ASCITES: Status: RESOLVED | Noted: 2024-12-09 | Resolved: 2025-01-23

## 2025-01-23 PROBLEM — K29.80 DUODENITIS: Status: RESOLVED | Noted: 2024-12-09 | Resolved: 2025-01-23

## 2025-01-23 PROCEDURE — G2211 COMPLEX E/M VISIT ADD ON: HCPCS | Performed by: FAMILY MEDICINE

## 2025-01-23 PROCEDURE — 99214 OFFICE O/P EST MOD 30 MIN: CPT | Performed by: FAMILY MEDICINE

## 2025-01-23 PROCEDURE — 3074F SYST BP LT 130 MM HG: CPT | Performed by: FAMILY MEDICINE

## 2025-01-23 PROCEDURE — 1159F MED LIST DOCD IN RCRD: CPT | Performed by: FAMILY MEDICINE

## 2025-01-23 PROCEDURE — G0439 PPPS, SUBSEQ VISIT: HCPCS | Performed by: FAMILY MEDICINE

## 2025-01-23 PROCEDURE — 1126F AMNT PAIN NOTED NONE PRSNT: CPT | Performed by: FAMILY MEDICINE

## 2025-01-23 PROCEDURE — 1160F RVW MEDS BY RX/DR IN RCRD: CPT | Performed by: FAMILY MEDICINE

## 2025-01-23 PROCEDURE — 3078F DIAST BP <80 MM HG: CPT | Performed by: FAMILY MEDICINE

## 2025-01-23 NOTE — ASSESSMENT & PLAN NOTE
Subcarinal lymph node was imaged on CT angiogram in November.  We will repeat CT scan for reevaluation of this with follow-up orders based on this test result  Orders:    CT Chest With & Without Contrast Diagnostic; Future

## 2025-01-23 NOTE — PROGRESS NOTES
Subjective   The ABCs of the Annual Wellness Visit  Medicare Wellness Visit      Viktoria Villasenor is a 84 y.o. patient who presents for a Medicare Wellness Visit.    The following portions of the patient's history were reviewed and   updated as appropriate: allergies, current medications, past family history, past medical history, past social history, past surgical history, and problem list.    Compared to one year ago, the patient's physical   health is the same.  Compared to one year ago, the patient's mental   health is the same.    Recent Hospitalizations:  This patient has had a [facility] admission record on file within the last 365 days.  Current Medical Providers:  Patient Care Team:  Magen Olson MD as PCP - General  Magen Olson MD as PCP - Family Medicine  Ralph Turner MD as Consulting Physician (Otolaryngology)  Casper Hodge MD as Consulting Physician (Gastroenterology)  Loyda Hernandez APRN as Nurse Practitioner (Cardiology)  Chuy Orellana MD as Consulting Physician (Cardiology)    Outpatient Medications Prior to Visit   Medication Sig Dispense Refill    apixaban (ELIQUIS) 5 MG tablet tablet Take 1 tablet by mouth 2 (Two) Times a Day. 180 tablet 3    atenolol (TENORMIN) 50 MG tablet Take 1 tablet by mouth 2 (Two) Times a Day. 60 tablet 2    atorvastatin (LIPITOR) 10 MG tablet Take 1 tablet by mouth Every Night for 270 days. 90 tablet 2    digoxin (LANOXIN) 125 MCG tablet Take 1 tablet by mouth Daily. 30 tablet 2    furosemide (LASIX) 20 MG tablet Take 1 tablet by mouth Daily. 90 tablet 3    pantoprazole (PROTONIX) 40 MG EC tablet Take 1 tablet by mouth Every Morning. 90 tablet 3    spironolactone (ALDACTONE) 100 MG tablet Take 0.5 tablets by mouth Daily. 90 tablet 3    sulfamethoxazole-trimethoprim (BACTRIM DS,SEPTRA DS) 800-160 MG per tablet  (Patient not taking: Reported on 1/23/2025)       No facility-administered medications prior to visit.     No opioid medication  "identified on active medication list. I have reviewed chart for other potential  high risk medication/s and harmful drug interactions in the elderly.      Aspirin is not on active medication list.  Aspirin use is contraindicated for this patient due to: current use of Eliquis.  .    Patient Active Problem List   Diagnosis    Other hyperlipidemia    Essential hypertension    SOBOE (shortness of breath on exertion)    Thyroid cysts    Mild intermittent asthma without complication    Esophagitis    Prediabetes    Class 1 obesity due to excess calories with serious comorbidity and body mass index (BMI) of 32.0 to 32.9 in adult    Steatosis of liver    Cardiac murmur    Hearing problem of both ears    NSTEMI, initial episode of care    Persistent atrial fibrillation    Subcarinal Lymph node seen on imaging study     Advance Care Planning Advance Directive is on file.  ACP discussion was held with the patient during this visit. Patient has an advance directive in EMR which is still valid.             Objective   Vitals:    01/23/25 0920   BP: 116/60   Pulse: 94   SpO2: 98%   Weight: 88.9 kg (196 lb)   Height: 164.6 cm (64.8\")   PainSc: 0-No pain       Estimated body mass index is 32.82 kg/m² as calculated from the following:    Height as of this encounter: 164.6 cm (64.8\").    Weight as of this encounter: 88.9 kg (196 lb).    BMI is >= 30 and <35. (Class 1 Obesity). The following options were offered after discussion;: weight loss educational material (shared in after visit summary), exercise counseling/recommendations, and nutrition counseling/recommendations           Does the patient have evidence of cognitive impairment? No  Lab Results   Component Value Date    CHLPL 114 01/15/2025    TRIG 112 01/15/2025    HDL 37 (L) 01/15/2025    LDL 56 01/15/2025    VLDL 21 01/15/2025    HGBA1C 5.40 11/12/2024                                                                                                Health  Risk " Assessment    Smoking Status:  Social History     Tobacco Use   Smoking Status Former    Current packs/day: 0.00    Average packs/day: 1 pack/day for 30.0 years (30.0 ttl pk-yrs)    Types: Cigarettes    Start date: 1964    Quit date: 1994    Years since quittin.0   Smokeless Tobacco Never     Alcohol Consumption:  Social History     Substance and Sexual Activity   Alcohol Use No       Fall Risk Screen  STEADI Fall Risk Assessment was completed, and patient is at LOW risk for falls.Assessment completed on:2025    Depression Screening   Little interest or pleasure in doing things? Not at all   Feeling down, depressed, or hopeless? Not at all   PHQ-2 Total Score 0      Health Habits and Functional and Cognitive Screenin/21/2025    11:13 PM   Functional & Cognitive Status   Do you have difficulty preparing food and eating? No    Do you have difficulty bathing yourself, getting dressed or grooming yourself? No    Do you have difficulty using the toilet? No    Do you have difficulty moving around from place to place? No    Do you have trouble with steps or getting out of a bed or a chair? No    Current Diet Limited Junk Food    Dental Exam Unknown    Eye Exam Up to date    Exercise (times per week) 2 times per week    Current Exercises Include No Regular Exercise    Do you need help using the phone?  No    Are you deaf or do you have serious difficulty hearing?  No    Do you need help to go to places out of walking distance? No    Do you need help shopping? No    Do you need help preparing meals?  No    Do you need help with housework?  No    Do you need help with laundry? No    Do you need help taking your medications? No    Do you need help managing money? No    Do you ever drive or ride in a car without wearing a seat belt? No    Have you felt unusual stress, anger or loneliness in the last month? No    Who do you live with? Spouse    If you need help, do you have trouble finding someone  available to you? No    Have you been bothered in the last four weeks by sexual problems? No    Do you have difficulty concentrating, remembering or making decisions? No        Patient-reported           Age-appropriate Screening Schedule:  Refer to the list below for future screening recommendations based on patient's age, sex and/or medical conditions. Orders for these recommended tests are listed in the plan section. The patient has been provided with a written plan.    Health Maintenance List  Health Maintenance   Topic Date Due    Hepatitis B (1 of 3 - Risk 3-dose series) Never done    ZOSTER VACCINE (2 of 3) 05/18/2013    DXA SCAN  08/06/2016    RSV Vaccine - Adults (1 - 1-dose 75+ series) Never done    TDAP/TD VACCINES (2 - Td or Tdap) 03/24/2019    ANNUAL WELLNESS VISIT  10/18/2022    COVID-19 Vaccine (3 - 2024-25 season) 09/01/2024    INFLUENZA VACCINE  03/31/2025 (Originally 7/1/2024)    LIPID PANEL  01/15/2026    BMI FOLLOWUP  01/23/2026    COLORECTAL CANCER SCREENING  12/01/2026    Pneumococcal Vaccine 65+  Completed    MAMMOGRAM  Discontinued                                                                                                                                                CMS Preventative Services Quick Reference  Risk Factors Identified During Encounter  Immunizations Discussed/Encouraged: Tdap, Shingrix, COVID19, and RSV (Respiratory Syncytial Virus)  Inactivity/Sedentary: Patient was advised to exercise at least 150 minutes a week per CDC recommendations.    The above risks/problems have been discussed with the patient.  Pertinent information has been shared with the patient in the After Visit Summary.  An After Visit Summary and PPPS were made available to the patient.    Follow Up:   Next Medicare Wellness visit to be scheduled in 1 year.         Additional E&M Note during same encounter follows:  Patient has additional, significant, and separately identifiable condition(s)/problem(s)  "that require work above and beyond the Medicare Wellness Visit     Chief Complaint  Medicare Wellness-subsequent    Subjective    HPI  Viktoria is also being seen today for additional medical problem/s.       The patient is here today for a subsequent Medicare wellness visit.    She reports no significant changes in her physical or mental health over the past year. She has been unable to engage in outdoor activities due to inclement weather conditions. She has designated her children as her power of . She has 2 cats at home. She used to love fishing, but she does not do it anymore because she has trouble with her hands. She spends 2 hours a day exercising between bike riding, aerobics, and gardening.    She has been experiencing urinary difficulties, which she attributes to her diuretic medication. She has been informed that long-term use of naproxen is not advisable. She has not scheduled a follow-up appointment with her cardiologist, Dr. Katerina Hernandez, whom she last consulted on 12/30/2024. She is under the care of Dr. Katerina Hernandez for atrial fibrillation. She has been diagnosed with atrial fibrillation and is currently on Eliquis, atenolol, atorvastatin, digoxin, Lasix, pantoprazole, and spironolactone. She was previously diagnosed with a urinary tract infection.    SOCIAL HISTORY  - Has 1 brother  - Has 2 children    ALLERGIES  Allergies discussed during this visit.  Medication allergies: NKDA.      MEDICATIONS  - Eliquis  - Atenolol  - Atorvastatin  - Digoxin  - Lasix  - Pantoprazole  - Spironolactone  - Naproxen          Objective   Vital Signs:  /60   Pulse 94   Ht 164.6 cm (64.8\")   Wt 88.9 kg (196 lb)   SpO2 98%   BMI 32.82 kg/m²   Physical Exam      General Appearance: No Acute Distress, normal appearance, well developed, well nourished.  Vital signs: BMI is 32.82. Blood pressure is 116/60.  Respiratory: Normal respiratory effort. Lungs are clear to auscultation. No wheezes, no rhonchi, " no rales.  Cardiovascular: Heart has an irregular rhythm but the rate is controlled. There are no murmurs, rubs, or gallops.  Extremities: No pretibial edema, bilaterally.  Skin: Warm and dry, no rash.  Psychiatric: patient cooperative, normal affect.    The following data was reviewed by: Magen Olson MD on 01/23/2025:        Results  - Laboratory Studies (01/15/2025):    - Sodium: 142    - Potassium: 4.1    - Chloride: 102    - Calcium: 9.1    - BUN: 11    - Creatinine: 1.14    - EGFR: 48.2    - Cholesterol: 114    - LDL cholesterol: 56    - HDL: 37    - Triglycerides: 112    - White blood cell count: 9.34    - Hemoglobin: 13.8    - Imaging:    - CT angiogram of the chest showed mildly prominent subcarinal lymph node              Assessment and Plan        Medicare annual wellness visit, subsequent  Relatively normal Medicare annual wellness visit.  Recommendations for increased exercise.  Immunization needs have been discussed.  Patient is having issues with urination.  She will reach out to her cardiologist to adjust both furosemide and spironolactone if possible.  If her situation continues she will reach out to our office for further evaluation and treatment options.       Enlarged lymph node  Subcarinal lymph node was imaged on CT angiogram in November.  We will repeat CT scan for reevaluation of this with follow-up orders based on this test result  Orders:    CT Chest With & Without Contrast Diagnostic; Future    Persistent atrial fibrillation  Atrial fibrillation is controlled.  For now continue same medications.  We will continue to follow along with cardiology       Other hyperlipidemia     Condition is stable. The current medical regimen is effective;  continue present plan and medications.       Essential hypertension    Condition is stable. The current medical regimen is effective;  continue present plan and medications.       Class 1 obesity due to excess calories with serious comorbidity and body  mass index (BMI) of 32.0 to 32.9 in adult  Patient's (Body mass index is 32.82 kg/m².) indicates that they are obese (BMI >30) with health conditions that include coronary heart disease and dyslipidemias . Weight is improving with lifestyle modifications. BMI  is above average; BMI management plan is completed. We discussed low calorie, low carb based diet program, portion control, and increasing exercise.                    Follow Up   Return in about 6 months (around 7/23/2025) for recheck/refill medication.  Patient was given instructions and counseling regarding her condition or for health maintenance advice. Please see specific information pulled into the AVS if appropriate.  Patient or patient representative verbalized consent for the use of Ambient Listening during the visit with  Magen Olson MD for chart documentation. 1/23/2025  09:58 EST

## 2025-01-23 NOTE — PATIENT INSTRUCTIONS
You are due for adacel Tdap vaccination. (provides protection against tetanus, diptheria and whooping cough) Please  get the immunization at your local pharmacy at your earliest convenience.  Please click on the link for more information about this vaccine.    https://www.cdc.gov/vaccines/vpd/dtap-tdap-td/public/index.html    You are due for Shingrix vaccination series ( the newest shingles vaccine).  It is a two shot series spaced 2-6 months apart. Please get this vaccine series started at your earliest convenience at your local pharmacy to help avoid shingles outbreak. It is more effective than the old Zostavax vaccine and is recommended even if you have had the Zostavax vaccine in the past.  Once the Shingrix series is completed, it does not need to be repeated.   For more information, please look at the website below:  https://www.cdc.gov/vaccines/vpd/shingles/public/shingrix/index.html    You are due for RSV vaccination. (provides protection against Respiratory Syncytial Virus Infection) Please  get the immunization at your local pharmacy at your earliest convenience. This immunization is currently a one time vaccine only. Please click on the link for more information about this vaccine.   https://www.cdc.gov/rsv/vaccines/older-adults.html    You are due for a Covid 19 vaccination. (provides protection against Covid 19 Viral Infection) Please  talk to your pharmacist and get the immunization at your local pharmacy at your earliest convenience. Please click on the link for more information about this vaccine.   https://www.cdc.gov/coronavirus/2019-ncov/vaccines/stay-up-to-date.html    Exercising to Lose Weight  Getting regular exercise is important for everyone. It is especially important if you are overweight. Being overweight increases your risk of heart disease, stroke, diabetes, high blood pressure, and several types of cancer. Exercising, and reducing the calories you consume, can help you lose weight and  improve fitness and health.  Exercise can be moderate or vigorous intensity. To lose weight, most people need to do a certain amount of moderate or vigorous-intensity exercise each week.  How can exercise affect me?  You lose weight when you exercise enough to burn more calories than you eat. Exercise also reduces body fat and builds muscle. The more muscle you have, the more calories you burn. Exercise also:  Improves mood.  Reduces stress and tension.  Improves your overall fitness, flexibility, and endurance.  Increases bone strength.  Moderate-intensity exercise    Moderate-intensity exercise is any activity that gets you moving enough to burn at least three times more energy (calories) than if you were sitting.  Examples of moderate exercise include:  Walking a mile in 15 minutes.  Doing light yard work.  Biking at an easy pace.  Most people should get at least 150 minutes of moderate-intensity exercise a week to maintain their body weight.  Vigorous-intensity exercise  Vigorous-intensity exercise is any activity that gets you moving enough to burn at least six times more calories than if you were sitting. When you exercise at this intensity, you should be working hard enough that you are not able to carry on a conversation.  Examples of vigorous exercise include:  Running.  Playing a team sport, such as football, basketball, and soccer.  Jumping rope.  Most people should get at least 75 minutes a week of vigorous exercise to maintain their body weight.  What actions can I take to lose weight?  The amount of exercise you need to lose weight depends on:  Your age.  The type of exercise.  Any health conditions you have.  Your overall physical ability.  Talk to your health care provider about how much exercise you need and what types of activities are safe for you.  Nutrition    Make changes to your diet as told by your health care provider or diet and nutrition specialist (dietitian). This may include:  Eating  fewer calories.  Eating more protein.  Eating less unhealthy fats.  Eating a diet that includes fresh fruits and vegetables, whole grains, low-fat dairy products, and lean protein.  Avoiding foods with added fat, salt, and sugar.  Drink plenty of water while you exercise to prevent dehydration or heat stroke.  Activity  Choose an activity that you enjoy and set realistic goals. Your health care provider can help you make an exercise plan that works for you.  Exercise at a moderate or vigorous intensity most days of the week.  The intensity of exercise may vary from person to person. You can tell how intense a workout is for you by paying attention to your breathing and heartbeat. Most people will notice their breathing and heartbeat get faster with more intense exercise.  Do resistance training twice each week, such as:  Push-ups.  Sit-ups.  Lifting weights.  Using resistance bands.  Getting short amounts of exercise can be just as helpful as long, structured periods of exercise. If you have trouble finding time to exercise, try doing these things as part of your daily routine:  Get up, stretch, and walk around every 30 minutes throughout the day.  Go for a walk during your lunch break.  Park your car farther away from your destination.  If you take public transportation, get off one stop early and walk the rest of the way.  Make phone calls while standing up and walking around.  Take the stairs instead of elevators or escalators.  Wear comfortable clothes and shoes with good support.  Do not exercise so much that you hurt yourself, feel dizzy, or get very short of breath.  Where to find more information  [place] Department of Health and Human Services: www.hhs.gov  Centers for Disease Control and Prevention: www.cdc.gov  Contact a health care provider:  Before starting a new exercise program.  If you have questions or concerns about your weight.  If you have a medical problem that keeps you from exercising.  Get help  right away if:  You have any of the following while exercising:  Injury.  Dizziness.  Difficulty breathing or shortness of breath that does not go away when you stop exercising.  Chest pain.  Rapid heartbeat.  These symptoms may represent a serious problem that is an emergency. Do not wait to see if the symptoms will go away. Get medical help right away. Call your local emergency services (911 in the [place] Do not drive yourself to the hospital.  Summary  Getting regular exercise is especially important if you are overweight.  Being overweight increases your risk of heart disease, stroke, diabetes, high blood pressure, and several types of cancer.  Losing weight happens when you burn more calories than you eat.  Reducing the amount of calories you eat, and getting regular moderate or vigorous exercise each week, helps you lose weight.  This information is not intended to replace advice given to you by your health care provider. Make sure you discuss any questions you have with your health care provider.  Document Revised: 02/13/2022 Document Reviewed: 02/13/2022  [facility] Patient Education © 2024 [facility] [employer]Fat and Cholesterol Restricted Diet  Getting too much fat and cholesterol in your diet may cause health problems. Following this diet helps keep your fat and cholesterol at normal levels. This can keep you from getting sick.  WHAT TYPES OF FAT SHOULD I CHOOSE?  Choose monosaturated and polyunsaturated fats. These are found in foods such as olive oil, canola oil, flaxseeds, walnuts, almonds, and seeds.  Eat more omega-3 fats. Good choices include salmon, mackerel, sardines, tuna, flaxseed oil, and ground flaxseeds.  Limit saturated fats. These are in animal products such as meats, butter, and cream. They can also be in plant products such as palm oil, palm kernel oil, and coconut oil.      Avoid foods with partially hydrogenated oils in them. These contain trans fats. Examples of foods that have trans fats are  "stick margarine, some tub margarines, cookies, crackers, and other baked goods.  WHAT GENERAL GUIDELINES DO I NEED TO FOLLOW?    Check food labels. Look for the words \"trans fat\" and \"saturated fat.\"  When preparing a meal:  Fill half of your plate with vegetables and green salads.  Fill one fourth of your plate with whole grains. Look for the word \"whole\" as the first word in the ingredient list.  Fill one fourth of your plate with lean protein foods.  Limit fruit to two servings a day. Choose fruit instead of juice.  Eat more foods with soluble fiber. Examples of foods with this type of fiber are apples, broccoli, carrots, beans, peas, and barley. Try to get 20-30 g (grams) of fiber per day.  Eat more home-cooked foods. Eat less at restaurants and buffets.  Limit or avoid alcohol.  Limit foods high in starch and sugar.Fat and Cholesterol Restricted Eating Plan  Getting too much fat and cholesterol in your diet may cause health problems. Choosing the right foods helps keep your fat and cholesterol at normal levels. This can keep you from getting certain diseases.  Your doctor may recommend an eating plan that includes:  Total fat: ______% or less of total calories a day.  Saturated fat: ______% or less of total calories a day.  Cholesterol: less than _________mg a day.  Fiber: ______g a day.  What are tips for following this plan?  Meal planning  At meals, divide your plate into four equal parts:  Fill one-half of your plate with vegetables and green salads.  Fill one-fourth of your plate with whole grains.  Fill one-fourth of your plate with low-fat (lean) protein foods.  Eat fish that is high in omega-3 fats at least two times a week. This includes mackerel, tuna, sardines, and salmon.  Eat foods that are high in fiber, such as whole grains, beans, apples, broccoli, carrots, peas, and barley.  General tips    Work with your doctor to lose weight if you need to.  Avoid:  Foods with added sugar.  Fried " "foods.  Foods with partially hydrogenated oils.  Limit alcohol intake to no more than 1 drink a day for nonpregnant women and 2 drinks a day for men. One drink equals 12 oz of beer, 5 oz of wine, or 1½ oz of hard liquor.    Reading food labels  Check food labels for:  Trans fats.  Partially hydrogenated oils.  Saturated fat (g) in each serving.  Cholesterol (mg) in each serving.  Fiber (g) in each serving.  Choose foods with healthy fats, such as:  Monounsaturated fats.  Polyunsaturated fats.  Omega-3 fats.  Choose grain products that have whole grains. Look for the word \"whole\" as the first word in the ingredient list.  Cooking  Cook foods using low-fat methods. These include baking, boiling, grilling, and broiling.  Eat more home-cooked foods. Eat at restaurants and buffets less often.  Avoid cooking using saturated fats, such as butter, cream, palm oil, palm kernel oil, and coconut oil.  Recommended foods    Fruits  All fresh, canned (in natural juice), or frozen fruits.  Vegetables  Fresh or frozen vegetables (raw, steamed, roasted, or grilled). Green salads.  Grains  Whole grains, such as whole wheat or whole grain breads, crackers, cereals, and pasta. Unsweetened oatmeal, bulgur, barley, quinoa, or brown rice. Corn or whole wheat flour tortillas.  Meats and other protein foods  Ground beef (85% or leaner), grass-fed beef, or beef trimmed of fat. Skinless chicken or turkey. Ground chicken or turkey. Pork trimmed of fat. All fish and seafood. Egg whites. Dried beans, peas, or lentils. Unsalted nuts or seeds. Unsalted canned beans. Nut butters without added sugar or oil.  Dairy  Low-fat or nonfat dairy products, such as skim or 1% milk, 2% or reduced-fat cheeses, low-fat and fat-free ricotta or cottage cheese, or plain low-fat and nonfat yogurt.  Fats and oils  Tub margarine without trans fats. Light or reduced-fat mayonnaise and salad dressings. Avocado. Olive, canola, sesame, or safflower oils.  The items " listed above may not be a complete list of foods and beverages you can eat. Contact a dietitian for more information.  Foods to avoid  Fruits  Canned fruit in heavy syrup. Fruit in cream or butter sauce. Fried fruit.  Vegetables  Vegetables cooked in cheese, cream, or butter sauce. Fried vegetables.  Grains  White bread. White pasta. White rice. Cornbread. Bagels, pastries, and croissants. Crackers and snack foods that contain trans fat and hydrogenated oils.  Meats and other protein foods  Fatty cuts of meat. Ribs, chicken wings, amaral, sausage, bologna, salami, chitterlings, fatback, hot dogs, bratwurst, and packaged lunch meats. Liver and organ meats. Whole eggs and egg yolks. Chicken and turkey with skin. Fried meat.  Dairy  Whole or 2% milk, cream, half-and-half, and cream cheese. Whole milk cheeses. Whole-fat or sweetened yogurt. Full-fat cheeses. Nondairy creamers and whipped toppings. Processed cheese, cheese spreads, and cheese curds.  Beverages  Alcohol. Sugar-sweetened drinks such as sodas, lemonade, and fruit drinks.  Fats and oils  Butter, stick margarine, lard, shortening, ghee, or amaral fat. Coconut, palm kernel, and palm oils.  Sweets and desserts  Corn syrup, sugars, honey, and molasses. Candy. Jam and jelly. Syrup. Sweetened cereals. Cookies, pies, cakes, donuts, muffins, and ice cream.  The items listed above may not be a complete list of foods and beverages you should avoid. Contact a dietitian for more information.  Summary  Choosing the right foods helps keep your fat and cholesterol at normal levels. This can keep you from getting certain diseases.  At meals, fill one-half of your plate with vegetables and green salads.  Eat high-fiber foods, like whole grains, beans, apples, carrots, peas, and barley.  Limit added sugar, saturated fats, alcohol, and fried foods.  This information is not intended to replace advice given to you by your health care provider. Make sure you discuss any questions  you have with your health care provider.  Document Revised: 04/21/2021 Document Reviewed: 04/21/2021  [employer] Patient Education © 2021 [employer]    Limit fried foods.  Cook foods without frying them. Baking, boiling, grilling, and broiling are all great options.  Lose weight if you are overweight. Losing even a small amount of weight can help your overall health. It can also help prevent diseases such as diabetes and heart disease.  WHAT FOODS CAN I EAT?  Grains  Whole grains, such as whole wheat or whole grain breads, crackers, cereals, and pasta. Unsweetened oatmeal, bulgur, barley, quinoa, or brown rice. Corn or whole wheat flour tortillas.  Vegetables  Fresh or frozen vegetables (raw, steamed, roasted, or grilled). Green salads.  Fruits  All fresh, canned (in natural juice), or frozen fruits.  Meat and Other Protein Products  Ground beef (85% or leaner), grass-fed beef, or beef trimmed of fat. Skinless chicken or turkey. Ground chicken or turkey. Pork trimmed of fat. All fish and seafood. Eggs. Dried beans, peas, or lentils. Unsalted nuts or seeds. Unsalted canned or dry beans.  Dairy  Low-fat dairy products, such as skim or 1% milk, 2% or reduced-fat cheeses, low-fat ricotta or cottage cheese, or plain low-fat yogurt.  Fats and Oils  Tub margarines without trans fats. Light or reduced-fat mayonnaise and salad dressings. Avocado. Olive, canola, sesame, or safflower oils. Natural peanut or almond butter (choose ones without added sugar and oil).  The items listed above may not be a complete list of recommended foods or beverages. Contact your dietitian for more options.  WHAT FOODS ARE NOT RECOMMENDED?  Grains  White bread. White pasta. White rice. Cornbread. Bagels, pastries, and croissants. Crackers that contain trans fat.  Vegetables  White potatoes. Corn. Creamed or fried vegetables. Vegetables in a cheese sauce.  Fruits  Dried fruits. Canned fruit in light or heavy syrup. Fruit juice.  Meat and Other  Protein Products  Fatty cuts of meat. Ribs, chicken wings, amaral, sausage, bologna, salami, chitterlings, fatback, hot dogs, bratwurst, and packaged luncheon meats. Liver and organ meats.  Dairy  Whole or 2% milk, cream, half-and-half, and cream cheese. Whole milk cheeses. Whole-fat or sweetened yogurt. Full-fat cheeses. Nondairy creamers and whipped toppings. Processed cheese, cheese spreads, or cheese curds.  Sweets and Desserts  Corn syrup, sugars, honey, and molasses. Candy. Jam and jelly. Syrup. Sweetened cereals. Cookies, pies, cakes, donuts, muffins, and ice cream.  Fats and Oils  Butter, stick margarine, lard, shortening, ghee, or amaral fat. Coconut, palm kernel, or palm oils.  Beverages  Alcohol. Sweetened drinks (such as sodas, lemonade, and fruit drinks or punches).  The items listed above may not be a complete list of foods and beverages to avoid. Contact your dietitian for more information.  Document Released: 2013 Document Revised: 2015 Document Reviewed: 2015  [employer]® Patient Information  [facility]. This information is not intended to replace advice given to you by your health care provider. Make sure you discuss any questions you have with your health care provider.     Medicare Wellness  Personal Prevention Plan of Service     Date of Office Visit:    Encounter Provider:  Magen Olson MD  Place of Service:  [facility]  Patient Name: Viktoria Villasenor  :  1940    As part of the Medicare Wellness portion of your visit today, we are providing you with this personalized preventive plan of services (PPPS). This plan is based upon recommendations of the [place] Preventive Services Task Force (USPSTF) and the Advisory Committee on Immunization Practices (ACIP).    This lists the preventive care services that should be considered, and provides dates of when you are due. Items listed as completed are up-to-date and do not require  any further intervention.    Health Maintenance   Topic Date Due    Hepatitis B (1 of 3 - Risk 3-dose series) Never done    ZOSTER VACCINE (2 of 3) 05/18/2013    DXA SCAN  08/06/2016    RSV Vaccine - Adults (1 - 1-dose 75+ series) Never done    TDAP/TD VACCINES (2 - Td or Tdap) 03/24/2019    ANNUAL WELLNESS VISIT  10/18/2022    COVID-19 Vaccine (3 - 2024-25 season) 09/01/2024    BMI FOLLOWUP  01/04/2025    INFLUENZA VACCINE  03/31/2025 (Originally 7/1/2024)    LIPID PANEL  01/15/2026    COLORECTAL CANCER SCREENING  12/01/2026    Pneumococcal Vaccine 65+  Completed    MAMMOGRAM  Discontinued       Orders Placed This Encounter   Procedures    CT Chest With & Without Contrast Diagnostic     Standing Status:   Future     Standing Expiration Date:   1/23/2026     Order Specific Question:   Does this exam require Nicholas Bronch Protocol?     Answer:   No     Order Specific Question:   Release to patient     Answer:   Routine Release [[id number]]     Order Specific Question:   Reason for Exam:     Answer:   followup lymph node from 11/24       No follow-ups on file.

## 2025-01-23 NOTE — ASSESSMENT & PLAN NOTE
Patient's (Body mass index is 32.82 kg/m².) indicates that they are obese (BMI >30) with health conditions that include coronary heart disease and dyslipidemias . Weight is improving with lifestyle modifications. BMI  is above average; BMI management plan is completed. We discussed low calorie, low carb based diet program, portion control, and increasing exercise.

## 2025-01-23 NOTE — ASSESSMENT & PLAN NOTE
Condition is stable. The current medical regimen is effective;  continue present plan and medications.

## 2025-01-23 NOTE — ASSESSMENT & PLAN NOTE
Atrial fibrillation is controlled.  For now continue same medications.  We will continue to follow along with cardiology

## 2025-01-28 RX ORDER — SPIRONOLACTONE 25 MG/1
25 TABLET ORAL DAILY
Qty: 90 TABLET | Refills: 3 | Status: SHIPPED | OUTPATIENT
Start: 2025-01-28

## 2025-01-29 DIAGNOSIS — R59.9 ENLARGED LYMPH NODE: Primary | ICD-10-CM

## 2025-02-20 DIAGNOSIS — E78.49 OTHER HYPERLIPIDEMIA: ICD-10-CM

## 2025-02-20 RX ORDER — ATORVASTATIN CALCIUM 10 MG/1
10 TABLET, FILM COATED ORAL NIGHTLY
Qty: 90 TABLET | Refills: 2 | Status: SHIPPED | OUTPATIENT
Start: 2025-02-20 | End: 2025-11-17

## 2025-02-27 ENCOUNTER — HOSPITAL ENCOUNTER (OUTPATIENT)
Facility: HOSPITAL | Age: 85
Discharge: HOME | End: 2025-02-27
Admitting: FAMILY MEDICINE
Payer: MEDICARE

## 2025-02-27 DIAGNOSIS — R59.9 ENLARGED LYMPH NODE: ICD-10-CM

## 2025-02-27 PROCEDURE — 25510000001 IOPAMIDOL 61 % SOLUTION: Performed by: FAMILY MEDICINE

## 2025-02-27 PROCEDURE — 71260 CT THORAX DX C+: CPT

## 2025-02-27 RX ORDER — IOPAMIDOL 612 MG/ML
100 INJECTION, SOLUTION INTRAVASCULAR
Status: COMPLETED | OUTPATIENT
Start: 2025-02-27 | End: 2025-02-27

## 2025-02-27 RX ADMIN — IOPAMIDOL 75 ML: 612 INJECTION, SOLUTION INTRAVENOUS at 11:03

## 2025-03-20 RX ORDER — ATENOLOL 50 MG/1
50 TABLET ORAL 2 TIMES DAILY
Qty: 60 TABLET | Refills: 2 | Status: SHIPPED | OUTPATIENT
Start: 2025-03-20

## 2025-03-25 RX ORDER — DIGOXIN 125 MCG
125 TABLET ORAL DAILY
Qty: 30 TABLET | Refills: 2 | Status: SHIPPED | OUTPATIENT
Start: 2025-03-25

## 2025-05-19 ENCOUNTER — TELEPHONE (OUTPATIENT)
Dept: CARDIOLOGY | Age: 85
End: 2025-05-19

## 2025-05-19 NOTE — TELEPHONE ENCOUNTER
Caller: Viktoria Villasenor    Relationship to patient: Self    Best call back number: [phone]        Type of visit: FOLLOW UP     Requested date: AS SOON AS POSSIBLE AND A MORNING APPOINTMENT TIME    If rescheduling, when is the original appointment: N/A     Additional notes:PATIENT STATED THAT SHE IS IN AFIB AND WHEN SHE STANDS UP AND WALKS SHE GETS DIZZINESS AND SHORTNESS OF BREATH. PATIENT WOULD LIKE TO SEE LEANDER KELLEY AS SOON AS POSSIBLE AND IF POSSIBLE AT [facility] LOCATION.

## 2025-05-21 NOTE — PROGRESS NOTES
RM:________     PCP: Magen Olson MD                                     Last EKG :  2024    : 1940  AGE: 84 y.o.    REASON FOR VISIT: __________________________________________    ____________________________________________________________                                                       Wt Readings from Last 3 Encounters:   25 88.9 kg (196 lb)   24 89.8 kg (198 lb)   24 87.5 kg (193 lb)      BP Readings from Last 3 Encounters:   25 116/60   24 122/78   24 120/70          WT: ____________ HT: ______ BP: __________ HR ______   02% _______                 Lipid Panel          1/15/2025    10:14   Lipid Panel   Total Cholesterol 114    Triglycerides 112    HDL Cholesterol 37    VLDL Cholesterol 21    LDL Cholesterol  56

## 2025-05-22 ENCOUNTER — OFFICE VISIT (OUTPATIENT)
Dept: CARDIOLOGY | Age: 85
End: 2025-05-22
Payer: MEDICARE

## 2025-05-22 VITALS
OXYGEN SATURATION: 100 % | HEIGHT: 65 IN | DIASTOLIC BLOOD PRESSURE: 54 MMHG | WEIGHT: 193.8 LBS | BODY MASS INDEX: 32.29 KG/M2 | SYSTOLIC BLOOD PRESSURE: 114 MMHG | HEART RATE: 76 BPM

## 2025-05-22 DIAGNOSIS — R06.02 SHORTNESS OF BREATH: ICD-10-CM

## 2025-05-22 DIAGNOSIS — I10 ESSENTIAL HYPERTENSION: ICD-10-CM

## 2025-05-22 DIAGNOSIS — I48.19 PERSISTENT ATRIAL FIBRILLATION: Primary | ICD-10-CM

## 2025-05-22 PROCEDURE — 93000 ELECTROCARDIOGRAM COMPLETE: CPT | Performed by: NURSE PRACTITIONER

## 2025-05-22 PROCEDURE — 99214 OFFICE O/P EST MOD 30 MIN: CPT | Performed by: NURSE PRACTITIONER

## 2025-05-22 PROCEDURE — 3074F SYST BP LT 130 MM HG: CPT | Performed by: NURSE PRACTITIONER

## 2025-05-22 PROCEDURE — 3078F DIAST BP <80 MM HG: CPT | Performed by: NURSE PRACTITIONER

## 2025-05-22 NOTE — ASSESSMENT & PLAN NOTE
Patient is in rate controlled atrial fibrillation.  Reports shortness of breath with minimal exertion and tachycardia with minimal exertion.  Recommend ZIO monitor to assess heart rate  Recommend echocardiogram to further 6 function and structure of the heart  Continue atenolol 50 mg twice daily  Continue digoxin 125 mcg/day, will get digoxin level  Patient declines anticoagulation

## 2025-05-22 NOTE — PROGRESS NOTES
CARDIOLOGY        Patient Name: Viktoria Villasenor  :1940  Age: 84 y.o.  Primary Cardiologist: Chuy Orellana MD  Encounter Provider:  WARREN Hay    Date of Service: 2025      CHIEF COMPLAINT / REASON FOR OFFICE VISIT     Atrial Fibrillation, Dizziness, and Shortness of Breath      HPI  Viktoria Villasenor is a 84 y.o. female who presents today for evaluation of symptoms.     Pt has a  history significant for atrial fibrillation, hypertension, liver cirrhosis.    Patient called the office 2025 with complaints of feeling that she was in atrial fibrillation, dizziness and dyspnea.    Patient reports that for the past several weeks she has had increased shortness of breath with minimal exertion.  She adds that she feels that her heart is racing with minimal exertion.  She adds intermittent dizziness and headache.  She reports that the CASTRO is the symptom that is bothering her the most.  Today, she actually feels better than she has.  She does go to the grocery store to attempt to exercise, but notes that when she does this she is exhausted for several hours after.  Denies edema, fever, cough      HISTORY OF PRESENT ILLNESS       Echocardiogram 2024.  LVEF 66-70%.  Normal RV cavity size and systolic function.  Aortic valve leaflets are mildly to moderately calcified.  Moderate mitral valve regurgitation.    Cardiac catheterization 2024.  Normal coronary angiography.    Medical record review reveals that patient was recently hospitalized with discharge date 11/15/2024.  Patient was found to be volume overloaded secondary to decompensated cirrhosis.  She also had new onset atrial fibrillation with RVR.  Given her history of liver cirrhosis and duodenitis she was not started on anticoagulation.  She was challenging to get heart rates controlled and she was discharged with metoprolol every 8 hours as well as furosemide and spironolactone.    Unfortunately, 2 days later patient  "again required hospitalization and was ultimately discharged 11/22/2024.  Patient was found to have acute bronchitis secondary to rhinovirus.  She again had challenges getting heart rates controlled.  She was also started on steroids.  At time of discharge patient was on atenolol as well as digoxin with adequate heart rate control.  Again, avoided systemic anticoagulation secondary to duodenitis and liver cirrhosis.      The following portions of the patient's history were reviewed and updated as appropriate: allergies, current medications, past family history, past medical history, past social history, past surgical history and problem list.      VITAL SIGNS     Visit Vitals  /54 (BP Location: Left arm, Patient Position: Sitting, Cuff Size: Adult)   Pulse 76   Ht 164.6 cm (64.8\")   Wt 87.9 kg (193 lb 12.8 oz)   SpO2 100%   BMI 32.45 kg/m²           Wt Readings from Last 3 Encounters:   05/22/25 87.9 kg (193 lb 12.8 oz)   01/23/25 88.9 kg (196 lb)   12/30/24 89.8 kg (198 lb)     Body mass index is 32.45 kg/m².      REVIEW OF SYSTEMS     Review of Systems   Constitutional: Positive for malaise/fatigue. Negative for chills, fever, weight gain and weight loss.   Cardiovascular:  Positive for dyspnea on exertion, irregular heartbeat and palpitations. Negative for leg swelling.   Respiratory:  Negative for cough, snoring and wheezing.    Hematologic/Lymphatic: Negative for bleeding problem. Does not bruise/bleed easily.   Skin:  Negative for color change.   Musculoskeletal:  Negative for falls, joint pain and myalgias.   Gastrointestinal:  Negative for melena and nausea.   Genitourinary:  Negative for hematuria.   Neurological:  Positive for dizziness and headaches. Negative for excessive daytime sleepiness.   Psychiatric/Behavioral:  Negative for depression. The patient is not nervous/anxious.            PHYSICAL EXAMINATION     Vitals and nursing note reviewed.   Constitutional:       Appearance: Normal " appearance. Well-developed.   Eyes:      Conjunctiva/sclera: Conjunctivae normal.   Neck:      Vascular: No carotid bruit.   Pulmonary:      Breath sounds: Normal breath sounds.   Cardiovascular:      Normal rate. Irregularly irregular rhythm. Normal S1 with normal intensity. Normal S2 with normal intensity.       Murmurs: There is no murmur.      No gallop.  No click. No rub.   Edema:     Peripheral edema absent.   Musculoskeletal: Normal range of motion. Skin:     General: Skin is warm and dry.   Neurological:      Mental Status: Alert and oriented to person, place, and time.      GCS: GCS eye subscore is 4. GCS verbal subscore is 5. GCS motor subscore is 6.   Psychiatric:         Speech: Speech normal.         Behavior: Behavior normal.         Thought Content: Thought content normal.         Judgment: Judgment normal.           REVIEWED DATA       ECG 12 Lead    Date/Time: 5/22/2025 12:56 PM  Performed by: Loyda Hernandez APRN    Authorized by: Loyda Hernandez APRN  Comparison: compared with previous ECG from 12/4/2024  Rhythm: atrial fibrillation  Rate: normal  BPM: 76  Conduction: right bundle branch block  QRS axis: normal  Other findings: non-specific ST-T wave changes    Clinical impression: non-specific ECG              Lipid Panel          1/15/2025    10:14   Lipid Panel   Total Cholesterol 114    Triglycerides 112    HDL Cholesterol 37    VLDL Cholesterol 21    LDL Cholesterol  56        Lab Results   Component Value Date     01/15/2025     12/04/2024    K 4.1 01/15/2025    K 3.8 12/04/2024     01/15/2025     12/04/2024    CO2 26.7 01/15/2025    CO2 23.8 12/04/2024    BUN 11 01/15/2025    BUN 23 12/04/2024    CREATININE 1.14 (H) 01/15/2025    CREATININE 1.31 (H) 12/04/2024    EGFRIFNONA 66 04/07/2021    EGFRIFNONA 63 09/25/2019    EGFRIFAFRI 81 04/07/2021    EGFRIFAFRI 77 09/25/2019    GLUCOSE 104 (H) 01/15/2025    GLUCOSE 117 (H) 12/04/2024    CALCIUM 9.1 01/15/2025     CALCIUM 8.7 12/04/2024    ALBUMIN 3.8 01/15/2025    ALBUMIN 4.2 11/17/2024    BILITOT 0.8 01/15/2025    BILITOT 0.7 11/17/2024    AST 25 01/15/2025    AST 25 11/17/2024    ALT 13 01/15/2025    ALT 19 11/17/2024     Lab Results   Component Value Date    WBC 9.34 01/15/2025    WBC 5.62 11/18/2024    HGB 13.8 01/15/2025    HGB 13.0 11/18/2024    HCT 42.6 01/15/2025    HCT 40.6 11/18/2024    MCV 83.4 01/15/2025    MCV 85.8 11/18/2024     01/15/2025     (L) 11/18/2024     Lab Results   Component Value Date    PROBNP 1,323.0 11/11/2024     Lab Results   Component Value Date    CKTOTAL 54 01/15/2025    TROPONINT 17 (H) 11/17/2024     Lab Results   Component Value Date    TSH 1.190 11/12/2024    TSH 2.030 01/25/2024             ASSESSMENT & PLAN     Diagnoses and all orders for this visit:    1. Persistent atrial fibrillation (Primary)  Overview:  CHADS-VASc Risk Assessment               4 Total Score    1 Hypertension    2 Age >/= 75    1 Sex: Female    Criteria that do not apply:    CHF    DM    PRIOR STROKE/TIA/THROMBO    Vascular Disease    Age 65-74              Assessment & Plan:  Patient is in rate controlled atrial fibrillation.  Reports shortness of breath with minimal exertion and tachycardia with minimal exertion.  Recommend ZIO monitor to assess heart rate  Recommend echocardiogram to further 6 function and structure of the heart  Continue atenolol 50 mg twice daily  Continue digoxin 125 mcg/day, will get digoxin level  Patient declines anticoagulation    Orders:  -     Adult Transthoracic Echo Complete W/ Cont if Necessary Per Protocol; Future  -     Holter Monitor - 72 Hour Up To 15 Days; Future  -     CBC & Differential; Future  -     Comprehensive Metabolic Panel; Future  -     Digoxin Level; Future  -     TSH; Future  -     T4, free; Future    2. Shortness of breath  Assessment & Plan:  Plan as above for atrial fibrillation    Orders:  -     Adult Transthoracic Echo Complete W/ Cont if  Necessary Per Protocol; Future  -     Holter Monitor - 72 Hour Up To 15 Days; Future    3. Essential hypertension  Overview:  Atenolol 50 mg twice daily  Furosemide 20 mg/day  Spironolactone 25 mg/day    Assessment & Plan:  Hypertension is stable and controlled  Continue current treatment regimen.  Dietary sodium restriction.  Regular aerobic exercise.  Ambulatory blood pressure monitoring.  Blood pressure will be reassessed in 3 months.      Other orders  -     ECG 12 Lead          Future Appointments         Provider Department Center    6/13/2025 9:15 AM (Arrive by 8:45 AM) [facility] ECHO/VAS BACK [facility] OUTPATIENT ECHOCARDIOGRAPHY [facility]    6/13/2025 10:30 AM (Arrive by 10:15 AM) [facility] HOLTER [facility] CARDIOLOGY [facility]    7/24/2025 10:30 AM (Arrive by 10:15 AM) Magen Olson MD [facility] PRIMARY CARE [facility]              MEDICATIONS         Discharge Medications            Accurate as of May 22, 2025  1:51 PM. If you have any questions, ask your nurse or doctor.                Continue These Medications        Instructions Start Date   apixaban 5 MG tablet tablet  Commonly known as: ELIQUIS   5 mg, Oral, 2 Times Daily      atenolol 50 MG tablet  Commonly known as: TENORMIN   50 mg, Oral, 2 Times Daily      atorvastatin 10 MG tablet  Commonly known as: LIPITOR   10 mg, Oral, Nightly      digoxin 125 MCG tablet  Commonly known as: LANOXIN   125 mcg, Oral, Daily      furosemide 20 MG tablet  Commonly known as: LASIX   20 mg, Oral, Daily      pantoprazole 40 MG EC tablet  Commonly known as: PROTONIX   40 mg, Oral, Every Early Morning      spironolactone 25 MG tablet  Commonly known as: ALDACTONE   25 mg, Oral, Daily                   **Dragon Disclaimer:   Much of this encounter note is an electronic transcription/translation of spoken language to printed text. The electronic translation of spoken language may permit erroneous, or at times, nonsensical words or  phrases to be inadvertently transcribed. Although I have reviewed the note for such errors, some may still exist.

## 2025-06-09 PROBLEM — K57.92 DIVERTICULITIS: Status: ACTIVE | Noted: 2024-12-09

## 2025-06-09 PROBLEM — K29.80 DUODENITIS: Status: ACTIVE | Noted: 2024-12-09

## 2025-06-09 PROBLEM — K74.60 CIRRHOSIS OF LIVER WITHOUT ASCITES: Status: ACTIVE | Noted: 2024-12-09

## 2025-06-12 ENCOUNTER — TELEPHONE (OUTPATIENT)
Dept: CARDIOLOGY | Age: 85
End: 2025-06-12
Payer: MEDICARE

## 2025-06-13 ENCOUNTER — APPOINTMENT (OUTPATIENT)
Dept: GENERAL RADIOLOGY | Facility: HOSPITAL | Age: 85
DRG: 378 | End: 2025-06-13
Payer: MEDICARE

## 2025-06-13 ENCOUNTER — HOSPITAL ENCOUNTER (INPATIENT)
Facility: HOSPITAL | Age: 85
LOS: 4 days | Discharge: HOME | DRG: 378 | End: 2025-06-18
Attending: EMERGENCY MEDICINE | Admitting: STUDENT IN AN ORGANIZED HEALTH CARE EDUCATION/TRAINING PROGRAM
Payer: MEDICARE

## 2025-06-13 ENCOUNTER — HOSPITAL ENCOUNTER (OUTPATIENT)
Dept: CARDIOLOGY | Facility: HOSPITAL | Age: 85
Discharge: HOME | End: 2025-06-13
Payer: MEDICARE

## 2025-06-13 ENCOUNTER — LAB (OUTPATIENT)
Dept: LAB | Facility: HOSPITAL | Age: 85
End: 2025-06-13
Payer: MEDICARE

## 2025-06-13 VITALS
HEART RATE: 80 BPM | WEIGHT: 193 LBS | SYSTOLIC BLOOD PRESSURE: 122 MMHG | HEIGHT: 65 IN | DIASTOLIC BLOOD PRESSURE: 64 MMHG | BODY MASS INDEX: 32.15 KG/M2

## 2025-06-13 DIAGNOSIS — I48.19 PERSISTENT ATRIAL FIBRILLATION: ICD-10-CM

## 2025-06-13 DIAGNOSIS — K92.2 GASTROINTESTINAL HEMORRHAGE, UNSPECIFIED GASTROINTESTINAL HEMORRHAGE TYPE: ICD-10-CM

## 2025-06-13 DIAGNOSIS — D64.9 ANEMIA REQUIRING TRANSFUSIONS: Primary | ICD-10-CM

## 2025-06-13 DIAGNOSIS — R79.89 ELEVATED BRAIN NATRIURETIC PEPTIDE (BNP) LEVEL: ICD-10-CM

## 2025-06-13 DIAGNOSIS — R06.02 SHORTNESS OF BREATH: ICD-10-CM

## 2025-06-13 LAB
ABO GROUP BLD: NORMAL
ALBUMIN SERPL-MCNC: 3.8 G/DL (ref 3.5–5.2)
ALBUMIN SERPL-MCNC: 4 G/DL (ref 3.5–5.2)
ALBUMIN/GLOB SERPL: 1.2 G/DL
ALBUMIN/GLOB SERPL: 1.4 G/DL
ALP SERPL-CCNC: 76 U/L (ref 39–117)
ALP SERPL-CCNC: 78 U/L (ref 39–117)
ALT SERPL W P-5'-P-CCNC: 10 U/L (ref 1–33)
ALT SERPL W P-5'-P-CCNC: 11 U/L (ref 1–33)
ANION GAP SERPL CALCULATED.3IONS-SCNC: 12.9 MMOL/L (ref 5–15)
ANION GAP SERPL CALCULATED.3IONS-SCNC: 9.7 MMOL/L (ref 5–15)
ANISOCYTOSIS BLD QL: ABNORMAL
ANISOCYTOSIS BLD QL: ABNORMAL
AORTIC ARCH: 2 CM
AORTIC DIMENSIONLESS INDEX: 0.55 (DI)
APTT PPP: 33.7 SECONDS (ref 22.7–35.4)
ASCENDING AORTA: 2.9 CM
AST SERPL-CCNC: 17 U/L (ref 1–32)
AST SERPL-CCNC: 18 U/L (ref 1–32)
AV MEAN PRESS GRAD SYS DOP V1V2: 10 MMHG
AV VMAX SYS DOP: 220 CM/SEC
BASOPHILS # BLD MANUAL: 0.12 10*3/MM3 (ref 0–0.2)
BASOPHILS # BLD MANUAL: 0.13 10*3/MM3 (ref 0–0.2)
BASOPHILS NFR BLD MANUAL: 1 % (ref 0–1.5)
BASOPHILS NFR BLD MANUAL: 1 % (ref 0–1.5)
BH CV ECHO MEAS - ACS: 1.56 CM
BH CV ECHO MEAS - AO MAX PG: 19.4 MMHG
BH CV ECHO MEAS - AO ROOT DIAM: 2.8 CM
BH CV ECHO MEAS - AO V2 VTI: 46.2 CM
BH CV ECHO MEAS - AVA(I,D): 1.23 CM2
BH CV ECHO MEAS - EDV(CUBED): 125 ML
BH CV ECHO MEAS - EDV(MOD-SP2): 76 ML
BH CV ECHO MEAS - EDV(MOD-SP4): 81 ML
BH CV ECHO MEAS - EF(MOD-SP2): 64.5 %
BH CV ECHO MEAS - ESV(CUBED): 36.6 ML
BH CV ECHO MEAS - ESV(MOD-SP2): 27 ML
BH CV ECHO MEAS - FS: 33.6 %
BH CV ECHO MEAS - IVS/LVPW: 1 CM
BH CV ECHO MEAS - IVSD: 0.9 CM
BH CV ECHO MEAS - LAT PEAK E' VEL: 9.7 CM/SEC
BH CV ECHO MEAS - LV DIASTOLIC VOL/BSA (35-75): 41.6 CM2
BH CV ECHO MEAS - LV MASS(C)D: 158.2 GRAMS
BH CV ECHO MEAS - LV MAX PG: 5.5 MMHG
BH CV ECHO MEAS - LV MEAN PG: 3 MMHG
BH CV ECHO MEAS - LV V1 MAX: 117 CM/SEC
BH CV ECHO MEAS - LV V1 VTI: 25.2 CM
BH CV ECHO MEAS - LVIDD: 5 CM
BH CV ECHO MEAS - LVIDS: 3.3 CM
BH CV ECHO MEAS - LVOT AREA: 2.26 CM2
BH CV ECHO MEAS - LVOT DIAM: 1.7 CM
BH CV ECHO MEAS - LVPWD: 0.9 CM
BH CV ECHO MEAS - MED PEAK E' VEL: 9.7 CM/SEC
BH CV ECHO MEAS - MR MAX PG: 143.9 MMHG
BH CV ECHO MEAS - MR MAX VEL: 599.7 CM/SEC
BH CV ECHO MEAS - MV DEC SLOPE: 923.7 CM/SEC2
BH CV ECHO MEAS - MV DEC TIME: 0.19 SEC
BH CV ECHO MEAS - MV E MAX VEL: 130 CM/SEC
BH CV ECHO MEAS - MV MAX PG: 14.7 MMHG
BH CV ECHO MEAS - MV MEAN PG: 4.4 MMHG
BH CV ECHO MEAS - MV P1/2T: 62.2 MSEC
BH CV ECHO MEAS - MV V2 VTI: 44.3 CM
BH CV ECHO MEAS - MVA(P1/2T): 3.5 CM2
BH CV ECHO MEAS - MVA(VTI): 1.29 CM2
BH CV ECHO MEAS - PA ACC TIME: 0.1 SEC
BH CV ECHO MEAS - PA V2 MAX: 106.7 CM/SEC
BH CV ECHO MEAS - PULM DIAS VEL: 116.4 CM/SEC
BH CV ECHO MEAS - PULM S/D: 0.31
BH CV ECHO MEAS - PULM SYS VEL: 36.4 CM/SEC
BH CV ECHO MEAS - QP/QS: 1.05
BH CV ECHO MEAS - RAP SYSTOLE: 15 MMHG
BH CV ECHO MEAS - RV MAX PG: 4.4 MMHG
BH CV ECHO MEAS - RV V1 MAX: 105.3 CM/SEC
BH CV ECHO MEAS - RV V1 VTI: 18.9 CM
BH CV ECHO MEAS - RVOT DIAM: 2.01 CM
BH CV ECHO MEAS - RVSP: 70 MMHG
BH CV ECHO MEAS - SUP REN AO DIAM: 2 CM
BH CV ECHO MEAS - SV(LVOT): 57 ML
BH CV ECHO MEAS - SV(MOD-SP2): 49 ML
BH CV ECHO MEAS - SV(RVOT): 60 ML
BH CV ECHO MEAS - SVI(LVOT): 29.2 ML/M2
BH CV ECHO MEAS - SVI(MOD-SP2): 25.1 ML/M2
BH CV ECHO MEAS - TAPSE (>1.6): 1.61 CM
BH CV ECHO MEAS - TR MAX PG: 55.1 MMHG
BH CV ECHO MEAS - TR MAX VEL: 371 CM/SEC
BH CV ECHO MEAS RV FREE WALL STRAIN: -20.5 %
BH CV ECHO MEASUREMENTS AVERAGE E/E' RATIO: 13.4
BH CV XLRA - RV BASE: 3.8 CM
BH CV XLRA - RV LENGTH: 7 CM
BH CV XLRA - RV MID: 3.1 CM
BH CV XLRA - TDI S': 8.3 CM/SEC
BILIRUB SERPL-MCNC: 0.6 MG/DL (ref 0–1.2)
BILIRUB SERPL-MCNC: 0.8 MG/DL (ref 0–1.2)
BLD GP AB SCN SERPL QL: NEGATIVE
BUN SERPL-MCNC: 15 MG/DL (ref 8–23)
BUN SERPL-MCNC: 15 MG/DL (ref 8–23)
BUN/CREAT SERPL: 14 (ref 7–25)
BUN/CREAT SERPL: 14.9 (ref 7–25)
CALCIUM SPEC-SCNC: 8.8 MG/DL (ref 8.6–10.5)
CALCIUM SPEC-SCNC: 9 MG/DL (ref 8.6–10.5)
CHLORIDE SERPL-SCNC: 101 MMOL/L (ref 98–107)
CHLORIDE SERPL-SCNC: 103 MMOL/L (ref 98–107)
CO2 SERPL-SCNC: 25.1 MMOL/L (ref 22–29)
CO2 SERPL-SCNC: 25.3 MMOL/L (ref 22–29)
CREAT SERPL-MCNC: 1.01 MG/DL (ref 0.57–1)
CREAT SERPL-MCNC: 1.07 MG/DL (ref 0.57–1)
DACRYOCYTES BLD QL SMEAR: ABNORMAL
DEPRECATED RDW RBC AUTO: 42.6 FL (ref 37–54)
DEPRECATED RDW RBC AUTO: 43.6 FL (ref 37–54)
DIGOXIN SERPL-MCNC: 1 NG/ML (ref 0.6–1.2)
EGFRCR SERPLBLD CKD-EPI 2021: 52 ML/MIN/1.73
EGFRCR SERPLBLD CKD-EPI 2021: 55.7 ML/MIN/1.73
ELLIPTOCYTES BLD QL SMEAR: ABNORMAL
EOSINOPHIL # BLD MANUAL: 0.25 10*3/MM3 (ref 0–0.4)
EOSINOPHIL # BLD MANUAL: 0.35 10*3/MM3 (ref 0–0.4)
EOSINOPHIL NFR BLD MANUAL: 2 % (ref 0.3–6.2)
EOSINOPHIL NFR BLD MANUAL: 3 % (ref 0.3–6.2)
ERYTHROCYTE [DISTWIDTH] IN BLOOD BY AUTOMATED COUNT: 17.1 % (ref 12.3–15.4)
ERYTHROCYTE [DISTWIDTH] IN BLOOD BY AUTOMATED COUNT: 17.2 % (ref 12.3–15.4)
EXPIRATION DATE: ABNORMAL
FECAL OCCULT BLOOD SCREEN, POC: POSITIVE
FOLATE SERPL-MCNC: 6.31 NG/ML (ref 4.78–24.2)
GEN 5 1HR TROPONIN T REFLEX: 17 NG/L
GLOBULIN UR ELPH-MCNC: 2.8 GM/DL
GLOBULIN UR ELPH-MCNC: 3.1 GM/DL
GLUCOSE SERPL-MCNC: 114 MG/DL (ref 65–99)
GLUCOSE SERPL-MCNC: 97 MG/DL (ref 65–99)
HCT VFR BLD AUTO: 24.5 % (ref 34–46.6)
HCT VFR BLD AUTO: 26.5 % (ref 34–46.6)
HGB BLD-MCNC: 6.7 G/DL (ref 12–15.9)
HGB BLD-MCNC: 7 G/DL (ref 12–15.9)
HYPOCHROMIA BLD QL: ABNORMAL
INR PPP: 1.5 (ref 0.9–1.1)
IRON 24H UR-MRATE: 21 MCG/DL (ref 37–145)
IRON SATN MFR SERPL: 4 % (ref 20–50)
LEFT ATRIUM VOLUME INDEX: 25.4 ML/M2
LV EF BIPLANE MOD: 64 %
LYMPHOCYTES # BLD MANUAL: 0.9 10*3/MM3 (ref 0.7–3.1)
LYMPHOCYTES # BLD MANUAL: 1.4 10*3/MM3 (ref 0.7–3.1)
LYMPHOCYTES NFR BLD MANUAL: 10 % (ref 5–12)
LYMPHOCYTES NFR BLD MANUAL: 8.2 % (ref 5–12)
Lab: 289
MCH RBC QN AUTO: 18.6 PG (ref 26.6–33)
MCH RBC QN AUTO: 18.9 PG (ref 26.6–33)
MCHC RBC AUTO-ENTMCNC: 26.4 G/DL (ref 31.5–35.7)
MCHC RBC AUTO-ENTMCNC: 27.3 G/DL (ref 31.5–35.7)
MCV RBC AUTO: 69.2 FL (ref 79–97)
MCV RBC AUTO: 70.3 FL (ref 79–97)
MICROCYTES BLD QL: ABNORMAL
MONOCYTES # BLD: 1.03 10*3/MM3 (ref 0.1–0.9)
MONOCYTES # BLD: 1.17 10*3/MM3 (ref 0.1–0.9)
NEGATIVE CONTROL: NEGATIVE
NEUTROPHILS # BLD AUTO: 10.3 10*3/MM3 (ref 1.7–7)
NEUTROPHILS # BLD AUTO: 8.63 10*3/MM3 (ref 1.7–7)
NEUTROPHILS NFR BLD MANUAL: 74 % (ref 42.7–76)
NEUTROPHILS NFR BLD MANUAL: 81.6 % (ref 42.7–76)
NRBC SPEC MANUAL: 1 /100 WBC (ref 0–0.2)
NT-PROBNP SERPL-MCNC: 2911 PG/ML (ref 0–1800)
OVALOCYTES BLD QL SMEAR: ABNORMAL
PLAT MORPH BLD: NORMAL
PLAT MORPH BLD: NORMAL
PLATELET # BLD AUTO: 247 10*3/MM3 (ref 140–450)
PLATELET # BLD AUTO: 279 10*3/MM3 (ref 140–450)
PMV BLD AUTO: 10.4 FL (ref 6–12)
PMV BLD AUTO: 11.4 FL (ref 6–12)
POIKILOCYTOSIS BLD QL SMEAR: ABNORMAL
POIKILOCYTOSIS BLD QL SMEAR: ABNORMAL
POLYCHROMASIA BLD QL SMEAR: ABNORMAL
POSITIVE CONTROL: POSITIVE
POTASSIUM SERPL-SCNC: 4.1 MMOL/L (ref 3.5–5.2)
POTASSIUM SERPL-SCNC: 4.4 MMOL/L (ref 3.5–5.2)
PROT SERPL-MCNC: 6.8 G/DL (ref 6–8.5)
PROT SERPL-MCNC: 6.9 G/DL (ref 6–8.5)
PROTHROMBIN TIME: 18.1 SECONDS (ref 11.7–14.2)
RBC # BLD AUTO: 3.54 10*6/MM3 (ref 3.77–5.28)
RBC # BLD AUTO: 3.77 10*6/MM3 (ref 3.77–5.28)
RH BLD: POSITIVE
SINUS: 2.8 CM
SODIUM SERPL-SCNC: 136 MMOL/L (ref 136–145)
SODIUM SERPL-SCNC: 141 MMOL/L (ref 136–145)
STJ: 2.42 CM
T&S EXPIRATION DATE: NORMAL
T4 FREE SERPL-MCNC: 1.3 NG/DL (ref 0.92–1.68)
TIBC SERPL-MCNC: 532 MCG/DL (ref 298–536)
TRANSFERRIN SERPL-MCNC: 357 MG/DL (ref 200–360)
TROPONIN T % DELTA: 6
TROPONIN T NUMERIC DELTA: 1 NG/L
TROPONIN T SERPL HS-MCNC: 16 NG/L
TSH SERPL DL<=0.05 MIU/L-ACNC: 1.53 UIU/ML (ref 0.27–4.2)
VARIANT LYMPHS NFR BLD MANUAL: 12 % (ref 19.6–45.3)
VARIANT LYMPHS NFR BLD MANUAL: 7.1 % (ref 19.6–45.3)
WBC MORPH BLD: NORMAL
WBC MORPH BLD: NORMAL
WBC NRBC COR # BLD AUTO: 11.66 10*3/MM3 (ref 3.4–10.8)
WBC NRBC COR # BLD AUTO: 12.62 10*3/MM3 (ref 3.4–10.8)

## 2025-06-13 PROCEDURE — 86923 COMPATIBILITY TEST ELECTRIC: CPT

## 2025-06-13 PROCEDURE — 84466 ASSAY OF TRANSFERRIN: CPT | Performed by: NURSE PRACTITIONER

## 2025-06-13 PROCEDURE — 85730 THROMBOPLASTIN TIME PARTIAL: CPT | Performed by: PHYSICIAN ASSISTANT

## 2025-06-13 PROCEDURE — 85007 BL SMEAR W/DIFF WBC COUNT: CPT | Performed by: PHYSICIAN ASSISTANT

## 2025-06-13 PROCEDURE — P9016 RBC LEUKOCYTES REDUCED: HCPCS

## 2025-06-13 PROCEDURE — 36415 COLL VENOUS BLD VENIPUNCTURE: CPT

## 2025-06-13 PROCEDURE — 84484 ASSAY OF TROPONIN QUANT: CPT | Performed by: PHYSICIAN ASSISTANT

## 2025-06-13 PROCEDURE — 36430 TRANSFUSION BLD/BLD COMPNT: CPT

## 2025-06-13 PROCEDURE — 86901 BLOOD TYPING SEROLOGIC RH(D): CPT

## 2025-06-13 PROCEDURE — 86900 BLOOD TYPING SEROLOGIC ABO: CPT

## 2025-06-13 PROCEDURE — G0378 HOSPITAL OBSERVATION PER HR: HCPCS

## 2025-06-13 PROCEDURE — 86850 RBC ANTIBODY SCREEN: CPT | Performed by: PHYSICIAN ASSISTANT

## 2025-06-13 PROCEDURE — 84439 ASSAY OF FREE THYROXINE: CPT

## 2025-06-13 PROCEDURE — 80053 COMPREHEN METABOLIC PANEL: CPT

## 2025-06-13 PROCEDURE — 80053 COMPREHEN METABOLIC PANEL: CPT | Performed by: PHYSICIAN ASSISTANT

## 2025-06-13 PROCEDURE — 85610 PROTHROMBIN TIME: CPT | Performed by: PHYSICIAN ASSISTANT

## 2025-06-13 PROCEDURE — 86900 BLOOD TYPING SEROLOGIC ABO: CPT | Performed by: PHYSICIAN ASSISTANT

## 2025-06-13 PROCEDURE — 82270 OCCULT BLOOD FECES: CPT | Performed by: PHYSICIAN ASSISTANT

## 2025-06-13 PROCEDURE — 80162 ASSAY OF DIGOXIN TOTAL: CPT

## 2025-06-13 PROCEDURE — 85025 COMPLETE CBC W/AUTO DIFF WBC: CPT | Performed by: PHYSICIAN ASSISTANT

## 2025-06-13 PROCEDURE — 84443 ASSAY THYROID STIM HORMONE: CPT

## 2025-06-13 PROCEDURE — 93306 TTE W/DOPPLER COMPLETE: CPT

## 2025-06-13 PROCEDURE — 99291 CRITICAL CARE FIRST HOUR: CPT

## 2025-06-13 PROCEDURE — 83880 ASSAY OF NATRIURETIC PEPTIDE: CPT | Performed by: PHYSICIAN ASSISTANT

## 2025-06-13 PROCEDURE — 82746 ASSAY OF FOLIC ACID SERUM: CPT | Performed by: NURSE PRACTITIONER

## 2025-06-13 PROCEDURE — 86901 BLOOD TYPING SEROLOGIC RH(D): CPT | Performed by: PHYSICIAN ASSISTANT

## 2025-06-13 PROCEDURE — 83540 ASSAY OF IRON: CPT | Performed by: NURSE PRACTITIONER

## 2025-06-13 PROCEDURE — 85007 BL SMEAR W/DIFF WBC COUNT: CPT

## 2025-06-13 PROCEDURE — 71045 X-RAY EXAM CHEST 1 VIEW: CPT

## 2025-06-13 PROCEDURE — 85025 COMPLETE CBC W/AUTO DIFF WBC: CPT

## 2025-06-13 RX ORDER — ONDANSETRON 4 MG/1
4 TABLET, ORALLY DISINTEGRATING ORAL EVERY 6 HOURS PRN
Status: DISCONTINUED | OUTPATIENT
Start: 2025-06-13 | End: 2025-06-18 | Stop reason: HOSPADM

## 2025-06-13 RX ORDER — BISACODYL 5 MG/1
5 TABLET, DELAYED RELEASE ORAL DAILY PRN
Status: DISCONTINUED | OUTPATIENT
Start: 2025-06-13 | End: 2025-06-18 | Stop reason: HOSPADM

## 2025-06-13 RX ORDER — BISACODYL 10 MG
10 SUPPOSITORY, RECTAL RECTAL DAILY PRN
Status: DISCONTINUED | OUTPATIENT
Start: 2025-06-13 | End: 2025-06-18 | Stop reason: HOSPADM

## 2025-06-13 RX ORDER — NITROGLYCERIN 0.4 MG/1
0.4 TABLET SUBLINGUAL
Status: DISCONTINUED | OUTPATIENT
Start: 2025-06-13 | End: 2025-06-18 | Stop reason: HOSPADM

## 2025-06-13 RX ORDER — ACETAMINOPHEN 160 MG/5ML
650 SOLUTION ORAL EVERY 4 HOURS PRN
Status: DISCONTINUED | OUTPATIENT
Start: 2025-06-13 | End: 2025-06-18 | Stop reason: HOSPADM

## 2025-06-13 RX ORDER — ONDANSETRON 2 MG/ML
4 INJECTION INTRAMUSCULAR; INTRAVENOUS EVERY 6 HOURS PRN
Status: DISCONTINUED | OUTPATIENT
Start: 2025-06-13 | End: 2025-06-18 | Stop reason: HOSPADM

## 2025-06-13 RX ORDER — AMOXICILLIN 250 MG
2 CAPSULE ORAL 2 TIMES DAILY PRN
Status: DISCONTINUED | OUTPATIENT
Start: 2025-06-13 | End: 2025-06-18 | Stop reason: HOSPADM

## 2025-06-13 RX ORDER — POLYETHYLENE GLYCOL 3350 17 G/17G
17 POWDER, FOR SOLUTION ORAL DAILY PRN
Status: DISCONTINUED | OUTPATIENT
Start: 2025-06-13 | End: 2025-06-18 | Stop reason: HOSPADM

## 2025-06-13 RX ORDER — ALUMINA, MAGNESIA, AND SIMETHICONE 2400; 2400; 240 MG/30ML; MG/30ML; MG/30ML
15 SUSPENSION ORAL EVERY 6 HOURS PRN
Status: DISCONTINUED | OUTPATIENT
Start: 2025-06-13 | End: 2025-06-18 | Stop reason: HOSPADM

## 2025-06-13 RX ORDER — ACETAMINOPHEN 325 MG/1
650 TABLET ORAL EVERY 4 HOURS PRN
Status: DISCONTINUED | OUTPATIENT
Start: 2025-06-13 | End: 2025-06-18 | Stop reason: HOSPADM

## 2025-06-13 RX ORDER — ACETAMINOPHEN 650 MG/1
650 SUPPOSITORY RECTAL EVERY 4 HOURS PRN
Status: DISCONTINUED | OUTPATIENT
Start: 2025-06-13 | End: 2025-06-18 | Stop reason: HOSPADM

## 2025-06-13 RX ORDER — SODIUM CHLORIDE 0.9 % (FLUSH) 0.9 %
10 SYRINGE (ML) INJECTION AS NEEDED
Status: DISCONTINUED | OUTPATIENT
Start: 2025-06-13 | End: 2025-06-18 | Stop reason: HOSPADM

## 2025-06-13 RX ORDER — PANTOPRAZOLE SODIUM 40 MG/10ML
80 INJECTION, POWDER, LYOPHILIZED, FOR SOLUTION INTRAVENOUS ONCE
Status: COMPLETED | OUTPATIENT
Start: 2025-06-13 | End: 2025-06-13

## 2025-06-13 RX ADMIN — PANTOPRAZOLE SODIUM 80 MG: 40 INJECTION, POWDER, FOR SOLUTION INTRAVENOUS at 18:58

## 2025-06-13 RX ADMIN — PANTOPRAZOLE SODIUM 8 MG/HR: 40 INJECTION, POWDER, FOR SOLUTION INTRAVENOUS at 18:59

## 2025-06-13 NOTE — ED PROVIDER NOTES
EMERGENCY DEPARTMENT ENCOUNTER      PCP: Magen Olson MD  Patient Care Team:  Magen Olson MD as PCP - General  Magen Olson MD as PCP - Family Medicine  Ralph Turner MD as Consulting Physician (Otolaryngology)  Casper Hodge MD as Consulting Physician (Gastroenterology)  Loyda Hernandez APRN as Nurse Practitioner (Cardiology)  Chuy Orellana MD as Consulting Physician (Cardiology)   Independent Historians: Patient    HPI:  Chief Complaint: Abnormal lab   A complete HPI/ROS/PMH/PSH/SH/FH are unobtainable due to: None    Chronic or social conditions impacting patient care (social determinants of health): None    Context: Viktoria Villasenor is a 84 y.o. female who presents to the ED c/o acute shortness of breath, fatigue that has been worsening.  She was seen by cardiology today and had echocardiogram and labs drawn.  She was contacted due to low hemoglobin of 7 and abnormal echo and recommended to come to the ER for evaluation and admission to the hospital.  Patient takes Eliquis daily for history of atrial fibrillation.  She has not noticed any hematemesis or melena.  She does report some nausea and pain with eating.  She also reports some increase in lower extremity swelling.  No fevers or chills.    Review of prior external notes and/or external test results outside of this encounter: CBC performed earlier today 6/13/25 showed hemoglobin of 7.0. Previous hemoglobin level on 1/15/25 was 13.8.      PAST MEDICAL HISTORY  Active Ambulatory Problems     Diagnosis Date Noted    Other hyperlipidemia     Essential hypertension     Shortness of breath     Thyroid cysts     Mild intermittent asthma without complication     Esophagitis 04/06/2016    Prediabetes 08/03/2017    Class 1 obesity due to excess calories with serious comorbidity and body mass index (BMI) of 32.0 to 32.9 in adult 08/03/2017    Steatosis of liver 08/09/2018    Cardiac murmur 07/08/2021    Hearing problem of both ears 06/30/2022     NSTEMI, initial episode of care 12/20/2023    Persistent atrial fibrillation 11/11/2024    Subcarinal Lymph node seen on imaging study 01/23/2025    Cirrhosis of liver without ascites 12/09/2024    Duodenitis 12/09/2024    Diverticulitis 12/09/2024     Resolved Ambulatory Problems     Diagnosis Date Noted    Plantar fasciitis of left foot     Left shoulder tendonitis     ACE-inhibitor cough     Conjunctivitis     Hypopigmentation     Menopausal syndrome     Asymptomatic postmenopausal status     Pruritus     UTI (urinary tract infection)     Viral syndrome     Motion sickness     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    Chest tightness 04/06/2016    Elevated LFTs 06/28/2016    Diverticulitis 12/20/2023    Chest pain 04/25/2024    Decompensated cirrhosis 11/11/2024    Pleural effusion 11/15/2024    Asthma exacerbation 11/18/2024    Acute bronchitis due to Rhinovirus 11/18/2024    Encounter for monitoring digoxin therapy 12/04/2024    Cirrhosis of liver without ascites 12/09/2024    Duodenitis 12/09/2024     Past Medical History:   Diagnosis Date    Asthma     Cholelithiasis     Cirrhosis     Colon polyp 10/2016    Diverticulitis of colon 08/23    Fatty liver     GERD (gastroesophageal reflux disease)     GI (gastrointestinal bleed)     History of colon polyps     HLD (hyperlipidemia)     Hypertension     SOB (shortness of breath)     SOBOE (shortness of breath on exertion)        The patient has started, but not completed, their COVID-19 vaccination series.    PAST SURGICAL HISTORY  Past Surgical History:   Procedure Laterality Date    CARDIAC CATHETERIZATION      in the 80's    CARDIAC CATHETERIZATION N/A 05/04/2016    Procedure: Left Heart Cath;  Surgeon: Dale Vasquez MD;  Location:  [facility];  Service:     CARDIAC CATHETERIZATION N/A 05/04/2016    Procedure: Coronary angiography;  Surgeon: Dale Vasquez MD;  Location:  [facility];  Service:     CARDIAC CATHETERIZATION N/A 05/04/2016     Procedure: Left ventriculography;  Surgeon: Dale Vasquez MD;  Location:  [facility];  Service:     CARDIAC CATHETERIZATION N/A 4/26/2024    Procedure: Left Heart Cath;  Surgeon: Devon Cardona MD;  [facility];  Service: Cardiovascular;  Laterality: N/A;    CARDIAC CATHETERIZATION N/A 4/26/2024    Procedure: Coronary angiography;  Surgeon: Devon Cardona MD;  [facility];  Service: Cardiovascular;  Laterality: N/A;    CARDIAC CATHETERIZATION N/A 4/26/2024    Procedure: Left ventriculography;  Surgeon: Devon Cardona MD;  [facility];  Service: Cardiovascular;  Laterality: N/A;    CHOLECYSTECTOMY  1980    COLONOSCOPY      >5 years    COLONOSCOPY  12/01/2016    tics, IH, rectal biopsy showed changes suggestive of Schwannoma     ENDOSCOPY  12/01/2016    LA grade B esophagitis, mild Schatzki ring, HH, gastric polyp, erythematous mucosa in stomach    LASIK Bilateral     RECTAL ULTRASOUND N/A 02/07/2017    Procedure: ENDOSCOPIC ULTRASOUND (LOWER);  Surgeon: Casper Rodríguez MD;  Location: [facility] ENDOSCOPY;  Service:     SHOULDER SURGERY Left 06/17/2010    Dr. Green; repair L shoulder adhesive capsulitis    SKIN BIOPSY      TOTAL ABDOMINAL HYSTERECTOMY  1989    fibroid tumors    TUBAL ABDOMINAL LIGATION           FAMILY HISTORY  Family History   Problem Relation Age of Onset    Anxiety disorder Mother     Uterine cancer Mother     Dementia Mother     Cancer Father         Prostate Cancer     Leukemia Brother     Malig Hyperthermia Neg Hx          SOCIAL HISTORY  Social History     Socioeconomic History    Marital status:      Spouse name: Abram    Number of children: 2   Tobacco Use    Smoking status: Former     Current packs/day: 0.00     Average packs/day: 1 pack/day for 30.0 years (30.0 ttl pk-yrs)     Types: Cigarettes     Start date: 1/1/1964     Quit date: 1/1/1994     Years  since quittin.4    Smokeless tobacco: Never   Vaping Use    Vaping status: Never Used   Substance and Sexual Activity    Alcohol use: No    Drug use: No    Sexual activity: Not Currently     Partners: Male         ALLERGIES  Lisinopril        REVIEW OF SYSTEMS  Review of Systems   Constitutional:  Negative for fever.   Respiratory:  Positive for shortness of breath.    Cardiovascular:  Positive for leg swelling.   Gastrointestinal:  Positive for abdominal pain and nausea. Negative for blood in stool.        All systems reviewed and negative except for those discussed in HPI.       PHYSICAL EXAM    I have reviewed the triage vital signs and nursing notes.    ED Triage Vitals   Temp Heart Rate Resp BP SpO2   25 1612 25 1612 25 1612 25 1614 25 1612   97.8 °F (36.6 °C) 96 16 134/63 98 %      Temp src Heart Rate Source Patient Position BP Location FiO2 (%)   -- -- -- -- --              Physical Exam  GENERAL: alert, no acute distress  SKIN: Warm, dry  HENT: Normocephalic, atraumatic  EYES: no scleral icterus  CV: regular rhythm, regular rate  RESPIRATORY: normal effort, lungs clear, some mild conversational dyspnea  ABDOMEN: soft, mild upper abdominal discomfort, nondistended  Rectal: RN chaperone present, stool is dark brown, heme positive  MUSCULOSKELETAL: no deformity, trace edema to the lower extremities  NEURO: alert, moves all extremities, follows commands          LAB RESULTS  Recent Results (from the past 24 hours)   Adult Transthoracic Echo Complete W/ Cont if Necessary Per Protocol    Collection Time: 25  9:28 AM   Result Value Ref Range    LVIDd 5.0 cm    LVIDs 3.3 cm    IVSd 0.90 cm    LVPWd 0.90 cm    FS 33.6 %    IVS/LVPW 1.00 cm    ESV(cubed) 36.6 ml    EDV(cubed) 125.0 ml    LV Carson Vol (BSA corrected) 41.6 cm2    LV mass(C)d 158.2 grams    LVOT area 2.26 cm2    LVOT diam 1.70 cm    EDV(MOD-sp2) 76.0 ml    EDV(MOD-sp4) 81.0 ml    ESV(MOD-sp2) 27.0 ml    SV(MOD-sp2)  49.0 ml    SVi(MOD-SP2) 25.1 ml/m2    SVi (LVOT) 29.2 ml/m2    EF(MOD-sp2) 64.5 %    MV E max sajan 130.0 cm/sec    MV dec time 0.19 sec    LA ESV Index (BP) 25.4 ml/m2    Med Peak E' Sajan 9.7 cm/sec    Lat Peak E' Sajan 9.7 cm/sec    TR max sajan 371.0 cm/sec    Avg E/e' ratio 13.40     SV(LVOT) 57.0 ml    SV(RVOT) 60.0 ml    Qp/Qs 1.05     BH CV ECHO RV FREE WALL STRAIN -20.5 %    RV Base 3.8 cm    RV Mid 3.1 cm    RV Length 7.0 cm    TAPSE (>1.6) 1.61 cm    RV S' 8.3 cm/sec    Pulm Sys Sajan 36.4 cm/sec    Pulm Carson Sajan 116.4 cm/sec    Pulm S/D 0.31     LV V1 max 117.0 cm/sec    LV V1 max PG 5.5 mmHg    LV V1 mean PG 3.0 mmHg    LV V1 VTI 25.2 cm    Ao pk sjaan 220.0 cm/sec    Ao max PG 19.4 mmHg    Ao mean PG 10.0 mmHg    Ao V2 VTI 46.2 cm    ABRIL(I,D) 1.23 cm2    Dimensionless Index 0.55 (DI)    MV max PG 14.7 mmHg    MV mean PG 4.4 mmHg    MV V2 VTI 44.3 cm    MV P1/2t 62.2 msec    MVA(P1/2t) 3.5 cm2    MVA(VTI) 1.29 cm2    MV dec slope 923.7 cm/sec2    MR max sajan 599.7 cm/sec    MR max .9 mmHg    TR max PG 55.1 mmHg    RVOT diam 2.01 cm    RV V1 max PG 4.4 mmHg    RV V1 max 105.3 cm/sec    RV V1 VTI 18.9 cm    PA V2 max 106.7 cm/sec    PA acc time 0.10 sec    Ao root diam 2.8 cm    ACS 1.56 cm    Sinus 2.8 cm    STJ 2.42 cm    EF(MOD-bp) 64.0 %    RVSP(TR) 70 mmHg    RAP systole 15 mmHg    Ascending aorta 2.9 cm    Aortic arch 2.0 cm    Abdo Ao Diam 2.0 cm   Comprehensive Metabolic Panel    Collection Time: 06/13/25 10:18 AM    Specimen: Blood   Result Value Ref Range    Glucose 114 (H) 65 - 99 mg/dL    BUN 15.0 8.0 - 23.0 mg/dL    Creatinine 1.07 (H) 0.57 - 1.00 mg/dL    Sodium 136 136 - 145 mmol/L    Potassium 4.4 3.5 - 5.2 mmol/L    Chloride 101 98 - 107 mmol/L    CO2 25.3 22.0 - 29.0 mmol/L    Calcium 9.0 8.6 - 10.5 mg/dL    Total Protein 6.8 6.0 - 8.5 g/dL    Albumin 4.0 3.5 - 5.2 g/dL    ALT (SGPT) 11 1 - 33 U/L    AST (SGOT) 18 1 - 32 U/L    Alkaline Phosphatase 78 39 - 117 U/L    Total Bilirubin 0.8 0.0 -  1.2 mg/dL    Globulin 2.8 gm/dL    A/G Ratio 1.4 g/dL    BUN/Creatinine Ratio 14.0 7.0 - 25.0    Anion Gap 9.7 5.0 - 15.0 mmol/L    eGFR 52.0 (L) >60.0 mL/min/1.73   Digoxin Level    Collection Time: 06/13/25 10:18 AM    Specimen: Blood   Result Value Ref Range    Digoxin 1.00 0.60 - 1.20 ng/mL   TSH    Collection Time: 06/13/25 10:18 AM    Specimen: Blood   Result Value Ref Range    TSH 1.530 0.270 - 4.200 uIU/mL   T4, free    Collection Time: 06/13/25 10:18 AM    Specimen: Blood   Result Value Ref Range    Free T4 1.30 0.92 - 1.68 ng/dL   CBC Auto Differential    Collection Time: 06/13/25 10:18 AM    Specimen: Blood   Result Value Ref Range    WBC 12.62 (H) 3.40 - 10.80 10*3/mm3    RBC 3.77 3.77 - 5.28 10*6/mm3    Hemoglobin 7.0 (L) 12.0 - 15.9 g/dL    Hematocrit 26.5 (L) 34.0 - 46.6 %    MCV 70.3 (L) 79.0 - 97.0 fL    MCH 18.6 (L) 26.6 - 33.0 pg    MCHC 26.4 (L) 31.5 - 35.7 g/dL    RDW 17.2 (H) 12.3 - 15.4 %    RDW-SD 43.6 37.0 - 54.0 fl    MPV 11.4 6.0 - 12.0 fL    Platelets 279 140 - 450 10*3/mm3   Manual Differential    Collection Time: 06/13/25 10:18 AM    Specimen: Blood   Result Value Ref Range    Neutrophil % 81.6 (H) 42.7 - 76.0 %    Lymphocyte % 7.1 (L) 19.6 - 45.3 %    Monocyte % 8.2 5.0 - 12.0 %    Eosinophil % 2.0 0.3 - 6.2 %    Basophil % 1.0 0.0 - 1.5 %    Neutrophils Absolute 10.30 (H) 1.70 - 7.00 10*3/mm3    Lymphocytes Absolute 0.90 0.70 - 3.10 10*3/mm3    Monocytes Absolute 1.03 (H) 0.10 - 0.90 10*3/mm3    Eosinophils Absolute 0.25 0.00 - 0.40 10*3/mm3    Basophils Absolute 0.13 0.00 - 0.20 10*3/mm3    nRBC 1.0 (H) 0.0 - 0.2 /100 WBC    Anisocytosis Mod/2+ None Seen    Dacrocytes Slight/1+ None Seen    Elliptocytes Slight/1+ None Seen    Hypochromia Mod/2+ None Seen    Microcytes Mod/2+ None Seen    Ovalocytes Mod/2+ None Seen    Poikilocytes Mod/2+ None Seen    WBC Morphology Normal Normal    Platelet Morphology Normal Normal   Comprehensive Metabolic Panel    Collection Time: 06/13/25  6:07 PM     Specimen: Blood   Result Value Ref Range    Glucose 97 65 - 99 mg/dL    BUN 15.0 8.0 - 23.0 mg/dL    Creatinine 1.01 (H) 0.57 - 1.00 mg/dL    Sodium 141 136 - 145 mmol/L    Potassium 4.1 3.5 - 5.2 mmol/L    Chloride 103 98 - 107 mmol/L    CO2 25.1 22.0 - 29.0 mmol/L    Calcium 8.8 8.6 - 10.5 mg/dL    Total Protein 6.9 6.0 - 8.5 g/dL    Albumin 3.8 3.5 - 5.2 g/dL    ALT (SGPT) 10 1 - 33 U/L    AST (SGOT) 17 1 - 32 U/L    Alkaline Phosphatase 76 39 - 117 U/L    Total Bilirubin 0.6 0.0 - 1.2 mg/dL    Globulin 3.1 gm/dL    A/G Ratio 1.2 g/dL    BUN/Creatinine Ratio 14.9 7.0 - 25.0    Anion Gap 12.9 5.0 - 15.0 mmol/L    eGFR 55.7 (L) >60.0 mL/min/1.73   Protime-INR    Collection Time: 06/13/25  6:07 PM    Specimen: Blood   Result Value Ref Range    Protime 18.1 (H) 11.7 - 14.2 Seconds    INR 1.50 (H) 0.90 - 1.10   aPTT    Collection Time: 06/13/25  6:07 PM    Specimen: Blood   Result Value Ref Range    PTT 33.7 22.7 - 35.4 seconds   Type & Screen    Collection Time: 06/13/25  6:07 PM    Specimen: Blood   Result Value Ref Range    ABO Type O     RH type Positive     Antibody Screen Negative     T&S Expiration Date 6/16/2025 11:59:59 PM    CBC Auto Differential    Collection Time: 06/13/25  6:07 PM    Specimen: Blood   Result Value Ref Range    WBC 11.66 (H) 3.40 - 10.80 10*3/mm3    RBC 3.54 (L) 3.77 - 5.28 10*6/mm3    Hemoglobin 6.7 (C) 12.0 - 15.9 g/dL    Hematocrit 24.5 (L) 34.0 - 46.6 %    MCV 69.2 (L) 79.0 - 97.0 fL    MCH 18.9 (L) 26.6 - 33.0 pg    MCHC 27.3 (L) 31.5 - 35.7 g/dL    RDW 17.1 (H) 12.3 - 15.4 %    RDW-SD 42.6 37.0 - 54.0 fl    MPV 10.4 6.0 - 12.0 fL    Platelets 247 140 - 450 10*3/mm3   BNP    Collection Time: 06/13/25  6:07 PM    Specimen: Blood   Result Value Ref Range    proBNP 2,911.0 (H) 0.0 - 1,800.0 pg/mL   High Sensitivity Troponin T    Collection Time: 06/13/25  6:07 PM    Specimen: Blood   Result Value Ref Range    HS Troponin T 16 (H) <14 ng/L   Manual Differential    Collection Time:  06/13/25  6:07 PM    Specimen: Blood   Result Value Ref Range    Neutrophil % 74.0 42.7 - 76.0 %    Lymphocyte % 12.0 (L) 19.6 - 45.3 %    Monocyte % 10.0 5.0 - 12.0 %    Eosinophil % 3.0 0.3 - 6.2 %    Basophil % 1.0 0.0 - 1.5 %    Neutrophils Absolute 8.63 (H) 1.70 - 7.00 10*3/mm3    Lymphocytes Absolute 1.40 0.70 - 3.10 10*3/mm3    Monocytes Absolute 1.17 (H) 0.10 - 0.90 10*3/mm3    Eosinophils Absolute 0.35 0.00 - 0.40 10*3/mm3    Basophils Absolute 0.12 0.00 - 0.20 10*3/mm3    Anisocytosis Slight/1+ None Seen    Poikilocytes Slight/1+ None Seen    Polychromasia Slight/1+ None Seen    WBC Morphology Normal Normal    Platelet Morphology Normal Normal   POCT Occult Blood Stool    Collection Time: 06/13/25  6:26 PM    Specimen: Per Rectum; Stool   Result Value Ref Range    Fecal Occult Blood Positive (A) Negative    Lot Number [id number]     Expiration Date 11/30/2025     Positive Control Positive Positive    Negative Control Negative Negative   Prepare RBC, 1 Units    Collection Time: 06/13/25  7:58 PM   Result Value Ref Range    Product Code [id number]     Unit Number [id number]     UNIT  ABO O     UNIT  RH POS     Crossmatch Interpretation Compatible     Dispense Status IS     Blood Expiration Date [id number]     Blood Type Barcode 5100        Ordered the above labs and independently reviewed and interpreted the results.        RADIOLOGY  XR Chest 1 View  Result Date: 6/13/2025  XR CHEST 1 VW-  HISTORY: Female who is 84 years-old, short of breath  TECHNIQUE: Frontal view of the chest  COMPARISON: 11/22/2024  FINDINGS: The heart size is borderline. Pulmonary vasculature is unremarkable. Small left basilar atelectasis/infiltrate/effusion, follow-up advised. No pneumothorax. No acute osseous process.      As described.  This report was finalized on 6/13/2025 6:24 PM by Dr. Hubert Em M.D on Workstation: [id number]      Adult Transthoracic Echo Complete W/ Cont if Necessary Per Protocol  Result Date: 6/13/2025     Left ventricular systolic function is normal. Calculated left ventricular EF = 64%   Left ventricular diastolic function was indeterminate in the setting of A-fib.   Aortic valve sclerosis with borderline aortic stenosis. Peak velocity of the flow distal to the aortic valve is 220 cm/s. Aortic valve mean pressure gradient is 10 mmHg. Aortic valve dimensionless index is 0.55. Aortic valve area is 1.2 cm2.   Moderate mitral valve regurgitation is present.   Moderate to severe tricuspid valve regurgitation is present.   Estimated right ventricular systolic pressure from tricuspid regurgitation is markedly elevated (>55 mmHg). Calculated right ventricular systolic pressure from tricuspid regurgitation is 70 mmHg.   Severe pulmonary hypertension is present.   Visually there is moderate to severe biatrial enlargement, borderline IVC dilatation.       I ordered the above noted radiological studies. Independently reviewed and interpreted by me.  See dictation for official radiology interpretation.      PROCEDURES    Critical Care    Performed by: Rea Stern PA  Authorized by: Kingston Garcia MD    Critical care provider statement:     Critical care time (minutes):  35    Critical care was necessary to treat or prevent imminent or life-threatening deterioration of the following conditions:  Circulatory failure and respiratory failure    Critical care was time spent personally by me on the following activities:  Blood draw for specimens, development of treatment plan with patient or surrogate, evaluation of patient's response to treatment, examination of patient, obtaining history from patient or surrogate, ordering and performing treatments and interventions, ordering and review of laboratory studies, ordering and review of radiographic studies, pulse oximetry, re-evaluation of patient's condition and review of old charts    Care discussed with: admitting provider          MEDICATIONS GIVEN IN ER    Medications    sodium chloride 0.9 % flush 10 mL (has no administration in time range)   pantoprazole (PROTONIX) injection 80 mg (80 mg Intravenous Given 6/13/25 1858)     And   pantoprazole (PROTONIX) 40 mg in sodium chloride 0.9 % 100 mL (0.4 mg/mL) MBP (8 mg/hr Intravenous New Bag 6/13/25 1859)   sodium chloride 0.9 % flush 10 mL (has no administration in time range)   nitroglycerin (NITROSTAT) SL tablet 0.4 mg (has no administration in time range)   acetaminophen (TYLENOL) tablet 650 mg (has no administration in time range)     Or   acetaminophen (TYLENOL) 160 MG/5ML oral solution 650 mg (has no administration in time range)     Or   acetaminophen (TYLENOL) suppository 650 mg (has no administration in time range)   sennosides-docusate (PERICOLACE) 8.6-50 MG per tablet 2 tablet (has no administration in time range)     And   polyethylene glycol (MIRALAX) packet 17 g (has no administration in time range)     And   bisacodyl (DULCOLAX) EC tablet 5 mg (has no administration in time range)     And   bisacodyl (DULCOLAX) suppository 10 mg (has no administration in time range)   melatonin tablet 2.5 mg (has no administration in time range)   ondansetron ODT (ZOFRAN-ODT) disintegrating tablet 4 mg (has no administration in time range)     Or   ondansetron (ZOFRAN) injection 4 mg (has no administration in time range)   aluminum-magnesium hydroxide-simethicone (MAALOX MAX) 400-400-40 MG/5ML suspension 15 mL (has no administration in time range)         PROGRESS, DATA ANALYSIS, CONSULTS, AND MEDICAL DECISION MAKING    All labs have been independently reviewed and interpreted by me.  All radiology studies have been independently reviewed and interpreted by me and discussed with radiologist dictating the report.   EKG's independently reviewed and interpreted by me.  Discussion below represents my analysis of pertinent findings related to patient's condition, differential diagnosis, treatment plan and final disposition.    My differential  diagnosis for dyspnea includes but is not limited to:    Asthma, COPD, pneumonia, pulmonary embolism, acute respiratory distress syndrome, pneumothorax, hemothorax, pleural effusion, pulmonary fibrosis, congestive heart failure, myocardial infarction, DKA, uremia, acidosis, sepsis, anemia, drug related, hyperventilation, CNS disease, inhalation exposure, airway obstruction, aspiration, electrolyte abnormalities, myasthenia gravis, panic attack, anaphylaxis      ED Course as of 06/13/25 2013 Fri Jun 13, 2025   1820 Hemoglobin(!!): 6.7  7.0 earlier today [DC]   1828 Fecal Occult Blood(!): Positive [DC]   1844 proBNP(!): 2,911.0 [DC]   1844 HS Troponin T(!): 16 [DC]   1852 Discussed case with KENNETH Enriuqe, who will admit the patient. [DC]   2012 XR Chest 1 View  Radiology study independently interpreted by me and my findings are no pneumothorax.   [DC]      ED Course User Index  [DC] Rea Stern PA             AS OF 20:13 EDT VITALS:    BP - 121/48  HR - 71  TEMP - 98.3 °F (36.8 °C) (Oral)  O2 SATS - 99%        DIAGNOSIS  Final diagnoses:   Anemia requiring transfusions   Gastrointestinal hemorrhage, unspecified gastrointestinal hemorrhage type   Elevated brain natriuretic peptide (BNP) level         DISPOSITION  ED Disposition       ED Disposition   Decision to Admit    Condition   --    Comment   Level of Care: Telemetry [5]   Diagnosis: Anemia requiring transfusions [[id number]]   Admitting Physician: DAHIANA SALINAS [[id number]]   Attending Physician: DAHIANA SALINAS [[id number]]   Is patient appropriate for Inpatient Observation Unit?: No [0]                    Note Disclaimer: At [facility], we believe that sharing information builds trust and better relationships. You are receiving this note because you recently visited [facility]. It is possible you will see health information before a provider has talked with you about it. This kind of information can be easy to misunderstand. To help you fully  understand what it means for your health, we urge you to discuss this note with your provider.         Rea Stern PA  06/13/25 2013       Rea Stern PA  06/13/25 2014

## 2025-06-13 NOTE — ED NOTES
.Nursing report ED to floor  Viktoria Villasenor  84 y.o.  female    HPI :  HPI  Stated Reason for Visit: fatigue    Chief Complaint  Chief Complaint   Patient presents with    Abnormal Lab    Fatigue       Admitting doctor:   Elisa Umana MD    Admitting diagnosis:   The primary encounter diagnosis was Anemia requiring transfusions. Diagnoses of Gastrointestinal hemorrhage, unspecified gastrointestinal hemorrhage type and Elevated brain natriuretic peptide (BNP) level were also pertinent to this visit.    Code status:   Current Code Status       Date Active Code Status Order ID Comments User Context       6/13/2025 1854 CPR (Attempt to Resuscitate) [id number]  Klaus Miner, APRN ED        Question Answer    Code Status (Patient has no pulse and is not breathing) CPR (Attempt to Resuscitate)    Medical Interventions (Patient has pulse or is breathing) Full Support                    Allergies:   Lisinopril    Isolation:   No active isolations    Intake and Output  No intake or output data in the 24 hours ending 06/13/25 1927    Weight:   There were no vitals filed for this visit.    Most recent vitals:   Vitals:    06/13/25 1612 06/13/25 1614 06/13/25 1758   BP:  134/63 131/80   Pulse: 96  89   Resp: 16  16   Temp: 97.8 °F (36.6 °C)     SpO2: 98%  98%       Active LDAs/IV Access:   Lines, Drains & Airways       Active LDAs       Name Placement date Placement time Site Days    Peripheral IV 06/13/25 1808 20 G Left Antecubital 06/13/25  1808  Antecubital  less than 1    Peripheral IV 06/13/25 1858 20 G Right Antecubital 06/13/25  1858  Antecubital  less than 1                    Labs (abnormal labs have a star):   Labs Reviewed   COMPREHENSIVE METABOLIC PANEL - Abnormal; Notable for the following components:       Result Value    Creatinine 1.01 (*)     eGFR 55.7 (*)     All other components within normal limits    Narrative:     GFR Categories in Chronic Kidney Disease (CKD)              GFR Category           GFR (mL/min/1.73)    Interpretation  G1                    90 or greater        Normal or high (1)  G2                    60-89                Mild decrease (1)  G3a                   45-59                Mild to moderate decrease  G3b                   30-44                Moderate to severe decrease  G4                    15-29                Severe decrease  G5                    14 or less           Kidney failure    (1)In the absence of evidence of kidney disease, neither GFR category G1 or G2 fulfill the criteria for CKD.    eGFR calculation 2021 CKD-EPI creatinine equation, which does not include race as a factor   PROTIME-INR - Abnormal; Notable for the following components:    Protime 18.1 (*)     INR 1.50 (*)     All other components within normal limits   CBC WITH AUTO DIFFERENTIAL - Abnormal; Notable for the following components:    WBC 11.66 (*)     RBC 3.54 (*)     Hemoglobin 6.7 (*)     Hematocrit 24.5 (*)     MCV 69.2 (*)     MCH 18.9 (*)     MCHC 27.3 (*)     RDW 17.1 (*)     All other components within normal limits   BNP (IN-HOUSE) - Abnormal; Notable for the following components:    proBNP 2,911.0 (*)     All other components within normal limits    Narrative:     This assay is used as an aid in the diagnosis of individuals suspected of having heart failure. It can be used as an aid in the diagnosis of acute decompensated heart failure (ADHF) in patients presenting with signs and symptoms of ADHF to the emergency department (ED). In addition, NT-proBNP of <300 pg/mL indicates ADHF is not likely.    Age Range Result Interpretation  NT-proBNP Concentration (pg/mL:      <50             Positive            >450                   Gray                 300-450                    Negative             <300    50-75           Positive            >900                  Gray                300-900                  Negative            <300      >75             Positive            >1800                  [place]                 300-1800                  Negative            <300   TROPONIN - Abnormal; Notable for the following components:    HS Troponin T 16 (*)     All other components within normal limits    Narrative:     High Sensitive Troponin T Reference Range:  <14.0 ng/L- Negative Female for AMI  <22.0 ng/L- Negative Male for AMI  >=14 - Abnormal Female indicating possible myocardial injury.  >=22 - Abnormal Male indicating possible myocardial injury.   Clinicians would have to utilize clinical acumen, EKG, Troponin, and serial changes to determine if it is an Acute Myocardial Infarction or myocardial injury due to an underlying chronic condition.        MANUAL DIFFERENTIAL - Abnormal; Notable for the following components:    Lymphocyte % 12.0 (*)     Neutrophils Absolute 8.63 (*)     Monocytes Absolute 1.17 (*)     All other components within normal limits   POCT OCCULT BLOOD STOOL (ED ONLY) - Abnormal; Notable for the following components:    Fecal Occult Blood Positive (*)     All other components within normal limits   APTT - Normal   HIGH SENSITIVITIY TROPONIN T 1HR   IRON PROFILE   FOLATE   TYPE AND SCREEN   PREPARE RBC   CBC AND DIFFERENTIAL    Narrative:     The following orders were created for panel order CBC & Differential.  Procedure                               Abnormality         Status                     ---------                               -----------         ------                     CBC Auto Differential[[id number]]        Abnormal            Final result                 Please view results for these tests on the individual orders.       EKG:   No orders to display       Meds given in ED:   Medications   sodium chloride 0.9 % flush 10 mL (has no administration in time range)   pantoprazole (PROTONIX) injection 80 mg (80 mg Intravenous Given 6/13/25 1858)     And   pantoprazole (PROTONIX) 40 mg in sodium chloride 0.9 % 100 mL (0.4 mg/mL) MBP (8 mg/hr Intravenous New Bag 6/13/25 1859)   sodium  chloride 0.9 % flush 10 mL (has no administration in time range)   nitroglycerin (NITROSTAT) SL tablet 0.4 mg (has no administration in time range)   acetaminophen (TYLENOL) tablet 650 mg (has no administration in time range)     Or   acetaminophen (TYLENOL) 160 MG/5ML oral solution 650 mg (has no administration in time range)     Or   acetaminophen (TYLENOL) suppository 650 mg (has no administration in time range)   sennosides-docusate (PERICOLACE) 8.6-50 MG per tablet 2 tablet (has no administration in time range)     And   polyethylene glycol (MIRALAX) packet 17 g (has no administration in time range)     And   bisacodyl (DULCOLAX) EC tablet 5 mg (has no administration in time range)     And   bisacodyl (DULCOLAX) suppository 10 mg (has no administration in time range)   melatonin tablet 2.5 mg (has no administration in time range)   ondansetron ODT (ZOFRAN-ODT) disintegrating tablet 4 mg (has no administration in time range)     Or   ondansetron (ZOFRAN) injection 4 mg (has no administration in time range)   aluminum-magnesium hydroxide-simethicone (MAALOX MAX) 400-400-40 MG/5ML suspension 15 mL (has no administration in time range)       Imaging results:  XR Chest 1 View  Result Date: 2025  As described.  This report was finalized on 2025 6:24 PM by Dr. Hubert Em M.D on Workstation: [id number]        Ambulatory status:   - standby    Social issues:   Social History     Socioeconomic History    Marital status:      Spouse name: Abram    Number of children: 2   Tobacco Use    Smoking status: Former     Current packs/day: 0.00     Average packs/day: 1 pack/day for 30.0 years (30.0 ttl pk-yrs)     Types: Cigarettes     Start date: 1964     Quit date: 1994     Years since quittin.4    Smokeless tobacco: Never   Vaping Use    Vaping status: Never Used   Substance and Sexual Activity    Alcohol use: No    Drug use: No    Sexual activity: Not Currently     Partners: Male        Peripheral Neurovascular  Peripheral Neurovascular (Adult)  Peripheral Neurovascular WDL: WDL    Neuro Cognitive  Neuro Cognitive (Adult)  Cognitive/Neuro/Behavioral WDL: WDL, orientation  Orientation: oriented x 4  Additional Documentation: [place] Coma Scale (Group)  [place] Coma Scale  Best Eye Response: 4-->(E4) spontaneous  Best Motor Response: 6-->(M6) obeys commands  Best Verbal Response: 5-->(V5) oriented  [place] Coma Scale Score: 15    Learning  Learning Assessment  Learning Readiness and Ability: no barriers identified  Education Provided  Person Taught: patient, spouse, family member/friend  Teaching Method: verbal instruction  Teaching Focus: symptom/problem overview  Education Outcome Evaluation: acceptance expressed, able to teach back, verbalizes understanding    Respiratory  Respiratory  Airway WDL: WDL  Respiratory WDL  Respiratory WDL: .WDL except, all  Rhythm/Pattern, Respiratory: shortness of breath    Abdominal Pain       Pain Assessments  Pain (Adult)  (0-10) Pain Rating: Rest: 0    NIH Stroke Scale       Griselda Rae RN  06/13/25 19:27 EDT

## 2025-06-13 NOTE — ED NOTES
..Nursing report ED to floor  Viktoria Villasenor  84 y.o.  female    HPI :  HPI  Stated Reason for Visit: fatigue    Chief Complaint  Chief Complaint   Patient presents with    Abnormal Lab    Fatigue       Admitting doctor:   Elisa Umana MD    Admitting diagnosis:   The primary encounter diagnosis was Anemia requiring transfusions. Diagnoses of Gastrointestinal hemorrhage, unspecified gastrointestinal hemorrhage type and Elevated brain natriuretic peptide (BNP) level were also pertinent to this visit.    Code status:   Current Code Status       Date Active Code Status Order ID Comments User Context       6/13/2025 1854 CPR (Attempt to Resuscitate) [id number]  Klaus Miner, [facility]        Question Answer    Code Status (Patient has no pulse and is not breathing) CPR (Attempt to Resuscitate)    Medical Interventions (Patient has pulse or is breathing) Full Support                    Allergies:   Lisinopril    Isolation:   No active isolations    Intake and Output  No intake or output data in the 24 hours ending 06/13/25 1910    Weight:   There were no vitals filed for this visit.    Most recent vitals:   Vitals:    06/13/25 1612 06/13/25 1614 06/13/25 1758   BP:  134/63 131/80   Pulse: 96  89   Resp: 16  16   Temp: 97.8 °F (36.6 °C)     SpO2: 98%  98%       Active LDAs/IV Access:   Lines, Drains & Airways       Active LDAs       Name Placement date Placement time Site Days    Peripheral IV 06/13/25 1808 20 G Left Antecubital 06/13/25  1808  Antecubital  less than 1    Peripheral IV 06/13/25 1858 20 G Right Antecubital 06/13/25  1858  Antecubital  less than 1                    Labs (abnormal labs have a star):   Labs Reviewed   COMPREHENSIVE METABOLIC PANEL - Abnormal; Notable for the following components:       Result Value    Creatinine 1.01 (*)     eGFR 55.7 (*)     All other components within normal limits    Narrative:     GFR Categories in Chronic Kidney Disease (CKD)              GFR Category           GFR (mL/min/1.73)    Interpretation  G1                    90 or greater        Normal or high (1)  G2                    60-89                Mild decrease (1)  G3a                   45-59                Mild to moderate decrease  G3b                   30-44                Moderate to severe decrease  G4                    15-29                Severe decrease  G5                    14 or less           Kidney failure    (1)In the absence of evidence of kidney disease, neither GFR category G1 or G2 fulfill the criteria for CKD.    eGFR calculation 2021 CKD-EPI creatinine equation, which does not include race as a factor   PROTIME-INR - Abnormal; Notable for the following components:    Protime 18.1 (*)     INR 1.50 (*)     All other components within normal limits   CBC WITH AUTO DIFFERENTIAL - Abnormal; Notable for the following components:    WBC 11.66 (*)     RBC 3.54 (*)     Hemoglobin 6.7 (*)     Hematocrit 24.5 (*)     MCV 69.2 (*)     MCH 18.9 (*)     MCHC 27.3 (*)     RDW 17.1 (*)     All other components within normal limits   BNP (IN-HOUSE) - Abnormal; Notable for the following components:    proBNP 2,911.0 (*)     All other components within normal limits    Narrative:     This assay is used as an aid in the diagnosis of individuals suspected of having heart failure. It can be used as an aid in the diagnosis of acute decompensated heart failure (ADHF) in patients presenting with signs and symptoms of ADHF to the emergency department (ED). In addition, NT-proBNP of <300 pg/mL indicates ADHF is not likely.    Age Range Result Interpretation  NT-proBNP Concentration (pg/mL:      <50             Positive            >450                   Gray                 300-450                    Negative             <300    50-75           Positive            >900                  Gray                300-900                  Negative            <300      >75             Positive            >1800                  [place]                 300-1800                  Negative            <300   TROPONIN - Abnormal; Notable for the following components:    HS Troponin T 16 (*)     All other components within normal limits    Narrative:     High Sensitive Troponin T Reference Range:  <14.0 ng/L- Negative Female for AMI  <22.0 ng/L- Negative Male for AMI  >=14 - Abnormal Female indicating possible myocardial injury.  >=22 - Abnormal Male indicating possible myocardial injury.   Clinicians would have to utilize clinical acumen, EKG, Troponin, and serial changes to determine if it is an Acute Myocardial Infarction or myocardial injury due to an underlying chronic condition.        MANUAL DIFFERENTIAL - Abnormal; Notable for the following components:    Lymphocyte % 12.0 (*)     Neutrophils Absolute 8.63 (*)     Monocytes Absolute 1.17 (*)     All other components within normal limits   POCT OCCULT BLOOD STOOL (ED ONLY) - Abnormal; Notable for the following components:    Fecal Occult Blood Positive (*)     All other components within normal limits   APTT - Normal   HIGH SENSITIVITIY TROPONIN T 1HR   IRON PROFILE   FOLATE   TYPE AND SCREEN   PREPARE RBC   CBC AND DIFFERENTIAL    Narrative:     The following orders were created for panel order CBC & Differential.  Procedure                               Abnormality         Status                     ---------                               -----------         ------                     CBC Auto Differential[[id number]]        Abnormal            Final result                 Please view results for these tests on the individual orders.       EKG:   No orders to display       Meds given in ED:   Medications   sodium chloride 0.9 % flush 10 mL (has no administration in time range)   pantoprazole (PROTONIX) injection 80 mg (80 mg Intravenous Given 6/13/25 1858)     And   pantoprazole (PROTONIX) 40 mg in sodium chloride 0.9 % 100 mL (0.4 mg/mL) MBP (8 mg/hr Intravenous New Bag 6/13/25 1859)   sodium  chloride 0.9 % flush 10 mL (has no administration in time range)   nitroglycerin (NITROSTAT) SL tablet 0.4 mg (has no administration in time range)   acetaminophen (TYLENOL) tablet 650 mg (has no administration in time range)     Or   acetaminophen (TYLENOL) 160 MG/5ML oral solution 650 mg (has no administration in time range)     Or   acetaminophen (TYLENOL) suppository 650 mg (has no administration in time range)   sennosides-docusate (PERICOLACE) 8.6-50 MG per tablet 2 tablet (has no administration in time range)     And   polyethylene glycol (MIRALAX) packet 17 g (has no administration in time range)     And   bisacodyl (DULCOLAX) EC tablet 5 mg (has no administration in time range)     And   bisacodyl (DULCOLAX) suppository 10 mg (has no administration in time range)   melatonin tablet 2.5 mg (has no administration in time range)   ondansetron ODT (ZOFRAN-ODT) disintegrating tablet 4 mg (has no administration in time range)     Or   ondansetron (ZOFRAN) injection 4 mg (has no administration in time range)   aluminum-magnesium hydroxide-simethicone (MAALOX MAX) 400-400-40 MG/5ML suspension 15 mL (has no administration in time range)       Imaging results:  XR Chest 1 View  Result Date: 2025  As described.  This report was finalized on 2025 6:24 PM by Dr. Hubert Em M.D on Workstation: [id number]        Ambulatory status:   - up ad be     Social issues:   Social History     Socioeconomic History    Marital status:      Spouse name: Abram    Number of children: 2   Tobacco Use    Smoking status: Former     Current packs/day: 0.00     Average packs/day: 1 pack/day for 30.0 years (30.0 ttl pk-yrs)     Types: Cigarettes     Start date: 1964     Quit date: 1994     Years since quittin.4    Smokeless tobacco: Never   Vaping Use    Vaping status: Never Used   Substance and Sexual Activity    Alcohol use: No    Drug use: No    Sexual activity: Not Currently     Partners: Male        Peripheral Neurovascular  Peripheral Neurovascular (Adult)  Peripheral Neurovascular WDL: WDL    Neuro Cognitive  Neuro Cognitive (Adult)  Cognitive/Neuro/Behavioral WDL: WDL, orientation  Orientation: oriented x 4  Additional Documentation: [place] Coma Scale (Group)  [place] Coma Scale  Best Eye Response: 4-->(E4) spontaneous  Best Motor Response: 6-->(M6) obeys commands  Best Verbal Response: 5-->(V5) oriented  [place] Coma Scale Score: 15    Learning  Learning Assessment  Learning Readiness and Ability: no barriers identified  Education Provided  Person Taught: patient, spouse, family member/friend  Teaching Method: verbal instruction  Teaching Focus: symptom/problem overview  Education Outcome Evaluation: acceptance expressed, able to teach back, verbalizes understanding    Respiratory  Respiratory  Airway WDL: WDL  Respiratory WDL  Respiratory WDL: .WDL except, all  Rhythm/Pattern, Respiratory: shortness of breath    Abdominal Pain       Pain Assessments  Pain (Adult)  (0-10) Pain Rating: Rest: 0    NIH Stroke Scale       Fermin Navarro RN  06/13/25 19:10 EDT

## 2025-06-14 PROBLEM — I48.19 PERSISTENT ATRIAL FIBRILLATION: Status: ACTIVE | Noted: 2025-06-14

## 2025-06-14 PROBLEM — K92.2 GI BLEED: Status: ACTIVE | Noted: 2025-06-14

## 2025-06-14 PROBLEM — I27.20 PULMONARY HYPERTENSION: Status: ACTIVE | Noted: 2025-06-14

## 2025-06-14 LAB
ANION GAP SERPL CALCULATED.3IONS-SCNC: 12.2 MMOL/L (ref 5–15)
BH BB BLOOD EXPIRATION DATE: NORMAL
BH BB BLOOD TYPE BARCODE: 5100
BH BB DISPENSE STATUS: NORMAL
BH BB PRODUCT CODE: NORMAL
BH BB UNIT NUMBER: NORMAL
BUN SERPL-MCNC: 13 MG/DL (ref 8–23)
BUN/CREAT SERPL: 14.4 (ref 7–25)
CALCIUM SPEC-SCNC: 8.6 MG/DL (ref 8.6–10.5)
CHLORIDE SERPL-SCNC: 102 MMOL/L (ref 98–107)
CO2 SERPL-SCNC: 23.8 MMOL/L (ref 22–29)
CREAT SERPL-MCNC: 0.9 MG/DL (ref 0.57–1)
CROSSMATCH INTERPRETATION: NORMAL
DEPRECATED RDW RBC AUTO: 48.6 FL (ref 37–54)
EGFRCR SERPLBLD CKD-EPI 2021: 64 ML/MIN/1.73
ERYTHROCYTE [DISTWIDTH] IN BLOOD BY AUTOMATED COUNT: 19.3 % (ref 12.3–15.4)
GLUCOSE SERPL-MCNC: 92 MG/DL (ref 65–99)
HCT VFR BLD AUTO: 26 % (ref 34–46.6)
HCT VFR BLD AUTO: 26.1 % (ref 34–46.6)
HCT VFR BLD AUTO: 30.1 % (ref 34–46.6)
HGB BLD-MCNC: 7.3 G/DL (ref 12–15.9)
HGB BLD-MCNC: 7.6 G/DL (ref 12–15.9)
HGB BLD-MCNC: 8.5 G/DL (ref 12–15.9)
MCH RBC QN AUTO: 19.9 PG (ref 26.6–33)
MCHC RBC AUTO-ENTMCNC: 28.1 G/DL (ref 31.5–35.7)
MCV RBC AUTO: 70.8 FL (ref 79–97)
PLATELET # BLD AUTO: 237 10*3/MM3 (ref 140–450)
PMV BLD AUTO: 10.8 FL (ref 6–12)
POTASSIUM SERPL-SCNC: 3.9 MMOL/L (ref 3.5–5.2)
RBC # BLD AUTO: 3.67 10*6/MM3 (ref 3.77–5.28)
SODIUM SERPL-SCNC: 138 MMOL/L (ref 136–145)
UNIT  ABO: NORMAL
UNIT  RH: NORMAL
VIT B12 BLD-MCNC: 411 PG/ML (ref 211–946)
WBC NRBC COR # BLD AUTO: 11.54 10*3/MM3 (ref 3.4–10.8)

## 2025-06-14 PROCEDURE — P9016 RBC LEUKOCYTES REDUCED: HCPCS

## 2025-06-14 PROCEDURE — 97530 THERAPEUTIC ACTIVITIES: CPT

## 2025-06-14 PROCEDURE — 85018 HEMOGLOBIN: CPT | Performed by: NURSE PRACTITIONER

## 2025-06-14 PROCEDURE — 86900 BLOOD TYPING SEROLOGIC ABO: CPT

## 2025-06-14 PROCEDURE — 99222 1ST HOSP IP/OBS MODERATE 55: CPT | Performed by: INTERNAL MEDICINE

## 2025-06-14 PROCEDURE — 85027 COMPLETE CBC AUTOMATED: CPT | Performed by: NURSE PRACTITIONER

## 2025-06-14 PROCEDURE — 36430 TRANSFUSION BLD/BLD COMPNT: CPT

## 2025-06-14 PROCEDURE — 80048 BASIC METABOLIC PNL TOTAL CA: CPT | Performed by: NURSE PRACTITIONER

## 2025-06-14 PROCEDURE — 85014 HEMATOCRIT: CPT | Performed by: NURSE PRACTITIONER

## 2025-06-14 PROCEDURE — 97162 PT EVAL MOD COMPLEX 30 MIN: CPT

## 2025-06-14 PROCEDURE — 82607 VITAMIN B-12: CPT | Performed by: NURSE PRACTITIONER

## 2025-06-14 PROCEDURE — 99222 1ST HOSP IP/OBS MODERATE 55: CPT

## 2025-06-14 RX ORDER — MORPHINE SULFATE 2 MG/ML
2 INJECTION, SOLUTION INTRAMUSCULAR; INTRAVENOUS ONCE
Status: DISCONTINUED | OUTPATIENT
Start: 2025-06-14 | End: 2025-06-14

## 2025-06-14 RX ORDER — SPIRONOLACTONE 25 MG/1
25 TABLET ORAL DAILY
Status: DISCONTINUED | OUTPATIENT
Start: 2025-06-14 | End: 2025-06-18 | Stop reason: HOSPADM

## 2025-06-14 RX ORDER — ATORVASTATIN CALCIUM 10 MG/1
10 TABLET, FILM COATED ORAL NIGHTLY
Status: DISCONTINUED | OUTPATIENT
Start: 2025-06-14 | End: 2025-06-18 | Stop reason: HOSPADM

## 2025-06-14 RX ORDER — ATENOLOL 25 MG/1
50 TABLET ORAL 2 TIMES DAILY
Status: DISCONTINUED | OUTPATIENT
Start: 2025-06-14 | End: 2025-06-18 | Stop reason: HOSPADM

## 2025-06-14 RX ORDER — DIGOXIN 125 MCG
125 TABLET ORAL DAILY
Status: DISCONTINUED | OUTPATIENT
Start: 2025-06-14 | End: 2025-06-18 | Stop reason: HOSPADM

## 2025-06-14 RX ORDER — FUROSEMIDE 20 MG/1
20 TABLET ORAL DAILY
Status: DISCONTINUED | OUTPATIENT
Start: 2025-06-14 | End: 2025-06-17

## 2025-06-14 RX ADMIN — PANTOPRAZOLE SODIUM 8 MG/HR: 40 INJECTION, POWDER, FOR SOLUTION INTRAVENOUS at 00:46

## 2025-06-14 RX ADMIN — ATENOLOL 50 MG: 25 TABLET ORAL at 20:36

## 2025-06-14 RX ADMIN — SPIRONOLACTONE 25 MG: 25 TABLET ORAL at 08:19

## 2025-06-14 RX ADMIN — PANTOPRAZOLE SODIUM 8 MG/HR: 40 INJECTION, POWDER, FOR SOLUTION INTRAVENOUS at 21:30

## 2025-06-14 RX ADMIN — ATORVASTATIN CALCIUM 10 MG: 10 TABLET ORAL at 20:39

## 2025-06-14 RX ADMIN — PANTOPRAZOLE SODIUM 8 MG/HR: 40 INJECTION, POWDER, FOR SOLUTION INTRAVENOUS at 15:54

## 2025-06-14 RX ADMIN — ATENOLOL 50 MG: 25 TABLET ORAL at 08:19

## 2025-06-14 RX ADMIN — PANTOPRAZOLE SODIUM 8 MG/HR: 40 INJECTION, POWDER, FOR SOLUTION INTRAVENOUS at 11:05

## 2025-06-14 RX ADMIN — PANTOPRAZOLE SODIUM 8 MG/HR: 40 INJECTION, POWDER, FOR SOLUTION INTRAVENOUS at 05:03

## 2025-06-14 RX ADMIN — DIGOXIN 125 MCG: 0.12 TABLET ORAL at 08:19

## 2025-06-14 RX ADMIN — FUROSEMIDE 20 MG: 20 TABLET ORAL at 08:19

## 2025-06-14 NOTE — H&P
Patient Name:  Viktoria Villasenor  YOB: 1940  MRN:  [id number]  Admit Date:  6/13/2025  Patient Care Team:  Magen Olson MD as PCP - General  Magen Olson MD as PCP - Family Medicine  Ralph Turner MD as Consulting Physician (Otolaryngology)  Casper Hodge MD as Consulting Physician (Gastroenterology)  Loyda Hernandez APRN as Nurse Practitioner (Cardiology)  Chuy Orellana MD as Consulting Physician (Cardiology)      Subjective   History Present Illness     Chief Complaint   Patient presents with    Abnormal Lab    Fatigue       Ms. Villasenor is a 84 y.o. former smoker with a history of persistent atrial fibrillation, cirrhosis, hypertension, and hyperlipidemia that presents to [facility] complaining of shortness of breath and fatigue. She reports she had some labs drawn at her cardiologist's office yesterday and was called and told to come to the ED due to a low hemoglobin. She reports worsening shortness of breath and fatigue for the past 3 to 4 weeks. She also reports dizziness, abdominal pain, nausea, and non-bloody vomiting. Work up in the ED revealed a hgb of 6.7, HCT 24.5, iron 21, iron saturation 4, transferrin 357, TIBC 532, folate 6.31, creatinine 1.01, BNP 2,911, troponin 16, and repeat troponin 17. A fecal occult blood was positive. She was started on a Protonix drip and she received one unit of PRBCs. She has been admitted for evaluation by gastroenterology.      History of Present Illness  Review of Systems   Constitutional:  Positive for fatigue. Negative for chills and fever.   HENT:  Negative for congestion and sore throat.    Eyes:  Negative for photophobia and visual disturbance.   Respiratory:  Positive for shortness of breath. Negative for cough and wheezing.    Cardiovascular:  Negative for chest pain, palpitations and leg swelling.   Gastrointestinal:  Positive for abdominal pain, nausea and vomiting. Negative for abdominal distention,  anal bleeding, blood in stool, constipation, diarrhea and rectal pain.   Genitourinary:  Negative for dysuria, flank pain, frequency and hematuria.   Musculoskeletal:  Negative for arthralgias and myalgias.   Neurological:  Positive for dizziness and light-headedness. Negative for numbness and headaches.        Personal History     Past Medical History:   Diagnosis Date    ACE-inhibitor cough     Acute bronchitis due to Rhinovirus 11/18/2024    Asthma     Asthma exacerbation 11/18/2024    Asymptomatic postmenopausal status     Cardiac murmur     Chest pain 04/25/2024    Cholelithiasis     Cirrhosis     Cirrhosis of liver without ascites 12/09/2024    Colon polyp 10/2016    Conjunctivitis     right    Decompensated cirrhosis 11/11/2024    Diverticulitis 12/20/2023    Diverticulitis of colon 08/23    Duodenitis 12/09/2024    Elevated LFTs 06/28/2016    Fatty liver     GERD (gastroesophageal reflux disease)     GI (gastrointestinal bleed)     History of colon polyps     HLD (hyperlipidemia)     Hypertension     Hypopigmentation     Left shoulder tendonitis     Menopausal syndrome     Motion sickness     Plantar fasciitis of left foot     nodule    Pleural effusion 11/15/2024    Pruritus     possibly due to Benicar    SOB (shortness of breath)     SOBOE (shortness of breath on exertion)     Thyroid cyst     UTI (urinary tract infection)     Viral syndrome      Past Surgical History:   Procedure Laterality Date    CARDIAC CATHETERIZATION      in the 80's    CARDIAC CATHETERIZATION N/A 05/04/2016    Procedure: Left Heart Cath;  Surgeon: Dale Vasquez MD;  Location: [facility] INVASIVE LOCATION;  Service:     CARDIAC CATHETERIZATION N/A 05/04/2016    Procedure: Coronary angiography;  Surgeon: Dale Vasquez MD;  Location: [facility] INVASIVE LOCATION;  Service:     CARDIAC CATHETERIZATION N/A 05/04/2016    Procedure: Left ventriculography;  Surgeon: Dale Vasquez MD;  Location: [facility] INVASIVE LOCATION;   Service:     CARDIAC CATHETERIZATION N/A 2024    Procedure: Left Heart Cath;  Surgeon: Devon Cardona MD;  Location:  [facility];  Service: Cardiovascular;  Laterality: N/A;    CARDIAC CATHETERIZATION N/A 2024    Procedure: Coronary angiography;  Surgeon: Devon Cardona MD;  Location:  [facility];  Service: Cardiovascular;  Laterality: N/A;    CARDIAC CATHETERIZATION N/A 2024    Procedure: Left ventriculography;  Surgeon: Devon Cardona MD;  Location:  [facility];  Service: Cardiovascular;  Laterality: N/A;    CHOLECYSTECTOMY      COLONOSCOPY      >5 years    COLONOSCOPY  2016    tics, IH, rectal biopsy showed changes suggestive of Schwannoma     ENDOSCOPY  2016    LA grade B esophagitis, mild Schatzki ring, HH, gastric polyp, erythematous mucosa in stomach    LASIK Bilateral     RECTAL ULTRASOUND N/A 2017    Procedure: ENDOSCOPIC ULTRASOUND (LOWER);  Surgeon: Casper Rodríguez MD;  Location: [facility] ENDOSCOPY;  Service:     SHOULDER SURGERY Left 2010    Dr. Green; repair L shoulder adhesive capsulitis    SKIN BIOPSY      TOTAL ABDOMINAL HYSTERECTOMY      fibroid tumors    TUBAL ABDOMINAL LIGATION       Family History   Problem Relation Age of Onset    Anxiety disorder Mother     Uterine cancer Mother     Dementia Mother     Cancer Father         Prostate Cancer     Leukemia Brother     Malig Hyperthermia Neg Hx      Social History     Tobacco Use    Smoking status: Former     Current packs/day: 0.00     Average packs/day: 1 pack/day for 30.0 years (30.0 ttl pk-yrs)     Types: Cigarettes     Start date: 1964     Quit date: 1994     Years since quittin.4    Smokeless tobacco: Never   Vaping Use    Vaping status: Never Used   Substance Use Topics    Alcohol use: No    Drug use: No     Medications Prior to Admission   Medication Sig Dispense Refill Last Dose/Taking    apixaban  (ELIQUIS) 5 MG tablet tablet Take 1 tablet by mouth 2 (Two) Times a Day. 180 tablet 3 6/12/2025    atenolol (TENORMIN) 50 MG tablet TAKE 1 TABLET BY MOUTH 2 TIMES A DAY 60 tablet 2 6/12/2025    atorvastatin (LIPITOR) 10 MG tablet Take 1 tablet by mouth Every Night for 270 days. 90 tablet 2 6/12/2025    digoxin (LANOXIN) 125 MCG tablet TAKE 1 TABLET BY MOUTH DAILY 30 tablet 2 6/13/2025    furosemide (LASIX) 20 MG tablet Take 1 tablet by mouth Daily. 90 tablet 3 6/13/2025    pantoprazole (PROTONIX) 40 MG EC tablet Take 1 tablet by mouth Every Morning. 90 tablet 3 6/13/2025    spironolactone (ALDACTONE) 25 MG tablet Take 1 tablet by mouth Daily. 90 tablet 3 6/13/2025     Allergies:    Allergies   Allergen Reactions    Lisinopril Cough       Objective    Objective     Vital Signs  Temp:  [97.7 °F (36.5 °C)-98.4 °F (36.9 °C)] 98.4 °F (36.9 °C)  Heart Rate:  [71-98] 90  Resp:  [16-18] 16  BP: (121-143)/(48-91) 128/62  SpO2:  [97 %-99 %] 98 %  on   ;   Device (Oxygen Therapy): room air  Body mass index is 32.1 kg/m².    Physical Exam  Nursing note reviewed.   Constitutional:       Appearance: Normal appearance.   HENT:      Head: Normocephalic and atraumatic.      Nose: Nose normal.      Mouth/Throat:      Mouth: Mucous membranes are moist.      Pharynx: Oropharynx is clear.   Eyes:      Conjunctiva/sclera: Conjunctivae normal.   Cardiovascular:      Rate and Rhythm: Normal rate. Rhythm irregular.      Pulses: Normal pulses.      Heart sounds: Normal heart sounds.   Pulmonary:      Effort: Pulmonary effort is normal.      Breath sounds: Normal breath sounds.   Abdominal:      General: Bowel sounds are normal. There is no distension.      Palpations: Abdomen is soft.   Musculoskeletal:         General: No swelling. Normal range of motion.      Cervical back: Normal range of motion and neck supple.   Skin:     General: Skin is warm and dry.   Neurological:      General: No focal deficit present.      Mental Status: She is  alert and oriented to person, place, and time.   Psychiatric:         Mood and Affect: Mood normal.         Behavior: Behavior normal.         Results Review:  I reviewed the patient's new clinical results.  I reviewed the patient's new imaging results and agree with the interpretation.  I reviewed the patient's other test results and agree with the interpretation  I personally viewed and interpreted the patient's EKG/Telemetry data  Discussed with ED provider.    Lab Results (last 24 hours)       Procedure Component Value Units Date/Time    CBC & Differential [[id number]]  (Abnormal) Collected: 06/13/25 1018    Specimen: Blood Updated: 06/13/25 1221    Narrative:      The following orders were created for panel order CBC & Differential.  Procedure                               Abnormality         Status                     ---------                               -----------         ------                     CBC Auto Differential[[id number]]        Abnormal            Final result                 Please view results for these tests on the individual orders.    Comprehensive Metabolic Panel [[id number]]  (Abnormal) Collected: 06/13/25 1018    Specimen: Blood Updated: 06/13/25 1154     Glucose 114 mg/dL      BUN 15.0 mg/dL      Creatinine 1.07 mg/dL      Sodium 136 mmol/L      Potassium 4.4 mmol/L      Chloride 101 mmol/L      CO2 25.3 mmol/L      Calcium 9.0 mg/dL      Total Protein 6.8 g/dL      Albumin 4.0 g/dL      ALT (SGPT) 11 U/L      AST (SGOT) 18 U/L      Alkaline Phosphatase 78 U/L      Total Bilirubin 0.8 mg/dL      Globulin 2.8 gm/dL      A/G Ratio 1.4 g/dL      BUN/Creatinine Ratio 14.0     Anion Gap 9.7 mmol/L      eGFR 52.0 mL/min/1.73     Narrative:      GFR Categories in Chronic Kidney Disease (CKD)              GFR Category          GFR (mL/min/1.73)    Interpretation  G1                    90 or greater        Normal or high (1)  G2                    60-89                Mild decrease (1)  G3a                    45-59                Mild to moderate decrease  G3b                   30-44                Moderate to severe decrease  G4                    15-29                Severe decrease  G5                    14 or less           Kidney failure    (1)In the absence of evidence of kidney disease, neither GFR category G1 or G2 fulfill the criteria for CKD.    eGFR calculation 2021 CKD-EPI creatinine equation, which does not include race as a factor    Digoxin Level [[id number]]  (Normal) Collected: 06/13/25 1018    Specimen: Blood Updated: 06/13/25 1154     Digoxin 1.00 ng/mL     TSH [[id number]]  (Normal) Collected: 06/13/25 1018    Specimen: Blood Updated: 06/13/25 1158     TSH 1.530 uIU/mL     T4, free [[id number]]  (Normal) Collected: 06/13/25 1018    Specimen: Blood Updated: 06/13/25 1158     Free T4 1.30 ng/dL     CBC Auto Differential [[id number]]  (Abnormal) Collected: 06/13/25 1018    Specimen: Blood Updated: 06/13/25 1221     WBC 12.62 10*3/mm3      RBC 3.77 10*6/mm3      Hemoglobin 7.0 g/dL      Hematocrit 26.5 %      MCV 70.3 fL      MCH 18.6 pg      MCHC 26.4 g/dL      RDW 17.2 %      RDW-SD 43.6 fl      MPV 11.4 fL      Platelets 279 10*3/mm3     Manual Differential [[id number]]  (Abnormal) Collected: 06/13/25 1018    Specimen: Blood Updated: 06/13/25 1221     Neutrophil % 81.6 %      Lymphocyte % 7.1 %      Monocyte % 8.2 %      Eosinophil % 2.0 %      Basophil % 1.0 %      Neutrophils Absolute 10.30 10*3/mm3      Lymphocytes Absolute 0.90 10*3/mm3      Monocytes Absolute 1.03 10*3/mm3      Eosinophils Absolute 0.25 10*3/mm3      Basophils Absolute 0.13 10*3/mm3      nRBC 1.0 /100 WBC      Anisocytosis Mod/2+     Dacrocytes Slight/1+     Elliptocytes Slight/1+     Hypochromia Mod/2+     Microcytes Mod/2+     Ovalocytes Mod/2+     Poikilocytes Mod/2+     WBC Morphology Normal     Platelet Morphology Normal    CBC & Differential [[id number]]  (Abnormal) Collected: 06/13/25 1807    Specimen: Blood Updated:  06/13/25 1819    Narrative:      The following orders were created for panel order CBC & Differential.  Procedure                               Abnormality         Status                     ---------                               -----------         ------                     CBC Auto Differential[[id number]]        Abnormal            Final result                 Please view results for these tests on the individual orders.    Comprehensive Metabolic Panel [[id number]]  (Abnormal) Collected: 06/13/25 1807    Specimen: Blood Updated: 06/13/25 1840     Glucose 97 mg/dL      BUN 15.0 mg/dL      Creatinine 1.01 mg/dL      Sodium 141 mmol/L      Potassium 4.1 mmol/L      Chloride 103 mmol/L      CO2 25.1 mmol/L      Calcium 8.8 mg/dL      Total Protein 6.9 g/dL      Albumin 3.8 g/dL      ALT (SGPT) 10 U/L      AST (SGOT) 17 U/L      Alkaline Phosphatase 76 U/L      Total Bilirubin 0.6 mg/dL      Globulin 3.1 gm/dL      A/G Ratio 1.2 g/dL      BUN/Creatinine Ratio 14.9     Anion Gap 12.9 mmol/L      eGFR 55.7 mL/min/1.73     Narrative:      GFR Categories in Chronic Kidney Disease (CKD)              GFR Category          GFR (mL/min/1.73)    Interpretation  G1                    90 or greater        Normal or high (1)  G2                    60-89                Mild decrease (1)  G3a                   45-59                Mild to moderate decrease  G3b                   30-44                Moderate to severe decrease  G4                    15-29                Severe decrease  G5                    14 or less           Kidney failure    (1)In the absence of evidence of kidney disease, neither GFR category G1 or G2 fulfill the criteria for CKD.    eGFR calculation 2021 CKD-EPI creatinine equation, which does not include race as a factor    Protime-INR [[id number]]  (Abnormal) Collected: 06/13/25 1807    Specimen: Blood Updated: 06/13/25 1843     Protime 18.1 Seconds      INR 1.50    aPTT [[id number]]  (Normal) Collected:  06/13/25 1807    Specimen: Blood Updated: 06/13/25 1843     PTT 33.7 seconds     CBC Auto Differential [[id number]]  (Abnormal) Collected: 06/13/25 1807    Specimen: Blood Updated: 06/13/25 1819     WBC 11.66 10*3/mm3      RBC 3.54 10*6/mm3      Hemoglobin 6.7 g/dL      Hematocrit 24.5 %      MCV 69.2 fL      MCH 18.9 pg      MCHC 27.3 g/dL      RDW 17.1 %      RDW-SD 42.6 fl      MPV 10.4 fL      Platelets 247 10*3/mm3     BNP [[id number]]  (Abnormal) Collected: 06/13/25 1807    Specimen: Blood Updated: 06/13/25 1840     proBNP 2,911.0 pg/mL     Narrative:      This assay is used as an aid in the diagnosis of individuals suspected of having heart failure. It can be used as an aid in the diagnosis of acute decompensated heart failure (ADHF) in patients presenting with signs and symptoms of ADHF to the emergency department (ED). In addition, NT-proBNP of <300 pg/mL indicates ADHF is not likely.    Age Range Result Interpretation  NT-proBNP Concentration (pg/mL:      <50             Positive            >450                   Gray                 300-450                    Negative             <300    50-75           Positive            >900                  Gray                300-900                  Negative            <300      >75             Positive            >1800                  Gray                300-1800                  Negative            <300    High Sensitivity Troponin T [[id number]]  (Abnormal) Collected: 06/13/25 1807    Specimen: Blood Updated: 06/13/25 1840     HS Troponin T 16 ng/L     Narrative:      High Sensitive Troponin T Reference Range:  <14.0 ng/L- Negative Female for AMI  <22.0 ng/L- Negative Male for AMI  >=14 - Abnormal Female indicating possible myocardial injury.  >=22 - Abnormal Male indicating possible myocardial injury.   Clinicians would have to utilize clinical acumen, EKG, Troponin, and serial changes to determine if it is an Acute Myocardial Infarction or myocardial injury due  to an underlying chronic condition.         Manual Differential [[id number]]  (Abnormal) Collected: 06/13/25 1807    Specimen: Blood Updated: 06/13/25 1907     Neutrophil % 74.0 %      Lymphocyte % 12.0 %      Monocyte % 10.0 %      Eosinophil % 3.0 %      Basophil % 1.0 %      Neutrophils Absolute 8.63 10*3/mm3      Lymphocytes Absolute 1.40 10*3/mm3      Monocytes Absolute 1.17 10*3/mm3      Eosinophils Absolute 0.35 10*3/mm3      Basophils Absolute 0.12 10*3/mm3      Anisocytosis Slight/1+     Poikilocytes Slight/1+     Polychromasia Slight/1+     WBC Morphology Normal     Platelet Morphology Normal    POCT Occult Blood Stool [[id number]]  (Abnormal) Collected: 06/13/25 1826    Specimen: Stool from Per Rectum Updated: 06/13/25 1827     Fecal Occult Blood Positive     Lot Number [id number]     Expiration Date 11/30/2025     Positive Control Positive     Negative Control Negative    High Sensitivity Troponin T 1Hr [[id number]]  (Abnormal) Collected: 06/13/25 1939    Specimen: Blood from Arm, Left Updated: 06/13/25 2016     HS Troponin T 17 ng/L      Troponin T Numeric Delta 1 ng/L      Troponin T % Delta 6    Narrative:      High Sensitive Troponin T Reference Range:  <14.0 ng/L- Negative Female for AMI  <22.0 ng/L- Negative Male for AMI  >=14 - Abnormal Female indicating possible myocardial injury.  >=22 - Abnormal Male indicating possible myocardial injury.   Clinicians would have to utilize clinical acumen, EKG, Troponin, and serial changes to determine if it is an Acute Myocardial Infarction or myocardial injury due to an underlying chronic condition.         Iron Profile w/o Ferritin [[id number]]  (Abnormal) Collected: 06/13/25 1939    Specimen: Blood from Arm, Left Updated: 06/13/25 2016     Iron 21 mcg/dL      Iron Saturation (TSAT) 4 %      Transferrin 357 mg/dL      TIBC 532 mcg/dL     Folate [[id number]]  (Normal) Collected: 06/13/25 1939    Specimen: Blood from Arm, Left Updated: 06/13/25 2028     Folate 6.31 ng/mL      Narrative:      Results may be falsely increased if patient taking Biotin.              Imaging Results (Last 24 Hours)       Procedure Component Value Units Date/Time    XR Chest 1 View [[id number]] Collected: 06/13/25 1821     Updated: 06/13/25 1827    Narrative:      XR CHEST 1 VW-     HISTORY: Female who is 84 years-old, short of breath     TECHNIQUE: Frontal view of the chest     COMPARISON: 11/22/2024     FINDINGS: The heart size is borderline. Pulmonary vasculature is  unremarkable. Small left basilar atelectasis/infiltrate/effusion,  follow-up advised. No pneumothorax. No acute osseous process.       Impression:      As described.     This report was finalized on 6/13/2025 6:24 PM by Dr. Hubert Em M.D on Workstation: [id number]               Results for orders placed during the hospital encounter of 06/13/25    Adult Transthoracic Echo Complete W/ Cont if Necessary Per Protocol    Interpretation Summary    Left ventricular systolic function is normal. Calculated left ventricular EF = 64%    Left ventricular diastolic function was indeterminate in the setting of A-fib.    Aortic valve sclerosis with borderline aortic stenosis. Peak velocity of the flow distal to the aortic valve is 220 cm/s. Aortic valve mean pressure gradient is 10 mmHg. Aortic valve dimensionless index is 0.55. Aortic valve area is 1.2 cm2.    Moderate mitral valve regurgitation is present.    Moderate to severe tricuspid valve regurgitation is present.    Estimated right ventricular systolic pressure from tricuspid regurgitation is markedly elevated (>55 mmHg). Calculated right ventricular systolic pressure from tricuspid regurgitation is 70 mmHg.    Severe pulmonary hypertension is present.    Visually there is moderate to severe biatrial enlargement, borderline IVC dilatation.      Telemetry Scan   Final Result           Assessment/Plan     Active Hospital Problems    Diagnosis  POA    **GI bleed [K92.2]  Unknown    Pulmonary  hypertension [I27.20]  Unknown    Persistent atrial fibrillation [I48.19]  Unknown    Cirrhosis of liver without ascites [K74.60]  Yes    Essential hypertension [I10]  Yes     Atenolol 50 mg twice daily  Furosemide 20 mg/day  Spironolactone 25 mg/day      Other hyperlipidemia [E78.49]  Yes       GI Bleed  -Admit to a telemetry unit for monitoring  -GI consult  -Fecal occult stool was positive  -Continue Protonix drip  -Monitor H&H q 8 H. Transfuse PRBCs for hgb less than 7  -Clear liquid diet  -She reports abdominal pain, nausea, and vomiting. Will check CT A/P without contrast  -IV morphine x 1 for abdominal pain    Persistent atrial fibrillation  -Her heart rate has been controlled. Continue atenolol and digoxin  -Hold Eliquis secondary to GI bleed    Hypertension  -Blood pressures are stable. Continue atenolol   -Monitor    Pulmonary hypertension  -Echo from 6/13/25 showed severe pulmonary hypertension  -She is currently sating 97-98% on room air  -Pulmonology consult   -Cardiology consult    Cirrhosis  -Her LFTs are stable  -Continue spironolactone and lasix    Hyperlipidemia  -Continue statin    GERD  -On Protonix drip    -I discussed the patients findings and my recommendations with patient.    VTE Prophylaxis - SCDs.  Code Status - Full code.       WARREN Castro  [facility]  06/14/25  01:25 EDT

## 2025-06-14 NOTE — THERAPY EVALUATION
Patient Name: Viktoria Villasenor  : 1940    MRN: [id number]                              Today's Date: 2025       Admit Date: 2025    Visit Dx:     ICD-10-CM ICD-9-CM   1. Anemia requiring transfusions  D64.9 285.9   2. Gastrointestinal hemorrhage, unspecified gastrointestinal hemorrhage type  K92.2 578.9   3. Elevated brain natriuretic peptide (BNP) level  R79.89 790.99     Patient Active Problem List   Diagnosis    Other hyperlipidemia    Essential hypertension    Shortness of breath    Thyroid cysts    Mild intermittent asthma without complication    Esophagitis    Prediabetes    Class 1 obesity due to excess calories with serious comorbidity and body mass index (BMI) of 32.0 to 32.9 in adult    Steatosis of liver    Cardiac murmur    Hearing problem of both ears    NSTEMI, initial episode of care    Persistent atrial fibrillation    Subcarinal Lymph node seen on imaging study    Cirrhosis of liver without ascites    Duodenitis    Diverticulitis    Anemia requiring transfusions    GI bleed    Pulmonary hypertension    Persistent atrial fibrillation     Past Medical History:   Diagnosis Date    ACE-inhibitor cough     Acute bronchitis due to Rhinovirus 2024    Asthma     Asthma exacerbation 2024    Asymptomatic postmenopausal status     Cardiac murmur     Chest pain 2024    Cholelithiasis     Cirrhosis     Cirrhosis of liver without ascites 2024    Colon polyp 10/2016    Conjunctivitis     right    Decompensated cirrhosis 2024    Diverticulitis 2023    Diverticulitis of colon     Duodenitis 2024    Elevated LFTs 2016    Fatty liver     GERD (gastroesophageal reflux disease)     GI (gastrointestinal bleed)     History of colon polyps     HLD (hyperlipidemia)     Hypertension     Hypopigmentation     Left shoulder tendonitis     Menopausal syndrome     Motion sickness     Plantar fasciitis of left foot     nodule    Pleural effusion 11/15/2024     Pruritus     possibly due to Benicar    SOB (shortness of breath)     SOBOE (shortness of breath on exertion)     Thyroid cyst     UTI (urinary tract infection)     Viral syndrome      Past Surgical History:   Procedure Laterality Date    CARDIAC CATHETERIZATION      in the 80's    CARDIAC CATHETERIZATION N/A 05/04/2016    Procedure: Left Heart Cath;  Surgeon: Dale Vasquez MD;  Location: [facility] CATH INVASIVE LOCATION;  Service:     CARDIAC CATHETERIZATION N/A 05/04/2016    Procedure: Coronary angiography;  Surgeon: Dale Vasquez MD;  Location: [facility] CATH INVASIVE LOCATION;  Service:     CARDIAC CATHETERIZATION N/A 05/04/2016    Procedure: Left ventriculography;  Surgeon: Dale Vasquez MD;  Location: [facility] CATH INVASIVE LOCATION;  Service:     CARDIAC CATHETERIZATION N/A 4/26/2024    Procedure: Left Heart Cath;  Surgeon: Devon Cardona MD;  Location: [facility] CATH INVASIVE LOCATION;  Service: Cardiovascular;  Laterality: N/A;    CARDIAC CATHETERIZATION N/A 4/26/2024    Procedure: Coronary angiography;  Surgeon: Devon Cardona MD;  Location: [facility] CATH INVASIVE LOCATION;  Service: Cardiovascular;  Laterality: N/A;    CARDIAC CATHETERIZATION N/A 4/26/2024    Procedure: Left ventriculography;  Surgeon: Devon Cardona MD;  Location: [facility] CATH INVASIVE LOCATION;  Service: Cardiovascular;  Laterality: N/A;    CHOLECYSTECTOMY  1980    COLONOSCOPY      >5 years    COLONOSCOPY  12/01/2016    tics, IH, rectal biopsy showed changes suggestive of Schwannoma     ENDOSCOPY  12/01/2016    LA grade B esophagitis, mild Schatzki ring, HH, gastric polyp, erythematous mucosa in stomach    LASIK Bilateral     RECTAL ULTRASOUND N/A 02/07/2017    Procedure: ENDOSCOPIC ULTRASOUND (LOWER);  Surgeon: Casper Rodríguez MD;  Location: [facility] ENDOSCOPY;  Service:     SHOULDER SURGERY Left 06/17/2010    Dr. Green; repair L shoulder adhesive capsulitis    SKIN BIOPSY      TOTAL ABDOMINAL HYSTERECTOMY  1989     fibroid tumors    TUBAL ABDOMINAL LIGATION        General Information       Row Name 06/14/25 0945          Physical Therapy Time and Intention    Document Type evaluation  -MO     Mode of Treatment individual therapy;physical therapy  -MO       Row Name 06/14/25 0945          General Information    Patient Profile Reviewed yes  -MO     Prior Level of Function independent:  -MO     Existing Precautions/Restrictions no known precautions/restrictions  -MO       Row Name 06/14/25 0945          Living Environment    Current Living Arrangements home  -MO     People in Home spouse  -MO       Row Name 06/14/25 0945          Home Main Entrance    Number of Stairs, Main Entrance two  -MO       Row Name 06/14/25 0945          Stairs Within Home, Primary    Stairs, Within Home, Primary multi-level home but lives on first story  -MO       Row Name 06/14/25 0945          Cognition    Orientation Status (Cognition) oriented x 4  -MO       Row Name 06/14/25 0945          Safety Issues/Impairments Affecting Functional Mobility    Impairments Affecting Function (Mobility) strength;shortness of breath  -MO               User Key  (r) = Recorded By, (t) = Taken By, (c) = Cosigned By      Initials Name Provider Type    Yi Nicholson, PT Physical Therapist                   Mobility       Row Name 06/14/25 0946          Bed Mobility    Bed Mobility supine-sit  -MO     Supine-Sit [place] (Bed Mobility) independent  -MO     Comment, (Bed Mobility) UIC at termination  -MO       Row Name 06/14/25 0946          Sit-Stand Transfer    Sit-Stand [place] (Transfers) supervision  -MO     Comment, (Sit-Stand Transfer) w/o AD  -MO       Row Name 06/14/25 0946          Gait/Stairs (Locomotion)    [place] Level (Gait) standby assist  -MO     Patient was able to Ambulate yes  -MO     Distance in Feet (Gait) 200  -MO               User Key  (r) = Recorded By, (t) = Taken By, (c) = Cosigned By      Initials Name Provider Type    MO  Yi Gurrola, PT Physical Therapist                   Obj/Interventions       Row Name 06/14/25 0946          Range of Motion Comprehensive    General Range of Motion no range of motion deficits identified  -MO       Row Name 06/14/25 0946          Strength Comprehensive (MMT)    Comment, General Manual Muscle Testing (MMT) Assessment Generalized weakness however appears baseline with no acute focal deficits  -MO       Row Name 06/14/25 0946          Balance    Balance Assessment sitting static balance;sitting dynamic balance;standing static balance;standing dynamic balance  -MO     Static Sitting Balance independent  -MO     Dynamic Sitting Balance independent  -MO     Position, Sitting Balance unsupported  -MO     Static Standing Balance supervision  -MO     Dynamic Standing Balance supervision  -MO               User Key  (r) = Recorded By, (t) = Taken By, (c) = Cosigned By      Initials Name Provider Type    MO Yi Gurrola, PT Physical Therapist                   Goals/Plan    No documentation.                  Clinical Impression       Row Name 06/14/25 0947          Pain    Pretreatment Pain Rating 0/10 - no pain  -MO     Posttreatment Pain Rating 0/10 - no pain  -MO       Row Name 06/14/25 0947          Plan of Care Review    Plan of Care Reviewed With patient  -MO     Progress improving  -MO     Outcome Evaluation 83 y/o female pt reported to ED on 6/13 2/2 fatigue for several weeks and abnormal hgb level at recent blood draw. Found to have GI bleed, given blood yesterday and early AM. Hgb 7.3 currently. At  baseline, pt lives in multilevel home with her , who does have dementia, in which she provides mild caregiving services. She is IND w/ all ADLs and mobility at baseline. Today at [facility], she completes all bed mobility independently, able to stand and amb full lap [place] [facility] [place] ~200', with SBA and w/o AD. No significant LOB or instabiltiy concerns, does have notable SOB at termination. Her O2  sats >99% throughout, HR elevated to 105 but quickly returns to baseline in sitting. Cueing for breathing techniques. At this time, she is near her baseline status, did discuss referral to outpatient PT in the future for strengthening and conditioning if wanted. She is safe for return home once medically stable. No acute PT needs at this time, encouraged amb with nursing staff several times. PT to s/o, reconsult if status changes.  -MO       Row Name 06/14/25 0947          Therapy Assessment/Plan (PT)    Criteria for Skilled Interventions Met (PT) no problems identified which require skilled intervention  -MO     Therapy Frequency (PT) evaluation only  -MO       Row Name 06/14/25 0947          Vital Signs    O2 Delivery Pre Treatment room air  -MO     Intra SpO2 (%) 99  -MO     O2 Delivery Intra Treatment room air  -MO     Post SpO2 (%) 99  -MO     O2 Delivery Post Treatment room air  -MO       Row Name 06/14/25 0947          Positioning and Restraints    Pre-Treatment Position in bed  -MO     Post Treatment Position chair  -MO     In Chair sitting;call light within reach;encouraged to call for assist  -MO               User Key  (r) = Recorded By, (t) = Taken By, (c) = Cosigned By      Initials Name Provider Type    Yi Nicholson, PT Physical Therapist                   Outcome Measures       Row Name 06/14/25 0952 06/14/25 0827       How much help from another person do you currently need...    Turning from your back to your side while in flat bed without using bedrails? 4  -MO 4  -LT (r) KL (t) LT (c)    Moving from lying on back to sitting on the side of a flat bed without bedrails? 4  -MO 4  -LT (r) KL (t) LT (c)    Moving to and from a bed to a chair (including a wheelchair)? 4  -MO 4  -LT (r) KL (t) LT (c)    Standing up from a chair using your arms (e.g., wheelchair, bedside chair)? 4  -MO 4  -LT (r) KL (t) LT (c)    Climbing 3-5 steps with a railing? 3  -MO 3  -LT (r) KL (t) LT (c)    To walk in hospital  room? 3  -MO 3  -LT (r) KL (t) LT (c)    AM-PAC 6 Clicks Score (PT) 22  -MO 22  -LT (r) KL (t)    Highest Level of Mobility Goal Walk 25 Feet or More-7  -MO Walk 25 Feet or More-7  -LT (r) KL (t)      Row Name 06/14/25 0952          Functional Assessment    Outcome Measure Options AM-PAC 6 Clicks Basic Mobility (PT)  -MO               User Key  (r) = Recorded By, (t) = Taken By, (c) = Cosigned By      Initials Name Provider Type    Malathi Parra, RN Registered Nurse    Yi Nicholson, PT Physical Therapist    Beatrice Jeffrey, RN Extern Registered Nurse                                   PT Recommendation and Plan     Progress: improving  Outcome Evaluation: 83 y/o female pt reported to ED on 6/13 2/2 fatigue for several weeks and abnormal hgb level at recent blood draw. Found to have GI bleed, given blood yesterday and early AM. Hgb 7.3 currently. At  baseline, pt lives in multilevel home with her , who does have dementia, in which she provides mild caregiving services. She is IND w/ all ADLs and mobility at baseline. Today at [facility], she completes all bed mobility independently, able to stand and amb full lap around [facility] [place], ~200', with SBA and w/o AD. No significant LOB or instabiltiy concerns, does have notable SOB at termination. Her O2 sats >99% throughout, HR elevated to 105 but quickly returns to baseline in sitting. Cueing for breathing techniques. At this time, she is near her baseline status, did discuss referral to outpatient PT in the future for strengthening and conditioning if wanted. She is safe for return home once medically stable. No acute PT needs at this time, encouraged amb with nursing staff several times. PT to s/o, reconsult if status changes.     Time Calculation:         PT Charges       Row Name 06/14/25 0952             Time Calculation    Start Time 0913  -MO      Stop Time 0924  -MO      Time Calculation (min) 11 min  -MO      PT Non-Billable Time (min) 3 min  -MO       PT Received On 06/14/25  -MO         Time Calculation- PT    Total Timed Code Minutes- PT 8 minute(s)  -MO         Timed Charges    50125 - PT Therapeutic Activity Minutes 8  -MO         Untimed Charges    PT Eval/Re-eval Minutes 3  -MO         Total Minutes    Timed Charges Total Minutes 8  -MO      Untimed Charges Total Minutes 3  -MO       Total Minutes 11  -MO                User Key  (r) = Recorded By, (t) = Taken By, (c) = Cosigned By      Initials Name Provider Type    Yi Nicholson PT Physical Therapist                  Therapy Charges for Today       Code Description Service Date Service Provider Modifiers Qty    14265847650 HC PT THERAPEUTIC ACT EA 15 MIN 6/14/2025 Yi Gurrola, PT GP 1    83293549983 HC PT EVAL MOD COMPLEXITY 2 6/14/2025 Yi Gurrola, PT GP 1            PT G-Codes  Outcome Measure Options: AM-PAC 6 Clicks Basic Mobility (PT)  AM-PAC 6 Clicks Score (PT): 22  PT Discharge Summary  Anticipated Discharge Disposition (PT): home    Yi Gurrola PT  6/14/2025

## 2025-06-14 NOTE — CONSULTS
Date of Consultation: 25    Referral Provider: WARREN Spears     Reason for Consultation: Atrial fibrillation, pulmonary hypertension     Encounter Provider: WARREN Marinelli    Group of Service: [facility]     Patient Name: Viktoria Villasenor    :1940    Chief complaint: Anemia     History of Present Illness: Viktoria Villasenor is an 84 year old who is followed by Dr. Orellana and has known atrial fibrillation, hypertension, liver cirrhosis, diabetes, and hyperlipidemia.     She was seen by WARREN Lim on May 22, 2025. She had complaints of dizziness and dyspnea. Her lab work revealed a decline in her hemoglobin to 7.0 (was 13.8 in 2025). Her echocardiogram also showed significantly increased RVSP. She was instructed to proceed to the ED.     HS troponin 16 then 17. proBNP 2911. Hemoglobin 6.7. Fecal occult blood positive.     On exam today she is up in the chair. She denies chest pain or discomfort. She has shortness of breath which she feels is improved. She has family at bedside.     Echocardiogram 25    Left ventricular systolic function is normal. Calculated left ventricular EF = 64%    Left ventricular diastolic function was indeterminate in the setting of A-fib.    Aortic valve sclerosis with borderline aortic stenosis. Peak velocity of the flow distal to the aortic valve is 220 cm/s. Aortic valve mean pressure gradient is 10 mmHg. Aortic valve dimensionless index is 0.55. Aortic valve area is 1.2 cm2.    Moderate mitral valve regurgitation is present.    Moderate to severe tricuspid valve regurgitation is present.    Estimated right ventricular systolic pressure from tricuspid regurgitation is markedly elevated (>55 mmHg). Calculated right ventricular systolic pressure from tricuspid regurgitation is 70 mmHg.    Severe pulmonary hypertension is present.    Visually there is moderate to severe biatrial enlargement, borderline IVC  dilatation.    Cardiac catheterization 4/26/24  Procedure findings:  Left main: Large-caliber vessel that is short.  Gives rise to an LAD and circumflex.  Normal.  LAD: Medium caliber vessel that gives rise to a small caliber diagonal branch in the midsegment.  The mid LAD has luminal irregularities.  Left circumflex: Large-caliber, dominant vessel that gives rise to a small caliber OM1, medium caliber OM 2, and medium caliber left PDA.  Normal.  RCA: Small caliber, nondominant vessel that is normal.  Left ventricular angiography: Normal left ventricular size and systolic function.  Ejection fraction 55 to 60%.  Normal wall motion.  Hemodynamics:   LV: 124/12  AO: 124/50  Estimated blood loss: Minimal  Complications: None  Conclusions:   1. Left main: Normal  2. LAD: Luminal irregularities mid segment  3. LCX: Dominant vessel.  Normal.  4. RCA: Small caliber, nondominant vessel.  Normal.  5.  Normal left ventricular size and systolic function        Past Medical History:   Diagnosis Date    ACE-inhibitor cough     Acute bronchitis due to Rhinovirus 11/18/2024    Asthma     Asthma exacerbation 11/18/2024    Asymptomatic postmenopausal status     Cardiac murmur     Chest pain 04/25/2024    Cholelithiasis     Cirrhosis     Cirrhosis of liver without ascites 12/09/2024    Colon polyp 10/2016    Conjunctivitis     right    Decompensated cirrhosis 11/11/2024    Diverticulitis 12/20/2023    Diverticulitis of colon 08/23    Duodenitis 12/09/2024    Elevated LFTs 06/28/2016    Fatty liver     GERD (gastroesophageal reflux disease)     GI (gastrointestinal bleed)     History of colon polyps     HLD (hyperlipidemia)     Hypertension     Hypopigmentation     Left shoulder tendonitis     Menopausal syndrome     Motion sickness     Plantar fasciitis of left foot     nodule    Pleural effusion 11/15/2024    Pruritus     possibly due to Benicar    SOB (shortness of breath)     SOBOE (shortness of breath on exertion)     Thyroid  cyst     UTI (urinary tract infection)     Viral syndrome          Past Surgical History:   Procedure Laterality Date    CARDIAC CATHETERIZATION      in the 80's    CARDIAC CATHETERIZATION N/A 05/04/2016    Procedure: Left Heart Cath;  Surgeon: Dale Vasquez MD;  Location: [facility] CATH INVASIVE LOCATION;  Service:     CARDIAC CATHETERIZATION N/A 05/04/2016    Procedure: Coronary angiography;  Surgeon: Dale Vasquez MD;  Location: [facility] CATH INVASIVE LOCATION;  Service:     CARDIAC CATHETERIZATION N/A 05/04/2016    Procedure: Left ventriculography;  Surgeon: Dale Vasquez MD;  Location: [facility] CATH INVASIVE LOCATION;  Service:     CARDIAC CATHETERIZATION N/A 4/26/2024    Procedure: Left Heart Cath;  Surgeon: Devon Cardona MD;  Location: [facility] CATH INVASIVE LOCATION;  Service: Cardiovascular;  Laterality: N/A;    CARDIAC CATHETERIZATION N/A 4/26/2024    Procedure: Coronary angiography;  Surgeon: Devon Cardona MD;  Location: [facility] CATH INVASIVE LOCATION;  Service: Cardiovascular;  Laterality: N/A;    CARDIAC CATHETERIZATION N/A 4/26/2024    Procedure: Left ventriculography;  Surgeon: Devon Cardona MD;  Location: [facility] CATH INVASIVE LOCATION;  Service: Cardiovascular;  Laterality: N/A;    CHOLECYSTECTOMY  1980    COLONOSCOPY      >5 years    COLONOSCOPY  12/01/2016    tics, IH, rectal biopsy showed changes suggestive of Schwannoma     ENDOSCOPY  12/01/2016    LA grade B esophagitis, mild Schatzki ring, HH, gastric polyp, erythematous mucosa in stomach    LASIK Bilateral     RECTAL ULTRASOUND N/A 02/07/2017    Procedure: ENDOSCOPIC ULTRASOUND (LOWER);  Surgeon: Casper Rodríguez MD;  Location: [facility] ENDOSCOPY;  Service:     SHOULDER SURGERY Left 06/17/2010    Dr. Green; repair L shoulder adhesive capsulitis    SKIN BIOPSY      TOTAL ABDOMINAL HYSTERECTOMY  1989    fibroid tumors    TUBAL ABDOMINAL LIGATION           Allergies   Allergen Reactions    Lisinopril Cough         No  current facility-administered medications on file prior to encounter.     Current Outpatient Medications on File Prior to Encounter   Medication Sig Dispense Refill    apixaban (ELIQUIS) 5 MG tablet tablet Take 1 tablet by mouth 2 (Two) Times a Day. 180 tablet 3    atenolol (TENORMIN) 50 MG tablet TAKE 1 TABLET BY MOUTH 2 TIMES A DAY 60 tablet 2    atorvastatin (LIPITOR) 10 MG tablet Take 1 tablet by mouth Every Night for 270 days. 90 tablet 2    digoxin (LANOXIN) 125 MCG tablet TAKE 1 TABLET BY MOUTH DAILY 30 tablet 2    furosemide (LASIX) 20 MG tablet Take 1 tablet by mouth Daily. 90 tablet 3    pantoprazole (PROTONIX) 40 MG EC tablet Take 1 tablet by mouth Every Morning. 90 tablet 3    spironolactone (ALDACTONE) 25 MG tablet Take 1 tablet by mouth Daily. 90 tablet 3         Social History     Socioeconomic History    Marital status:      Spouse name: Abram    Number of children: 2   Tobacco Use    Smoking status: Former     Current packs/day: 0.00     Average packs/day: 1 pack/day for 30.0 years (30.0 ttl pk-yrs)     Types: Cigarettes     Start date: 1964     Quit date: 1994     Years since quittin.4    Smokeless tobacco: Never   Vaping Use    Vaping status: Never Used   Substance and Sexual Activity    Alcohol use: No    Drug use: No    Sexual activity: Not Currently     Partners: Male         Family History   Problem Relation Age of Onset    Anxiety disorder Mother     Uterine cancer Mother     Dementia Mother     Cancer Father         Prostate Cancer     Leukemia Brother     Malig Hyperthermia Neg Hx        REVIEW OF SYSTEMS:   12 point ROS was performed and is negative except as outlined in HPI       Objective:     Vitals:    25 2226 25 2319 25 0734 25 0819   BP: 143/91 128/62 133/45 121/59   BP Location: Right arm Right arm Right arm    Patient Position: Lying Lying Lying    Pulse: 77 90 79 93   Resp: 16 16 24    Temp: 97.7 °F (36.5 °C) 98.4 °F (36.9 °C) 97.6 °F  "(36.4 °C)    TempSrc: Oral Oral Oral    SpO2: 98% 98% 98%    Weight:       Height:         Body mass index is 32.1 kg/m².  Flowsheet Rows      Flowsheet Row First Filed Value   Admission Height 165.1 cm (65\") Documented at 06/13/2025 1945   Admission Weight 87.5 kg (192 lb 14.4 oz) Documented at 06/13/2025 1945              Physical Exam  Vitals reviewed.   Constitutional:       General: She is not in acute distress.  HENT:      Head: Normocephalic.   Eyes:      Extraocular Movements: Extraocular movements intact.      Pupils: Pupils are equal, round, and reactive to light.   Cardiovascular:      Rate and Rhythm: Rhythm irregularly irregular.      Pulses: Normal pulses.      Heart sounds: Normal heart sounds, S1 normal and S2 normal. Heart sounds not distant. No murmur heard.     No friction rub. No gallop. No S3 or S4 sounds.   Pulmonary:      Effort: Pulmonary effort is normal.      Breath sounds: Normal breath sounds.   Abdominal:      General: Abdomen is flat. Bowel sounds are normal.      Palpations: Abdomen is soft.      Tenderness: There is no abdominal tenderness.   Skin:     General: Skin is warm and dry.   Neurological:      General: No focal deficit present.      Mental Status: She is alert and oriented to person, place, and time.   Psychiatric:         Mood and Affect: Mood normal.         Behavior: Behavior normal.         Lab Review:                Results from last 7 days   Lab Units 06/14/25  0326   SODIUM mmol/L 138   POTASSIUM mmol/L 3.9   CHLORIDE mmol/L 102   CO2 mmol/L 23.8   BUN mg/dL 13.0   CREATININE mg/dL 0.90   GLUCOSE mg/dL 92   CALCIUM mg/dL 8.6     Results from last 7 days   Lab Units 06/13/25  1939 06/13/25  1807   HSTROP T ng/L 17* 16*     Results from last 7 days   Lab Units 06/14/25  0326   WBC 10*3/mm3 11.54*   HEMOGLOBIN g/dL 7.3*   HEMATOCRIT % 26.0*   PLATELETS 10*3/mm3 237     Results from last 7 days   Lab Units 06/13/25  1807   INR  1.50*   APTT seconds 33.7               "     EKG:          Assessment/Plan:   Symptomatic anemia  Iron deficiency   She is being transfused with PRBC  Eliquis currently held.   GI has evaluated and are planning endoscopic evaluation on 6/17/25   Persistent atrial fibrillation  Continue atenolol, digoxin   Hypertension  BP well controlled. Continue atenolol, furosemide, and spironolactone.   Cirrhosis  Hyperlipidemia  Continue atorvastatin.   Severe pulmonary hypertension  Noted on recent echocardiogram, RVSP 70 mmHg. Borderline aortic stenosis, moderate mitral valve regurgitation. Pulmonology has evaluated as well. Will continue to follow as an outpatient.     Cardiology will follow, thank you for this consult.     Karin Barrera, APRN   06/14/25

## 2025-06-14 NOTE — PLAN OF CARE
Goal Outcome Evaluation:           Progress: (P) improving        Pt is A&O x 4, on RA. Pt received 1 unit of PRBCs today for a Hgb of 7.3. IV Protonix is infusing & pt stated her stomach is feeling much better. Pt has SCDs.

## 2025-06-14 NOTE — CONSULTS
Gastroenterology   Initial Inpatient Consult Note  Thank you for asking our opinion on this patient.  Referring Provider: Reese Clifford MD    Reason for Consultation: Anemia and heme positive stool    Subjective     History of present illness:    84 y.o. female that we are asked see for anemia and heme positive stool on Eliquis.  Patient denies to me any overt bleeding.  She presented to the ER with shortness of breath and fatigue and had labs drawn at an outside cardiology office showing significant iron deficiency anemia.  She has a history of diverticulosis.  CT of the abdomen is pending.  She also has a history of cirrhosis.    Past Medical History:  Past Medical History:   Diagnosis Date    ACE-inhibitor cough     Acute bronchitis due to Rhinovirus 11/18/2024    Asthma     Asthma exacerbation 11/18/2024    Asymptomatic postmenopausal status     Cardiac murmur     Chest pain 04/25/2024    Cholelithiasis     Cirrhosis     Cirrhosis of liver without ascites 12/09/2024    Colon polyp 10/2016    Conjunctivitis     right    Decompensated cirrhosis 11/11/2024    Diverticulitis 12/20/2023    Diverticulitis of colon 08/23    Duodenitis 12/09/2024    Elevated LFTs 06/28/2016    Fatty liver     GERD (gastroesophageal reflux disease)     GI (gastrointestinal bleed)     History of colon polyps     HLD (hyperlipidemia)     Hypertension     Hypopigmentation     Left shoulder tendonitis     Menopausal syndrome     Motion sickness     Plantar fasciitis of left foot     nodule    Pleural effusion 11/15/2024    Pruritus     possibly due to Benicar    SOB (shortness of breath)     SOBOE (shortness of breath on exertion)     Thyroid cyst     UTI (urinary tract infection)     Viral syndrome      Past Surgical History:  Past Surgical History:   Procedure Laterality Date    CARDIAC CATHETERIZATION      in the 80's    CARDIAC CATHETERIZATION N/A 05/04/2016    Procedure: Left Heart Cath;  Surgeon: Dale Vasquez MD;  Location: [facility]  CATH INVASIVE LOCATION;  Service:     CARDIAC CATHETERIZATION N/A 2016    Procedure: Coronary angiography;  Surgeon: Dale Vasquez MD;  Location: [facility] CATH INVASIVE LOCATION;  Service:     CARDIAC CATHETERIZATION N/A 2016    Procedure: Left ventriculography;  Surgeon: Dale Vasquez MD;  Location: [facility] CATH INVASIVE LOCATION;  Service:     CARDIAC CATHETERIZATION N/A 2024    Procedure: Left Heart Cath;  Surgeon: Devon Cardona MD;  Location: [facility] CATH INVASIVE LOCATION;  Service: Cardiovascular;  Laterality: N/A;    CARDIAC CATHETERIZATION N/A 2024    Procedure: Coronary angiography;  Surgeon: Devon Cardona MD;  Location: [facility] CATH INVASIVE LOCATION;  Service: Cardiovascular;  Laterality: N/A;    CARDIAC CATHETERIZATION N/A 2024    Procedure: Left ventriculography;  Surgeon: Devon Cardona MD;  Location: [facility] CATH INVASIVE LOCATION;  Service: Cardiovascular;  Laterality: N/A;    CHOLECYSTECTOMY      COLONOSCOPY      >5 years    COLONOSCOPY  2016    tics, IH, rectal biopsy showed changes suggestive of Schwannoma     ENDOSCOPY  2016    LA grade B esophagitis, mild Schatzki ring, HH, gastric polyp, erythematous mucosa in stomach    LASIK Bilateral     RECTAL ULTRASOUND N/A 2017    Procedure: ENDOSCOPIC ULTRASOUND (LOWER);  Surgeon: Casper Rodríguez MD;  Location: [facility] ENDOSCOPY;  Service:     SHOULDER SURGERY Left 2010    Dr. Green; repair L shoulder adhesive capsulitis    SKIN BIOPSY      TOTAL ABDOMINAL HYSTERECTOMY      fibroid tumors    TUBAL ABDOMINAL LIGATION        Social History:   Social History     Tobacco Use    Smoking status: Former     Current packs/day: 0.00     Average packs/day: 1 pack/day for 30.0 years (30.0 ttl pk-yrs)     Types: Cigarettes     Start date: 1964     Quit date: 1994     Years since quittin.4    Smokeless tobacco: Never   Substance Use Topics    Alcohol use: No       Family History:  Family History   Problem Relation Age of Onset    Anxiety disorder Mother     Uterine cancer Mother     Dementia Mother     Cancer Father         Prostate Cancer     Leukemia Brother     Malig Hyperthermia Neg Hx        Home Meds:  Medications Prior to Admission   Medication Sig Dispense Refill Last Dose/Taking    apixaban (ELIQUIS) 5 MG tablet tablet Take 1 tablet by mouth 2 (Two) Times a Day. 180 tablet 3 6/12/2025    atenolol (TENORMIN) 50 MG tablet TAKE 1 TABLET BY MOUTH 2 TIMES A DAY 60 tablet 2 6/12/2025    atorvastatin (LIPITOR) 10 MG tablet Take 1 tablet by mouth Every Night for 270 days. 90 tablet 2 6/12/2025    digoxin (LANOXIN) 125 MCG tablet TAKE 1 TABLET BY MOUTH DAILY 30 tablet 2 6/13/2025    furosemide (LASIX) 20 MG tablet Take 1 tablet by mouth Daily. 90 tablet 3 6/13/2025    pantoprazole (PROTONIX) 40 MG EC tablet Take 1 tablet by mouth Every Morning. 90 tablet 3 6/13/2025    spironolactone (ALDACTONE) 25 MG tablet Take 1 tablet by mouth Daily. 90 tablet 3 6/13/2025     Current Meds:   atenolol, 50 mg, Oral, BID  atorvastatin, 10 mg, Oral, Nightly  digoxin, 125 mcg, Oral, Daily  furosemide, 20 mg, Oral, Daily  Morphine, 2 mg, Intravenous, Once  spironolactone, 25 mg, Oral, Daily      Allergies:  Allergies   Allergen Reactions    Lisinopril Cough     Review of Systems  Pertinent items are noted in HPI, all other systems reviewed and negative    Objective     Vital Signs  Temp:  [97.6 °F (36.4 °C)-98.4 °F (36.9 °C)] 97.6 °F (36.4 °C)  Heart Rate:  [71-98] 93  Resp:  [16-24] 24  BP: (121-143)/(45-91) 121/59    Physical Exam:  CONSTITUTIONAL:  today's vital signs reviewed  EARS NOSE THROAT: trachea midline and no deformity of the nares  EYES: no scleral icterus  GASTROINTESTINAL: abdomen is soft, nontender, nondistended with normal active bowel sounds, no masses are appreciated  PSYCHIATRIC: appropriate mood and affect  RESPIRATORY: normal inspiratory effort with no increased work of  breathing  NEUROLOGIC: patient is awake and alert  DERMATOLOGIC: skin is warm with no cyanosis  LYMPHATIC: no periumbilical lymphadenopathy     Results Review:   I reviewed the patient's new clinical results.    Results from last 7 days   Lab Units 06/14/25  0326 06/13/25  1807 06/13/25  1018   WBC 10*3/mm3 11.54* 11.66* 12.62*   HEMOGLOBIN g/dL 7.3* 6.7* 7.0*   HEMATOCRIT % 26.0* 24.5* 26.5*   PLATELETS 10*3/mm3 237 247 279     Results from last 7 days   Lab Units 06/14/25  0326 06/13/25  1807 06/13/25  1018   SODIUM mmol/L 138 141 136   POTASSIUM mmol/L 3.9 4.1 4.4   CHLORIDE mmol/L 102 103 101   CO2 mmol/L 23.8 25.1 25.3   BUN mg/dL 13.0 15.0 15.0   CREATININE mg/dL 0.90 1.01* 1.07*   CALCIUM mg/dL 8.6 8.8 9.0   BILIRUBIN mg/dL  --  0.6 0.8   ALK PHOS U/L  --  76 78   ALT (SGPT) U/L  --  10 11   AST (SGOT) U/L  --  17 18   GLUCOSE mg/dL 92 97 114*     Results from last 7 days   Lab Units 06/13/25  1807   INR  1.50*     Lab Results   Lab Value Date/Time    LIPASE 22 11/11/2024 1619    LIPASE 24 08/11/2023 1018       Radiology:  XR Chest 1 View   Final Result   As described.       This report was finalized on 6/13/2025 6:24 PM by Dr. Hubert Em M.D on Workstation: [id number]          CT Abdomen Pelvis Without Contrast    (Results Pending)       Assessment & Plan   Active Hospital Problems    Diagnosis     **GI bleed     Pulmonary hypertension     Persistent atrial fibrillation     Cirrhosis of liver without ascites     Essential hypertension     Other hyperlipidemia        Assessment:  Heme positive stool without overt bleeding.  Iron deficiency anemia  Anticoagulation with Eliquis  Cirrhosis of the liver but no ascites    Plan:  Continue to hold Eliquis  Monitor H&H  Will tentatively plan EGD and colonoscopy on 6/17 to allow Eliquis to wash out of the system      I discussed the patients findings and my recommendations with patient and nursing staff.    MD Deo Johnson,  [facility] [place] [address]  Office: [phone]

## 2025-06-14 NOTE — PLAN OF CARE
Goal Outcome Evaluation:  Plan of Care Reviewed With: patient        Progress: improving  Outcome Evaluation: 83 y/o female pt reported to ED on 6/13 2/2 fatigue for several weeks and abnormal hgb level at recent blood draw. Found to have GI bleed, given blood yesterday and early AM. Hgb 7.3 currently. At  baseline, pt lives in multilevel home with her , who does have dementia, in which she provides mild caregiving services. She is IND w/ all ADLs and mobility at baseline. Today at [facility], she completes all bed mobility independently, able to stand and amb full lap around ns station, ~200', with SBA and w/o AD. No significant LOB or instabiltiy concerns, does have notable SOB at termination. Her O2 sats >99% throughout, HR elevated to 105 but quickly returns to baseline in sitting. Cueing for breathing techniques. At this time, she is near her baseline status, did discuss referral to outpatient PT in the future for strengthening and conditioning if wanted. She is safe for return home once medically stable. No acute PT needs at this time, encouraged amb with nursing staff several times. PT to s/o, reconsult if status changes.    Anticipated Discharge Disposition (PT): home

## 2025-06-14 NOTE — CONSULTS
[facility]: [facility]         CONSULT NOTE    Patient Identification:  Viktoria Villasenor  84 y.o.  female  1940  [id number]            Requesting physician:  WARREN Spears    Reason for Consultation: Pulmonary hypertension    CC: Shortness of breath    History of Present Illness:  84-year-old with general weakness and shortness of breath for the past 3 weeks presents with severe anemia.  Found to have GI bleed.  Echocardiogram was ordered.  We have been asked to evaluate pulmonary status due to echocardiogram showing pulmonary hypertension.  Echocardiogram also shows aortic valve disease and mitral regurgitation.   She quit smoking 25 years ago.  She was told she may have had asthma in her younger years but never COPD.  She is not on any maintenance inhalers.  Does not have a pulmonologist.  Complains of daytime fatigue and sleepiness but is not interested in sleep study, does not think she snores      Review of Systems   Constitutional:  Positive for fatigue. Negative for diaphoresis and fever.   HENT:  Negative for ear discharge and sore throat.    Eyes:  Negative for pain and visual disturbance.   Respiratory:  Positive for shortness of breath. Negative for cough.    Cardiovascular:  Negative for chest pain and leg swelling.   Gastrointestinal:  Negative for abdominal pain and diarrhea.   Endocrine: Negative for cold intolerance and polyuria.   Genitourinary:  Negative for dysuria and hematuria.   Musculoskeletal:  Negative for joint swelling and myalgias.   Skin:  Negative for rash and wound.   Neurological:  Positive for weakness. Negative for speech difficulty and numbness.   Hematological:  Negative for adenopathy. Does not bruise/bleed easily.   Psychiatric/Behavioral:  Negative for agitation and confusion.        Past Medical History:  Past Medical History:   Diagnosis Date    ACE-inhibitor cough     Acute bronchitis due to Rhinovirus 11/18/2024    Asthma     Asthma exacerbation  11/18/2024    Asymptomatic postmenopausal status     Cardiac murmur     Chest pain 04/25/2024    Cholelithiasis     Cirrhosis     Cirrhosis of liver without ascites 12/09/2024    Colon polyp 10/2016    Conjunctivitis     right    Decompensated cirrhosis 11/11/2024    Diverticulitis 12/20/2023    Diverticulitis of colon 08/23    Duodenitis 12/09/2024    Elevated LFTs 06/28/2016    Fatty liver     GERD (gastroesophageal reflux disease)     GI (gastrointestinal bleed)     History of colon polyps     HLD (hyperlipidemia)     Hypertension     Hypopigmentation     Left shoulder tendonitis     Menopausal syndrome     Motion sickness     Plantar fasciitis of left foot     nodule    Pleural effusion 11/15/2024    Pruritus     possibly due to Benicar    SOB (shortness of breath)     SOBOE (shortness of breath on exertion)     Thyroid cyst     UTI (urinary tract infection)     Viral syndrome        Past Surgical History:  Past Surgical History:   Procedure Laterality Date    CARDIAC CATHETERIZATION      in the 80's    CARDIAC CATHETERIZATION N/A 05/04/2016    Procedure: Left Heart Cath;  Surgeon: Dale Vasquez MD;  Location: [facility] CATH INVASIVE LOCATION;  Service:     CARDIAC CATHETERIZATION N/A 05/04/2016    Procedure: Coronary angiography;  Surgeon: Dale Vasquez MD;  Location: [facility] CATH INVASIVE LOCATION;  Service:     CARDIAC CATHETERIZATION N/A 05/04/2016    Procedure: Left ventriculography;  Surgeon: Dale Vasquez MD;  Location: [facility] CATH INVASIVE LOCATION;  Service:     CARDIAC CATHETERIZATION N/A 4/26/2024    Procedure: Left Heart Cath;  Surgeon: Devon Cardona MD;  Location: [facility] CATH INVASIVE LOCATION;  Service: Cardiovascular;  Laterality: N/A;    CARDIAC CATHETERIZATION N/A 4/26/2024    Procedure: Coronary angiography;  Surgeon: Devon Cardona MD;  Location: [facility] CATH INVASIVE LOCATION;  Service: Cardiovascular;  Laterality: N/A;    CARDIAC CATHETERIZATION N/A 4/26/2024    Procedure:  Left ventriculography;  Surgeon: Devon Cardona MD;  Location: [facility] CATH INVASIVE LOCATION;  Service: Cardiovascular;  Laterality: N/A;    CHOLECYSTECTOMY  1980    COLONOSCOPY      >5 years    COLONOSCOPY  12/01/2016    tics, IH, rectal biopsy showed changes suggestive of Schwannoma     ENDOSCOPY  12/01/2016    LA grade B esophagitis, mild Schatzki ring, HH, gastric polyp, erythematous mucosa in stomach    LASIK Bilateral     RECTAL ULTRASOUND N/A 02/07/2017    Procedure: ENDOSCOPIC ULTRASOUND (LOWER);  Surgeon: Casper Rodríguez MD;  Location: [facility] ENDOSCOPY;  Service:     SHOULDER SURGERY Left 06/17/2010    Dr. Green; repair L shoulder adhesive capsulitis    SKIN BIOPSY      TOTAL ABDOMINAL HYSTERECTOMY  1989    fibroid tumors    TUBAL ABDOMINAL LIGATION          Home Meds:  Medications Prior to Admission   Medication Sig Dispense Refill Last Dose/Taking    apixaban (ELIQUIS) 5 MG tablet tablet Take 1 tablet by mouth 2 (Two) Times a Day. 180 tablet 3 6/12/2025    atenolol (TENORMIN) 50 MG tablet TAKE 1 TABLET BY MOUTH 2 TIMES A DAY 60 tablet 2 6/12/2025    atorvastatin (LIPITOR) 10 MG tablet Take 1 tablet by mouth Every Night for 270 days. 90 tablet 2 6/12/2025    digoxin (LANOXIN) 125 MCG tablet TAKE 1 TABLET BY MOUTH DAILY 30 tablet 2 6/13/2025    furosemide (LASIX) 20 MG tablet Take 1 tablet by mouth Daily. 90 tablet 3 6/13/2025    pantoprazole (PROTONIX) 40 MG EC tablet Take 1 tablet by mouth Every Morning. 90 tablet 3 6/13/2025    spironolactone (ALDACTONE) 25 MG tablet Take 1 tablet by mouth Daily. 90 tablet 3 6/13/2025       Allergies:  Allergies   Allergen Reactions    Lisinopril Cough       Social History:   Social History     Socioeconomic History    Marital status:      Spouse name: Abram    Number of children: 2   Tobacco Use    Smoking status: Former     Current packs/day: 0.00     Average packs/day: 1 pack/day for 30.0 years (30.0 ttl pk-yrs)     Types: Cigarettes     Start  "date: 1964     Quit date: 1994     Years since quittin.4    Smokeless tobacco: Never   Vaping Use    Vaping status: Never Used   Substance and Sexual Activity    Alcohol use: No    Drug use: No    Sexual activity: Not Currently     Partners: Male       Family History:  Family History   Problem Relation Age of Onset    Anxiety disorder Mother     Uterine cancer Mother     Dementia Mother     Cancer Father         Prostate Cancer     Leukemia Brother     Malig Hyperthermia Neg Hx        Physical Exam:  /59   Pulse 93   Temp 97.6 °F (36.4 °C) (Oral)   Resp 24   Ht 165.1 cm (65\")   Wt 87.5 kg (192 lb 14.4 oz)   SpO2 98%   BMI 32.10 kg/m²  Body mass index is 32.1 kg/m². 98% 87.5 kg (192 lb 14.4 oz)  Physical Exam  HENT:      Right Ear: External ear normal.      Left Ear: External ear normal.      Nose: Nose normal.   Eyes:      Conjunctiva/sclera: Conjunctivae normal.      Pupils: Pupils are equal, round, and reactive to light.   Neck:      Thyroid: No thyromegaly.      Vascular: No JVD.      Trachea: No tracheal deviation.   Cardiovascular:      Rate and Rhythm: Normal rate and regular rhythm.      Heart sounds: Normal heart sounds. No murmur heard.  Pulmonary:      Effort: Pulmonary effort is normal.      Breath sounds: Normal breath sounds.   Abdominal:      Palpations: Abdomen is soft.      Tenderness: There is no abdominal tenderness. There is no rebound.      Comments: Cannot palpate liver or spleen enlargement   Musculoskeletal:         General: No deformity. Normal range of motion.      Cervical back: Neck supple. No rigidity.   Skin:     General: Skin is warm.      Coloration: Skin is pale.      Findings: No rash.      Comments: No palpable nodules   Neurological:      General: No focal deficit present.      Mental Status: She is alert and oriented to person, place, and time.      Cranial Nerves: No cranial nerve deficit.      Motor: No weakness.   Psychiatric:         Mood and Affect: " Mood normal.         Thought Content: Thought content normal.         LABS:  COVID19   Date Value Ref Range Status   11/18/2024 Not Detected Not Detected - Ref. Range Final       Lab Results   Component Value Date    CALCIUM 8.6 06/14/2025    PHOS 4.0 11/15/2024     Results from last 7 days   Lab Units 06/14/25  0326 06/13/25  1807 06/13/25  1018   SODIUM mmol/L 138 141 136   POTASSIUM mmol/L 3.9 4.1 4.4   CHLORIDE mmol/L 102 103 101   CO2 mmol/L 23.8 25.1 25.3   BUN mg/dL 13.0 15.0 15.0   CREATININE mg/dL 0.90 1.01* 1.07*   GLUCOSE mg/dL 92 97 114*   CALCIUM mg/dL 8.6 8.8 9.0   WBC 10*3/mm3 11.54* 11.66* 12.62*   HEMOGLOBIN g/dL 7.3* 6.7* 7.0*   PLATELETS 10*3/mm3 237 247 279   ALT (SGPT) U/L  --  10 11   AST (SGOT) U/L  --  17 18   PROBNP pg/mL  --  2,911.0*  --      Lab Results   Component Value Date    CKTOTAL 54 01/15/2025    TROPONINT 17 (H) 06/13/2025     Results from last 7 days   Lab Units 06/13/25  1939 06/13/25  1807   HSTROP T ng/L 17* 16*                     Results from last 7 days   Lab Units 06/13/25  1807   INR  1.50*           Imaging: I personally visualized the images of scans/x-rays performed within last 3 days.  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  She does not have a history of lung disease.  The valvular heart disease is likely the cause of her secondary pulmonary hypertension.  Unless a right heart catheterization is performed, there is no clinical indication for pharmacologic pulmonary vasodilator therapy in this patient.  Address current reason for admission, namely blood loss anemia.  She has mild intermittent uncomplicated asthma.  Says she only takes albuterol maybe once or twice a year.  Does not  want to follow-up with pulmonology.  We will be available as needed      Charles Brandon MD  6/14/2025  10:03 EDT      Much of this encounter note is an electronic transcription/translation of spoken language to printed text using Dragon Software.

## 2025-06-14 NOTE — PLAN OF CARE
Goal Outcome Evaluation:  Plan of Care Reviewed With: patient        Progress: no change  Outcome Evaluation: Pt is a new admit from ER with anemia.  Hgb 6.7, 1 unit PRBC given.  Continue IV protonix drip.  Pt is standby assist.  Pt is alert and oriented x4, room air, afib on the monitor.  VSS, no complaints of pain to this RN.  Eliquis on hold.

## 2025-06-15 ENCOUNTER — APPOINTMENT (OUTPATIENT)
Dept: CT IMAGING | Facility: HOSPITAL | Age: 85
DRG: 378 | End: 2025-06-15
Payer: MEDICARE

## 2025-06-15 LAB
BH BB BLOOD EXPIRATION DATE: NORMAL
BH BB BLOOD TYPE BARCODE: 5100
BH BB DISPENSE STATUS: NORMAL
BH BB PRODUCT CODE: NORMAL
BH BB UNIT NUMBER: NORMAL
CROSSMATCH INTERPRETATION: NORMAL
HCT VFR BLD AUTO: 28.3 % (ref 34–46.6)
HCT VFR BLD AUTO: 29.7 % (ref 34–46.6)
HCT VFR BLD AUTO: 30.4 % (ref 34–46.6)
HGB BLD-MCNC: 8.2 G/DL (ref 12–15.9)
HGB BLD-MCNC: 8.6 G/DL (ref 12–15.9)
HGB BLD-MCNC: 8.9 G/DL (ref 12–15.9)
UNIT  ABO: NORMAL
UNIT  RH: NORMAL

## 2025-06-15 PROCEDURE — 99232 SBSQ HOSP IP/OBS MODERATE 35: CPT | Performed by: INTERNAL MEDICINE

## 2025-06-15 PROCEDURE — 85014 HEMATOCRIT: CPT | Performed by: NURSE PRACTITIONER

## 2025-06-15 PROCEDURE — 85018 HEMOGLOBIN: CPT | Performed by: NURSE PRACTITIONER

## 2025-06-15 PROCEDURE — 74176 CT ABD & PELVIS W/O CONTRAST: CPT

## 2025-06-15 RX ADMIN — SPIRONOLACTONE 25 MG: 25 TABLET ORAL at 08:54

## 2025-06-15 RX ADMIN — FUROSEMIDE 20 MG: 20 TABLET ORAL at 08:52

## 2025-06-15 RX ADMIN — DIGOXIN 125 MCG: 0.12 TABLET ORAL at 08:54

## 2025-06-15 RX ADMIN — PANTOPRAZOLE SODIUM 8 MG/HR: 40 INJECTION, POWDER, FOR SOLUTION INTRAVENOUS at 19:11

## 2025-06-15 RX ADMIN — ATENOLOL 50 MG: 25 TABLET ORAL at 20:50

## 2025-06-15 RX ADMIN — ATORVASTATIN CALCIUM 10 MG: 10 TABLET ORAL at 20:50

## 2025-06-15 RX ADMIN — ATENOLOL 50 MG: 25 TABLET ORAL at 08:52

## 2025-06-15 RX ADMIN — PANTOPRAZOLE SODIUM 8 MG/HR: 40 INJECTION, POWDER, FOR SOLUTION INTRAVENOUS at 08:48

## 2025-06-15 RX ADMIN — PANTOPRAZOLE SODIUM 8 MG/HR: 40 INJECTION, POWDER, FOR SOLUTION INTRAVENOUS at 02:52

## 2025-06-15 RX ADMIN — PANTOPRAZOLE SODIUM 8 MG/HR: 40 INJECTION, POWDER, FOR SOLUTION INTRAVENOUS at 14:13

## 2025-06-15 NOTE — PLAN OF CARE
Goal Outcome Evaluation:  Plan of Care Reviewed With: patient        Progress: no change  Outcome Evaluation: No complaints of pain this shift. H&H stable. No signs of bleeding. Appetite good. Eliquis on hold for EGD and c-scope 6/17. Remains in a-fib. IV protonix continues. Pt to return home at [facility].

## 2025-06-15 NOTE — PROGRESS NOTES
LOS: 1 day   Patient Care Team:  Magen Olson MD as PCP - General  Magen Olson MD as PCP - Family Medicine  Ralph Turner MD as Consulting Physician (Otolaryngology)  Casper Hodge MD as Consulting Physician (Gastroenterology)  Loyda Hernandez APRN as Nurse Practitioner (Cardiology)  Chuy Orellana MD as Consulting Physician (Cardiology)    Chief Complaint: Follow-up iron deficiency anemia, persistent atrial fibrillation, moderate mitral regurgitation, moderate to severe tricuspid regurgitation.    Interval History: She is feeling better today.  Heart rate is controlled.  No chest pain or significant shortness of breath.  Really no cardiac complaints.    Vital Signs:  Temp:  [97.2 °F (36.2 °C)-97.7 °F (36.5 °C)] 97.7 °F (36.5 °C)  Heart Rate:  [63] 63  Resp:  [18-22] 22  BP: (121-136)/(48-64) 121/52    Intake/Output Summary (Last 24 hours) at 6/15/2025 1832  Last data filed at 6/15/2025 1700  Gross per 24 hour   Intake 1320 ml   Output --   Net 1320 ml       Physical Exam:   General Appearance:    No acute distress, alert and oriented x4   Lungs:     Clear to auscultation bilaterally     Heart:    Irregularly irregular rhythm and normal rate. II/VI SM RUSB and LLSB.   Abdomen:     Soft, nontender, nondistended.    Extremities:   Trace edema of the lower extremities.     Results Review:    Results from last 7 days   Lab Units 06/14/25  0326   SODIUM mmol/L 138   POTASSIUM mmol/L 3.9   CHLORIDE mmol/L 102   CO2 mmol/L 23.8   BUN mg/dL 13.0   CREATININE mg/dL 0.90   GLUCOSE mg/dL 92   CALCIUM mg/dL 8.6     Results from last 7 days   Lab Units 06/13/25  1939 06/13/25  1807   HSTROP T ng/L 17* 16*     Results from last 7 days   Lab Units 06/15/25  1611 06/14/25  1211 06/14/25  0326   WBC 10*3/mm3  --   --  11.54*   HEMOGLOBIN g/dL 8.9*   < > 7.3*   HEMATOCRIT % 30.4*   < > 26.0*   PLATELETS 10*3/mm3  --   --  237    < > = values in this interval not displayed.     Results from last 7 days   Lab Units  06/13/25  1807   INR  1.50*   APTT seconds 33.7                   I reviewed the patient's new clinical results.        Assessment:  1.  Symptomatic iron deficiency anemia  2.  Hemoccult positive stool  3.  Persistent atrial fibrillation, normally on Eliquis  4.  Mild aortic stenosis by echo on 6/13/2025  5.  Moderate mitral regurgitation by echo on 6/13/2025  6.  Moderate to severe tricuspid regurgitation with suggested pulmonary hypertension (RVSP 70 mmHg) by echo on 6/13/2025  7.  Chronic right bundle branch block  8.  Intermittent PVCs  9.  History of diverticulitis  10.  Hypertension  11.  Cirrhosis of the liver, compensated    Plan:  -Planning on EGD and colonoscopy on 6/17/2025 per gastroenterology.  Continue to hold Eliquis.  Reevaluate candidacy for anticoagulation after scopes.  Acceptable risk to proceed from a cardiology standpoint.    -Atrial fibrillation rate is controlled on atenolol and digoxin.    -Continue Lasix and spironolactone.  Appears to be euvolemic.    -Echocardiogram reviewed from 6/13/2025.  Aortic stenosis has not progressed.  Mitral regurgitation is still moderate.  There is suggestion of pulmonary hypertension, although she is not feeling short of breath.    Irving Fajardo MD  06/15/25  18:32 EDT

## 2025-06-15 NOTE — PROGRESS NOTES
Name: Viktoria Villasenor ADMIT: 2025   : 1940  PCP: Magen Olson MD    MRN: [id number] LOS: 1 days   AGE/SEX: 84 y.o. female  ROOM: [facility]/     Subjective   Subjective   Patient awake and resting in chair, doing okay at present, no new acute complaints. No acute events overnight.       Objective   Objective   Vital Signs  Temp:  [97.2 °F (36.2 °C)-97.9 °F (36.6 °C)] 97.7 °F (36.5 °C)  Heart Rate:  [63-68] 63  Resp:  [18-22] 22  BP: (119-136)/(48-64) 121/52  SpO2:  [97 %-98 %] 98 %  on   ;   Device (Oxygen Therapy): room air  Body mass index is 32.1 kg/m².  Physical Exam  Vitals and nursing note reviewed.   Constitutional:       General: She is not in acute distress.  Cardiovascular:      Rate and Rhythm: Normal rate.      Pulses: Normal pulses.      Heart sounds: Normal heart sounds.   Pulmonary:      Effort: Pulmonary effort is normal. No respiratory distress.      Breath sounds: No wheezing.   Abdominal:      General: There is no distension.      Palpations: Abdomen is soft.      Tenderness: There is abdominal tenderness (LLQ).   Skin:     General: Skin is warm and dry.   Neurological:      Mental Status: She is alert.       Results Review     I reviewed the patient's new clinical results.  Results from last 7 days   Lab Units 06/15/25  0751 06/15/25  0003 25  1853 25  1211 25  0326 25  18025  1018   WBC 10*3/mm3  --   --   --   --  11.54* 11.66* 12.62*   HEMOGLOBIN g/dL 8.2* 8.6* 8.5* 7.6* 7.3* 6.7* 7.0*   PLATELETS 10*3/mm3  --   --   --   --  237 247 279     Results from last 7 days   Lab Units 25  0326 25  18025  1018   SODIUM mmol/L 138 141 136   POTASSIUM mmol/L 3.9 4.1 4.4   CHLORIDE mmol/L 102 103 101   CO2 mmol/L 23.8 25.1 25.3   BUN mg/dL 13.0 15.0 15.0   CREATININE mg/dL 0.90 1.01* 1.07*   GLUCOSE mg/dL 92 97 114*   EGFR mL/min/1.73 64.0 55.7* 52.0*     Results from last 7 days   Lab Units 25  1807 25  1018   ALBUMIN  "g/dL 3.8 4.0   BILIRUBIN mg/dL 0.6 0.8   ALK PHOS U/L 76 78   AST (SGOT) U/L 17 18   ALT (SGPT) U/L 10 11     Results from last 7 days   Lab Units 06/14/25  0326 06/13/25  1807 06/13/25  1018   CALCIUM mg/dL 8.6 8.8 9.0   ALBUMIN g/dL  --  3.8 4.0       No results found for: \"HGBA1C\", \"POCGLU\"    XR Chest 1 View  Result Date: 6/13/2025  As described.  This report was finalized on 6/13/2025 6:24 PM by Dr. Hubert Em M.D on Workstation: [email or web address]        I have personally reviewed all medications:  Scheduled Medications  atenolol, 50 mg, Oral, BID  atorvastatin, 10 mg, Oral, Nightly  digoxin, 125 mcg, Oral, Daily  furosemide, 20 mg, Oral, Daily  spironolactone, 25 mg, Oral, Daily    Infusions  pantoprazole, 8 mg/hr, Last Rate: 8 mg/hr (06/15/25 0848)    Diet  Diet: Regular/House; Texture: Regular (IDDSI 7); Fluid Consistency: Thin (IDDSI 0)    I have personally reviewed:  [x]  Laboratory   []  Microbiology   [x]  Radiology   [x]  EKG/Telemetry  [x]  Cardiology/Vascular   []  Pathology    []  Records       Assessment/Plan     Active Hospital Problems    Diagnosis  POA    **GI bleed [K92.2]  Unknown    Pulmonary hypertension [I27.20]  Unknown    Persistent atrial fibrillation [I48.19]  Unknown    Cirrhosis of liver without ascites [K74.60]  Yes    Essential hypertension [I10]  Yes    Other hyperlipidemia [E78.49]  Yes      Resolved Hospital Problems   No resolved problems to display.       84 y.o. female admitted with GI bleed.    Symptomatic iron deficiency anemia  Left lower quadrant tenderness  - S/P PRBC transfusion, she is on PPI drip, Eliquis being held. Continue with close monitoring of hemoglobin, transfuse for hemoglobin <7.  - GI consulted and following, plans for scopes on 06/17 once Eliquis is out of her system. Will continue to follow their plans/recommendations. Greatly appreciate their help.    Severe pulmonary hypertension seen on recent echocardiogram  Mitral regurgitation, aortic stenosis  - " Cardiology and Pulmonology have evaluated, notes reviewed. Greatly appreciate their help. She does not want outpatient follow up with Pulmonology, but will follow up with Cardiology.    Asthma  - Appears stable from this perspective, no evidence of exacerbation, she is not chronically on inhalers. Continue with PRN medications.    Persistent afib  - She is on Digoxin and Atenolol. Eliquis currently being held, cardiology has evaluated. Continue treatments as prescribed. Monitor on telemetry.     Cirrhosis  - She is on lasix, spironolactone. I do not see a prior EGD in the system, but given the lack of overt bleeding, low suspicion for variceal bleeding. Closely monitor.     Essential hypertension  - BP appears acceptable, continue treatment as prescribed and closely monitor.    Hyperlipidemia  - Continue statin.    GERD  - PPI.    Obesity      SCDs for DVT prophylaxis.  Limited code (no CPR, no intubation).  Discussed with patient, family, and nursing staff.  Anticipate discharge home when cleared by consultants.  Expected discharge date/ time has not been documented.      Juan Mendez DO  [facility]  06/15/25

## 2025-06-15 NOTE — PROGRESS NOTES
Gastroenterology   Inpatient Progress Note    Reason for Follow Up: Anemia and heme positive stools    Subjective     Interval History:   No new complaints.  No overt bleeding reported.    Current Facility-Administered Medications:     acetaminophen (TYLENOL) tablet 650 mg, 650 mg, Oral, Q4H PRN **OR** acetaminophen (TYLENOL) 160 MG/5ML oral solution 650 mg, 650 mg, Oral, Q4H PRN **OR** acetaminophen (TYLENOL) suppository 650 mg, 650 mg, Rectal, Q4H PRN, Klaus Miner APRN    aluminum-magnesium hydroxide-simethicone (MAALOX MAX) 400-400-40 MG/5ML suspension 15 mL, 15 mL, Oral, Q6H PRN, Klaus Miner APRN    atenolol (TENORMIN) tablet 50 mg, 50 mg, Oral, BID, Juju Meadows APRN, 50 mg at 06/15/25 0852    atorvastatin (LIPITOR) tablet 10 mg, 10 mg, Oral, Nightly, Juju Meadows APRN, 10 mg at 06/14/25 2039    sennosides-docusate (PERICOLACE) 8.6-50 MG per tablet 2 tablet, 2 tablet, Oral, BID PRN **AND** polyethylene glycol (MIRALAX) packet 17 g, 17 g, Oral, Daily PRN **AND** bisacodyl (DULCOLAX) EC tablet 5 mg, 5 mg, Oral, Daily PRN **AND** bisacodyl (DULCOLAX) suppository 10 mg, 10 mg, Rectal, Daily PRN, Klaus Miner APRN    digoxin (LANOXIN) tablet 125 mcg, 125 mcg, Oral, Daily, Juju Meadows APRN, 125 mcg at 06/15/25 0854    furosemide (LASIX) tablet 20 mg, 20 mg, Oral, Daily, Juju Meadows APRN, 20 mg at 06/15/25 0852    melatonin tablet 2.5 mg, 2.5 mg, Oral, Nightly PRN, Klaus Miner APRN    nitroglycerin (NITROSTAT) SL tablet 0.4 mg, 0.4 mg, Sublingual, Q5 Min PRN, Klaus Miner, WARREN    ondansetron ODT (ZOFRAN-ODT) disintegrating tablet 4 mg, 4 mg, Oral, Q6H PRN **OR** ondansetron (ZOFRAN) injection 4 mg, 4 mg, Intravenous, Q6H PRN, Klaus Miner APRN    [COMPLETED] pantoprazole (PROTONIX) injection 80 mg, 80 mg, Intravenous, Once, 80 mg at 06/13/25 6766 **AND** pantoprazole (PROTONIX) 40 mg in sodium chloride 0.9 % 100 mL (0.4 mg/mL)  MBP, 8 mg/hr, Intravenous, Continuous, Rea Stern PA, Last Rate: 20 mL/hr at 06/15/25 0848, 8 mg/hr at 06/15/25 0848    [COMPLETED] Insert Peripheral IV, , , Once **AND** sodium chloride 0.9 % flush 10 mL, 10 mL, Intravenous, PRN, Rea Stern PA    sodium chloride 0.9 % flush 10 mL, 10 mL, Intravenous, PRN, Klaus Miner, APRN    spironolactone (ALDACTONE) tablet 25 mg, 25 mg, Oral, Daily, Juju Meadows, WARREN, 25 mg at 06/15/25 0854  Review of Systems:    The following systems were reviewed and negative;  gastrointestinal    Objective     Vital Signs  Temp:  [97.2 °F (36.2 °C)-98.4 °F (36.9 °C)] 97.3 °F (36.3 °C)  Heart Rate:  [61-68] 63  Resp:  [18-20] 20  BP: (100-136)/(42-64) 121/52  Body mass index is 32.1 kg/m².    Intake/Output Summary (Last 24 hours) at 6/15/2025 1049  Last data filed at 6/14/2025 1700  Gross per 24 hour   Intake 1140 ml   Output --   Net 1140 ml     No intake/output data recorded.     Physical Exam:   General: patient awake, alert and cooperative   Eyes: no scleral icterus   Skin: warm and dry, not jaundiced   Abdomen: soft, nontender, nondistended; normal bowel sounds, no masses palpated, no periumbical lymphadenopathy   Psychiatric: Appropriate affect and behavior     Results Review:     I reviewed the patient's new clinical results.    Results from last 7 days   Lab Units 06/15/25  0751 06/15/25  0003 06/14/25  1853 06/14/25  1211 06/14/25  0326 06/13/25  1807 06/13/25  1018   WBC 10*3/mm3  --   --   --   --  11.54* 11.66* 12.62*   HEMOGLOBIN g/dL 8.2* 8.6* 8.5*   < > 7.3* 6.7* 7.0*   HEMATOCRIT % 28.3* 29.7* 30.1*   < > 26.0* 24.5* 26.5*   PLATELETS 10*3/mm3  --   --   --   --  237 247 279    < > = values in this interval not displayed.     Results from last 7 days   Lab Units 06/14/25  0326 06/13/25  1807 06/13/25  1018   SODIUM mmol/L 138 141 136   POTASSIUM mmol/L 3.9 4.1 4.4   CHLORIDE mmol/L 102 103 101   CO2 mmol/L 23.8 25.1 25.3   BUN mg/dL 13.0 15.0 15.0    CREATININE mg/dL 0.90 1.01* 1.07*   CALCIUM mg/dL 8.6 8.8 9.0   BILIRUBIN mg/dL  --  0.6 0.8   ALK PHOS U/L  --  76 78   ALT (SGPT) U/L  --  10 11   AST (SGOT) U/L  --  17 18   GLUCOSE mg/dL 92 97 114*     Results from last 7 days   Lab Units 06/13/25  1807   INR  1.50*     Lab Results   Lab Value Date/Time    LIPASE 22 11/11/2024 1619    LIPASE 24 08/11/2023 1018       Radiology:  XR Chest 1 View   Final Result   As described.       This report was finalized on 6/13/2025 6:24 PM by Dr. Hubert Em M.D on Workstation: [id number]          CT Abdomen Pelvis Without Contrast    (Results Pending)       Assessment & Plan     Active Hospital Problems    Diagnosis     **GI bleed     Pulmonary hypertension     Persistent atrial fibrillation     Cirrhosis of liver without ascites     Essential hypertension     Other hyperlipidemia        Assessment:  Iron deficiency anemia.  Hemoglobin stable  Heme positive stool.  No overt bleeding.  Atrial fibrillation on Eliquis.  This is currently being held in preparation for endoscopic evaluation on 6/17  History of cirrhosis      Plan:  Continue to hold Eliquis  Trend H&H  Plan EGD and colonoscopy for evaluation of anemia and heme positive stools on 6/17  I discussed the patients findings and my recommendations with patient and nursing staff.    MD Deo Johnson M.D.  [facility] [place] [address]  Office: [phone]

## 2025-06-15 NOTE — PLAN OF CARE
Goal Outcome Evaluation:  Plan of Care Reviewed With: patient        Progress: improving  Outcome Evaluation: VSS, no complaints of pain this shift.  Last Hgb 8.5 after 1 unit of PRBC.  Continue to hold eliquis for scopes on 6/17.  Continue IV protonix drip.  Pt tolerating a regular diet.  At discharge plan is to return home with family.

## 2025-06-16 LAB
ANION GAP SERPL CALCULATED.3IONS-SCNC: 7.8 MMOL/L (ref 5–15)
BUN SERPL-MCNC: 15 MG/DL (ref 8–23)
BUN/CREAT SERPL: 12 (ref 7–25)
CALCIUM SPEC-SCNC: 8.6 MG/DL (ref 8.6–10.5)
CHLORIDE SERPL-SCNC: 104 MMOL/L (ref 98–107)
CO2 SERPL-SCNC: 24.2 MMOL/L (ref 22–29)
CREAT SERPL-MCNC: 1.25 MG/DL (ref 0.57–1)
DEPRECATED RDW RBC AUTO: 52.7 FL (ref 37–54)
EGFRCR SERPLBLD CKD-EPI 2021: 43.1 ML/MIN/1.73
ERYTHROCYTE [DISTWIDTH] IN BLOOD BY AUTOMATED COUNT: 20.5 % (ref 12.3–15.4)
FERRITIN SERPL-MCNC: 8.26 NG/ML (ref 13–150)
GLUCOSE SERPL-MCNC: 105 MG/DL (ref 65–99)
HCT VFR BLD AUTO: 28.9 % (ref 34–46.6)
HCT VFR BLD AUTO: 30.7 % (ref 34–46.6)
HCT VFR BLD AUTO: 30.8 % (ref 34–46.6)
HCT VFR BLD AUTO: 31.3 % (ref 34–46.6)
HGB BLD-MCNC: 8.5 G/DL (ref 12–15.9)
HGB BLD-MCNC: 8.6 G/DL (ref 12–15.9)
HGB BLD-MCNC: 8.7 G/DL (ref 12–15.9)
HGB BLD-MCNC: 8.9 G/DL (ref 12–15.9)
MCH RBC QN AUTO: 21.4 PG (ref 26.6–33)
MCHC RBC AUTO-ENTMCNC: 29.8 G/DL (ref 31.5–35.7)
MCV RBC AUTO: 72.1 FL (ref 79–97)
PLATELET # BLD AUTO: 248 10*3/MM3 (ref 140–450)
PMV BLD AUTO: 10.8 FL (ref 6–12)
POTASSIUM SERPL-SCNC: 4.3 MMOL/L (ref 3.5–5.2)
RBC # BLD AUTO: 4.01 10*6/MM3 (ref 3.77–5.28)
SODIUM SERPL-SCNC: 136 MMOL/L (ref 136–145)
WBC NRBC COR # BLD AUTO: 12.01 10*3/MM3 (ref 3.4–10.8)

## 2025-06-16 PROCEDURE — 99232 SBSQ HOSP IP/OBS MODERATE 35: CPT | Performed by: PHYSICIAN ASSISTANT

## 2025-06-16 PROCEDURE — 80048 BASIC METABOLIC PNL TOTAL CA: CPT | Performed by: STUDENT IN AN ORGANIZED HEALTH CARE EDUCATION/TRAINING PROGRAM

## 2025-06-16 PROCEDURE — 85014 HEMATOCRIT: CPT | Performed by: NURSE PRACTITIONER

## 2025-06-16 PROCEDURE — 85027 COMPLETE CBC AUTOMATED: CPT | Performed by: STUDENT IN AN ORGANIZED HEALTH CARE EDUCATION/TRAINING PROGRAM

## 2025-06-16 PROCEDURE — 85018 HEMOGLOBIN: CPT | Performed by: NURSE PRACTITIONER

## 2025-06-16 PROCEDURE — 99232 SBSQ HOSP IP/OBS MODERATE 35: CPT | Performed by: NURSE PRACTITIONER

## 2025-06-16 PROCEDURE — 82728 ASSAY OF FERRITIN: CPT | Performed by: STUDENT IN AN ORGANIZED HEALTH CARE EDUCATION/TRAINING PROGRAM

## 2025-06-16 RX ORDER — POLYETHYLENE GLYCOL 3350 17 G/17G
0.5 POWDER, FOR SOLUTION ORAL EVERY 12 HOURS
Status: COMPLETED | OUTPATIENT
Start: 2025-06-16 | End: 2025-06-17

## 2025-06-16 RX ADMIN — SPIRONOLACTONE 25 MG: 25 TABLET ORAL at 08:03

## 2025-06-16 RX ADMIN — DIGOXIN 125 MCG: 0.12 TABLET ORAL at 08:03

## 2025-06-16 RX ADMIN — ATENOLOL 50 MG: 25 TABLET ORAL at 08:03

## 2025-06-16 RX ADMIN — PANTOPRAZOLE SODIUM 8 MG/HR: 40 INJECTION, POWDER, FOR SOLUTION INTRAVENOUS at 11:22

## 2025-06-16 RX ADMIN — PANTOPRAZOLE SODIUM 8 MG/HR: 40 INJECTION, POWDER, FOR SOLUTION INTRAVENOUS at 16:49

## 2025-06-16 RX ADMIN — PANTOPRAZOLE SODIUM 8 MG/HR: 40 INJECTION, POWDER, FOR SOLUTION INTRAVENOUS at 00:29

## 2025-06-16 RX ADMIN — ATORVASTATIN CALCIUM 10 MG: 10 TABLET ORAL at 20:41

## 2025-06-16 RX ADMIN — FUROSEMIDE 20 MG: 20 TABLET ORAL at 08:03

## 2025-06-16 RX ADMIN — POLYETHYLENE GLYCOL 3350 0.5 BOTTLE: 17 POWDER, FOR SOLUTION ORAL at 16:48

## 2025-06-16 RX ADMIN — DOCUSATE SODIUM AND SENNOSIDES 2 TABLET: 8.6; 5 TABLET, FILM COATED ORAL at 08:03

## 2025-06-16 RX ADMIN — PANTOPRAZOLE SODIUM 8 MG/HR: 40 INJECTION, POWDER, FOR SOLUTION INTRAVENOUS at 06:22

## 2025-06-16 RX ADMIN — PANTOPRAZOLE SODIUM 8 MG/HR: 40 INJECTION, POWDER, FOR SOLUTION INTRAVENOUS at 22:06

## 2025-06-16 RX ADMIN — ATENOLOL 50 MG: 25 TABLET ORAL at 20:40

## 2025-06-16 NOTE — PROGRESS NOTES
Name: Viktoria Villasenor ADMIT: 2025   : 1940  PCP: Magen Olson MD    MRN: [id number] LOS: 2 days   AGE/SEX: 84 y.o. female  ROOM: [facility]     Subjective   Subjective   Patient awake and resting in chair, she is resting comfortably, having some intermittent abdominal discomfort, but improved from prior.       Objective   Objective   Vital Signs  Temp:  [97.7 °F (36.5 °C)-98.4 °F (36.9 °C)] 98.4 °F (36.9 °C)  Heart Rate:  [69-92] 69  Resp:  [18-22] 18  BP: (123-132)/() 127/79  SpO2:  [96 %-97 %] 96 %  on   ;   Device (Oxygen Therapy): room air  Body mass index is 32.1 kg/m².  Physical Exam  Vitals and nursing note reviewed.   Constitutional:       General: She is not in acute distress.  Cardiovascular:      Rate and Rhythm: Normal rate.      Pulses: Normal pulses.      Heart sounds: Normal heart sounds.   Pulmonary:      Effort: Pulmonary effort is normal. No respiratory distress.      Breath sounds: No wheezing.   Abdominal:      General: There is no distension.      Palpations: Abdomen is soft.      Tenderness: There is abdominal tenderness (LLQ). There is no guarding.   Skin:     General: Skin is warm and dry.   Neurological:      Mental Status: She is alert.       Results Review     I reviewed the patient's new clinical results.  Results from last 7 days   Lab Units 25  0810 25  0438 06/15/25  2357 06/15/25  1611 25  1211 25  0326 25  1807 25  1018   WBC 10*3/mm3  --  12.01*  --   --   --  11.54* 11.66* 12.62*   HEMOGLOBIN g/dL 8.7* 8.6* 8.5* 8.9*   < > 7.3* 6.7* 7.0*   PLATELETS 10*3/mm3  --  248  --   --   --  237 247 279    < > = values in this interval not displayed.     Results from last 7 days   Lab Units 25  0438 25  0326 25  18025  1018   SODIUM mmol/L 136 138 141 136   POTASSIUM mmol/L 4.3 3.9 4.1 4.4   CHLORIDE mmol/L 104 102 103 101   CO2 mmol/L 24.2 23.8 25.1 25.3   BUN mg/dL 15.0 13.0 15.0 15.0   CREATININE mg/dL  "1.25* 0.90 1.01* 1.07*   GLUCOSE mg/dL 105* 92 97 114*   EGFR mL/min/1.73 43.1* 64.0 55.7* 52.0*     Results from last 7 days   Lab Units 06/13/25  1807 06/13/25  1018   ALBUMIN g/dL 3.8 4.0   BILIRUBIN mg/dL 0.6 0.8   ALK PHOS U/L 76 78   AST (SGOT) U/L 17 18   ALT (SGPT) U/L 10 11     Results from last 7 days   Lab Units 06/16/25  0438 06/14/25  0326 06/13/25  1807 06/13/25  1018   CALCIUM mg/dL 8.6 8.6 8.8 9.0   ALBUMIN g/dL  --   --  3.8 4.0       No results found for: \"HGBA1C\", \"POCGLU\"    CT Abdomen Pelvis Without Contrast  Result Date: 6/15/2025  1. Hepatic cirrhotic morphology with partial atrophy of the left lobe without change. Previous cholecystectomy. 2. Small bilateral pleural effusions. Chronic anterior left lower lobe pleural thickening and atelectasis. 3. Presacral stranding/edema is nonspecific and mildly increased when compared to the previous exam. 4. Colonic diverticulosis without evidence for diverticulitis.  Radiation dose reduction techniques were utilized, including automated exposure control and exposure modulation based on body size.    This report was finalized on 6/15/2025 7:55 PM by Daniele Zepeda M.D on Workstation: [id number]        I have personally reviewed all medications:  Scheduled Medications  atenolol, 50 mg, Oral, BID  atorvastatin, 10 mg, Oral, Nightly  digoxin, 125 mcg, Oral, Daily  furosemide, 20 mg, Oral, Daily  spironolactone, 25 mg, Oral, Daily    Infusions  pantoprazole, 8 mg/hr, Last Rate: 8 mg/hr (06/16/25 0622)    Diet  Diet: Liquid; Clear Liquid; Fluid Consistency: Thin (IDDSI 0)    I have personally reviewed:  [x]  Laboratory   []  Microbiology   []  Radiology   [x]  EKG/Telemetry  []  Cardiology/Vascular   []  Pathology    []  Records       Assessment/Plan     Active Hospital Problems    Diagnosis  POA    **GI bleed [K92.2]  Unknown    Pulmonary hypertension [I27.20]  Unknown    Persistent atrial fibrillation [I48.19]  Unknown    Cirrhosis of liver without " ascites [K74.60]  Yes    Essential hypertension [I10]  Yes    Other hyperlipidemia [E78.49]  Yes      Resolved Hospital Problems   No resolved problems to display.       84 y.o. female admitted with GI bleed.    Symptomatic iron deficiency anemia  Left lower quadrant tenderness  - S/P PRBC transfusion, she is on PPI drip, Eliquis being held. Continue with close monitoring of hemoglobin, transfuse for hemoglobin <7.  - GI consulted and following, plans for scopes on 06/17 once Eliquis is out of her system. Will continue to follow their plans/recommendations. Greatly appreciate their help.  - TSAT low at 4, no ferritin obtained, will add this on, may benefit from IV iron prior to hospital discharge.    Severe pulmonary hypertension seen on recent echocardiogram  Mitral regurgitation, aortic stenosis  - Cardiology and Pulmonology have evaluated, notes reviewed. Greatly appreciate their help. She does not want outpatient follow up with Pulmonology, but will follow up with Cardiology.    Acute kidney injury  - Noted on labs, creatinine increase from 0.9 to 1.25, likely pre-renal. Hold Furosemide and Aldactone. Repeat labs in AM, may need to consider IV fluids if no improvement.    Leukocytosis  - Noted on labs, values fluctuating, but relatively stable, she denies signs/symptoms suggestive of infection at present. Will monitor this closely, if no improvement and/or worsens, can pursue further workup as indicated.    Asthma  - Appears stable from this perspective, no evidence of exacerbation, she is not chronically on inhalers. Continue with PRN medications.    Persistent afib  - She is on Digoxin and Atenolol. Eliquis currently being held, cardiology has evaluated. Continue treatments as prescribed. Monitor on telemetry.     Cirrhosis  - She is on lasix, spironolactone. No prior EGD in the system, but given the lack of overt bleeding, low suspicion for variceal bleeding. Closely monitor.  Holding medications as above for  now 2/2 GER, resume when appropriate.     Essential hypertension  - BP appears acceptable, closely monitor.    Hyperlipidemia  - Continue statin.    GERD  - PPI.    Obesity      SCDs for DVT prophylaxis.  Limited code (no CPR, no intubation).  Discussed with patient and nursing staff.  Anticipate discharge home when cleared by consultants.  Expected Discharge Date: 6/18/2025; Expected Discharge Time:       Juan Mendez DO  [facility]  06/16/25

## 2025-06-16 NOTE — PROGRESS NOTES
[facility]  Inpatient Progress Note    Reason for Followup: Anemia and heme positive stool    Subjective     Interval History:   No overt bleeding.  Denies any abdominal pain, nausea, vomiting.  Actually feels like appetite is returning.    Current Facility-Administered Medications:     acetaminophen (TYLENOL) tablet 650 mg, 650 mg, Oral, Q4H PRN **OR** acetaminophen (TYLENOL) 160 MG/5ML oral solution 650 mg, 650 mg, Oral, Q4H PRN **OR** acetaminophen (TYLENOL) suppository 650 mg, 650 mg, Rectal, Q4H PRN, Klaus Miner APRN    aluminum-magnesium hydroxide-simethicone (MAALOX MAX) 400-400-40 MG/5ML suspension 15 mL, 15 mL, Oral, Q6H PRN, Klaus Miner APRN    atenolol (TENORMIN) tablet 50 mg, 50 mg, Oral, BID, Juju Meadows APRN, 50 mg at 06/16/25 0803    atorvastatin (LIPITOR) tablet 10 mg, 10 mg, Oral, Nightly, Juju Meadows APRN, 10 mg at 06/15/25 2050    sennosides-docusate (PERICOLACE) 8.6-50 MG per tablet 2 tablet, 2 tablet, Oral, BID PRN, 2 tablet at 06/16/25 0803 **AND** polyethylene glycol (MIRALAX) packet 17 g, 17 g, Oral, Daily PRN **AND** bisacodyl (DULCOLAX) EC tablet 5 mg, 5 mg, Oral, Daily PRN **AND** bisacodyl (DULCOLAX) suppository 10 mg, 10 mg, Rectal, Daily PRN, Klaus Miner APRN    Calcium Replacement - Follow Nurse / BPA Driven Protocol, , Not Applicable, PRN, Juan Mendez,     digoxin (LANOXIN) tablet 125 mcg, 125 mcg, Oral, Daily, Juju Meadows APRN, 125 mcg at 06/16/25 0803    furosemide (LASIX) tablet 20 mg, 20 mg, Oral, Daily, Juju Meadows APRN, 20 mg at 06/16/25 0803    Magnesium Standard Dose Replacement - Follow Nurse / BPA Driven Protocol, , Not Applicable, PRN, Juan Mendez,     melatonin tablet 2.5 mg, 2.5 mg, Oral, Nightly PRN, Klaus Miner APRN    nitroglycerin (NITROSTAT) SL tablet 0.4 mg, 0.4 mg, Sublingual, Q5 Min PRN, Klaus Miner APRN    ondansetron ODT (ZOFRAN-ODT) disintegrating  tablet 4 mg, 4 mg, Oral, Q6H PRN **OR** ondansetron (ZOFRAN) injection 4 mg, 4 mg, Intravenous, Q6H PRN, Klaus Miner APRN    [COMPLETED] pantoprazole (PROTONIX) injection 80 mg, 80 mg, Intravenous, Once, 80 mg at 06/13/25 1858 **AND** pantoprazole (PROTONIX) 40 mg in sodium chloride 0.9 % 100 mL (0.4 mg/mL) MBP, 8 mg/hr, Intravenous, Continuous, Rea Stern PA, Last Rate: 20 mL/hr at 06/16/25 0622, 8 mg/hr at 06/16/25 0622    Phosphorus Replacement - Follow Nurse / BPA Driven Protocol, , Not Applicable, PRN, Juan Mendez DO    Potassium Replacement - Follow Nurse / BPA Driven Protocol, , Not Applicable, PRROSA, Juan Mendez DO    [COMPLETED] Insert Peripheral IV, , , Once **AND** sodium chloride 0.9 % flush 10 mL, 10 mL, Intravenous, PRN, Rea Stern PA    sodium chloride 0.9 % flush 10 mL, 10 mL, Intravenous, PRN, Klaus Miner, WARREN    spironolactone (ALDACTONE) tablet 25 mg, 25 mg, Oral, Daily, Juju Meadows, APRN, 25 mg at 06/16/25 0803    Objective     Vital Signs  Temp:  [97.7 °F (36.5 °C)-98.4 °F (36.9 °C)] 98.4 °F (36.9 °C)  Heart Rate:  [69-92] 69  Resp:  [18-22] 18  BP: (123-132)/() 127/79  Body mass index is 32.1 kg/m².    Intake/Output Summary (Last 24 hours) at 6/16/2025 0910  Last data filed at 6/15/2025 1700  Gross per 24 hour   Intake 840 ml   Output --   Net 840 ml     No intake/output data recorded.     Physical Exam:   General: patient awake, alert and cooperative   Abdomen: soft, nontender, nondistended; normal bowel sounds     Results Review:     I reviewed the patient's new clinical results.  I reviewed the patient's new imaging results and agree with the interpretation.    Results from last 7 days   Lab Units 06/16/25  0810 06/16/25  0438 06/15/25  2357 06/14/25  1211 06/14/25  0326 06/13/25  1807   WBC 10*3/mm3  --  12.01*  --   --  11.54* 11.66*   HEMOGLOBIN g/dL 8.7* 8.6* 8.5*   < > 7.3* 6.7*   HEMATOCRIT % 30.8* 28.9* 30.7*   < > 26.0* 24.5*   PLATELETS  10*3/mm3  --  248  --   --  237 247    < > = values in this interval not displayed.     Results from last 7 days   Lab Units 06/16/25  0438 06/14/25  0326 06/13/25  1807 06/13/25  1018   SODIUM mmol/L 136 138 141 136   POTASSIUM mmol/L 4.3 3.9 4.1 4.4   CHLORIDE mmol/L 104 102 103 101   CO2 mmol/L 24.2 23.8 25.1 25.3   BUN mg/dL 15.0 13.0 15.0 15.0   CREATININE mg/dL 1.25* 0.90 1.01* 1.07*   CALCIUM mg/dL 8.6 8.6 8.8 9.0   BILIRUBIN mg/dL  --   --  0.6 0.8   ALK PHOS U/L  --   --  76 78   ALT (SGPT) U/L  --   --  10 11   AST (SGOT) U/L  --   --  17 18   GLUCOSE mg/dL 105* 92 97 114*     Results from last 7 days   Lab Units 06/13/25  1807   INR  1.50*     Lab Results   Lab Value Date/Time    LIPASE 22 11/11/2024 1619    LIPASE 24 08/11/2023 1018       Radiology:  CT Abdomen Pelvis Without Contrast   Final Result   1. Hepatic cirrhotic morphology with partial atrophy of the left lobe   without change. Previous cholecystectomy.   2. Small bilateral pleural effusions. Chronic anterior left lower lobe   pleural thickening and atelectasis.   3. Presacral stranding/edema is nonspecific and mildly increased when   compared to the previous exam.   4. Colonic diverticulosis without evidence for diverticulitis.       Radiation dose reduction techniques were utilized, including automated   exposure control and exposure modulation based on body size.               This report was finalized on 6/15/2025 7:55 PM by Daniele Zepeda M.D   on Workstation: [id number]          XR Chest 1 View   Final Result   As described.       This report was finalized on 6/13/2025 6:24 PM by Dr. Hubert Em M.D on Workstation: [id number]                Assessment & Plan   Assessment:   Iron deficiency anemia.  Hemoglobin stable  Heme positive stool.  No overt bleeding.  Atrial fibrillation on Eliquis.  This is currently being held in preparation for endoscopic evaluation on 6/17  History of cirrhosis    All problems are new to me.  Plan:    Continue to hold Eliquis  Trend H&H  Currently on PPI drip  EGD and colonoscopy tomorrow with Dr. Hodge  Clear liquid diet today  N.p.o. at midnight    I discussed the patient's findings and my recommendations with patient and family.    Dictated utilizing Dragon dictation.        Jessie Beckwith PA-C   [facility]  [address] [place] [address]  Office: [phone]

## 2025-06-16 NOTE — PROGRESS NOTES
"[facility]    Patient Name: Viktoria Villasenor  :1940  84 y.o.  LOS: 2  Encounter Provider: WARREN Hay      Patient Care Team:  Magen Olson MD as PCP - General  Magen Olson MD as PCP - Family Medicine  Ralph Turner MD as Consulting Physician (Otolaryngology)  Casper Hodge MD as Consulting Physician (Gastroenterology)  Loyda Hernandez APRN as Nurse Practitioner (Cardiology)  Chuy Orellana MD as Consulting Physician (Cardiology)    Chief Complaint: Follow-up persistent atrial fibrillation, moderate MR, moderate to severe TR    Interval History: Continues with dyspnea with exertion.       Objective   Vital Signs  Temp:  [97.7 °F (36.5 °C)-98.4 °F (36.9 °C)] 98.4 °F (36.9 °C)  Heart Rate:  [69-92] 69  Resp:  [18-22] 18  BP: (123-132)/() 127/79    Intake/Output Summary (Last 24 hours) at 2025 1113  Last data filed at 2025 0836  Gross per 24 hour   Intake 1080 ml   Output --   Net 1080 ml     Flowsheet Rows      Flowsheet Row First Filed Value   Admission Height 165.1 cm (65\") Documented at 2025   Admission Weight 87.5 kg (192 lb 14.4 oz) Documented at 2025              Vitals and nursing note reviewed.   Constitutional:       Appearance: Normal appearance. Well-developed.   Eyes:      Conjunctiva/sclera: Conjunctivae normal.   Neck:      Vascular: No carotid bruit.   Pulmonary:      Breath sounds: Normal breath sounds.   Cardiovascular:      Normal rate. Irregularly irregular rhythm. Normal S1 with normal intensity. Normal S2 with normal intensity.       Murmurs: There is no murmur.      No gallop.  No click. No rub.   Edema:     Peripheral edema absent.   Musculoskeletal: Normal range of motion. Skin:     General: Skin is warm and dry.   Neurological:      Mental Status: Alert and oriented to person, place, and time.      GCS: GCS eye subscore is 4. GCS verbal subscore is 5. GCS motor subscore is 6.   Psychiatric:  " "       Speech: Speech normal.         Behavior: Behavior normal.         Thought Content: Thought content normal.         Judgment: Judgment normal.           Pertinent Test Results:  Results from last 7 days   Lab Units 06/16/25  0438 06/14/25  0326 06/13/25  1807 06/13/25  1018   SODIUM mmol/L 136 138 141 136   POTASSIUM mmol/L 4.3 3.9 4.1 4.4   CHLORIDE mmol/L 104 102 103 101   CO2 mmol/L 24.2 23.8 25.1 25.3   BUN mg/dL 15.0 13.0 15.0 15.0   CREATININE mg/dL 1.25* 0.90 1.01* 1.07*   GLUCOSE mg/dL 105* 92 97 114*   CALCIUM mg/dL 8.6 8.6 8.8 9.0   AST (SGOT) U/L  --   --  17 18   ALT (SGPT) U/L  --   --  10 11     Results from last 7 days   Lab Units 06/13/25  1939 06/13/25  1807   HSTROP T ng/L 17* 16*     Results from last 7 days   Lab Units 06/16/25  0810 06/16/25  0438 06/15/25  2357 06/15/25  1611 06/15/25  0751 06/15/25  0003 06/14/25  1853 06/14/25  1211 06/14/25  0326 06/13/25  1807 06/13/25  1018   WBC 10*3/mm3  --  12.01*  --   --   --   --   --   --  11.54* 11.66* 12.62*   HEMOGLOBIN g/dL 8.7* 8.6* 8.5* 8.9* 8.2* 8.6* 8.5*   < > 7.3* 6.7* 7.0*   HEMATOCRIT % 30.8* 28.9* 30.7* 30.4* 28.3* 29.7* 30.1*   < > 26.0* 24.5* 26.5*   PLATELETS 10*3/mm3  --  248  --   --   --   --   --   --  237 247 279    < > = values in this interval not displayed.     Results from last 7 days   Lab Units 06/13/25  1807   INR  1.50*   APTT seconds 33.7               Invalid input(s): \"LDLCALC\"  Results from last 7 days   Lab Units 06/13/25  1807   PROBNP pg/mL 2,911.0*     Results from last 7 days   Lab Units 06/13/25  1018   TSH uIU/mL 1.530           Medication Review:   atenolol, 50 mg, Oral, BID  atorvastatin, 10 mg, Oral, Nightly  digoxin, 125 mcg, Oral, Daily  [Held by provider] furosemide, 20 mg, Oral, Daily  [Held by provider] spironolactone, 25 mg, Oral, Daily         pantoprazole, 8 mg/hr, Last Rate: 8 mg/hr (06/16/25 0622)        Assessment & Plan     Active Hospital Problems    Diagnosis  POA    **GI bleed [K92.2]  " Unknown    Pulmonary hypertension [I27.20]  Unknown    Persistent atrial fibrillation [I48.19]  Unknown    Cirrhosis of liver without ascites [K74.60]  Yes    Essential hypertension [I10]  Yes    Other hyperlipidemia [E78.49]  Yes      Resolved Hospital Problems   No resolved problems to display.        Symptomatic iron deficiency anemia  GI bleed  Followed by GI, planning for EGD and colonoscopy 6/17/2025   anticoagulation currently on hold, will reconsider  Persistent atrial fibrillation  Heart rate controlled with atenolol 50 mg twice daily and digoxin 125 mcg/day  Anticoagulation currently on hold secondary to #2  Mild aortic valve stenosis by echocardiogram 6/13/2025  Moderate mitral valve regurgitation by echocardiogram 6/13/2025  Moderate to severe tricuspid valve regurgitation with suggestive pulmonary hypertension  Continue furosemide and spironolactone, appears to be euvolemic  Chronic RBBB  Intermittent PVCs  History of diverticulitis  Hypertension  Compensated liver cirrhosis    Plan: Bidirectional scope tomorrow.  Patient stable from atrial fibrillation standpoint.           WARREN Hay  [facility]   [facility]  [address] [place] [address]  Office: [phone]    06/16/25  11:13 EDT

## 2025-06-16 NOTE — PLAN OF CARE
Goal Outcome Evaluation:      A/Ox4 on RA. Protonix drip infusing. Trending H&H. Clear liquid diet, NPO at midnight for EGD and colonoscopy in AM. VSS

## 2025-06-16 NOTE — PROGRESS NOTES
Discharge Planning Assessment  [facility]     Patient Name: Viktoria Villasenor  MRN: [id number]  Today's Date: 6/16/2025    Admit Date: 6/13/2025    Plan: Return home with spouse   Discharge Needs Assessment       Row Name 06/16/25 1518       Living Environment    People in Home spouse    Name(s) of People in Home     Current Living Arrangements home    Potentially Unsafe Housing Conditions none    Primary Care Provided by self    Provides Primary Care For no one    Family Caregiver if Needed child(josey), adult    Family Caregiver Names Rojelio Hercules [phone]    Quality of Family Relationships helpful    Able to Return to Prior Arrangements yes       Resource/Environmental Concerns    Resource/Environmental Concerns none    Transportation Concerns none       Transition Planning    Patient/Family Anticipates Transition to home with family    Patient/Family Anticipated Services at Transition none    Transportation Anticipated family or friend will provide       Discharge Needs Assessment    Readmission Within the Last 30 Days no previous admission in last 30 days    Equipment Currently Used at Home none    Concerns to be Addressed denies needs/concerns at this time    Anticipated Changes Related to Illness none    Equipment Needed After Discharge none                   Discharge Plan       Row Name 06/16/25 8684       Plan    Plan Return home with spouse    Patient/Family in Agreement with Plan yes    Plan Comments Spoke with patient at bedside.  She lives with  but he has dementia and she assists him.  She is IADL, uses no DME, has used [facility] in the past and has never been to SNF.  PXP is Dr. Magen Olson and pharmacy is [facility].  Patient drives, son Diomedes [phone] will help if needed.  She plans to return home at [place].  CCP will follow.  Blaire FLOWER                    Expected Discharge Date and Time       Expected Discharge Date Expected Discharge Time    Jun 18, 2025             Demographic Summary       Row Name 06/16/25 1517       General Information    Admission Type inpatient    Arrived From home    Referral Source admission list    Reason for Consult discharge planning    Preferred Language English                   Functional Status       Row Name 06/16/25 1517       Functional Status    Usual Activity Tolerance good    Current Activity Tolerance moderate       Functional Status, IADL    Medications independent    Meal Preparation independent    Housekeeping independent    Laundry independent    Shopping independent       Mental Status    General Appearance WDL WDL       Mental Status Summary    Recent Changes in Mental Status/Cognitive Functioning no changes                               Becky S. [place], RN

## 2025-06-16 NOTE — PLAN OF CARE
Goal Outcome Evaluation:  Plan of Care Reviewed With: patient        Progress: no change  Outcome Evaluation: VSS, no complaints of pain this shift.  H&H stable.  Eliquis on  hold for EGD and c-scope planned for 6/17.  Pt is alert and oriented, on room air.  Afib on the monitor.  Continue IV protonix.

## 2025-06-17 ENCOUNTER — TELEPHONE (OUTPATIENT)
Dept: GASTROENTEROLOGY | Facility: CLINIC | Age: 85
End: 2025-06-17
Payer: MEDICARE

## 2025-06-17 ENCOUNTER — ANESTHESIA (OUTPATIENT)
Dept: GASTROENTEROLOGY | Facility: HOSPITAL | Age: 85
End: 2025-06-17
Payer: MEDICARE

## 2025-06-17 ENCOUNTER — ANESTHESIA EVENT (OUTPATIENT)
Dept: GASTROENTEROLOGY | Facility: HOSPITAL | Age: 85
End: 2025-06-17
Payer: MEDICARE

## 2025-06-17 LAB
ANION GAP SERPL CALCULATED.3IONS-SCNC: 11 MMOL/L (ref 5–15)
BUN SERPL-MCNC: 12 MG/DL (ref 8–23)
BUN/CREAT SERPL: 11.2 (ref 7–25)
CALCIUM SPEC-SCNC: 9.4 MG/DL (ref 8.6–10.5)
CHLORIDE SERPL-SCNC: 99 MMOL/L (ref 98–107)
CO2 SERPL-SCNC: 26 MMOL/L (ref 22–29)
CREAT SERPL-MCNC: 1.07 MG/DL (ref 0.57–1)
DEPRECATED RDW RBC AUTO: 53.1 FL (ref 37–54)
EGFRCR SERPLBLD CKD-EPI 2021: 52 ML/MIN/1.73
ERYTHROCYTE [DISTWIDTH] IN BLOOD BY AUTOMATED COUNT: 21.4 % (ref 12.3–15.4)
GLUCOSE SERPL-MCNC: 93 MG/DL (ref 65–99)
HCT VFR BLD AUTO: 30 % (ref 34–46.6)
HCT VFR BLD AUTO: 33.1 % (ref 34–46.6)
HGB BLD-MCNC: 8.8 G/DL (ref 12–15.9)
HGB BLD-MCNC: 9.4 G/DL (ref 12–15.9)
HGB BLD-MCNC: 9.5 G/DL (ref 12–15.9)
HGB BLD-MCNC: 9.5 G/DL (ref 12–15.9)
MCH RBC QN AUTO: 20.5 PG (ref 26.6–33)
MCHC RBC AUTO-ENTMCNC: 28.7 G/DL (ref 31.5–35.7)
MCV RBC AUTO: 71.3 FL (ref 79–97)
PLATELET # BLD AUTO: 296 10*3/MM3 (ref 140–450)
PMV BLD AUTO: 10.4 FL (ref 6–12)
POTASSIUM SERPL-SCNC: 4.5 MMOL/L (ref 3.5–5.2)
RBC # BLD AUTO: 4.64 10*6/MM3 (ref 3.77–5.28)
SODIUM SERPL-SCNC: 136 MMOL/L (ref 136–145)
WBC NRBC COR # BLD AUTO: 11.83 10*3/MM3 (ref 3.4–10.8)

## 2025-06-17 PROCEDURE — 0W3P8ZZ CONTROL BLEEDING IN GASTROINTESTINAL TRACT, VIA NATURAL OR ARTIFICIAL OPENING ENDOSCOPIC: ICD-10-PCS | Performed by: INTERNAL MEDICINE

## 2025-06-17 PROCEDURE — 43255 EGD CONTROL BLEEDING ANY: CPT | Performed by: INTERNAL MEDICINE

## 2025-06-17 PROCEDURE — 45380 COLONOSCOPY AND BIOPSY: CPT | Performed by: INTERNAL MEDICINE

## 2025-06-17 PROCEDURE — 88305 TISSUE EXAM BY PATHOLOGIST: CPT | Performed by: INTERNAL MEDICINE

## 2025-06-17 PROCEDURE — 85014 HEMATOCRIT: CPT | Performed by: INTERNAL MEDICINE

## 2025-06-17 PROCEDURE — 25010000002 LIDOCAINE 2% SOLUTION: Performed by: NURSE ANESTHETIST, CERTIFIED REGISTERED

## 2025-06-17 PROCEDURE — 25010000002 PROPOFOL 10 MG/ML EMULSION: Performed by: NURSE ANESTHETIST, CERTIFIED REGISTERED

## 2025-06-17 PROCEDURE — 85027 COMPLETE CBC AUTOMATED: CPT | Performed by: STUDENT IN AN ORGANIZED HEALTH CARE EDUCATION/TRAINING PROGRAM

## 2025-06-17 PROCEDURE — 85018 HEMOGLOBIN: CPT | Performed by: INTERNAL MEDICINE

## 2025-06-17 PROCEDURE — 25010000002 NA FERRIC GLUC CPLX PER 12.5 MG: Performed by: STUDENT IN AN ORGANIZED HEALTH CARE EDUCATION/TRAINING PROGRAM

## 2025-06-17 PROCEDURE — 25010000002 GLYCOPYRROLATE 0.2 MG/ML SOLUTION: Performed by: NURSE ANESTHETIST, CERTIFIED REGISTERED

## 2025-06-17 PROCEDURE — 0DBK8ZX EXCISION OF ASCENDING COLON, VIA NATURAL OR ARTIFICIAL OPENING ENDOSCOPIC, DIAGNOSTIC: ICD-10-PCS | Performed by: INTERNAL MEDICINE

## 2025-06-17 PROCEDURE — 80048 BASIC METABOLIC PNL TOTAL CA: CPT | Performed by: STUDENT IN AN ORGANIZED HEALTH CARE EDUCATION/TRAINING PROGRAM

## 2025-06-17 PROCEDURE — 85018 HEMOGLOBIN: CPT | Performed by: NURSE PRACTITIONER

## 2025-06-17 PROCEDURE — 25010000002 PHENYLEPHRINE 10 MG/ML SOLUTION: Performed by: NURSE ANESTHETIST, CERTIFIED REGISTERED

## 2025-06-17 PROCEDURE — 25810000003 SODIUM CHLORIDE 0.9 % SOLUTION: Performed by: STUDENT IN AN ORGANIZED HEALTH CARE EDUCATION/TRAINING PROGRAM

## 2025-06-17 PROCEDURE — 25810000003 SODIUM CHLORIDE 0.9 % SOLUTION: Performed by: INTERNAL MEDICINE

## 2025-06-17 RX ORDER — GLYCOPYRROLATE 0.2 MG/ML
INJECTION INTRAMUSCULAR; INTRAVENOUS AS NEEDED
Status: DISCONTINUED | OUTPATIENT
Start: 2025-06-17 | End: 2025-06-17 | Stop reason: SURG

## 2025-06-17 RX ORDER — LIDOCAINE HYDROCHLORIDE 20 MG/ML
INJECTION, SOLUTION INFILTRATION; PERINEURAL AS NEEDED
Status: DISCONTINUED | OUTPATIENT
Start: 2025-06-17 | End: 2025-06-17 | Stop reason: SURG

## 2025-06-17 RX ORDER — PROPOFOL 10 MG/ML
VIAL (ML) INTRAVENOUS AS NEEDED
Status: DISCONTINUED | OUTPATIENT
Start: 2025-06-17 | End: 2025-06-17 | Stop reason: SURG

## 2025-06-17 RX ORDER — FUROSEMIDE 20 MG/1
20 TABLET ORAL DAILY
Status: DISCONTINUED | OUTPATIENT
Start: 2025-06-17 | End: 2025-06-18 | Stop reason: HOSPADM

## 2025-06-17 RX ORDER — CETIRIZINE HYDROCHLORIDE 10 MG/1
5 TABLET ORAL ONCE
Status: COMPLETED | OUTPATIENT
Start: 2025-06-17 | End: 2025-06-17

## 2025-06-17 RX ORDER — PHENYLEPHRINE HYDROCHLORIDE 10 MG/ML
INJECTION INTRAVENOUS AS NEEDED
Status: DISCONTINUED | OUTPATIENT
Start: 2025-06-17 | End: 2025-06-17 | Stop reason: SURG

## 2025-06-17 RX ORDER — ACETAMINOPHEN 325 MG/1
650 TABLET ORAL ONCE
Status: COMPLETED | OUTPATIENT
Start: 2025-06-17 | End: 2025-06-17

## 2025-06-17 RX ORDER — SODIUM CHLORIDE 9 MG/ML
30 INJECTION, SOLUTION INTRAVENOUS CONTINUOUS
Status: DISCONTINUED | OUTPATIENT
Start: 2025-06-17 | End: 2025-06-18 | Stop reason: HOSPADM

## 2025-06-17 RX ADMIN — SODIUM CHLORIDE 30 ML/HR: 9 INJECTION, SOLUTION INTRAVENOUS at 09:12

## 2025-06-17 RX ADMIN — ATENOLOL 50 MG: 25 TABLET ORAL at 08:02

## 2025-06-17 RX ADMIN — PROPOFOL 40 MG: 10 INJECTION, EMULSION INTRAVENOUS at 11:31

## 2025-06-17 RX ADMIN — GLYCOPYRROLATE 0.2 MG: 0.2 INJECTION INTRAMUSCULAR; INTRAVENOUS at 11:45

## 2025-06-17 RX ADMIN — PANTOPRAZOLE SODIUM 8 MG/HR: 40 INJECTION, POWDER, FOR SOLUTION INTRAVENOUS at 03:11

## 2025-06-17 RX ADMIN — DIGOXIN 125 MCG: 0.12 TABLET ORAL at 14:32

## 2025-06-17 RX ADMIN — PROPOFOL 20 MG: 10 INJECTION, EMULSION INTRAVENOUS at 11:40

## 2025-06-17 RX ADMIN — POLYETHYLENE GLYCOL 3350 0.5 BOTTLE: 17 POWDER, FOR SOLUTION ORAL at 03:11

## 2025-06-17 RX ADMIN — PROPOFOL 100 MCG/KG/MIN: 10 INJECTION, EMULSION INTRAVENOUS at 11:31

## 2025-06-17 RX ADMIN — SODIUM CHLORIDE 250 MG: 9 INJECTION, SOLUTION INTRAVENOUS at 15:12

## 2025-06-17 RX ADMIN — FUROSEMIDE 20 MG: 20 TABLET ORAL at 14:32

## 2025-06-17 RX ADMIN — LIDOCAINE HYDROCHLORIDE 60 MG: 20 INJECTION, SOLUTION INFILTRATION; PERINEURAL at 11:31

## 2025-06-17 RX ADMIN — ACETAMINOPHEN 650 MG: 325 TABLET, FILM COATED ORAL at 14:32

## 2025-06-17 RX ADMIN — ATORVASTATIN CALCIUM 10 MG: 10 TABLET ORAL at 20:41

## 2025-06-17 RX ADMIN — PHENYLEPHRINE HYDROCHLORIDE 150 MCG: 10 INJECTION INTRAVENOUS at 11:43

## 2025-06-17 RX ADMIN — CETIRIZINE HYDROCHLORIDE 5 MG: 10 TABLET, FILM COATED ORAL at 14:32

## 2025-06-17 NOTE — ANESTHESIA PREPROCEDURE EVALUATION
Anesthesia Evaluation     Patient summary reviewed and Nursing notes reviewed                Airway   Mallampati: II  Dental      Pulmonary    (+) asthma,shortness of breath  Cardiovascular     ECG reviewed  PT is on anticoagulation therapy  Patient on routine beta blocker  Rhythm: irregular  Rate: abnormal    (+) hypertension, valvular problems/murmurs MR, TI, AS and murmur, past MI , dysrhythmias (RBBB) Atrial Fib, PVC, hyperlipidemia      Neuro/Psych- negative ROS  GI/Hepatic/Renal/Endo    (+) obesity, GERD, GI bleeding , liver disease cirrhosis, thyroid problem     Musculoskeletal (-) negative ROS    Abdominal    Substance History - negative use     OB/GYN negative ob/gyn ROS         Other                    Anesthesia Plan    ASA 4     MAC     (Pulmonary HTN  AFib  PVC's  BradyCardia at times on Dig/ atenolol  GIbleed    I have reviewed the patient's history. I have explained the risks of anesthesia including but not limited to dental damage, corneal abrasion, nerve injury, aspiration ,MI, stroke, and death. Questions asked and answered. Anesthetic plan discussed with patient and team as indicated. Patient expressed understanding of the above.    )  intravenous induction     Anesthetic plan, risks, benefits, and alternatives have been provided, discussed and informed consent has been obtained with: patient.    CODE STATUS:    Code Status (Patient has no pulse and is not breathing): No CPR (Do Not Attempt to Resuscitate)  Medical Interventions (Patient has pulse or is breathing): Limited Support  Medical Intervention Limits: No intubation (DNI)  Level Of Support Discussed With: Patient

## 2025-06-17 NOTE — PROGRESS NOTES
Name: Viktoria Villasenor ADMIT: 2025   : 1940  PCP: Magen Olson MD    MRN: [id number] LOS: 3 days   AGE/SEX: 84 y.o. female  ROOM: [facility]/     Subjective   Subjective   Patient awake and resting in chair, she is resting comfortably, doing okay at present, plans for scope to GI this morning.       Objective   Objective   Vital Signs  Temp:  [97.8 °F (36.6 °C)-98.7 °F (37.1 °C)] 97.8 °F (36.6 °C)  Heart Rate:  [71-80] 71  Resp:  [17-18] 17  BP: (110-127)/(52-64) 127/64  SpO2:  [96 %-99 %] 98 %  on   ;   Device (Oxygen Therapy): room air  Body mass index is 32.1 kg/m².  Physical Exam  Vitals and nursing note reviewed.   Constitutional:       General: She is not in acute distress.  Cardiovascular:      Rate and Rhythm: Normal rate.      Pulses: Normal pulses.      Heart sounds: Normal heart sounds.   Pulmonary:      Effort: Pulmonary effort is normal. No respiratory distress.      Breath sounds: No wheezing or rhonchi.   Abdominal:      General: There is no distension.      Palpations: Abdomen is soft.      Tenderness: There is abdominal tenderness (LLQ). There is no guarding.   Skin:     General: Skin is warm and dry.   Neurological:      Mental Status: She is alert.       Results Review     I reviewed the patient's new clinical results.  Results from last 7 days   Lab Units 25  2347 25  1545 25  0810 25  0438 25  1211 25  0326 25  1807 25  1018   WBC 10*3/mm3  --   --   --  12.01*  --  11.54* 11.66* 12.62*   HEMOGLOBIN g/dL 9.4* 8.9* 8.7* 8.6*   < > 7.3* 6.7* 7.0*   PLATELETS 10*3/mm3  --   --   --  248  --  237 247 279    < > = values in this interval not displayed.     Results from last 7 days   Lab Units 25  0438 25  0326 25  1807 25  1018   SODIUM mmol/L 136 138 141 136   POTASSIUM mmol/L 4.3 3.9 4.1 4.4   CHLORIDE mmol/L 104 102 103 101   CO2 mmol/L 24.2 23.8 25.1 25.3   BUN mg/dL 15.0 13.0 15.0 15.0   CREATININE mg/dL  "1.25* 0.90 1.01* 1.07*   GLUCOSE mg/dL 105* 92 97 114*   EGFR mL/min/1.73 43.1* 64.0 55.7* 52.0*     Results from last 7 days   Lab Units 06/13/25  1807 06/13/25  1018   ALBUMIN g/dL 3.8 4.0   BILIRUBIN mg/dL 0.6 0.8   ALK PHOS U/L 76 78   AST (SGOT) U/L 17 18   ALT (SGPT) U/L 10 11     Results from last 7 days   Lab Units 06/16/25  0438 06/14/25  0326 06/13/25  1807 06/13/25  1018   CALCIUM mg/dL 8.6 8.6 8.8 9.0   ALBUMIN g/dL  --   --  3.8 4.0       No results found for: \"HGBA1C\", \"POCGLU\"    CT Abdomen Pelvis Without Contrast  Result Date: 6/15/2025  1. Hepatic cirrhotic morphology with partial atrophy of the left lobe without change. Previous cholecystectomy. 2. Small bilateral pleural effusions. Chronic anterior left lower lobe pleural thickening and atelectasis. 3. Presacral stranding/edema is nonspecific and mildly increased when compared to the previous exam. 4. Colonic diverticulosis without evidence for diverticulitis.  Radiation dose reduction techniques were utilized, including automated exposure control and exposure modulation based on body size.    This report was finalized on 6/15/2025 7:55 PM by Daniele Zepeda M.D on Workstation: [id number]        I have personally reviewed all medications:  Scheduled Medications  atenolol, 50 mg, Oral, BID  atorvastatin, 10 mg, Oral, Nightly  digoxin, 125 mcg, Oral, Daily  [Held by provider] furosemide, 20 mg, Oral, Daily  [Held by provider] spironolactone, 25 mg, Oral, Daily    Infusions  pantoprazole, 8 mg/hr, Last Rate: 8 mg/hr (06/17/25 0311)    Diet  NPO Diet NPO Type: Strict NPO    I have personally reviewed:  [x]  Laboratory   []  Microbiology   []  Radiology   [x]  EKG/Telemetry  []  Cardiology/Vascular   []  Pathology    []  Records       Assessment/Plan     Active Hospital Problems    Diagnosis  POA    **GI bleed [K92.2]  Unknown    Pulmonary hypertension [I27.20]  Unknown    Persistent atrial fibrillation [I48.19]  Unknown    Anemia requiring " transfusions [D64.9]  Unknown    Cirrhosis of liver without ascites [K74.60]  Yes    Essential hypertension [I10]  Yes    Other hyperlipidemia [E78.49]  Yes      Resolved Hospital Problems   No resolved problems to display.       84 y.o. female admitted with GI bleed.    Symptomatic iron deficiency anemia  Left lower quadrant tenderness  - S/P PRBC transfusion, she is on PPI drip, Eliquis being held. Hemoglobin remains low, continue with close monitoring of hemoglobin, transfuse for hemoglobin <7.  - GI consulted and following, plans for scopes this morning. Will continue to follow their plans/recommendations. Greatly appreciate their help.  - TSAT low at 4, Ferritin added on and low at 8.26, she will need IV iron prior to discharge, will plan to start this after she has procedures with GI.     Severe pulmonary hypertension seen on recent echocardiogram  Mitral regurgitation, aortic stenosis  - Cardiology and Pulmonology have evaluated, notes reviewed. Greatly appreciate their help. She does not want outpatient follow up with Pulmonology, but will follow up with Cardiology.    Acute kidney injury  - Noted on labs, creatinine increase from 0.9 to 1.25, likely pre-renal. Holding Furosemide and Aldactone. Repeat labs are pending this morning, will follow these up to help guide further management.     Leukocytosis  - Noted on labs, values fluctuating, but relatively stable, she denies signs/symptoms suggestive of infection at present. Repeat labs are pending this morning, will follow these up to help guide further management. Will monitor this closely, if no improvement and/or worsens, can pursue further workup as indicated.    Asthma  - Appears stable from this perspective, no evidence of exacerbation, she is not chronically on inhalers. Continue with PRN medications.    Persistent afib  - She is on Digoxin and Atenolol. Eliquis currently being held, cardiology has evaluated. Continue treatments as prescribed. Monitor  on telemetry.     Cirrhosis  - She is on lasix, spironolactone. Holding medications as above for now 2/2 GER, resume when appropriate. Closely monitor volume status.    Essential hypertension  - BP appears acceptable, closely monitor.    Hyperlipidemia  - Continue statin.    GERD  - PPI.    Obesity      SCDs for DVT prophylaxis.  Limited code (no CPR, no intubation).  Discussed with patient and nursing staff.  Anticipate discharge home when cleared by consultants.  Expected Discharge Date: 6/18/2025; Expected Discharge Time:       Juan Mendez DO  [facility]  06/17/25

## 2025-06-17 NOTE — ANESTHESIA POSTPROCEDURE EVALUATION
Patient: Viktoria Villasenor    Procedure Summary       Date: 06/17/25 Room / Location:  [facility] ENDOSCOPY 8 /  [facility] ENDOSCOPY    Anesthesia Start: 1126 Anesthesia Stop: 1157    Procedures:       COLONOSCOPY to cecum and into ti with cold biopsy polypectomy      ESOPHAGOGASTRODUODENOSCOPY with apc (Esophagus) Diagnosis:       Anemia requiring transfusions      Gastrointestinal hemorrhage, unspecified gastrointestinal hemorrhage type      (Anemia requiring transfusions [D64.9])      (Gastrointestinal hemorrhage, unspecified gastrointestinal hemorrhage type [K92.2])    Surgeons: Casper Hodge MD Provider: Devon Duong MD    Anesthesia Type: MAC ASA Status: 4            Anesthesia Type: MAC    Vitals  Vitals Value Taken Time   /83 06/17/25 12:15   Temp     Pulse 85 06/17/25 12:24   Resp 18 06/17/25 12:15   SpO2 90 % 06/17/25 12:22   Vitals shown include unfiled device data.        Post Anesthesia Care and Evaluation    Patient location during evaluation: PACU  Patient participation: complete - patient participated  Level of consciousness: awake and alert  Pain management: adequate    Airway patency: patent  Anesthetic complications: No anesthetic complications    Cardiovascular status: acceptable  Respiratory status: acceptable  Hydration status: acceptable    Comments: -------------------------------                 06/17/25                          1249           -------------------------------   BP:          (P) 130/57         Pulse:                          Resp:          (P) 18           Temp:   (P) 36.3 °C (97.4 °F)   SpO2:                          -------------------------------

## 2025-06-17 NOTE — PLAN OF CARE
Goal Outcome Evaluation:            Patient A&Ox4. Bowel prep completed; EGD and colonoscopy scheduled for this morning. Protonix gtt infusing. No complaints of pain. 6.4 second pause this AM. Cardiology notified. VSS.

## 2025-06-17 NOTE — PLAN OF CARE
Goal Outcome Evaluation:      EGD and colonoscopy complete. IV iron given. Protonix changed to PO. VSS

## 2025-06-17 NOTE — TELEPHONE ENCOUNTER
Protocol Failed.     Last lab for magnesium was on 11.21.24    NOV - No note in last visit about the next visit.      LOV 12/4/2024 (LUIS)    Assessment & Plan:  Patient has had recent hospitalizations where rates were challenging to control  She is now recovered from acute illness and heart rate is more controlled  Patient would like to discontinue her midday atenolol dose  Patient sent for a digoxin level and a CMP.  Digoxin level was normal at time of dictation  Regimen as follows:  Continue apixaban 5 mg twice daily  Continue digoxin 125 mcg/day  Decrease atenolol to 50 mg twice daily     Orders:  -     Basic Metabolic Panel; Future  -     Digoxin Level; Future  -     Holter Monitor - 24 Hour; Future     2. Encounter for monitoring digoxin therapy  -     Digoxin Level; Future     3. Essential hypertension  Assessment & Plan:  BP controlled in clinic at 120/70  Regimen as follows:  Decrease atenolol to 50 mg twice daily  Continue furosemide 20 mg/day  Continue spironolactone 50 mg/day     Hypertension is stable and controlled  Medication changes per orders.  Dietary sodium restriction.  Regular aerobic exercise.  Blood pressure will be reassessed in 6 month  
    Ran Out of Food in the Last Year: Never true   Transportation Needs: No Transportation Needs (2/26/2025)    PRAPARE - Transportation     Lack of Transportation (Medical): No     Lack of Transportation (Non-Medical): No   Housing Stability: Low Risk  (2/26/2025)    Housing Stability Vital Sign     Unable to Pay for Housing in the Last Year: No     Number of Times Moved in the Last Year: 0     Homeless in the Last Year: No      Current Outpatient Medications   Medication Sig Dispense Refill    desogestrel-ethinyl estradiol (VOLNEA) 0.15-0.02/0.01 MG (21/5) per tablet Take 1 tablet by mouth daily 84 tablet 2    vitamin D (VITAMIN D3) 50 MCG (2000 UT) CAPS capsule Take 1 capsule by mouth daily      ibuprofen (ADVIL;MOTRIN) 600 MG tablet Take 1 tablet by mouth 3 times daily as needed for Pain 60 tablet 1    Misc. Devices (PILL ) MISC 1 each by Does not apply route daily 1 each 0    docusate sodium (COLACE) 100 MG capsule Take 1 capsule by mouth daily as needed for Constipation 30 capsule 5     No current facility-administered medications for this visit.        Objective:   Physical Exam  Vitals reviewed.   Constitutional:       Appearance: Normal appearance. She is well-developed.   HENT:      Head: Normocephalic and atraumatic.   Cardiovascular:      Rate and Rhythm: Normal rate and regular rhythm.   Pulmonary:      Effort: Pulmonary effort is normal.      Breath sounds: Normal breath sounds.   Musculoskeletal:         General: Normal range of motion.   Skin:     General: Skin is warm and dry.   Neurological:      Mental Status: She is alert and oriented to person, place, and time.   Psychiatric:         Behavior: Behavior normal.         Thought Content: Thought content normal.         Judgment: Judgment normal.         Assessment:          Diagnosis Orders   1. Encounter for birth control pills maintenance        2. History of irregular menstrual bleeding        3. History of dysmenorrhea            Plan:

## 2025-06-18 ENCOUNTER — READMISSION MANAGEMENT (OUTPATIENT)
Dept: CALL CENTER | Facility: HOSPITAL | Age: 85
End: 2025-06-18
Payer: MEDICARE

## 2025-06-18 VITALS
OXYGEN SATURATION: 98 % | DIASTOLIC BLOOD PRESSURE: 59 MMHG | SYSTOLIC BLOOD PRESSURE: 98 MMHG | HEIGHT: 65 IN | RESPIRATION RATE: 18 BRPM | WEIGHT: 192.9 LBS | HEART RATE: 72 BPM | BODY MASS INDEX: 32.14 KG/M2 | TEMPERATURE: 98.1 F

## 2025-06-18 LAB
ANION GAP SERPL CALCULATED.3IONS-SCNC: 13 MMOL/L (ref 5–15)
BUN SERPL-MCNC: 13 MG/DL (ref 8–23)
BUN/CREAT SERPL: 12.1 (ref 7–25)
CALCIUM SPEC-SCNC: 8.7 MG/DL (ref 8.6–10.5)
CHLORIDE SERPL-SCNC: 103 MMOL/L (ref 98–107)
CO2 SERPL-SCNC: 21 MMOL/L (ref 22–29)
CREAT SERPL-MCNC: 1.07 MG/DL (ref 0.57–1)
DEPRECATED RDW RBC AUTO: 53.3 FL (ref 37–54)
EGFRCR SERPLBLD CKD-EPI 2021: 52 ML/MIN/1.73
ERYTHROCYTE [DISTWIDTH] IN BLOOD BY AUTOMATED COUNT: 21.8 % (ref 12.3–15.4)
GLUCOSE SERPL-MCNC: 94 MG/DL (ref 65–99)
HCT VFR BLD AUTO: 30.8 % (ref 34–46.6)
HCT VFR BLD AUTO: 31.2 % (ref 34–46.6)
HCT VFR BLD AUTO: 31.5 % (ref 34–46.6)
HGB BLD-MCNC: 9.1 G/DL (ref 12–15.9)
HGB BLD-MCNC: 9.3 G/DL (ref 12–15.9)
HGB BLD-MCNC: 9.3 G/DL (ref 12–15.9)
MCH RBC QN AUTO: 20.7 PG (ref 26.6–33)
MCHC RBC AUTO-ENTMCNC: 29.8 G/DL (ref 31.5–35.7)
MCV RBC AUTO: 69.5 FL (ref 79–97)
PLATELET # BLD AUTO: 241 10*3/MM3 (ref 140–450)
PMV BLD AUTO: 10.3 FL (ref 6–12)
POTASSIUM SERPL-SCNC: 4.4 MMOL/L (ref 3.5–5.2)
RBC # BLD AUTO: 4.49 10*6/MM3 (ref 3.77–5.28)
SODIUM SERPL-SCNC: 137 MMOL/L (ref 136–145)
WBC NRBC COR # BLD AUTO: 10.67 10*3/MM3 (ref 3.4–10.8)

## 2025-06-18 PROCEDURE — 80048 BASIC METABOLIC PNL TOTAL CA: CPT | Performed by: INTERNAL MEDICINE

## 2025-06-18 PROCEDURE — 85014 HEMATOCRIT: CPT | Performed by: STUDENT IN AN ORGANIZED HEALTH CARE EDUCATION/TRAINING PROGRAM

## 2025-06-18 PROCEDURE — 99232 SBSQ HOSP IP/OBS MODERATE 35: CPT | Performed by: PHYSICIAN ASSISTANT

## 2025-06-18 PROCEDURE — 85018 HEMOGLOBIN: CPT | Performed by: STUDENT IN AN ORGANIZED HEALTH CARE EDUCATION/TRAINING PROGRAM

## 2025-06-18 PROCEDURE — 25810000003 SODIUM CHLORIDE 0.9 % SOLUTION: Performed by: STUDENT IN AN ORGANIZED HEALTH CARE EDUCATION/TRAINING PROGRAM

## 2025-06-18 PROCEDURE — 25010000002 NA FERRIC GLUC CPLX PER 12.5 MG: Performed by: STUDENT IN AN ORGANIZED HEALTH CARE EDUCATION/TRAINING PROGRAM

## 2025-06-18 PROCEDURE — 99232 SBSQ HOSP IP/OBS MODERATE 35: CPT | Performed by: NURSE PRACTITIONER

## 2025-06-18 PROCEDURE — 85027 COMPLETE CBC AUTOMATED: CPT | Performed by: INTERNAL MEDICINE

## 2025-06-18 RX ORDER — ACETAMINOPHEN 325 MG/1
650 TABLET ORAL ONCE
Status: COMPLETED | OUTPATIENT
Start: 2025-06-18 | End: 2025-06-18

## 2025-06-18 RX ORDER — FERROUS SULFATE 325(65) MG
325 TABLET ORAL
Qty: 30 TABLET | Refills: 0 | Status: SHIPPED | OUTPATIENT
Start: 2025-06-18 | End: 2025-06-26 | Stop reason: SDUPTHER

## 2025-06-18 RX ORDER — CETIRIZINE HYDROCHLORIDE 10 MG/1
5 TABLET ORAL ONCE
Status: COMPLETED | OUTPATIENT
Start: 2025-06-18 | End: 2025-06-18

## 2025-06-18 RX ORDER — PANTOPRAZOLE SODIUM 40 MG/1
40 TABLET, DELAYED RELEASE ORAL
Status: DISCONTINUED | OUTPATIENT
Start: 2025-06-18 | End: 2025-06-18 | Stop reason: HOSPADM

## 2025-06-18 RX ADMIN — ACETAMINOPHEN 650 MG: 325 TABLET, FILM COATED ORAL at 09:05

## 2025-06-18 RX ADMIN — CETIRIZINE HYDROCHLORIDE 5 MG: 10 TABLET, FILM COATED ORAL at 09:05

## 2025-06-18 RX ADMIN — DIGOXIN 125 MCG: 0.12 TABLET ORAL at 09:05

## 2025-06-18 RX ADMIN — FUROSEMIDE 20 MG: 20 TABLET ORAL at 09:05

## 2025-06-18 RX ADMIN — PANTOPRAZOLE SODIUM 40 MG: 40 TABLET, DELAYED RELEASE ORAL at 10:12

## 2025-06-18 RX ADMIN — SODIUM CHLORIDE 250 MG: 9 INJECTION, SOLUTION INTRAVENOUS at 10:07

## 2025-06-18 NOTE — PROGRESS NOTES
Case Management Discharge Note      Final Note: DC'd home         Selected Continued Care - Discharged on 6/18/2025 Admission date: 6/13/2025 - Discharge disposition: Home or Self Care              Transportation Services  Private: Car    Final Discharge Disposition Code: 01 - home or self-care

## 2025-06-18 NOTE — DISCHARGE SUMMARY
Patient Name: Viktoria Villasenor  : 1940  MRN: [id number]    Date of Admission: 2025  Date of Discharge:  2025  Primary Care Physician: Magen Olson MD      Chief Complaint:   Abnormal Lab and Fatigue      Discharge Diagnoses     Active Hospital Problems    Diagnosis  POA    **GI bleed [K92.2]  Unknown    Pulmonary hypertension [I27.20]  Unknown    Persistent atrial fibrillation [I48.19]  Unknown    Anemia requiring transfusions [D64.9]  Unknown    Cirrhosis of liver without ascites [K74.60]  Yes    Essential hypertension [I10]  Yes    Other hyperlipidemia [E78.49]  Yes      Resolved Hospital Problems   No resolved problems to display.        Hospital Course     Ms. Villasenor is a 84 y.o. femalewho presented to [facility] initially complaining of dyspnea, fatigue.  Please see the admitting history and physical for further details.  She was admitted to the hospital for further evaluation and treatment.      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 GI recommended continuation of PPI and plans for outpatient follow up. Additionally, iron studies obtained, TSAT low at 4, Ferritin added on and low at 8.26, she was treated with IV Ferrlecit x 2 doses and tolerated well. Transition to oral iron at discharge. Recommend close follow up with PCP within 1 week after discharge for reassessment to guide ongoing management decisions. Would recommend repeat CBC at that time to check hemoglobin.      Severe pulmonary hypertension seen on recent echocardiogram  Mitral regurgitation, aortic stenosis  - Cardiology and Pulmonology have evaluated, no acute intervention warranted at present, Cardiology noting plans to follow up after discharge to guide ongoing management.     Acute kidney injury  - Noted on  labs previously, held Furosemide and Aldactone, which led to improvement. Renal function appeared stable at discharge. Recommend repeat BMP with PCP within 1 week for reassessment.    Leukocytosis  - Noted on labs previously, likely reactive, no signs of infection values normalized by time of discharge.     Atrial fibrillation  - She is on Digoxin and Atenolol. She was cleared to resume Eliquis. Continue medications as prescribed. Follow up with Cardiology as scheduled.     Cirrhosis  - Continue Furosemide and spironolactone after discharge as previously prescribed.      Essential hypertension  - Recommend that patient check blood pressure 2-3 times daily and record those values in log book and take it to next PCP visit to allow for greater insight into treatment efficacy to guide further management decisions. Recommend heart healthy/low sodium diet.     Hyperlipidemia  - Continue statin.    Asthma  - Appears stable, no evidence of exacerbation     GERD  - Continue PPI.    Day of Discharge     Subjective:  She is awake and resting comfortably, doing okay today, no new acute complaints, cleared for discharge by consultants.    Physical Exam:  Temp:  [97.3 °F (36.3 °C)-98.1 °F (36.7 °C)] 98.1 °F (36.7 °C)  Heart Rate:  [59-81] 72  Resp:  [16-18] 18  BP: ()/(48-85) 98/59  Body mass index is 32.1 kg/m².  Physical Exam  Vitals and nursing note reviewed.   Constitutional:       General: She is not in acute distress.  Cardiovascular:      Rate and Rhythm: Normal rate.      Pulses: Normal pulses.      Heart sounds: Normal heart sounds.   Pulmonary:      Effort: Pulmonary effort is normal. No respiratory distress.      Breath sounds: No wheezing or rhonchi.   Abdominal:      General: There is no distension.      Palpations: Abdomen is soft.      Tenderness: There is no guarding.   Skin:     General: Skin is warm and dry.   Neurological:      Mental Status: She is alert.         Consultants     Consult Orders (all)  (From admission, onward)       Start     Ordered    06/14/25 0635  Inpatient Pulmonology Consult  Once        Specialty:  Pulmonary Disease  Provider:  Zenon Lynn MD    06/14/25 0634    06/14/25 0635  Inpatient Cardiology Consult  Once        Specialty:  Cardiology  Provider:  Loyda Hernandez APRN    06/14/25 0634    06/14/25 0533  Inpatient Gastroenterology Consult  Once        Specialty:  Gastroenterology  Provider:  Casper Rodríguez MD    06/14/25 0534    06/13/25 1829  [facility] (on-call MD unless specified) Details  Once        Specialty:  Hospitalist  Provider:  (Not yet assigned)    06/13/25 1828                  Procedures     COLONOSCOPY to cecum and into ti with cold biopsy polypectomy, ESOPHAGOGASTRODUODENOSCOPY with apc    Imaging Results (All)       Procedure Component Value Units Date/Time    CT Abdomen Pelvis Without Contrast [[id number]] Collected: 06/15/25 1954     Updated: 06/15/25 1958    Narrative:      CT ABDOMEN PELVIS WO CONTRAST-     HISTORY: 84 years of age, Female.  abdominal pain, N/V, GI blees;  D64.9-Anemia, unspecified; K92.2-Gastrointestinal hemorrhage,  unspecified; R79.89-Other specified abnormal findings of blood chemistry     TECHNIQUE:  CT includes axial imaging from the lung bases to the  trochanters without intravenous contrast and without use of oral  contrast. Data reconstructed in coronal and sagittal planes. Radiation  dose reduction techniques were utilized, including automated exposure  control and exposure modulation based on body size.     COMPARISON: CT abdomen and pelvis 11/11/2024, CT chest 02/27/2025     FINDINGS: Heart size is enlarged. Left lower lobe atelectasis extending  along the anterior aspect of left lower lobe where there is adjacent  pleural thickening and this is without change. Small pleural effusions  are present.     Hepatic cirrhotic morphology. Previous cholecystectomy. Mild adrenal  gland thickening. Pancreas, spleen exhibit normal  noncontrasted CT  appearance. Mild posterior lateral right renal cortical thinning with  adjacent 2 mm calcification. Left kidney appears normal. No  hydronephrosis.     Colonic diverticulosis without evidence for diverticulitis. No evidence  for appendicitis. No node enlarged in the abdomen or pelvis. There is  presacral stranding and atherosclerotic calcifications are present  involving the abdominal aorta and iliac vasculature. 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 Retroaortic left renal vein.       Impression:      1. Hepatic cirrhotic morphology with partial atrophy of the left lobe  without change. Previous cholecystectomy.  2. Small bilateral pleural effusions. Chronic anterior left lower lobe  pleural thickening and atelectasis.  3. Presacral stranding/edema is nonspecific and mildly increased when  compared to the previous exam.  4. Colonic diverticulosis without evidence for diverticulitis.     Radiation dose reduction techniques were utilized, including automated  exposure control and exposure modulation based on body size.           This report was finalized on 6/15/2025 7:55 PM by Daniele Zepeda M.D  on Workstation: [id number]       XR Chest 1 View [[id number]] Collected: 06/13/25 1821     Updated: 06/13/25 1827    Narrative:      XR CHEST 1 VW-     HISTORY: Female who is 84 years-old, short of breath     TECHNIQUE: Frontal view of the chest     COMPARISON: 11/22/2024     FINDINGS: The heart size is borderline. Pulmonary vasculature is  unremarkable. Small left basilar atelectasis/infiltrate/effusion,  follow-up advised. No pneumothorax. No acute osseous process.       Impression:      As described.     This report was finalized on 6/13/2025 6:24 PM by Dr. Hubert Em M.D on Workstation: [id number]             Results for orders placed during the hospital encounter of 09/09/21    Duplex Venous Lower Extremity - Bilateral  CAR    Interpretation Summary  · Normal bilateral lower extremity venous duplex scan.    Results for orders placed during the hospital encounter of 06/13/25    Adult Transthoracic Echo Complete W/ Cont if Necessary Per Protocol    Interpretation Summary    Left ventricular systolic function is normal. Calculated left ventricular EF = 64%    Left ventricular diastolic function was indeterminate in the setting of A-fib.    Aortic valve sclerosis with borderline aortic stenosis. Peak velocity of the flow distal to the aortic valve is 220 cm/s. Aortic valve mean pressure gradient is 10 mmHg. Aortic valve dimensionless index is 0.55. Aortic valve area is 1.2 cm2.    Moderate mitral valve regurgitation is present.    Moderate to severe tricuspid valve regurgitation is present.    Estimated right ventricular systolic pressure from tricuspid regurgitation is markedly elevated (>55 mmHg). Calculated right ventricular systolic pressure from tricuspid regurgitation is 70 mmHg.    Severe pulmonary hypertension is present.    Visually there is moderate to severe biatrial enlargement, borderline IVC dilatation.    Pertinent Labs     Results from last 7 days   Lab Units 06/18/25  1147 06/18/25  0420 06/17/25  2356 06/17/25  1542 06/17/25  0826 06/16/25  0810 06/16/25  0438 06/14/25  1211 06/14/25  0326   WBC 10*3/mm3  --  10.67  --   --  11.83*  --  12.01*  --  11.54*   HEMOGLOBIN g/dL 9.1* 9.3* 9.3* 8.8* 9.5*  9.5*   < > 8.6*   < > 7.3*   PLATELETS 10*3/mm3  --  241  --   --  296  --  248  --  237    < > = values in this interval not displayed.     Results from last 7 days   Lab Units 06/18/25  0420 06/17/25  0825 06/16/25  0438 06/14/25  0326   SODIUM mmol/L 137 136 136 138   POTASSIUM mmol/L 4.4 4.5 4.3 3.9   CHLORIDE mmol/L 103 99 104 102   CO2 mmol/L 21.0* 26.0 24.2 23.8   BUN mg/dL 13.0 12.0 15.0 13.0   CREATININE mg/dL 1.07* 1.07* 1.25* 0.90   GLUCOSE mg/dL 94 93 105* 92   EGFR mL/min/1.73 52.0* 52.0* 43.1* 64.0  "    Results from last 7 days   Lab Units 06/13/25  1807 06/13/25  1018   ALBUMIN g/dL 3.8 4.0   BILIRUBIN mg/dL 0.6 0.8   ALK PHOS U/L 76 78   AST (SGOT) U/L 17 18   ALT (SGPT) U/L 10 11     Results from last 7 days   Lab Units 06/18/25  0420 06/17/25  0825 06/16/25  0438 06/14/25  0326 06/13/25 1807 06/13/25  1018   CALCIUM mg/dL 8.7 9.4 8.6 8.6 8.8 9.0   ALBUMIN g/dL  --   --   --   --  3.8 4.0       Results from last 7 days   Lab Units 06/13/25  1939 06/13/25 1807 06/13/25  1018   HSTROP T ng/L 17* 16*  --    PROBNP pg/mL  --  2,911.0*  --    DIGOXIN LVL ng/mL  --   --  1.00           Invalid input(s): \"LDLCALC\"          Test Results Pending at Discharge     Pending Results       Procedure [Order ID] Specimen - Date/Time    Hemoglobin & Hematocrit, Blood [[id number]]     Specimen: Blood               Discharge Details        Discharge Medications        New Medications        Instructions Start Date   ferrous sulfate 325 (65 FE) MG tablet   325 mg, Oral, Daily With Breakfast             Continue These Medications        Instructions Start Date   apixaban 5 MG tablet tablet  Commonly known as: ELIQUIS   5 mg, Oral, 2 Times Daily      atenolol 50 MG tablet  Commonly known as: TENORMIN   50 mg, Oral, 2 Times Daily      atorvastatin 10 MG tablet  Commonly known as: LIPITOR   10 mg, Oral, Nightly      digoxin 125 MCG tablet  Commonly known as: LANOXIN   125 mcg, Oral, Daily      furosemide 20 MG tablet  Commonly known as: LASIX   20 mg, Oral, Daily      pantoprazole 40 MG EC tablet  Commonly known as: PROTONIX   40 mg, Oral, Every Early Morning      spironolactone 25 MG tablet  Commonly known as: ALDACTONE   25 mg, Oral, Daily               Allergies   Allergen Reactions    Lisinopril Cough       Discharge Disposition:  Home or Self Care      Discharge Diet:  Diet Order   Procedures    Diet: Regular/House; Fluid Consistency: Thin (IDDSI 0)       Discharge Activity:   Activity Instructions       Gradually Increase " Activity Until at Pre-Hospitalization Level              CODE STATUS:    Code Status and Medical Interventions: No CPR (Do Not Attempt to Resuscitate); Limited Support; No intubation (DNI)   Ordered at: 06/14/25 1228     Code Status (Patient has no pulse and is not breathing):    No CPR (Do Not Attempt to Resuscitate)     Medical Interventions (Patient has pulse or is breathing):    Limited Support     Medical Intervention Limits:    No intubation (DNI)     Level Of Support Discussed With:    Patient       Future Appointments   Date Time Provider Department Center   7/24/2025 10:30 AM Magen Olson MD [id number]     Additional Instructions for the Follow-ups that You Need to Schedule       Discharge Follow-up with PCP   As directed       Currently Documented PCP:    Magen Olson MD    PCP Phone Number:    [phone]     Follow Up Details: within 1 week for reassessment, medication and symptom review, blood pressure/weight check, CMP and CBC        Discharge Follow-up with Specialty: Gastroenterology, cardiology   As directed      Specialty: Gastroenterology, cardiology   Follow Up Details: 1-2 weeks or as scheduled, per their services               Follow-up Information       Magen Olson MD .    Specialty: Family Medicine  Why: within 1 week for reassessment, medication and symptom review, blood pressure/weight check, CMP and CBC  Contact information:  [address] [facility] [address]  [facility] [id number] [phone]               Casper Rodríguez MD. Schedule an appointment as soon as possible for a visit in 3 week(s).    Specialties: Gastroenterology, Internal Medicine  Why: Follow up in 3-4 week with GI specialty.  Contact information:  [address] [facility]  [place] [facility] [place] [id number] [phone]               Irving Fajardo MD. Schedule an appointment as soon as possible for a visit.    Specialty: Cardiology  Why: Call to make a follow up appt with cardiology  Contact  information:  [facility]  [address] [address]  [phone]                             Additional Instructions for the Follow-ups that You Need to Schedule       Discharge Follow-up with PCP   As directed       Currently Documented PCP:    Magen Olson MD    PCP Phone Number:    [phone]     Follow Up Details: within 1 week for reassessment, medication and symptom review, blood pressure/weight check, CMP and CBC        Discharge Follow-up with Specialty: Gastroenterology, cardiology   As directed      Specialty: Gastroenterology, cardiology   Follow Up Details: 1-2 weeks or as scheduled, per their services            Time Spent on Discharge:  Greater than 30 minutes      Juan Mendez DO  [facility]  06/18/25  13:18 EDT

## 2025-06-18 NOTE — PLAN OF CARE
Goal Outcome Evaluation:      Patient A&Ox4. No complaints of pain. Rested most of the night. HR went as low as in the 20's overnight but did not sustain, the patient was asymptomatic. Atenolol was held prior d/t BP parameters. Cardiology notified. Up adlib. VSS.

## 2025-06-18 NOTE — PROGRESS NOTES
"[facility]    Patient Name: Viktoria Villasenor  :1940  84 y.o.  LOS: 4  Encounter Provider: WARREN Hay      Patient Care Team:  Magen Olson MD as PCP - General  Magen Olson MD as PCP - Family Medicine  Ralph Turner MD as Consulting Physician (Otolaryngology)  Casper Hodge MD as Consulting Physician (Gastroenterology)  Loyda Hernandez APRN as Nurse Practitioner (Cardiology)  Chuy Orellana MD as Consulting Physician (Cardiology)    Chief Complaint: Follow-up persistent atrial fibrillation, moderate MR, moderate to severe TR    Interval History: Dyspnea with exertion has improved.  Patient receiving iron infusions.       Objective   Vital Signs  Temp:  [97.3 °F (36.3 °C)-98.1 °F (36.7 °C)] 98.1 °F (36.7 °C)  Heart Rate:  [59-72] 72  Resp:  [16-18] 18  BP: ()/(48-59) 98/59    Intake/Output Summary (Last 24 hours) at 2025 1609  Last data filed at 2025 0746  Gross per 24 hour   Intake 240 ml   Output --   Net 240 ml     Flowsheet Rows      Flowsheet Row First Filed Value   Admission Height 165.1 cm (65\") Documented at 2025   Admission Weight 87.5 kg (192 lb 14.4 oz) Documented at 2025              Vitals and nursing note reviewed.   Constitutional:       Appearance: Normal appearance. Well-developed.   Eyes:      Conjunctiva/sclera: Conjunctivae normal.   Neck:      Vascular: No carotid bruit.   Pulmonary:      Breath sounds: Normal breath sounds.   Cardiovascular:      Normal rate. Irregularly irregular rhythm. Normal S1 with normal intensity. Normal S2 with normal intensity.       Murmurs: There is no murmur.      No gallop.  No click. No rub.   Edema:     Peripheral edema absent.   Musculoskeletal: Normal range of motion. Skin:     General: Skin is warm and dry.   Neurological:      Mental Status: Alert and oriented to person, place, and time.      GCS: GCS eye subscore is 4. GCS verbal subscore is 5. GCS motor " "subscore is 6.   Psychiatric:         Speech: Speech normal.         Behavior: Behavior normal.         Thought Content: Thought content normal.         Judgment: Judgment normal.           Pertinent Test Results:  Results from last 7 days   Lab Units 06/18/25  0420 06/17/25  0825 06/16/25  0438 06/14/25  0326 06/13/25  1807 06/13/25  1018   SODIUM mmol/L 137 136 136 138 141 136   POTASSIUM mmol/L 4.4 4.5 4.3 3.9 4.1 4.4   CHLORIDE mmol/L 103 99 104 102 103 101   CO2 mmol/L 21.0* 26.0 24.2 23.8 25.1 25.3   BUN mg/dL 13.0 12.0 15.0 13.0 15.0 15.0   CREATININE mg/dL 1.07* 1.07* 1.25* 0.90 1.01* 1.07*   GLUCOSE mg/dL 94 93 105* 92 97 114*   CALCIUM mg/dL 8.7 9.4 8.6 8.6 8.8 9.0   AST (SGOT) U/L  --   --   --   --  17 18   ALT (SGPT) U/L  --   --   --   --  10 11     Results from last 7 days   Lab Units 06/13/25  1939 06/13/25  1807   HSTROP T ng/L 17* 16*     Results from last 7 days   Lab Units 06/18/25  1147 06/18/25  0420 06/17/25  2356 06/17/25  1542 06/17/25  0826 06/16/25  2347 06/16/25  1545 06/16/25  0810 06/16/25  0438 06/14/25  1211 06/14/25  0326 06/13/25  1807 06/13/25  1018   WBC 10*3/mm3  --  10.67  --   --  11.83*  --   --   --  12.01*  --  11.54* 11.66* 12.62*   HEMOGLOBIN g/dL 9.1* 9.3* 9.3* 8.8* 9.5*  9.5* 9.4* 8.9*   < > 8.6*   < > 7.3* 6.7* 7.0*   HEMATOCRIT % 30.8* 31.2* 31.5* 30.0* 33.1*  33.1* 33.1* 31.3*   < > 28.9*   < > 26.0* 24.5* 26.5*   PLATELETS 10*3/mm3  --  241  --   --  296  --   --   --  248  --  237 247 279    < > = values in this interval not displayed.     Results from last 7 days   Lab Units 06/13/25  1807   INR  1.50*   APTT seconds 33.7               Invalid input(s): \"LDLCALC\"  Results from last 7 days   Lab Units 06/13/25  1807   PROBNP pg/mL 2,911.0*     Results from last 7 days   Lab Units 06/13/25  1018   TSH uIU/mL 1.530           Medication Review:          No current facility-administered medications for this encounter.      Assessment & Plan     Active Hospital " Problems    Diagnosis  POA    **GI bleed [K92.2]  Unknown    Pulmonary hypertension [I27.20]  Unknown    Persistent atrial fibrillation [I48.19]  Unknown    Anemia requiring transfusions [D64.9]  Unknown    Cirrhosis of liver without ascites [K74.60]  Yes    Essential hypertension [I10]  Yes    Other hyperlipidemia [E78.49]  Yes      Resolved Hospital Problems   No resolved problems to display.        Symptomatic iron deficiency anemia  Hemoglobin stable this morning  GI bleed  Followed by GI, bidirectional scope was unrevealing for source of bleeding  Persistent atrial fibrillation  Heart rate controlled with atenolol 50 mg twice daily and digoxin 125 mcg/day  Anticoagulation currently on hold secondary to #2  Mild aortic valve stenosis by echocardiogram 6/13/2025  Moderate mitral valve regurgitation by echocardiogram 6/13/2025  Moderate to severe tricuspid valve regurgitation with suggestive pulmonary hypertension  Continue furosemide and spironolactone, appears to be euvolemic  Chronic RBBB  Intermittent PVCs  History of diverticulitis  Hypertension  Compensated liver cirrhosis    Plan: Okay for discharge from cardiovascular standpoint.  We will arrange outpatient follow-up.  Restart apixaban in the outpatient setting.  Will monitor H&H closely.           WARREN Hay  [facility]   [facility]  [address] [place] [address]  Office: [phone]    06/18/25  16:09 EDT

## 2025-06-18 NOTE — OUTREACH NOTE
Prep Survey      Flowsheet Row Responses   [facility] facility patient discharged from? [place]   Is LACE score < 7 ? No   Eligibility [facility]   Date of Admission 06/13/25   Date of Discharge 06/18/25   Discharge Disposition Home or Self Care   Discharge diagnosis GI bleed-Colonoscopy this visit   Does the patient have one of the following disease processes/diagnoses(primary or secondary)? Other   Does the patient have Home health ordered? No   Is there a DME ordered? No   Prep survey completed? Yes            VIMAL RILEY - Registered Nurse

## 2025-06-18 NOTE — PLAN OF CARE
Goal Outcome Evaluation:  Plan of Care Reviewed With: patient        Progress: improving  Outcome Evaluation: Pt had IV iron once; started on protonix; GI and Cardiology have cleared discharge; pt will restart eliquis per both specialties; pt remains up ad be; VSS; family at bedside.

## 2025-06-18 NOTE — PROGRESS NOTES
[facility]  Inpatient Progress Note    Reason for Followup: Anemia, heme positive stool    Subjective     Interval History:   Upper GI Endoscopy (06/17/2025 11:01) -normal esophagus, duodenum, GAVE treated with APC  Colonoscopy (06/17/2025 10:59) -normal TI, one 2 mm polyp in the ascending colon, diverticulosis, nonbleeding internal hemorrhoids.    CBC reviewed with hemoglobin 9.3 this morning.    No abdominal pain. Tolerating PO diet.     Current Facility-Administered Medications:     acetaminophen (TYLENOL) tablet 650 mg, 650 mg, Oral, Q4H PRN **OR** acetaminophen (TYLENOL) 160 MG/5ML oral solution 650 mg, 650 mg, Oral, Q4H PRN **OR** acetaminophen (TYLENOL) suppository 650 mg, 650 mg, Rectal, Q4H PRN, Casper Hodge MD    aluminum-magnesium hydroxide-simethicone (MAALOX MAX) 400-400-40 MG/5ML suspension 15 mL, 15 mL, Oral, Q6H PRN, Casper Hodge MD    atenolol (TENORMIN) tablet 50 mg, 50 mg, Oral, BID, Casper Hodge MD, 50 mg at 06/17/25 0802    atorvastatin (LIPITOR) tablet 10 mg, 10 mg, Oral, Nightly, Casper Hodge MD, 10 mg at 06/17/25 2041    sennosides-docusate (PERICOLACE) 8.6-50 MG per tablet 2 tablet, 2 tablet, Oral, BID PRN, 2 tablet at 06/16/25 0803 **AND** polyethylene glycol (MIRALAX) packet 17 g, 17 g, Oral, Daily PRN **AND** bisacodyl (DULCOLAX) EC tablet 5 mg, 5 mg, Oral, Daily PRN **AND** bisacodyl (DULCOLAX) suppository 10 mg, 10 mg, Rectal, Daily PRN, Casper Hodge MD    Calcium Replacement - Follow Nurse / BPA Driven Protocol, , Not Applicable, PRN, Casper Hodge MD    digoxin (LANOXIN) tablet 125 mcg, 125 mcg, Oral, Daily, Casper Hodge MD, 125 mcg at 06/18/25 0905    ferric gluconate (FERRLECIT) 250 MG in sodium chloride 0.9% 250 mL IVPB, 250 mg, Intravenous, Once, Juan Mendez DO    furosemide (LASIX) tablet 20 mg, 20 mg, Oral, Daily, Juan Mendez DO, 20 mg at 06/18/25 0905    Magnesium Standard Dose Replacement - Follow Nurse / BPA Driven  Protocol, , Not Applicable, Naveen SIMENTAL Brian M, MD    melatonin tablet 2.5 mg, 2.5 mg, Oral, Nightly PRNNaveen Brian M, MD    nitroglycerin (NITROSTAT) SL tablet 0.4 mg, 0.4 mg, Sublingual, Q5 Min PRNNaveen Brian M, MD    ondansetron ODT (ZOFRAN-ODT) disintegrating tablet 4 mg, 4 mg, Oral, Q6H PRN **OR** ondansetron (ZOFRAN) injection 4 mg, 4 mg, Intravenous, Q6H PRN, Casper Hodge MD    [COMPLETED] pantoprazole (PROTONIX) injection 80 mg, 80 mg, Intravenous, Once, 80 mg at 06/13/25 1858 **AND** pantoprazole (PROTONIX) 40 mg in sodium chloride 0.9 % 100 mL (0.4 mg/mL) MBP, 8 mg/hr, Intravenous, Continuous, Casper Hodge MD, Last Rate: 20 mL/hr at 06/17/25 0311, 8 mg/hr at 06/17/25 0311    Phosphorus Replacement - Follow Nurse / BPA Driven Protocol, , Not Applicable, Naveen SIMENTAL Brian M, MD    Potassium Replacement - Follow Nurse / BPA Driven Protocol, , Not Applicable, Naveen SIMENTAL Brian M, MD    [COMPLETED] Insert Peripheral IV, , , Once **AND** sodium chloride 0.9 % flush 10 mL, 10 mL, Intravenous, Naveen SIMENTAL Brian M, MD    sodium chloride 0.9 % flush 10 mL, 10 mL, Intravenous, Naveen SIMENTAL Brian M, MD    sodium chloride 0.9 % infusion, 30 mL/hr, Intravenous, Continuous, Casper Hodge MD, Stopped at 06/17/25 1221    [Held by provider] spironolactone (ALDACTONE) tablet 25 mg, 25 mg, Oral, Daily, Juju Meadows APRN, 25 mg at 06/16/25 0803    Objective     Vital Signs  Temp:  [97.3 °F (36.3 °C)-98.1 °F (36.7 °C)] 98.1 °F (36.7 °C)  Heart Rate:  [59-89] 72  Resp:  [16-18] 18  BP: ()/(48-85) 98/59  Body mass index is 32.1 kg/m².    Intake/Output Summary (Last 24 hours) at 6/18/2025 0955  Last data filed at 6/18/2025 0746  Gross per 24 hour   Intake 540 ml   Output --   Net 540 ml     I/O this shift:  In: 240 [P.O.:240]  Out: -      Physical Exam:   General: patient awake, alert and cooperative   Abdomen: soft, nontender, nondistended; normal bowel sounds     Results Review:     I  reviewed the patient's new clinical results.  I reviewed the patient's new imaging results and agree with the interpretation.    Results from last 7 days   Lab Units 06/18/25  0420 06/17/25  2356 06/17/25  1542 06/17/25  0826 06/16/25  0810 06/16/25  0438   WBC 10*3/mm3 10.67  --   --  11.83*  --  12.01*   HEMOGLOBIN g/dL 9.3* 9.3* 8.8* 9.5*  9.5*   < > 8.6*   HEMATOCRIT % 31.2* 31.5* 30.0* 33.1*  33.1*   < > 28.9*   PLATELETS 10*3/mm3 241  --   --  296  --  248    < > = values in this interval not displayed.     Results from last 7 days   Lab Units 06/18/25  0420 06/17/25  0825 06/16/25  0438 06/14/25  0326 06/13/25  1807 06/13/25  1018   SODIUM mmol/L 137 136 136   < > 141 136   POTASSIUM mmol/L 4.4 4.5 4.3   < > 4.1 4.4   CHLORIDE mmol/L 103 99 104   < > 103 101   CO2 mmol/L 21.0* 26.0 24.2   < > 25.1 25.3   BUN mg/dL 13.0 12.0 15.0   < > 15.0 15.0   CREATININE mg/dL 1.07* 1.07* 1.25*   < > 1.01* 1.07*   CALCIUM mg/dL 8.7 9.4 8.6   < > 8.8 9.0   BILIRUBIN mg/dL  --   --   --   --  0.6 0.8   ALK PHOS U/L  --   --   --   --  76 78   ALT (SGPT) U/L  --   --   --   --  10 11   AST (SGOT) U/L  --   --   --   --  17 18   GLUCOSE mg/dL 94 93 105*   < > 97 114*    < > = values in this interval not displayed.     Results from last 7 days   Lab Units 06/13/25  1807   INR  1.50*     Lab Results   Lab Value Date/Time    LIPASE 22 11/11/2024 1619    LIPASE 24 08/11/2023 1018       Radiology:  CT Abdomen Pelvis Without Contrast   Final Result   1. Hepatic cirrhotic morphology with partial atrophy of the left lobe   without change. Previous cholecystectomy.   2. Small bilateral pleural effusions. Chronic anterior left lower lobe   pleural thickening and atelectasis.   3. Presacral stranding/edema is nonspecific and mildly increased when   compared to the previous exam.   4. Colonic diverticulosis without evidence for diverticulitis.       Radiation dose reduction techniques were utilized, including automated   exposure  control and exposure modulation based on body size.               This report was finalized on 6/15/2025 7:55 PM by Daniele Zepeda M.D   on Workstation: [id number]          XR Chest 1 View   Final Result   As described.       This report was finalized on 6/13/2025 6:24 PM by Dr. Hubert Em M.D on Workstation: [id number]                Assessment & Plan   Assessment:   Iron deficiency anemia.  Hemoglobin stable  Heme positive stool.  No overt bleeding.  Atrial fibrillation on Eliquis.  This is currently being held in preparation for endoscopic evaluation on 6/17  History of cirrhosis  -[facility]ology [facility]   -MELD 3.0 - 11   -No EV on EGD    -AFP ordered - normal in 11/2024    -No hx of HE    -on low dose diuretics     Plan:   EGD with GAVE status post APC  Continue to monitor H&H  Protonix 40 mg once daily  Okay to resume Eliquis  Patient needs outpatient follow-up with Dr. Rodríguez in 3 to 4 weeks  Okay for discharge home from a GI standpoint.     I discussed the patient's findings and my recommendations with patient.    Dictated utilizing Dragon dictation.        Jessie Beckwith PA-C   [facility]  [address] [place] [address]  Office: [phone]

## 2025-06-19 ENCOUNTER — TRANSITIONAL CARE MANAGEMENT TELEPHONE ENCOUNTER (OUTPATIENT)
Dept: CALL CENTER | Facility: HOSPITAL | Age: 85
End: 2025-06-19
Payer: MEDICARE

## 2025-06-19 LAB
CYTO UR: NORMAL
LAB AP CASE REPORT: NORMAL
PATH REPORT.FINAL DX SPEC: NORMAL
PATH REPORT.GROSS SPEC: NORMAL

## 2025-06-19 NOTE — OUTREACH NOTE
Call Center TCM Note      Flowsheet Row Responses   [facility] patient discharged from? [place]   Does the patient have one of the following disease processes/diagnoses(primary or secondary)? Other   TCM attempt successful? Yes   Call start time 0839   Call end time 0849   Discharge diagnosis GI bleed-Colonoscopy this visit   Meds reviewed with patient/caregiver? Yes   Is the patient having any side effects they believe may be caused by any medication additions or changes? No   Does the patient have all medications ordered at discharge? Yes   Is the patient taking all medications as directed (includes completed medication regime)? Yes   Comments Appt made for 6/23 @ 2:00 with Dr. Olson.  She will call and get GI and cardiology appt.   Does the patient have an appointment with their PCP within 7-14 days of discharge? No   Nursing Interventions Assisted patient with making appointment per protocol   Psychosocial issues? No   Did the patient receive a copy of their discharge instructions? Yes   Nursing interventions Reviewed instructions with patient   What is the patient's perception of their health status since discharge? Improving   Is the patient/caregiver able to teach back signs and symptoms related to disease process for when to call PCP? Yes   Is the patient/caregiver able to teach back signs and symptoms related to disease process for when to call 911? Yes   Is the patient/caregiver able to teach back the hierarchy of who to call/visit for symptoms/problems? PCP, Specialist, Home health nurse, Urgent Care, ED, 911 Yes   If the patient is a current smoker, are they able to teach back resources for cessation? Not a smoker   Additional teach back comments States she is doing well and no noticeable bleeding.  She will keep a bp log.   TCM call completed? Yes   Wrap up additional comments Pt to make cardiology and GI appt.   Call end time 0849            Myrtle SINHA - Licensed Nurse    6/19/2025, 08:51  EDT

## 2025-06-23 ENCOUNTER — OFFICE VISIT (OUTPATIENT)
Dept: FAMILY MEDICINE CLINIC | Facility: CLINIC | Age: 85
End: 2025-06-23
Payer: MEDICARE

## 2025-06-23 VITALS
BODY MASS INDEX: 30.82 KG/M2 | OXYGEN SATURATION: 98 % | DIASTOLIC BLOOD PRESSURE: 64 MMHG | HEART RATE: 74 BPM | SYSTOLIC BLOOD PRESSURE: 118 MMHG | WEIGHT: 185 LBS | HEIGHT: 65 IN

## 2025-06-23 DIAGNOSIS — R30.0 DYSURIA: ICD-10-CM

## 2025-06-23 DIAGNOSIS — Z09 HOSPITAL DISCHARGE FOLLOW-UP: Primary | ICD-10-CM

## 2025-06-23 DIAGNOSIS — K74.60 CIRRHOSIS OF LIVER WITHOUT ASCITES, UNSPECIFIED HEPATIC CIRRHOSIS TYPE: ICD-10-CM

## 2025-06-23 DIAGNOSIS — N17.9 AKI (ACUTE KIDNEY INJURY): ICD-10-CM

## 2025-06-23 DIAGNOSIS — I48.19 PERSISTENT ATRIAL FIBRILLATION: ICD-10-CM

## 2025-06-23 DIAGNOSIS — D64.9 ANEMIA REQUIRING TRANSFUSIONS: ICD-10-CM

## 2025-06-23 DIAGNOSIS — K92.2 GASTROINTESTINAL HEMORRHAGE, UNSPECIFIED GASTROINTESTINAL HEMORRHAGE TYPE: ICD-10-CM

## 2025-06-23 LAB
BILIRUB BLD-MCNC: NEGATIVE MG/DL
CLARITY, POC: CLEAR
COLOR UR: YELLOW
EXPIRATION DATE: NORMAL
GLUCOSE UR STRIP-MCNC: NEGATIVE MG/DL
KETONES UR QL: NEGATIVE
LEUKOCYTE EST, POC: NEGATIVE
Lab: NORMAL
NITRITE UR-MCNC: NEGATIVE MG/ML
PH UR: 7 [PH] (ref 5–8)
PROT UR STRIP-MCNC: NEGATIVE MG/DL
RBC # UR STRIP: NEGATIVE /UL
SP GR UR: 1.01 (ref 1–1.03)
UROBILINOGEN UR QL: NORMAL

## 2025-06-23 PROCEDURE — 3074F SYST BP LT 130 MM HG: CPT | Performed by: FAMILY MEDICINE

## 2025-06-23 PROCEDURE — 99495 TRANSJ CARE MGMT MOD F2F 14D: CPT | Performed by: FAMILY MEDICINE

## 2025-06-23 PROCEDURE — 1126F AMNT PAIN NOTED NONE PRSNT: CPT | Performed by: FAMILY MEDICINE

## 2025-06-23 PROCEDURE — 81003 URINALYSIS AUTO W/O SCOPE: CPT | Performed by: FAMILY MEDICINE

## 2025-06-23 PROCEDURE — 3078F DIAST BP <80 MM HG: CPT | Performed by: FAMILY MEDICINE

## 2025-06-23 PROCEDURE — 1111F DSCHRG MED/CURRENT MED MERGE: CPT | Performed by: FAMILY MEDICINE

## 2025-06-23 RX ORDER — DIGOXIN 125 MCG
125 TABLET ORAL DAILY
Qty: 30 TABLET | Refills: 2 | Status: SHIPPED | OUTPATIENT
Start: 2025-06-23 | End: 2025-06-26 | Stop reason: SDUPTHER

## 2025-06-23 RX ORDER — ATENOLOL 50 MG/1
50 TABLET ORAL 2 TIMES DAILY
Qty: 180 TABLET | Refills: 3 | Status: SHIPPED | OUTPATIENT
Start: 2025-06-23

## 2025-06-23 NOTE — PROGRESS NOTES
Transitional Care Follow Up Visit  Subjective     CC: Transitional Care Management Visit        Within 48 business hours after discharge our office contacted her via telephone to coordinate her care and needs.      I reviewed and discussed the details of that call along with the discharge summary, hospital problems, inpatient lab results, inpatient diagnostic studies, and consultation reports with Viktoria.     Current outpatient and discharge medications have been reconciled for the patient.  Reviewed by: Magen Olson MD          4/27/2024    11:37 AM 11/15/2024     5:27 PM 11/22/2024     5:09 PM 6/18/2025     6:13 PM   Date of TCM Phone Call   [facility]   Date of Admission 4/25/2024 11/11/2024 11/17/2024 6/13/2025   Date of Discharge 4/27/2024 11/15/2024 11/22/2024 6/18/2025   Discharge Disposition Home or Self Care Home or Self Care  Home or Self Care       Risk for Readmission (LACE) Score: 12 (6/18/2025  6:00 AM)      HPI     The patient presents for evaluation of gastrointestinal hemorrhage, anemia, dysuria, and atrial fibrillation.    The patient was hospitalized last week for a hemoglobin drop to 6.5 due to a stomach bleed. The etiology of the tear is uncertain, possibly related to Eliquis or a pre-existing condition exacerbated by the blood thinner. The patient is currently on pantoprazole and received 2 units of RBCs. A gastroenterology follow-up is scheduled in 3 weeks. The patient reports stomach discomfort, nausea when drinking water, weight loss, and a daily water intake of 6-8 glasses. An EGD was performed during hospitalization.    The patient is taking iron tablets but reports constipation, with only one bowel movement since discharge. They are considering daily Dulcolax for management.    The patient reports occasional dysuria, cloudy urine, decreased urine volume, dribbling, pressure, and frequent urination every 15  minutes, raising concerns for a UTI.    The patient is not currently on Eliquis but plans to resume soon. They are on furosemide and spironolactone. The patient has a cardiologist appointment with Dr. Vera on Thursday. Prior to hospitalization, the patient experienced difficulty walking short distances, dyspnea, and fatigue. They were evaluated with a Zio patch for dizziness and fatigue.    MEDICATIONS  Current: Furosemide, spironolactone, pantoprazole, iron tablet.  Discontinued: Eliquis.     Course During Hospital Stay The following information was reviewed by: Magen Olson MD on 06/23/2025:   Hospital Course      Ms. Villasenor is a 84 y.o. femalewho presented to [facility] initially complaining of dyspnea, fatigue.  Please see the admitting history and physical for further details.  She was admitted to the hospital for further evaluation and treatment.       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 GI recommended continuation of PPI and plans for outpatient follow up. Additionally, iron studies obtained, TSAT low at 4, Ferritin added on and low at 8.26, she was treated with IV Ferrlecit x 2 doses and tolerated well. Transition to oral iron at discharge. Recommend close follow up with PCP within 1 week after discharge for reassessment to guide ongoing management decisions. Would recommend repeat CBC at that time to check hemoglobin.      Severe pulmonary hypertension seen on recent echocardiogram  Mitral regurgitation, aortic stenosis  - Cardiology and Pulmonology have evaluated, no acute intervention warranted at present, Cardiology noting plans to follow up after discharge to guide ongoing management.     Acute kidney injury  - Noted on labs previously, held Furosemide and Aldactone, which led to  "improvement. Renal function appeared stable at discharge. Recommend repeat BMP with PCP within 1 week for reassessment.     Leukocytosis  - Noted on labs previously, likely reactive, no signs of infection values normalized by time of discharge.     Atrial fibrillation  - She is on Digoxin and Atenolol. She was cleared to resume Eliquis. Continue medications as prescribed. Follow up with Cardiology as scheduled.     Cirrhosis  - Continue Furosemide and spironolactone after discharge as previously prescribed.      Essential hypertension  - Recommend that patient check blood pressure 2-3 times daily and record those values in log book and take it to next PCP visit to allow for greater insight into treatment efficacy to guide further management decisions. Recommend heart healthy/low sodium diet.     Hyperlipidemia  - Continue statin.     Asthma  - Appears stable, no evidence of exacerbation     GERD  - Continue PPI.   The following portions of the patient's history were reviewed and updated as appropriate: allergies, current medications, past family history, past medical history, past social history, past surgical history, and problem list.     Vitals:    06/23/25 1359   BP: 118/64   Pulse: 74   SpO2: 98%   Weight: 83.9 kg (185 lb)   Height: 165.1 cm (65\")             Objective   Physical Exam      General Appearance: Normal appearance, No acute distress.  Vital signs: BP: 118/64.  Respiratory: No respiratory distress, lungs clear to auscultation with no wheezes or rubs.  Cardiovascular: irregular rate and rhythm with no murmurs, rubs, or gallop. Zio patch in place  Gastrointestinal: Epigastric tenderness noted.  Genitourinary: Female, mild dysuria noted, urine is cloudy per patient history.  Extremities: no pretibial edema bilaterally.  Psychiatric: normal affect, pleasant, cooperative with exam.  Other observations: no carotid bruits auscultated bilaterally.     DATA REVIEWED:  The following data was reviewed by: " Magen Olson MD on 06/23/2025:  ED to Hosp-Admission (Discharged) with Juan Mendez DO; Elisa Umana MD (06/13/2025)   Results  - Laboratory Studies:    - Hemoglobin: 6.5 (dropped from 9.1 on June 18)    - Hematocrit: 30.8   POC Urinalysis Dipstick, Automated (06/23/2025 14:57) normal    Assessment & Plan  Hospital discharge follow-up  Patient is improving except for her urinary symptoms.  She does have follow-up with gastroenterology.  She continues to take pantoprazole.       Gastrointestinal hemorrhage, unspecified gastrointestinal hemorrhage type  Etiology of GI hemorrhage not completely known other than a tear identified with EGD.  Orders:    CBC & Differential    Anemia requiring transfusions  Anemia improved status post 2 units packed red blood cells.  Most recent hemoglobin 9.1.  Side effects from iron therapy include constipation  Orders:    CBC & Differential    Ferritin    Iron Profile w/o Ferritin    CBC & Differential; Future    Cirrhosis of liver without ascites, unspecified hepatic cirrhosis type  Recheck liver function with upcoming labs  Orders:    Comprehensive Metabolic Panel    GER (acute kidney injury)    Mild dysuria noted.  Plan repeating kidney function.  Orders:    Comprehensive Metabolic Panel    Dysuria  Urine is cloudy per patient history.  She does have some burning with urination.  She states that her urine quantity is diminished with dribbling noted. Cause undetermined  Orders:    POC Urinalysis Dipstick, Automated    Persistent atrial fibrillation  Patient has been cleared by hospitalist to resume Eliquis.  Certainly there are risks and benefits to this medication based on her recent history.  I concur that the potential benefits of the Eliquis might outweigh the risks.  Patient will discuss this issue as well with her cardiologist this Thursday          Follow Up     Return in 31 days (on 7/24/2025) for next scheduled follow up.        Patient or patient representative  verbalized consent for the use of Ambient Listening during the visit with  Magen Olson MD for chart documentation. 6/23/2025  14:41 EDT

## 2025-06-23 NOTE — ASSESSMENT & PLAN NOTE
Anemia improved status post 2 units packed red blood cells.  Most recent hemoglobin 9.1.  Side effects from iron therapy include constipation  Orders:    CBC & Differential    Ferritin    Iron Profile w/o Ferritin    CBC & Differential; Future

## 2025-06-23 NOTE — ASSESSMENT & PLAN NOTE
Patient has been cleared by hospitalist to resume Eliquis.  Certainly there are risks and benefits to this medication based on her recent history.  I concur that the potential benefits of the Eliquis might outweigh the risks.  Patient will discuss this issue as well with her cardiologist this Thursday        Patient

## 2025-06-23 NOTE — ASSESSMENT & PLAN NOTE
Etiology of GI hemorrhage not completely known other than a tear identified with EGD.  Orders:    CBC & Differential

## 2025-06-24 ENCOUNTER — READMISSION MANAGEMENT (OUTPATIENT)
Dept: CALL CENTER | Facility: HOSPITAL | Age: 85
End: 2025-06-24
Payer: MEDICARE

## 2025-06-24 LAB
ALBUMIN SERPL-MCNC: 4.4 G/DL (ref 3.7–4.7)
ALP SERPL-CCNC: 88 IU/L (ref 44–121)
ALT SERPL-CCNC: 17 IU/L (ref 0–32)
AST SERPL-CCNC: 30 IU/L (ref 0–40)
BASOPHILS # BLD AUTO: 0.1 X10E3/UL (ref 0–0.2)
BASOPHILS NFR BLD AUTO: 1 %
BILIRUB SERPL-MCNC: 0.7 MG/DL (ref 0–1.2)
BUN SERPL-MCNC: 11 MG/DL (ref 8–27)
BUN/CREAT SERPL: 10 (ref 12–28)
CALCIUM SERPL-MCNC: 9.5 MG/DL (ref 8.7–10.3)
CHLORIDE SERPL-SCNC: 100 MMOL/L (ref 96–106)
CO2 SERPL-SCNC: 25 MMOL/L (ref 20–29)
CREAT SERPL-MCNC: 1.05 MG/DL (ref 0.57–1)
EGFRCR SERPLBLD CKD-EPI 2021: 53 ML/MIN/1.73
EOSINOPHIL # BLD AUTO: 0.3 X10E3/UL (ref 0–0.4)
EOSINOPHIL NFR BLD AUTO: 3 %
ERYTHROCYTE [DISTWIDTH] IN BLOOD BY AUTOMATED COUNT: 24.2 % (ref 11.7–15.4)
FERRITIN SERPL-MCNC: 224 NG/ML (ref 15–150)
GLOBULIN SER CALC-MCNC: 2.5 G/DL (ref 1.5–4.5)
GLUCOSE SERPL-MCNC: 98 MG/DL (ref 70–99)
HCT VFR BLD AUTO: 37.1 % (ref 34–46.6)
HGB BLD-MCNC: 10.5 G/DL (ref 11.1–15.9)
IMM GRANULOCYTES # BLD AUTO: 0 X10E3/UL (ref 0–0.1)
IMM GRANULOCYTES NFR BLD AUTO: 0 %
IRON SATN MFR SERPL: 14 % (ref 15–55)
IRON SERPL-MCNC: 59 UG/DL (ref 27–139)
LYMPHOCYTES # BLD AUTO: 2.3 X10E3/UL (ref 0.7–3.1)
LYMPHOCYTES NFR BLD AUTO: 20 %
MCH RBC QN AUTO: 21.6 PG (ref 26.6–33)
MCHC RBC AUTO-ENTMCNC: 28.3 G/DL (ref 31.5–35.7)
MCV RBC AUTO: 77 FL (ref 79–97)
MONOCYTES # BLD AUTO: 1.1 X10E3/UL (ref 0.1–0.9)
MONOCYTES NFR BLD AUTO: 10 %
MORPHOLOGY BLD-IMP: ABNORMAL
NEUTROPHILS # BLD AUTO: 7.7 X10E3/UL (ref 1.4–7)
NEUTROPHILS NFR BLD AUTO: 66 %
PLATELET # BLD AUTO: 268 X10E3/UL (ref 150–450)
POTASSIUM SERPL-SCNC: 4.8 MMOL/L (ref 3.5–5.2)
PROT SERPL-MCNC: 6.9 G/DL (ref 6–8.5)
RBC # BLD AUTO: 4.85 X10E6/UL (ref 3.77–5.28)
SODIUM SERPL-SCNC: 141 MMOL/L (ref 134–144)
TIBC SERPL-MCNC: 415 UG/DL (ref 250–450)
UIBC SERPL-MCNC: 356 UG/DL (ref 118–369)
WBC # BLD AUTO: 11.6 X10E3/UL (ref 3.4–10.8)

## 2025-06-24 NOTE — OUTREACH NOTE
Medical Week 2 Survey      Flowsheet Row Responses   [facility] patient discharged from? [place]   Does the patient have one of the following disease processes/diagnoses(primary or secondary)? Other   Week 2 attempt successful? No   Unsuccessful attempts Attempt 1   oke Karson Guerra Registered Nurse

## 2025-06-25 ENCOUNTER — RESULTS FOLLOW-UP (OUTPATIENT)
Dept: FAMILY MEDICINE CLINIC | Facility: CLINIC | Age: 85
End: 2025-06-25
Payer: MEDICARE

## 2025-06-25 NOTE — PROGRESS NOTES
Please give the patient the following message:  Your test results have some minor abnormalities that do not require any change in your treatment at this time.  Your anemia is improving.  Minor changes in the complete blood cell count which I think will settle out with time.  Please keep our next scheduled follow-up in July.  Dr. Olson

## 2025-06-25 NOTE — LETTER
Viktoria Villasenor  [address] [facility] [address]    June 25, 2025     Dear Ms. Villasenor:    Below are the results from your recent visit:    Resulted Orders   Comprehensive Metabolic Panel   Result Value Ref Range    Glucose 98 70 - 99 mg/dL    BUN 11 8 - 27 mg/dL    Creatinine 1.05 (H) 0.57 - 1.00 mg/dL    EGFR Result 53 (L) >59 mL/min/1.73    BUN/Creatinine Ratio 10 (L) 12 - 28    Sodium 141 134 - 144 mmol/L    Potassium 4.8 3.5 - 5.2 mmol/L    Chloride 100 96 - 106 mmol/L    Total CO2 25 20 - 29 mmol/L    Calcium 9.5 8.7 - 10.3 mg/dL    Total Protein 6.9 6.0 - 8.5 g/dL    Albumin 4.4 3.7 - 4.7 g/dL    Globulin 2.5 1.5 - 4.5 g/dL    Total Bilirubin 0.7 0.0 - 1.2 mg/dL    Alkaline Phosphatase 88 44 - 121 IU/L    AST (SGOT) 30 0 - 40 IU/L    ALT (SGPT) 17 0 - 32 IU/L   CBC & Differential   Result Value Ref Range    WBC 11.6 (H) 3.4 - 10.8 x10E3/uL    RBC 4.85 3.77 - 5.28 x10E6/uL    Hemoglobin 10.5 (L) 11.1 - 15.9 g/dL    Hematocrit 37.1 34.0 - 46.6 %    MCV 77 (L) 79 - 97 fL    MCH 21.6 (L) 26.6 - 33.0 pg    MCHC 28.3 (L) 31.5 - 35.7 g/dL    RDW 24.2 (H) 11.7 - 15.4 %    Platelets 268 150 - 450 x10E3/uL    Neutrophil Rel % 66 Not Estab. %    Lymphocyte Rel % 20 Not Estab. %    Monocyte Rel % 10 Not Estab. %    Eosinophil Rel % 3 Not Estab. %    Basophil Rel % 1 Not Estab. %    Neutrophils Absolute 7.7 (H) 1.4 - 7.0 x10E3/uL    Lymphocytes Absolute 2.3 0.7 - 3.1 x10E3/uL    Monocytes Absolute 1.1 (H) 0.1 - 0.9 x10E3/uL    Eosinophils Absolute 0.3 0.0 - 0.4 x10E3/uL    Basophils Absolute 0.1 0.0 - 0.2 x10E3/uL    Immature Granulocyte Rel % 0 Not Estab. %    Immature Grans Absolute 0.0 0.0 - 0.1 x10E3/uL    Hematology Comments: Note:       Comment:      Verified by microscopic examination.   Ferritin   Result Value Ref Range    Ferritin 224 (H) 15 - 150 ng/mL   Iron Profile w/o Ferritin   Result Value Ref Range    TIBC 415 250 - 450 ug/dL    UIBC 356 118 - 369 ug/dL    Iron 59 27 - 139 ug/dL     Iron Saturation 14 (L) 15 - 55 %   POC Urinalysis Dipstick, Automated   Result Value Ref Range    Color Yellow Yellow, Straw, Dark Yellow, Lolly    Clarity, UA Clear Clear    Specific Gravity  1.015 1.005 - 1.030    pH, Urine 7.0 5.0 - 8.0    Leukocytes Negative Negative    Nitrite, UA Negative Negative    Protein, POC Negative Negative mg/dL    Glucose, UA Negative Negative mg/dL    Ketones, UA Negative Negative    Urobilinogen, UA Normal Normal, 0.2 E.U./dL    Bilirubin Negative Negative    Blood, UA Negative Negative    Lot Number [id number]     Expiration Date 3-31-26        Your test results have some minor abnormalities that do not require any change in your treatment at this time.  Your anemia is improving.  Minor changes in the complete blood cell count which I think will settle out with time.  Please keep our next scheduled follow-up in July.       Sincerely,        Magen Olson MD

## 2025-06-25 NOTE — PROGRESS NOTES
RM:________     PCP: Magen Olson MD          Last EKG :  __________    : 1940  AGE: 84 y.o.    REASON FOR VISIT: __________________________________________    ____________________________________________________________                                                   Wt Readings from Last 3 Encounters:   25 83.9 kg (185 lb)   25 87.5 kg (192 lb 14.4 oz)   25 87.5 kg (193 lb)      BP Readings from Last 3 Encounters:   25 118/64   25 98/59   25 122/64      WT: ____________ HT: ______ BP: __________ HR ______   02% _______               Lipid Panel          1/15/2025    10:14   Lipid Panel   Total Cholesterol 114    Triglycerides 112    HDL Cholesterol 37    VLDL Cholesterol 21    LDL Cholesterol  56

## 2025-06-26 ENCOUNTER — OFFICE VISIT (OUTPATIENT)
Dept: CARDIOLOGY | Age: 85
End: 2025-06-26
Payer: MEDICARE

## 2025-06-26 VITALS
HEART RATE: 93 BPM | SYSTOLIC BLOOD PRESSURE: 124 MMHG | WEIGHT: 184 LBS | BODY MASS INDEX: 30.66 KG/M2 | OXYGEN SATURATION: 96 % | HEIGHT: 65 IN | DIASTOLIC BLOOD PRESSURE: 68 MMHG

## 2025-06-26 DIAGNOSIS — I48.19 PERSISTENT ATRIAL FIBRILLATION: Primary | ICD-10-CM

## 2025-06-26 DIAGNOSIS — R06.02 SHORTNESS OF BREATH: ICD-10-CM

## 2025-06-26 DIAGNOSIS — I10 ESSENTIAL HYPERTENSION: ICD-10-CM

## 2025-06-26 RX ORDER — DIGOXIN 125 MCG
125 TABLET ORAL DAILY
Qty: 90 TABLET | Refills: 3 | Status: SHIPPED | OUTPATIENT
Start: 2025-06-26

## 2025-06-26 RX ORDER — FERROUS SULFATE 325(65) MG
325 TABLET ORAL
Qty: 30 TABLET | Refills: 0 | Status: SHIPPED | OUTPATIENT
Start: 2025-06-26

## 2025-06-26 NOTE — PROGRESS NOTES
CARDIOLOGY        Patient Name: Viktoria Villasenor  :1940  Age: 84 y.o.  Primary Cardiologist: Chuy Orellana MD  Encounter Provider:  WARREN Hay    Date of Service: 2025      CHIEF COMPLAINT / REASON FOR OFFICE VISIT     Hospital Follow Up Visit (2025) and Anemia      HPI  Viktoria Villasenor is a 84 y.o. female who presents today for hospital reevaluation.    Pt has a  history significant for atrial fibrillation, hypertension, liver cirrhosis.    Patient called the office 2025 with complaints of feeling that she was in atrial fibrillation, dizziness and dyspnea.    She was me in clinic 2025.  At that time she was having increased fatigue and severe shortness of breath at rest that worsened with exertion.  Patient was sent for outpatient testing.  She had some outpatient labs that revealed hemoglobin 7.0.  She did have echocardiogram.  Patient was referred to the emergency department for admission.    Echocardiogram 2025.  LVEF 64%.  Diastolic function indeterminate.  Aortic valve sclerosis with borderline aortic valve stenosis.  Mean pressure gradient 10 mmHg.  Moderate mitral valve regurgitation.  Moderate to severe tricuspid valve regurgitation.  Calculated RVSP 70 mmHg.  Outpatient monitor placed to assess patient's atrial fibrillation burden.    Patient was hospitalized  - 2025 for symptomatic anemia.  Patient did receive PRBC transfusions.  She was also treated with PPI.  Patient had bidirectional scoping and no source of bleeding was recommended.  GI recommended outpatient PPI and close follow-up.  Iron studies did reveal a iron deficiency anemia and patient was treated with IV iron x 2 doses.  She was discharged with oral iron.  Patient will follow closely with PCP.  Patient was cleared to resume apixaban.    Patient presents today for hospital follow-up.  She reports that she feels significantly better now that her blood counts have  "normalized.  She saw her PCP recently and laboratory studies were checked and hemoglobin is remaining stable.  He will follow-up closely.  At this time she remains on iron supplementation.  She is back on apixaban.  Denies complaints at this time.      HISTORY OF PRESENT ILLNESS       Echocardiogram 4/25/2024.  LVEF 66-70%.  Normal RV cavity size and systolic function.  Aortic valve leaflets are mildly to moderately calcified.  Moderate mitral valve regurgitation.    Cardiac catheterization 4/26/2024.  Normal coronary angiography.    Medical record review reveals that patient was recently hospitalized with discharge date 11/15/2024.  Patient was found to be volume overloaded secondary to decompensated cirrhosis.  She also had new onset atrial fibrillation with RVR.  Given her history of liver cirrhosis and duodenitis she was not started on anticoagulation.  She was challenging to get heart rates controlled and she was discharged with metoprolol every 8 hours as well as furosemide and spironolactone.    Unfortunately, 2 days later patient again required hospitalization and was ultimately discharged 11/22/2024.  Patient was found to have acute bronchitis secondary to rhinovirus.  She again had challenges getting heart rates controlled.  She was also started on steroids.  At time of discharge patient was on atenolol as well as digoxin with adequate heart rate control.  Again, avoided systemic anticoagulation secondary to duodenitis and liver cirrhosis.      The following portions of the patient's history were reviewed and updated as appropriate: allergies, current medications, past family history, past medical history, past social history, past surgical history and problem list.      VITAL SIGNS     Visit Vitals  /68 (BP Location: Left arm)   Pulse 93   Ht 165.1 cm (65\")   Wt 83.5 kg (184 lb)   SpO2 96%   BMI 30.62 kg/m²           Wt Readings from Last 3 Encounters:   06/26/25 83.5 kg (184 lb)   06/23/25 83.9 kg " (185 lb)   06/13/25 87.5 kg (192 lb 14.4 oz)     Body mass index is 30.62 kg/m².      REVIEW OF SYSTEMS     Review of Systems   Constitutional: Negative for chills, fever, malaise/fatigue, weight gain and weight loss.   Cardiovascular:  Negative for dyspnea on exertion, irregular heartbeat, leg swelling and palpitations.   Respiratory:  Negative for cough, snoring and wheezing.    Hematologic/Lymphatic: Negative for bleeding problem. Does not bruise/bleed easily.   Skin:  Negative for color change.   Musculoskeletal:  Negative for falls, joint pain and myalgias.   Gastrointestinal:  Negative for melena and nausea.   Genitourinary:  Negative for hematuria.   Neurological:  Negative for excessive daytime sleepiness, dizziness and headaches.   Psychiatric/Behavioral:  Negative for depression. The patient is not nervous/anxious.            PHYSICAL EXAMINATION     Vitals and nursing note reviewed.   Constitutional:       Appearance: Normal appearance. Well-developed.   Eyes:      Conjunctiva/sclera: Conjunctivae normal.   Neck:      Vascular: No carotid bruit.   Pulmonary:      Breath sounds: Normal breath sounds.   Cardiovascular:      Normal rate. Irregularly irregular rhythm. Normal S1 with normal intensity. Normal S2 with normal intensity.       Murmurs: There is no murmur.      No gallop.  No click. No rub.   Edema:     Peripheral edema absent.   Musculoskeletal: Normal range of motion. Skin:     General: Skin is warm and dry.   Neurological:      Mental Status: Alert and oriented to person, place, and time.      GCS: GCS eye subscore is 4. GCS verbal subscore is 5. GCS motor subscore is 6.   Psychiatric:         Speech: Speech normal.         Behavior: Behavior normal.         Thought Content: Thought content normal.         Judgment: Judgment normal.           REVIEWED DATA     Procedures        Lipid Panel          1/15/2025    10:14   Lipid Panel   Total Cholesterol 114    Triglycerides 112    HDL Cholesterol  37    VLDL Cholesterol 21    LDL Cholesterol  56        Lab Results   Component Value Date     06/23/2025     06/18/2025    K 4.8 06/23/2025    K 4.4 06/18/2025     06/23/2025     06/18/2025    CO2 25 06/23/2025    CO2 21.0 (L) 06/18/2025    BUN 11 06/23/2025    BUN 13.0 06/18/2025    CREATININE 1.05 (H) 06/23/2025    CREATININE 1.07 (H) 06/18/2025    EGFRIFNONA 66 04/07/2021    EGFRIFNONA 63 09/25/2019    EGFRIFAFRI 81 04/07/2021    EGFRIFAFRI 77 09/25/2019    GLUCOSE 98 06/23/2025    GLUCOSE 94 06/18/2025    CALCIUM 9.5 06/23/2025    CALCIUM 8.7 06/18/2025    ALBUMIN 4.4 06/23/2025    ALBUMIN 3.8 06/13/2025    BILITOT 0.7 06/23/2025    BILITOT 0.6 06/13/2025    AST 30 06/23/2025    AST 17 06/13/2025    ALT 17 06/23/2025    ALT 10 06/13/2025     Lab Results   Component Value Date    WBC 11.6 (H) 06/23/2025    WBC 10.67 06/18/2025    HGB 10.5 (L) 06/23/2025    HGB 9.1 (L) 06/18/2025    HCT 37.1 06/23/2025    HCT 30.8 (L) 06/18/2025    MCV 77 (L) 06/23/2025    MCV 69.5 (L) 06/18/2025     06/23/2025     06/18/2025     Lab Results   Component Value Date    PROBNP 2,911.0 (H) 06/13/2025    PROBNP 1,323.0 11/11/2024     Lab Results   Component Value Date    CKTOTAL 54 01/15/2025    TROPONINT 17 (H) 06/13/2025     Lab Results   Component Value Date    TSH 1.530 06/13/2025    TSH 1.190 11/12/2024             ASSESSMENT & PLAN     Diagnoses and all orders for this visit:    1. Persistent atrial fibrillation (Primary)  Overview:  CHADS-VASc Risk Assessment               4 Total Score    1 Hypertension    2 Age >/= 75    1 Sex: Female    Criteria that do not apply:    CHF    DM    PRIOR STROKE/TIA/THROMBO    Vascular Disease    Age 65-74              Assessment & Plan:  Patient has resumed Eliquis.  We will continue to monitor hemoglobin closely.  Hopefully with iron supplementation it will stay within normal range.  If hemoglobin begins to drop again we may need to reconsider  risk-benefit ratio of apixaban.  If this does shift, would send patient for evaluation for watchman.  Continue atenolol 50 mg/day      2. Essential hypertension  Overview:  Atenolol 50 mg twice daily  Furosemide 20 mg/day  Spironolactone 25 mg/day    Assessment & Plan:  Hypertension is stable and controlled  Continue current treatment regimen.  Dietary sodium restriction.  Regular aerobic exercise.  Ambulatory blood pressure monitoring.  Blood pressure will be reassessed in 6 months.      3. Shortness of breath  Assessment & Plan:  Resolved now that hemoglobin is improved.  PCP monitoring hemoglobin levels closely.      Other orders  -     digoxin (LANOXIN) 125 MCG tablet; Take 1 tablet by mouth Daily.  Dispense: 90 tablet; Refill: 3  -     ferrous sulfate 325 (65 FE) MG tablet; Take 1 tablet by mouth Daily With Breakfast.  Dispense: 30 tablet; Refill: 0          Future Appointments         Provider Department Center    7/24/2025 10:30 AM (Arrive by 10:15 AM) Magen Olson MD [facility] [place]    9/8/2025 9:00 AM (Arrive by 8:45 AM) Samara Salvador APRN [facility] GASTROENTEROLOGY [place]    10/28/2025 11:40 AM (Arrive by 11:25 AM) Chuy Orellana MD [facility] CARDIOLOGY [place]              MEDICATIONS         Discharge Medications            Accurate as of June 26, 2025 11:46 AM. If you have any questions, ask your nurse or doctor.                Continue These Medications        Instructions Start Date   apixaban 5 MG tablet tablet  Commonly known as: ELIQUIS   5 mg, Oral, 2 Times Daily      atenolol 50 MG tablet  Commonly known as: TENORMIN   50 mg, Oral, 2 Times Daily      atorvastatin 10 MG tablet  Commonly known as: LIPITOR   10 mg, Oral, Nightly      digoxin 125 MCG tablet  Commonly known as: LANOXIN   125 mcg, Oral, Daily      ferrous sulfate 325 (65 FE) MG tablet   325 mg, Oral, Daily With Breakfast      furosemide 20 MG tablet  Commonly known  as: LASIX   20 mg, Oral, Daily      pantoprazole 40 MG EC tablet  Commonly known as: PROTONIX   40 mg, Oral, Every Early Morning      spironolactone 25 MG tablet  Commonly known as: ALDACTONE   25 mg, Oral, Daily                   **Dragon Disclaimer:   Much of this encounter note is an electronic transcription/translation of spoken language to printed text. The electronic translation of spoken language may permit erroneous, or at times, nonsensical words or phrases to be inadvertently transcribed. Although I have reviewed the note for such errors, some may still exist.

## 2025-06-26 NOTE — ASSESSMENT & PLAN NOTE
Patient has resumed Eliquis.  We will continue to monitor hemoglobin closely.  Hopefully with iron supplementation it will stay within normal range.  If hemoglobin begins to drop again we may need to reconsider risk-benefit ratio of apixaban.  If this does shift, would send patient for evaluation for watchman.  Continue atenolol 50 mg/day

## 2025-07-24 ENCOUNTER — OFFICE VISIT (OUTPATIENT)
Dept: FAMILY MEDICINE CLINIC | Facility: CLINIC | Age: 85
End: 2025-07-24
Payer: MEDICARE

## 2025-07-24 VITALS
SYSTOLIC BLOOD PRESSURE: 116 MMHG | DIASTOLIC BLOOD PRESSURE: 62 MMHG | HEIGHT: 65 IN | BODY MASS INDEX: 30.16 KG/M2 | WEIGHT: 181 LBS | OXYGEN SATURATION: 97 % | HEART RATE: 88 BPM

## 2025-07-24 DIAGNOSIS — E78.49 OTHER HYPERLIPIDEMIA: Primary | ICD-10-CM

## 2025-07-24 DIAGNOSIS — I48.91 ATRIAL FIBRILLATION WITH RAPID VENTRICULAR RESPONSE: ICD-10-CM

## 2025-07-24 DIAGNOSIS — R91.1 LUNG NODULE SEEN ON IMAGING STUDY: ICD-10-CM

## 2025-07-24 RX ORDER — ATORVASTATIN CALCIUM 10 MG/1
10 TABLET, FILM COATED ORAL NIGHTLY
Qty: 90 TABLET | Refills: 2 | Status: SHIPPED | OUTPATIENT
Start: 2025-07-24 | End: 2026-04-20

## 2025-07-24 NOTE — ASSESSMENT & PLAN NOTE
{Hyperlipidemia A/P Block (Optional):[id number]}  The current medical regimen is effective;  continue present plan and medications.    Orders:    atorvastatin (LIPITOR) 10 MG tablet; Take 1 tablet by mouth Every Night for 270 days.    Comprehensive Metabolic Panel; Future    CBC & Differential; Future    CK; Future    Lipid Panel With / Chol / HDL Ratio; Future

## 2025-07-24 NOTE — PROGRESS NOTES
"Chief Complaint  Follow-up (6 month), Enlarged lymph node, Atrial Fibrillation, Hyperlipidemia, Hypertension, and Obesity    Subjective        Atrial Fibrillation  Past medical history includes atrial fibrillation and hyperlipidemia.   Hyperlipidemia  Exacerbating diseases include obesity.   Hypertension  Obesity  Primary Care Follow-Up  Conditions present: hyperlipidemia    Treatment compliance:  All of the time  Hyperlipidemia:     Exacerbating diseases: obesity          84-year-old female presents for evaluation of a lung nodule, anemia, and medication management.    CT abdomen with contrast revealed a right middle lobe lung nodule. The patient discontinued iron supplement due to constipation. She resumed Eliquis and stopped digoxin per Dr. Orellana's instructions. She is currently on atorvastatin.    MEDICATIONS  Current: Eliquis, atorvastatin  Discontinued: digoxin           Vital Signs:   Vitals:    07/24/25 1009   BP: 116/62   Pulse: 88   SpO2: 97%   Weight: 82.1 kg (181 lb)   Height: 165.1 cm (65\")            1/23/2025     9:23 AM   PHQ-2/PHQ-9 Depression Screening   Little interest or pleasure in doing things Not at all   Feeling down, depressed, or hopeless Not at all                 Physical Exam     General Appearance: Normal appearance, No acute distress.  Vital signs: Normal respiratory rate.  Respiratory: No respiratory distress, lungs clear to auscultation with no wheezes or rubs.  Cardiovascular: Irregular but controlled heart rate, no murmurs, rubs, or gallops.  Extremities: no pretibial edema bilaterally.  Psychiatric: normal affect, pleasant, cooperative with exam.  Other observations: no carotid bruits auscultated bilaterally.       The following data was reviewed by: Magen Olson MD on 07/24/2025:      Results  - Laboratory Studies (07/17/2025):    - WBC: 7.67    - Hgb: 13.2    - Hct: 42.7    - MCH: 25.1    - MCHC: 30.9    - Imaging:    - CT abdomen: 0.6 cm lung nodule in right middle lobe   "              Assessment and Plan      1. Right middle lobe lung nodule.  Ordered low-dose CT chest without contrast to monitor nodule size. No further action if nodule remains same size or resolves.    2. Anemia.  Hgb improved to 13.2, indicating resolution. Stopped iron supplements due to constipation. Repeat labs in 6 months.    3. Medication management.  Resumed Eliquis and atorvastatin. Sent prescriptions for 90-day supply with two refills to [facility] [place] [facility]. Advised to arrange monthly payment plan for Medicare A and B coverage.    Follow-up  Follow up in 6 months for annual wellness visit.         Other hyperlipidemia     The current medical regimen is effective;  continue present plan and medications.    Orders:    atorvastatin (LIPITOR) 10 MG tablet; Take 1 tablet by mouth Every Night for 270 days.    Comprehensive Metabolic Panel; Future    CBC & Differential; Future    CK; Future    Lipid Panel With / Chol / HDL Ratio; Future    Atrial fibrillation with rapid ventricular response  The current medical regimen is effective;  continue present plan and medications.    Orders:    apixaban (ELIQUIS) 5 MG tablet tablet; Take 1 tablet by mouth 2 (Two) Times a Day for 270 days.    TSH; Future    Lung nodule seen on imaging study  0.6 cm nodule seen on recent imaging study.  She is due for CT scan follow-up of the lung  Orders:    CT Chest Low Dose Follow Up Without Contrast; Future       Return in about 6 months (around 1/24/2026) for Medicare Wellness & regular visit, [id number] min.     Patient was given instructions and counseling regarding her condition or for health maintenance advice. Please see specific information pulled into the AVS if appropriate.     Patient or patient representative verbalized consent for the use of Ambient Listening during the visit with  Magen Olson MD for chart documentation. 7/24/2025  10:58 EDT

## 2025-07-30 DIAGNOSIS — R91.1 LUNG NODULE SEEN ON IMAGING STUDY: Primary | ICD-10-CM

## 2025-08-28 ENCOUNTER — HOSPITAL ENCOUNTER (OUTPATIENT)
Facility: HOSPITAL | Age: 85
Discharge: HOME | End: 2025-08-28
Admitting: FAMILY MEDICINE
Payer: MEDICARE

## 2025-08-28 DIAGNOSIS — R91.1 LUNG NODULE SEEN ON IMAGING STUDY: ICD-10-CM

## 2025-08-28 PROCEDURE — 71250 CT THORAX DX C-: CPT

## (undated) DEVICE — LN SMPL CO2 SHTRM SD STREAM W/M LUER

## (undated) DEVICE — ADAPT CLN BIOGUARD AIR/H2O DISP

## (undated) DEVICE — BLCK/BITE BLOX W/DENTL/RIM W/STRAP 54F

## (undated) DEVICE — CATH DIAG IMPULSE FR5 5F 100CM

## (undated) DEVICE — CATH DIAG IMPULSE FL3.5 5F 100CM

## (undated) DEVICE — CANNULA,ADULT,SOFT-TOUCH,7TUBE,SC: Brand: MEDLINE

## (undated) DEVICE — CANN NASL CO2 TRULINK W/O2 A/

## (undated) DEVICE — CATH DIAG IMPULSE PIG 5F 100CM

## (undated) DEVICE — FRCP BX RADJAW4 NDL 2.8 240CM LG OG BX40

## (undated) DEVICE — KT MANIFLD CARDIAC

## (undated) DEVICE — TUBING, SUCTION, 1/4" X 10', STRAIGHT: Brand: MEDLINE

## (undated) DEVICE — CANN O2 ETCO2 FITS ALL CONN CO2 SMPL A/ 7IN DISP LF

## (undated) DEVICE — DGW .035 FC J3MM 260CM TEF: Brand: EMERALD

## (undated) DEVICE — KT ORCA ORCAPOD DISP STRL

## (undated) DEVICE — SENSR O2 OXIMAX FNGR A/ 18IN NONSTR

## (undated) DEVICE — ENDOGATOR CHANNEL ADAPTER: Brand: ENDOGATOR

## (undated) DEVICE — PK CATH CARD 40

## (undated) DEVICE — GLIDESHEATH SLENDER STAINLESS STEEL KIT: Brand: GLIDESHEATH SLENDER

## (undated) DEVICE — FIAPC® PROBE W/ FILTER 2200 A OD 2.3MM/6.9FR; L 2.2M/7.2FT: Brand: ERBE